# Patient Record
Sex: FEMALE | Race: WHITE | NOT HISPANIC OR LATINO | Employment: UNEMPLOYED | URBAN - NONMETROPOLITAN AREA
[De-identification: names, ages, dates, MRNs, and addresses within clinical notes are randomized per-mention and may not be internally consistent; named-entity substitution may affect disease eponyms.]

---

## 2023-09-07 PROBLEM — G60.9 IDIOPATHIC NEUROPATHY: Status: ACTIVE | Noted: 2023-09-07

## 2023-09-07 PROBLEM — E07.9 THYROID DISEASE: Status: ACTIVE | Noted: 2023-09-07

## 2023-09-07 PROBLEM — E89.0 POSTPROCEDURAL HYPOTHYROIDISM: Status: ACTIVE | Noted: 2023-09-07

## 2023-09-07 PROBLEM — M19.90 OSTEOARTHRITIS: Status: ACTIVE | Noted: 2023-09-07

## 2023-09-07 PROBLEM — R20.0 NUMBNESS: Status: ACTIVE | Noted: 2023-09-07

## 2023-09-07 PROBLEM — R76.0 ABNORMAL ANTINUCLEAR ANTIBODY TITER: Status: ACTIVE | Noted: 2023-09-07

## 2023-09-07 PROBLEM — E55.9 VITAMIN D DEFICIENCY: Status: ACTIVE | Noted: 2023-09-07

## 2023-09-07 PROBLEM — H35.3190 NONEXUDATIVE AGE-RELATED MACULAR DEGENERATION: Status: ACTIVE | Noted: 2023-09-07

## 2023-09-07 PROBLEM — M19.90 ARTHRITIS: Status: ACTIVE | Noted: 2023-09-07

## 2023-09-07 RX ORDER — ASPIRIN 325 MG
TABLET ORAL
COMMUNITY
End: 2024-02-09 | Stop reason: ENTERED-IN-ERROR

## 2023-09-07 RX ORDER — TRIAMCINOLONE ACETONIDE 55 UG/1
SPRAY, METERED NASAL
COMMUNITY
Start: 2018-08-20 | End: 2024-01-09 | Stop reason: ALTCHOICE

## 2023-09-07 RX ORDER — LEVOTHYROXINE SODIUM 112 UG/1
TABLET ORAL
COMMUNITY
Start: 2018-08-20 | End: 2024-01-09 | Stop reason: ALTCHOICE

## 2023-09-07 RX ORDER — DIAPER,BRIEF,INFANT-TODD,DISP
1 EACH MISCELLANEOUS DAILY
COMMUNITY
Start: 2018-08-20 | End: 2024-02-09 | Stop reason: ENTERED-IN-ERROR

## 2023-09-07 RX ORDER — ERYTHROMYCIN 5 MG/G
OINTMENT OPHTHALMIC
COMMUNITY
End: 2024-01-09 | Stop reason: ALTCHOICE

## 2023-09-07 RX ORDER — VIT A/VIT C/VIT E/ZINC/COPPER 2148-113
1 TABLET ORAL 2 TIMES DAILY
Status: ON HOLD | COMMUNITY
End: 2024-02-09 | Stop reason: SINTOL

## 2023-09-07 RX ORDER — TRIAMCINOLONE ACETONIDE 1 MG/G
CREAM TOPICAL
COMMUNITY
Start: 2018-08-20 | End: 2024-02-09 | Stop reason: ENTERED-IN-ERROR

## 2023-09-07 RX ORDER — TRAVOPROST OPHTHALMIC SOLUTION 0.04 MG/ML
1 SOLUTION OPHTHALMIC NIGHTLY
COMMUNITY
Start: 2018-08-20

## 2023-09-07 RX ORDER — SOY PROTEIN
1 POWDER (GRAM) ORAL DAILY
COMMUNITY
Start: 2018-08-20 | End: 2024-02-09 | Stop reason: ENTERED-IN-ERROR

## 2023-09-07 RX ORDER — ONDANSETRON 4 MG/1
4 TABLET, FILM COATED ORAL EVERY 8 HOURS PRN
COMMUNITY
End: 2024-04-05 | Stop reason: ALTCHOICE

## 2023-09-07 RX ORDER — LEVOTHYROXINE SODIUM 75 UG/1
TABLET ORAL
COMMUNITY
End: 2023-10-05

## 2023-09-07 RX ORDER — ACETAMINOPHEN 500 MG
1 TABLET ORAL DAILY
COMMUNITY

## 2023-09-07 RX ORDER — CLOBETASOL PROPIONATE 0.5 MG/G
EMULSION TOPICAL
COMMUNITY
Start: 2018-08-20 | End: 2024-02-09 | Stop reason: ENTERED-IN-ERROR

## 2023-10-05 DIAGNOSIS — E07.9 THYROID DISEASE: Primary | ICD-10-CM

## 2023-10-05 DIAGNOSIS — E07.9 THYROID DISEASE: ICD-10-CM

## 2023-10-05 RX ORDER — LEVOTHYROXINE SODIUM 75 UG/1
75 TABLET ORAL DAILY
Qty: 90 TABLET | Refills: 3 | Status: CANCELLED | OUTPATIENT
Start: 2023-10-05 | End: 2024-10-04

## 2023-10-05 RX ORDER — LEVOTHYROXINE SODIUM 75 UG/1
75 TABLET ORAL DAILY
Qty: 90 TABLET | Refills: 3 | Status: SHIPPED | OUTPATIENT
Start: 2023-10-05 | End: 2024-10-04

## 2024-01-09 ENCOUNTER — OFFICE VISIT (OUTPATIENT)
Dept: PRIMARY CARE | Facility: CLINIC | Age: 89
End: 2024-01-09
Payer: MEDICARE

## 2024-01-09 ENCOUNTER — ANCILLARY PROCEDURE (OUTPATIENT)
Dept: RADIOLOGY | Facility: CLINIC | Age: 89
End: 2024-01-09
Payer: MEDICARE

## 2024-01-09 VITALS
TEMPERATURE: 98.6 F | HEIGHT: 62 IN | SYSTOLIC BLOOD PRESSURE: 136 MMHG | WEIGHT: 167.4 LBS | DIASTOLIC BLOOD PRESSURE: 80 MMHG | OXYGEN SATURATION: 97 % | BODY MASS INDEX: 30.8 KG/M2 | HEART RATE: 86 BPM

## 2024-01-09 DIAGNOSIS — B02.9 HERPES ZOSTER WITHOUT COMPLICATION: Primary | ICD-10-CM

## 2024-01-09 DIAGNOSIS — M79.671 RIGHT FOOT PAIN: ICD-10-CM

## 2024-01-09 PROBLEM — R53.82 CHRONIC FATIGUE: Status: ACTIVE | Noted: 2024-01-09

## 2024-01-09 PROCEDURE — 1125F AMNT PAIN NOTED PAIN PRSNT: CPT | Performed by: FAMILY MEDICINE

## 2024-01-09 PROCEDURE — 99214 OFFICE O/P EST MOD 30 MIN: CPT | Performed by: FAMILY MEDICINE

## 2024-01-09 PROCEDURE — 73630 X-RAY EXAM OF FOOT: CPT | Mod: RIGHT SIDE | Performed by: RADIOLOGY

## 2024-01-09 PROCEDURE — 1159F MED LIST DOCD IN RCRD: CPT | Performed by: FAMILY MEDICINE

## 2024-01-09 PROCEDURE — 73630 X-RAY EXAM OF FOOT: CPT | Mod: RT

## 2024-01-09 PROCEDURE — 1036F TOBACCO NON-USER: CPT | Performed by: FAMILY MEDICINE

## 2024-01-09 RX ORDER — VALACYCLOVIR HYDROCHLORIDE 1 G/1
1000 TABLET, FILM COATED ORAL 3 TIMES DAILY
Qty: 21 TABLET | Refills: 0 | Status: SHIPPED | OUTPATIENT
Start: 2024-01-09 | End: 2024-01-16

## 2024-01-09 ASSESSMENT — ENCOUNTER SYMPTOMS
COUGH: 0
DEPRESSION: 0
FEVER: 0
SORE THROAT: 0
LOSS OF SENSATION IN FEET: 0
NUMBNESS: 1
SHORTNESS OF BREATH: 0
CHILLS: 0
FATIGUE: 0
OCCASIONAL FEELINGS OF UNSTEADINESS: 0
JOINT SWELLING: 1

## 2024-01-09 ASSESSMENT — PATIENT HEALTH QUESTIONNAIRE - PHQ9
1. LITTLE INTEREST OR PLEASURE IN DOING THINGS: NOT AT ALL
SUM OF ALL RESPONSES TO PHQ9 QUESTIONS 1 AND 2: 0
2. FEELING DOWN, DEPRESSED OR HOPELESS: NOT AT ALL

## 2024-01-09 ASSESSMENT — LIFESTYLE VARIABLES: HOW OFTEN DO YOU HAVE A DRINK CONTAINING ALCOHOL: NEVER

## 2024-01-09 ASSESSMENT — PAIN SCALES - GENERAL: PAINLEVEL: 6

## 2024-01-09 NOTE — PROGRESS NOTES
"Subjective   Patient ID: Nanette Flynn is a 88 y.o. female who presents for Neck Pain and Ear Injury.    HPI pain began 5-6 days ago, pain along neck and jaw into collarbone. Now with rash.      Review of Systems   Constitutional:  Negative for chills, fatigue and fever.   HENT:  Positive for ear pain. Negative for congestion, postnasal drip and sore throat.    Eyes:  Negative for visual disturbance.   Respiratory:  Negative for cough and shortness of breath.    Cardiovascular:  Negative for chest pain.   Musculoskeletal:  Positive for joint swelling (right foot).   Neurological:  Positive for numbness.       Objective   /80   Pulse 86   Temp 37 °C (98.6 °F)   Ht 1.575 m (5' 2\")   Wt 75.9 kg (167 lb 6.4 oz)   SpO2 97%   BMI 30.62 kg/m²     Physical Exam  Constitutional:       Appearance: Normal appearance.   Musculoskeletal:         General: Swelling, tenderness (lateral aspect of right foot) and deformity present.   Skin:     Findings: Erythema and rash present.      Comments: Erythematous rash along c2 distribution with 2 small blisters noted   Neurological:      Mental Status: She is alert.         Assessment/Plan   Problem List Items Addressed This Visit    None  Visit Diagnoses         Codes    Herpes zoster without complication    -  Primary B02.9    Relevant Medications    valACYclovir (Valtrex) 1 gram tablet    Right foot pain     M79.671    Relevant Orders    XR foot right 1-2 views          Call if no better or worse  "

## 2024-01-10 ENCOUNTER — TELEPHONE (OUTPATIENT)
Dept: PRIMARY CARE | Facility: CLINIC | Age: 89
End: 2024-01-10
Payer: MEDICARE

## 2024-01-10 PROBLEM — B02.9 HERPES ZOSTER WITHOUT COMPLICATION: Status: ACTIVE | Noted: 2024-01-10

## 2024-01-10 PROBLEM — M79.671 RIGHT FOOT PAIN: Status: ACTIVE | Noted: 2024-01-10

## 2024-01-10 PROBLEM — H40.1130 PRIMARY OPEN ANGLE GLAUCOMA (POAG) OF BOTH EYES: Status: ACTIVE | Noted: 2022-04-01

## 2024-01-10 PROBLEM — H35.3130 BILATERAL NONEXUDATIVE AGE-RELATED MACULAR DEGENERATION: Status: ACTIVE | Noted: 2022-10-03

## 2024-01-10 NOTE — TELEPHONE ENCOUNTER
Daughter has decided to get the rx for Gabapentin filled.  Please send to [facility] in [place].  Thank you

## 2024-01-11 DIAGNOSIS — B02.9 HERPES ZOSTER WITHOUT COMPLICATION: Primary | ICD-10-CM

## 2024-01-11 DIAGNOSIS — M79.671 RIGHT FOOT PAIN: Primary | ICD-10-CM

## 2024-01-11 RX ORDER — GABAPENTIN 100 MG/1
100 CAPSULE ORAL 2 TIMES DAILY
Qty: 60 CAPSULE | Refills: 5 | Status: SHIPPED | OUTPATIENT
Start: 2024-01-11 | End: 2024-01-25 | Stop reason: SINTOL

## 2024-01-25 ENCOUNTER — OFFICE VISIT (OUTPATIENT)
Dept: PRIMARY CARE | Facility: CLINIC | Age: 89
End: 2024-01-25
Payer: MEDICARE

## 2024-01-25 VITALS
OXYGEN SATURATION: 95 % | HEIGHT: 62 IN | DIASTOLIC BLOOD PRESSURE: 60 MMHG | HEART RATE: 82 BPM | TEMPERATURE: 97.8 F | BODY MASS INDEX: 31.1 KG/M2 | SYSTOLIC BLOOD PRESSURE: 138 MMHG | WEIGHT: 169 LBS

## 2024-01-25 DIAGNOSIS — Z00.00 ROUTINE GENERAL MEDICAL EXAMINATION AT HEALTH CARE FACILITY: Primary | ICD-10-CM

## 2024-01-25 DIAGNOSIS — R76.0 ABNORMAL ANTINUCLEAR ANTIBODY TITER: ICD-10-CM

## 2024-01-25 DIAGNOSIS — B02.29 POST HERPETIC NEURALGIA: ICD-10-CM

## 2024-01-25 DIAGNOSIS — E55.9 VITAMIN D DEFICIENCY: ICD-10-CM

## 2024-01-25 DIAGNOSIS — R53.82 CHRONIC FATIGUE: ICD-10-CM

## 2024-01-25 DIAGNOSIS — E07.9 THYROID DISEASE: ICD-10-CM

## 2024-01-25 LAB
ALBUMIN SERPL BCP-MCNC: 4.1 G/DL (ref 3.4–5)
ALP SERPL-CCNC: 55 U/L (ref 33–136)
ALT SERPL W P-5'-P-CCNC: 22 U/L (ref 7–45)
ANION GAP SERPL CALC-SCNC: 14 MMOL/L (ref 10–20)
AST SERPL W P-5'-P-CCNC: 25 U/L (ref 9–39)
BASOPHILS # BLD AUTO: 0.06 X10*3/UL (ref 0–0.1)
BASOPHILS NFR BLD AUTO: 0.7 %
BILIRUB SERPL-MCNC: 0.5 MG/DL (ref 0–1.2)
BUN SERPL-MCNC: 27 MG/DL (ref 6–23)
CALCIUM SERPL-MCNC: 9.6 MG/DL (ref 8.6–10.3)
CHLORIDE SERPL-SCNC: 102 MMOL/L (ref 98–107)
CO2 SERPL-SCNC: 27 MMOL/L (ref 21–32)
CREAT SERPL-MCNC: 0.94 MG/DL (ref 0.5–1.05)
EGFRCR SERPLBLD CKD-EPI 2021: 59 ML/MIN/1.73M*2
EOSINOPHIL # BLD AUTO: 0.3 X10*3/UL (ref 0–0.4)
EOSINOPHIL NFR BLD AUTO: 3.3 %
ERYTHROCYTE [DISTWIDTH] IN BLOOD BY AUTOMATED COUNT: 13.8 % (ref 11.5–14.5)
GLUCOSE SERPL-MCNC: 98 MG/DL (ref 74–99)
HCT VFR BLD AUTO: 39.7 % (ref 36–46)
HGB BLD-MCNC: 13 G/DL (ref 12–16)
IMM GRANULOCYTES # BLD AUTO: 0.02 X10*3/UL (ref 0–0.5)
IMM GRANULOCYTES NFR BLD AUTO: 0.2 % (ref 0–0.9)
LYMPHOCYTES # BLD AUTO: 3.54 X10*3/UL (ref 0.8–3)
LYMPHOCYTES NFR BLD AUTO: 39 %
MCH RBC QN AUTO: 31.3 PG (ref 26–34)
MCHC RBC AUTO-ENTMCNC: 32.7 G/DL (ref 32–36)
MCV RBC AUTO: 96 FL (ref 80–100)
MONOCYTES # BLD AUTO: 0.87 X10*3/UL (ref 0.05–0.8)
MONOCYTES NFR BLD AUTO: 9.6 %
NEUTROPHILS # BLD AUTO: 4.28 X10*3/UL (ref 1.6–5.5)
NEUTROPHILS NFR BLD AUTO: 47.2 %
NRBC BLD-RTO: 0 /100 WBCS (ref 0–0)
PLATELET # BLD AUTO: 226 X10*3/UL (ref 150–450)
POTASSIUM SERPL-SCNC: 4.3 MMOL/L (ref 3.5–5.3)
PROT SERPL-MCNC: 7.1 G/DL (ref 6.4–8.2)
RBC # BLD AUTO: 4.15 X10*6/UL (ref 4–5.2)
SODIUM SERPL-SCNC: 139 MMOL/L (ref 136–145)
T4 FREE SERPL-MCNC: 0.94 NG/DL (ref 0.61–1.12)
TSH SERPL-ACNC: 1.64 MIU/L (ref 0.44–3.98)
WBC # BLD AUTO: 9.1 X10*3/UL (ref 4.4–11.3)

## 2024-01-25 PROCEDURE — 1170F FXNL STATUS ASSESSED: CPT | Performed by: FAMILY MEDICINE

## 2024-01-25 PROCEDURE — 80053 COMPREHEN METABOLIC PANEL: CPT

## 2024-01-25 PROCEDURE — 1036F TOBACCO NON-USER: CPT | Performed by: FAMILY MEDICINE

## 2024-01-25 PROCEDURE — 84439 ASSAY OF FREE THYROXINE: CPT

## 2024-01-25 PROCEDURE — G0439 PPPS, SUBSEQ VISIT: HCPCS | Performed by: FAMILY MEDICINE

## 2024-01-25 PROCEDURE — 84443 ASSAY THYROID STIM HORMONE: CPT

## 2024-01-25 PROCEDURE — 1159F MED LIST DOCD IN RCRD: CPT | Performed by: FAMILY MEDICINE

## 2024-01-25 PROCEDURE — 1157F ADVNC CARE PLAN IN RCRD: CPT | Performed by: FAMILY MEDICINE

## 2024-01-25 PROCEDURE — 85025 COMPLETE CBC W/AUTO DIFF WBC: CPT

## 2024-01-25 PROCEDURE — 36415 COLL VENOUS BLD VENIPUNCTURE: CPT

## 2024-01-25 PROCEDURE — 1125F AMNT PAIN NOTED PAIN PRSNT: CPT | Performed by: FAMILY MEDICINE

## 2024-01-25 ASSESSMENT — PATIENT HEALTH QUESTIONNAIRE - PHQ9
1. LITTLE INTEREST OR PLEASURE IN DOING THINGS: NOT AT ALL
2. FEELING DOWN, DEPRESSED OR HOPELESS: NOT AT ALL
SUM OF ALL RESPONSES TO PHQ9 QUESTIONS 1 AND 2: 0

## 2024-01-25 ASSESSMENT — ENCOUNTER SYMPTOMS
CONSTIPATION: 0
DYSURIA: 0
FATIGUE: 0
SORE THROAT: 0
NAUSEA: 0
HEMATURIA: 0
HEADACHES: 0
CHILLS: 0
FREQUENCY: 0
WHEEZING: 0
DIARRHEA: 0
COUGH: 0
ABDOMINAL PAIN: 0
VOMITING: 0
ARTHRALGIAS: 0
FEVER: 0
DIZZINESS: 0
NERVOUS/ANXIOUS: 0
BRUISES/BLEEDS EASILY: 0
SHORTNESS OF BREATH: 0
PALPITATIONS: 0
DYSPHORIC MOOD: 0
JOINT SWELLING: 0

## 2024-01-25 ASSESSMENT — ACTIVITIES OF DAILY LIVING (ADL)
GROCERY_SHOPPING: INDEPENDENT
MANAGING_FINANCES: INDEPENDENT
TAKING_MEDICATION: INDEPENDENT
BATHING: INDEPENDENT
DOING_HOUSEWORK: INDEPENDENT
DRESSING: INDEPENDENT

## 2024-01-25 ASSESSMENT — PAIN SCALES - GENERAL: PAINLEVEL: 6

## 2024-01-25 ASSESSMENT — LIFESTYLE VARIABLES: HOW OFTEN DO YOU HAVE A DRINK CONTAINING ALCOHOL: NEVER

## 2024-01-25 NOTE — PROGRESS NOTES
"Subjective   Reason for Visit: Nanette Flynn is an 88 y.o. female here for a Medicare Wellness visit.     Past Medical, Surgical, and Family History reviewed and updated in chart.    Reviewed all medications by prescribing practitioner or clinical pharmacist (such as prescriptions, OTCs, herbal therapies and supplements) and documented in the medical record.    HPI HERE FOR EXAM AND LABS. MOST OF SHINGLES RASH GONE BUT STILL WITH PAIN, ALSO PAIN IN RIGHT EAR NOW TOO. NO NEW BLISTERS. SAW PODIATRY  AND HAD CALLUS ALONG WITH CORE REMOVAL AND PAIN IN FOOT GONE AND CAN WALK/PLACE PRESSURE.     Patient Care Team:  Salud Walker MD as PCP - General (Family Medicine)  Salud Walker MD as PCP - [employer] PCP  Asia Dejesus DO as Primary Care Provider     Review of Systems   Constitutional:  Negative for chills, fatigue and fever.   HENT:  Negative for congestion, ear pain, hearing loss, postnasal drip, sore throat and tinnitus.    Eyes:  Negative for visual disturbance.   Respiratory:  Negative for cough, shortness of breath and wheezing.    Cardiovascular:  Negative for chest pain and palpitations.   Gastrointestinal:  Negative for abdominal pain, constipation, diarrhea, nausea and vomiting.   Endocrine: Negative for polyuria.   Genitourinary:  Negative for dysuria, frequency, hematuria and urgency.   Musculoskeletal:  Negative for arthralgias, gait problem and joint swelling.   Skin:  Negative for rash.   Neurological:  Negative for dizziness, syncope and headaches.   Hematological:  Does not bruise/bleed easily.   Psychiatric/Behavioral:  Negative for dysphoric mood. The patient is not nervous/anxious.        Objective   Vitals:  /60   Pulse 82   Temp 36.6 °C (97.8 °F)   Ht 1.575 m (5' 2\")   Wt 76.7 kg (169 lb)   SpO2 95%   BMI 30.91 kg/m²       Physical Exam  Vitals reviewed.   Constitutional:       General: She is not in acute distress.     Appearance: " Normal appearance.   HENT:      Head: Normocephalic.      Right Ear: Tympanic membrane, ear canal and external ear normal.      Left Ear: Tympanic membrane, ear canal and external ear normal.      Nose: Nose normal.      Mouth/Throat:      Mouth: Mucous membranes are moist.      Pharynx: Oropharynx is clear.   Eyes:      Extraocular Movements: Extraocular movements intact.      Conjunctiva/sclera: Conjunctivae normal.      Pupils: Pupils are equal, round, and reactive to light.   Cardiovascular:      Rate and Rhythm: Normal rate and regular rhythm.      Pulses: Normal pulses.      Heart sounds: No murmur heard.  Pulmonary:      Effort: Pulmonary effort is normal.      Breath sounds: Normal breath sounds. No wheezing or rhonchi.   Abdominal:      General: Bowel sounds are normal. There is no distension.      Palpations: Abdomen is soft.      Tenderness: There is no abdominal tenderness. There is no rebound.   Musculoskeletal:      Right lower leg: No edema.      Left lower leg: No edema.   Skin:     General: Skin is warm.      Coloration: Skin is not jaundiced.   Neurological:      General: No focal deficit present.      Mental Status: She is alert and oriented to person, place, and time.      Cranial Nerves: No cranial nerve deficit.      Gait: Gait normal.   Psychiatric:         Mood and Affect: Mood normal.         Assessment/Plan   Problem List Items Addressed This Visit       Abnormal antinuclear antibody titer    Thyroid disease    Relevant Orders    Thyroid Stimulating Hormone    Thyroxine, Free    Vitamin D deficiency    Chronic fatigue    Relevant Orders    CBC and Auto Differential    Comprehensive Metabolic Panel     Other Visit Diagnoses       Routine general medical examination at health care facility    -  Primary    Relevant Orders    1 Year Follow Up In Primary Care - Wellness Exam    Post herpetic neuralgia              May expect decrease in pain as swelling continues to subside- use topical  capsaicin or HC in the meantime    Labs ordered     Low fat low chol low salt diet recommended, care gaps reviewed, wait on zoster vaccine for now

## 2024-01-25 NOTE — LETTER
January 25, 2024     Patient: Nanette Flynn   YOB: 1935   Date of Visit: 1/25/2024       To Whom It May Concern:    It is my medical opinion that Ms. Flynn requires a disability parking placard for the following reasons:  She cannot walk without assistance from another person or the use of an assistance device (cane, crutch, prosthetic device, wheelchair, etc.).  She cannot walk 200 feet without stopping to rest.  Duration of need: permanent    If you have any questions or concerns, please don't hesitate to call.         Sincerely,        Salud Walker MD        CC: Nanette EMMA Gee

## 2024-02-09 ENCOUNTER — HOSPITAL ENCOUNTER (INPATIENT)
Facility: HOSPITAL | Age: 89
LOS: 5 days | Discharge: NURSING, REHAB OR LONG-TERM CARE | DRG: 176 | End: 2024-02-14
Attending: STUDENT IN AN ORGANIZED HEALTH CARE EDUCATION/TRAINING PROGRAM | Admitting: INTERNAL MEDICINE
Payer: MEDICARE

## 2024-02-09 ENCOUNTER — APPOINTMENT (OUTPATIENT)
Dept: VASCULAR MEDICINE | Facility: HOSPITAL | Age: 89
DRG: 176 | End: 2024-02-09
Payer: MEDICARE

## 2024-02-09 ENCOUNTER — APPOINTMENT (OUTPATIENT)
Dept: RADIOLOGY | Facility: HOSPITAL | Age: 89
DRG: 176 | End: 2024-02-09
Payer: MEDICARE

## 2024-02-09 ENCOUNTER — APPOINTMENT (OUTPATIENT)
Dept: CARDIOLOGY | Facility: HOSPITAL | Age: 89
DRG: 176 | End: 2024-02-09
Payer: MEDICARE

## 2024-02-09 DIAGNOSIS — I82.623: ICD-10-CM

## 2024-02-09 DIAGNOSIS — I26.94 MULTIPLE SUBSEGMENTAL PULMONARY EMBOLI WITHOUT ACUTE COR PULMONALE (MULTI): Primary | ICD-10-CM

## 2024-02-09 DIAGNOSIS — I26.99 OTHER PULMONARY EMBOLISM WITHOUT ACUTE COR PULMONALE (MULTI): ICD-10-CM

## 2024-02-09 LAB
ALBUMIN SERPL BCP-MCNC: 4 G/DL (ref 3.4–5)
ALP SERPL-CCNC: 54 U/L (ref 33–136)
ALT SERPL W P-5'-P-CCNC: 13 U/L (ref 7–45)
ANION GAP SERPL CALC-SCNC: 14 MMOL/L (ref 10–20)
APPEARANCE UR: CLEAR
AST SERPL W P-5'-P-CCNC: 23 U/L (ref 9–39)
ATRIAL RATE: 78 BPM
BASOPHILS # BLD AUTO: 0.06 X10*3/UL (ref 0–0.1)
BASOPHILS NFR BLD AUTO: 0.3 %
BILIRUB SERPL-MCNC: 1.4 MG/DL (ref 0–1.2)
BILIRUB UR STRIP.AUTO-MCNC: NEGATIVE MG/DL
BUN SERPL-MCNC: 17 MG/DL (ref 6–23)
CALCIUM SERPL-MCNC: 9.3 MG/DL (ref 8.6–10.3)
CARDIAC TROPONIN I PNL SERPL HS: 10 NG/L (ref 0–13)
CHLORIDE SERPL-SCNC: 99 MMOL/L (ref 98–107)
CO2 SERPL-SCNC: 27 MMOL/L (ref 21–32)
COLOR UR: NORMAL
CREAT SERPL-MCNC: 0.72 MG/DL (ref 0.5–1.05)
EGFRCR SERPLBLD CKD-EPI 2021: 81 ML/MIN/1.73M*2
EOSINOPHIL # BLD AUTO: 0.17 X10*3/UL (ref 0–0.4)
EOSINOPHIL NFR BLD AUTO: 0.9 %
ERYTHROCYTE [DISTWIDTH] IN BLOOD BY AUTOMATED COUNT: 14 % (ref 11.5–14.5)
GLUCOSE SERPL-MCNC: 123 MG/DL (ref 74–99)
GLUCOSE UR STRIP.AUTO-MCNC: NEGATIVE MG/DL
HCT VFR BLD AUTO: 40.5 % (ref 36–46)
HGB BLD-MCNC: 13.5 G/DL (ref 12–16)
HOLD SPECIMEN: NORMAL
IMM GRANULOCYTES # BLD AUTO: 0.11 X10*3/UL (ref 0–0.5)
IMM GRANULOCYTES NFR BLD AUTO: 0.6 % (ref 0–0.9)
KETONES UR STRIP.AUTO-MCNC: NEGATIVE MG/DL
LACTATE SERPL-SCNC: 1.3 MMOL/L (ref 0.4–2)
LEUKOCYTE ESTERASE UR QL STRIP.AUTO: NEGATIVE
LIPASE SERPL-CCNC: 35 U/L (ref 9–82)
LYMPHOCYTES # BLD AUTO: 2.4 X10*3/UL (ref 0.8–3)
LYMPHOCYTES NFR BLD AUTO: 12.5 %
MAGNESIUM SERPL-MCNC: 1.91 MG/DL (ref 1.6–2.4)
MCH RBC QN AUTO: 32.2 PG (ref 26–34)
MCHC RBC AUTO-ENTMCNC: 33.3 G/DL (ref 32–36)
MCV RBC AUTO: 97 FL (ref 80–100)
MONOCYTES # BLD AUTO: 2.14 X10*3/UL (ref 0.05–0.8)
MONOCYTES NFR BLD AUTO: 11.2 %
NEUTROPHILS # BLD AUTO: 14.28 X10*3/UL (ref 1.6–5.5)
NEUTROPHILS NFR BLD AUTO: 74.5 %
NITRITE UR QL STRIP.AUTO: NEGATIVE
NRBC BLD-RTO: 0 /100 WBCS (ref 0–0)
P AXIS: 44 DEGREES
P OFFSET: 173 MS
P ONSET: 119 MS
PH UR STRIP.AUTO: 8 [PH]
PLATELET # BLD AUTO: 206 X10*3/UL (ref 150–450)
POTASSIUM SERPL-SCNC: 3.9 MMOL/L (ref 3.5–5.3)
PR INTERVAL: 190 MS
PROT SERPL-MCNC: 7.4 G/DL (ref 6.4–8.2)
PROT UR STRIP.AUTO-MCNC: NEGATIVE MG/DL
Q ONSET: 214 MS
QRS COUNT: 12 BEATS
QRS DURATION: 110 MS
QT INTERVAL: 388 MS
QTC CALCULATION(BAZETT): 442 MS
QTC FREDERICIA: 423 MS
R AXIS: 32 DEGREES
RBC # BLD AUTO: 4.19 X10*6/UL (ref 4–5.2)
RBC # UR STRIP.AUTO: NEGATIVE /UL
SODIUM SERPL-SCNC: 136 MMOL/L (ref 136–145)
SP GR UR STRIP.AUTO: 1.02
T AXIS: 79 DEGREES
T OFFSET: 408 MS
UFH PPP CHRO-ACNC: 1.1 IU/ML
UFH PPP CHRO-ACNC: 1.6 IU/ML
UROBILINOGEN UR STRIP.AUTO-MCNC: <2 MG/DL
VENTRICULAR RATE: 78 BPM
WBC # BLD AUTO: 19.2 X10*3/UL (ref 4.4–11.3)

## 2024-02-09 PROCEDURE — 71275 CT ANGIOGRAPHY CHEST: CPT

## 2024-02-09 PROCEDURE — 93005 ELECTROCARDIOGRAM TRACING: CPT

## 2024-02-09 PROCEDURE — 2500000001 HC RX 250 WO HCPCS SELF ADMINISTERED DRUGS (ALT 637 FOR MEDICARE OP): Performed by: INTERNAL MEDICINE

## 2024-02-09 PROCEDURE — 83735 ASSAY OF MAGNESIUM: CPT | Performed by: PHYSICIAN ASSISTANT

## 2024-02-09 PROCEDURE — 36415 COLL VENOUS BLD VENIPUNCTURE: CPT | Performed by: PHYSICIAN ASSISTANT

## 2024-02-09 PROCEDURE — 74177 CT ABD & PELVIS W/CONTRAST: CPT

## 2024-02-09 PROCEDURE — 96375 TX/PRO/DX INJ NEW DRUG ADDON: CPT

## 2024-02-09 PROCEDURE — 84484 ASSAY OF TROPONIN QUANT: CPT | Performed by: PHYSICIAN ASSISTANT

## 2024-02-09 PROCEDURE — 83605 ASSAY OF LACTIC ACID: CPT | Performed by: PHYSICIAN ASSISTANT

## 2024-02-09 PROCEDURE — 2500000004 HC RX 250 GENERAL PHARMACY W/ HCPCS (ALT 636 FOR OP/ED): Performed by: PHYSICIAN ASSISTANT

## 2024-02-09 PROCEDURE — 93970 EXTREMITY STUDY: CPT

## 2024-02-09 PROCEDURE — 93970 EXTREMITY STUDY: CPT | Performed by: INTERNAL MEDICINE

## 2024-02-09 PROCEDURE — 99223 1ST HOSP IP/OBS HIGH 75: CPT | Performed by: INTERNAL MEDICINE

## 2024-02-09 PROCEDURE — 85025 COMPLETE CBC W/AUTO DIFF WBC: CPT | Performed by: PHYSICIAN ASSISTANT

## 2024-02-09 PROCEDURE — 81003 URINALYSIS AUTO W/O SCOPE: CPT | Performed by: PHYSICIAN ASSISTANT

## 2024-02-09 PROCEDURE — 1100000001 HC PRIVATE ROOM DAILY

## 2024-02-09 PROCEDURE — 85520 HEPARIN ASSAY: CPT | Mod: 91 | Performed by: INTERNAL MEDICINE

## 2024-02-09 PROCEDURE — 99285 EMERGENCY DEPT VISIT HI MDM: CPT | Mod: 25 | Performed by: STUDENT IN AN ORGANIZED HEALTH CARE EDUCATION/TRAINING PROGRAM

## 2024-02-09 PROCEDURE — 36415 COLL VENOUS BLD VENIPUNCTURE: CPT | Performed by: INTERNAL MEDICINE

## 2024-02-09 PROCEDURE — 2550000001 HC RX 255 CONTRASTS: Performed by: PHYSICIAN ASSISTANT

## 2024-02-09 PROCEDURE — 96374 THER/PROPH/DIAG INJ IV PUSH: CPT

## 2024-02-09 PROCEDURE — 83690 ASSAY OF LIPASE: CPT | Performed by: PHYSICIAN ASSISTANT

## 2024-02-09 PROCEDURE — 2550000001 HC RX 255 CONTRASTS: Performed by: STUDENT IN AN ORGANIZED HEALTH CARE EDUCATION/TRAINING PROGRAM

## 2024-02-09 PROCEDURE — 80053 COMPREHEN METABOLIC PANEL: CPT | Performed by: PHYSICIAN ASSISTANT

## 2024-02-09 PROCEDURE — 96361 HYDRATE IV INFUSION ADD-ON: CPT

## 2024-02-09 RX ORDER — ONDANSETRON HYDROCHLORIDE 2 MG/ML
4 INJECTION, SOLUTION INTRAVENOUS ONCE
Status: COMPLETED | OUTPATIENT
Start: 2024-02-09 | End: 2024-02-09

## 2024-02-09 RX ORDER — LEVOTHYROXINE SODIUM 75 UG/1
75 TABLET ORAL DAILY
Status: DISCONTINUED | OUTPATIENT
Start: 2024-02-10 | End: 2024-02-15 | Stop reason: HOSPADM

## 2024-02-09 RX ORDER — ACETAMINOPHEN 325 MG/1
650 TABLET ORAL EVERY 6 HOURS PRN
Status: DISCONTINUED | OUTPATIENT
Start: 2024-02-09 | End: 2024-02-11

## 2024-02-09 RX ORDER — HEPARIN SODIUM 10000 [USP'U]/100ML
0-4500 INJECTION, SOLUTION INTRAVENOUS CONTINUOUS
Status: DISCONTINUED | OUTPATIENT
Start: 2024-02-09 | End: 2024-02-14

## 2024-02-09 RX ORDER — HEPARIN SODIUM 5000 [USP'U]/ML
80 INJECTION, SOLUTION INTRAVENOUS; SUBCUTANEOUS ONCE
Status: COMPLETED | OUTPATIENT
Start: 2024-02-09 | End: 2024-02-09

## 2024-02-09 RX ORDER — LANOLIN ALCOHOL/MO/W.PET/CERES
1000 CREAM (GRAM) TOPICAL DAILY
Status: DISCONTINUED | OUTPATIENT
Start: 2024-02-09 | End: 2024-02-15 | Stop reason: HOSPADM

## 2024-02-09 RX ORDER — LEVOTHYROXINE SODIUM 75 UG/1
75 TABLET ORAL
Status: DISCONTINUED | OUTPATIENT
Start: 2024-02-10 | End: 2024-02-09

## 2024-02-09 RX ORDER — KETOROLAC TROMETHAMINE 15 MG/ML
15 INJECTION, SOLUTION INTRAMUSCULAR; INTRAVENOUS ONCE
Status: COMPLETED | OUTPATIENT
Start: 2024-02-09 | End: 2024-02-09

## 2024-02-09 RX ORDER — LATANOPROST 50 UG/ML
1 SOLUTION/ DROPS OPHTHALMIC NIGHTLY
Status: DISCONTINUED | OUTPATIENT
Start: 2024-02-09 | End: 2024-02-15 | Stop reason: HOSPADM

## 2024-02-09 RX ORDER — HEPARIN SODIUM 5000 [USP'U]/ML
3000-6000 INJECTION, SOLUTION INTRAVENOUS; SUBCUTANEOUS EVERY 4 HOURS PRN
Status: DISCONTINUED | OUTPATIENT
Start: 2024-02-09 | End: 2024-02-14

## 2024-02-09 RX ORDER — ALUMINUM HYDROXIDE, MAGNESIUM HYDROXIDE, AND SIMETHICONE 1200; 120; 1200 MG/30ML; MG/30ML; MG/30ML
10 SUSPENSION ORAL 4 TIMES DAILY PRN
Status: DISCONTINUED | OUTPATIENT
Start: 2024-02-09 | End: 2024-02-15 | Stop reason: HOSPADM

## 2024-02-09 RX ORDER — ACETAMINOPHEN 500 MG
5 TABLET ORAL NIGHTLY PRN
Status: DISCONTINUED | OUTPATIENT
Start: 2024-02-09 | End: 2024-02-15 | Stop reason: HOSPADM

## 2024-02-09 RX ORDER — TRAVOPROST OPHTHALMIC SOLUTION 0.04 MG/ML
1 SOLUTION OPHTHALMIC NIGHTLY
Status: DISCONTINUED | OUTPATIENT
Start: 2024-02-09 | End: 2024-02-09

## 2024-02-09 RX ORDER — TRAMADOL HYDROCHLORIDE 50 MG/1
50 TABLET ORAL EVERY 6 HOURS PRN
Status: DISCONTINUED | OUTPATIENT
Start: 2024-02-09 | End: 2024-02-10

## 2024-02-09 RX ORDER — CHOLECALCIFEROL (VITAMIN D3) 25 MCG
5000 TABLET ORAL EVERY MORNING
Status: DISCONTINUED | OUTPATIENT
Start: 2024-02-10 | End: 2024-02-15 | Stop reason: HOSPADM

## 2024-02-09 RX ADMIN — HEPARIN SODIUM 6000 UNITS: 5000 INJECTION INTRAVENOUS; SUBCUTANEOUS at 12:30

## 2024-02-09 RX ADMIN — HEPARIN SODIUM 2000 UNITS/HR: 10000 INJECTION, SOLUTION INTRAVENOUS at 12:31

## 2024-02-09 RX ADMIN — ONDANSETRON 4 MG: 2 INJECTION INTRAMUSCULAR; INTRAVENOUS at 10:50

## 2024-02-09 RX ADMIN — Medication 1 PACKET: at 20:00

## 2024-02-09 RX ADMIN — KETOROLAC TROMETHAMINE 15 MG: 15 INJECTION, SOLUTION INTRAMUSCULAR; INTRAVENOUS at 10:50

## 2024-02-09 RX ADMIN — SODIUM CHLORIDE 500 ML: 9 INJECTION, SOLUTION INTRAVENOUS at 10:49

## 2024-02-09 RX ADMIN — LATANOPROST 1 DROP: 50 SOLUTION OPHTHALMIC at 22:57

## 2024-02-09 RX ADMIN — IOHEXOL 90 ML: 350 INJECTION, SOLUTION INTRAVENOUS at 11:35

## 2024-02-09 SDOH — SOCIAL STABILITY: SOCIAL INSECURITY: ARE YOU OR HAVE YOU BEEN THREATENED OR ABUSED PHYSICALLY, EMOTIONALLY, OR SEXUALLY BY ANYONE?: NO

## 2024-02-09 SDOH — SOCIAL STABILITY: SOCIAL INSECURITY: ARE THERE ANY APPARENT SIGNS OF INJURIES/BEHAVIORS THAT COULD BE RELATED TO ABUSE/NEGLECT?: NO

## 2024-02-09 SDOH — SOCIAL STABILITY: SOCIAL INSECURITY: HAS ANYONE EVER THREATENED TO HURT YOUR FAMILY OR YOUR PETS?: NO

## 2024-02-09 SDOH — SOCIAL STABILITY: SOCIAL INSECURITY: WERE YOU ABLE TO COMPLETE ALL THE BEHAVIORAL HEALTH SCREENINGS?: YES

## 2024-02-09 SDOH — SOCIAL STABILITY: SOCIAL INSECURITY: DO YOU FEEL ANYONE HAS EXPLOITED OR TAKEN ADVANTAGE OF YOU FINANCIALLY OR OF YOUR PERSONAL PROPERTY?: NO

## 2024-02-09 SDOH — SOCIAL STABILITY: SOCIAL INSECURITY: DO YOU FEEL UNSAFE GOING BACK TO THE PLACE WHERE YOU ARE LIVING?: NO

## 2024-02-09 SDOH — SOCIAL STABILITY: SOCIAL INSECURITY: HAVE YOU HAD THOUGHTS OF HARMING ANYONE ELSE?: NO

## 2024-02-09 SDOH — SOCIAL STABILITY: SOCIAL INSECURITY: ABUSE: ADULT

## 2024-02-09 SDOH — SOCIAL STABILITY: SOCIAL INSECURITY: DOES ANYONE TRY TO KEEP YOU FROM HAVING/CONTACTING OTHER FRIENDS OR DOING THINGS OUTSIDE YOUR HOME?: NO

## 2024-02-09 ASSESSMENT — COGNITIVE AND FUNCTIONAL STATUS - GENERAL
STANDING UP FROM CHAIR USING ARMS: A LITTLE
DRESSING REGULAR LOWER BODY CLOTHING: A LITTLE
WALKING IN HOSPITAL ROOM: A LITTLE
MOVING TO AND FROM BED TO CHAIR: A LITTLE
TOILETING: A LITTLE
PATIENT BASELINE BEDBOUND: NO
WALKING IN HOSPITAL ROOM: A LITTLE
CLIMB 3 TO 5 STEPS WITH RAILING: A LOT
MOBILITY SCORE: 19
DAILY ACTIVITIY SCORE: 22
MOVING TO AND FROM BED TO CHAIR: A LITTLE
CLIMB 3 TO 5 STEPS WITH RAILING: A LITTLE
DRESSING REGULAR LOWER BODY CLOTHING: A LITTLE
STANDING UP FROM CHAIR USING ARMS: A LITTLE
TOILETING: A LITTLE
DAILY ACTIVITIY SCORE: 22
MOBILITY SCORE: 20

## 2024-02-09 ASSESSMENT — LIFESTYLE VARIABLES
EVER HAD A DRINK FIRST THING IN THE MORNING TO STEADY YOUR NERVES TO GET RID OF A HANGOVER: NO
HOW OFTEN DO YOU HAVE 6 OR MORE DRINKS ON ONE OCCASION: NEVER
AUDIT-C TOTAL SCORE: 0
AUDIT-C TOTAL SCORE: 0
HOW OFTEN DO YOU HAVE A DRINK CONTAINING ALCOHOL: NEVER
HAVE YOU EVER FELT YOU SHOULD CUT DOWN ON YOUR DRINKING: NO
HAVE PEOPLE ANNOYED YOU BY CRITICIZING YOUR DRINKING: NO
SKIP TO QUESTIONS 9-10: 1
EVER FELT BAD OR GUILTY ABOUT YOUR DRINKING: NO
HOW MANY STANDARD DRINKS CONTAINING ALCOHOL DO YOU HAVE ON A TYPICAL DAY: PATIENT DOES NOT DRINK

## 2024-02-09 ASSESSMENT — ENCOUNTER SYMPTOMS
RESPIRATORY NEGATIVE: 1
NECK PAIN: 1
ACTIVITY CHANGE: 1
ALLERGIC/IMMUNOLOGIC NEGATIVE: 1
HEMATOLOGIC/LYMPHATIC NEGATIVE: 1
BACK PAIN: 1
NEUROLOGICAL NEGATIVE: 1
ARTHRALGIAS: 1
EYES NEGATIVE: 1
CARDIOVASCULAR NEGATIVE: 1
FLANK PAIN: 1
PSYCHIATRIC NEGATIVE: 1
JOINT SWELLING: 1
ENDOCRINE NEGATIVE: 1

## 2024-02-09 ASSESSMENT — PAIN SCALES - GENERAL
PAINLEVEL_OUTOF10: 0 - NO PAIN
PAINLEVEL_OUTOF10: 0 - NO PAIN
PAINLEVEL_OUTOF10: 7
PAINLEVEL_OUTOF10: 5 - MODERATE PAIN

## 2024-02-09 ASSESSMENT — ACTIVITIES OF DAILY LIVING (ADL)
FEEDING YOURSELF: INDEPENDENT
HEARING - LEFT EAR: FUNCTIONAL
DRESSING YOURSELF: INDEPENDENT
LACK_OF_TRANSPORTATION: NO
JUDGMENT_ADEQUATE_SAFELY_COMPLETE_DAILY_ACTIVITIES: YES
GROOMING: INDEPENDENT
HEARING - RIGHT EAR: FUNCTIONAL
LACK_OF_TRANSPORTATION: NO
PATIENT'S MEMORY ADEQUATE TO SAFELY COMPLETE DAILY ACTIVITIES?: YES
TOILETING: INDEPENDENT
WALKS IN HOME: NEEDS ASSISTANCE
ASSISTIVE_DEVICE: WALKER
BATHING: INDEPENDENT
ADEQUATE_TO_COMPLETE_ADL: YES

## 2024-02-09 ASSESSMENT — PATIENT HEALTH QUESTIONNAIRE - PHQ9
SUM OF ALL RESPONSES TO PHQ9 QUESTIONS 1 & 2: 0
1. LITTLE INTEREST OR PLEASURE IN DOING THINGS: NOT AT ALL
2. FEELING DOWN, DEPRESSED OR HOPELESS: NOT AT ALL

## 2024-02-09 ASSESSMENT — PAIN - FUNCTIONAL ASSESSMENT
PAIN_FUNCTIONAL_ASSESSMENT: 0-10
PAIN_FUNCTIONAL_ASSESSMENT: 0-10

## 2024-02-09 ASSESSMENT — COLUMBIA-SUICIDE SEVERITY RATING SCALE - C-SSRS
6. HAVE YOU EVER DONE ANYTHING, STARTED TO DO ANYTHING, OR PREPARED TO DO ANYTHING TO END YOUR LIFE?: NO
1. IN THE PAST MONTH, HAVE YOU WISHED YOU WERE DEAD OR WISHED YOU COULD GO TO SLEEP AND NOT WAKE UP?: NO
2. HAVE YOU ACTUALLY HAD ANY THOUGHTS OF KILLING YOURSELF?: NO

## 2024-02-09 ASSESSMENT — PAIN DESCRIPTION - DESCRIPTORS: DESCRIPTORS: PRESSURE

## 2024-02-09 NOTE — PROGRESS NOTES
02/09/24 1442   Discharge Planning   Living Arrangements Children;Family members   Support Systems Children;Family members   Assistance Needed A&OX3; independent with ADLs with walker- lives in in-law suite at daughters home; doesn't drive; room air at baseline and room air now; not on anticoags prior to hospitals [facility]???   Type of Residence Private residence   Number of Stairs to Enter Residence 0   Number of Stairs Within Residence 0   Do you have animals or pets at home? Yes   Type of Animals or Pets 1dog   Who is requesting discharge planning? Provider   Patient expects to be discharged to: TBD - likely home no needs with new Eliquis (cost???)   Does the patient need discharge transport arranged? Yes   RoundTrip coordination needed? Yes   Has discharge transport been arranged? No   Financial Resource Strain   How hard is it for you to pay for the very basics like food, housing, medical care, and heating? Not hard   Housing Stability   In the last 12 months, was there a time when you were not able to pay the mortgage or rent on time? N   In the last 12 months, how many places have you lived? 1   In the last 12 months, was there a time when you did not have a steady place to sleep or slept in a shelter (including now)? N   Transportation Needs   In the past 12 months, has lack of transportation kept you from medical appointments or from getting medications? no   In the past 12 months, has lack of transportation kept you from meetings, work, or from getting things needed for daily living? No        02/12/24 1131   Discharge Planning   Living Arrangements Children;Family members   Support Systems Children;Family members   Assistance Needed A&OX3; independent with ADLs with walker- lives in in-law suite at daughters home; doesn't drive; room air at baseline and room air now; not on anticoags prior to hospital - [facility]   Type of Residence Private residence   Number of Stairs to Enter Residence 0    Number of Stairs Within Residence 0   Do you have animals or pets at home? Yes   Type of Animals or Pets 1dog   Who is requesting discharge planning? Provider   Financial Resource Strain   How hard is it for you to pay for the very basics like food, housing, medical care, and heating? Not hard   Housing Stability   In the last 12 months, was there a time when you were not able to pay the mortgage or rent on time? N   In the last 12 months, how many places have you lived? 1   In the last 12 months, was there a time when you did not have a steady place to sleep or slept in a shelter (including now)? N   Transportation Needs   In the past 12 months, has lack of transportation kept you from medical appointments or from getting medications? no   In the past 12 months, has lack of transportation kept you from meetings, work, or from getting things needed for daily living? No      02/14/24 0630   Discharge Planning   Living Arrangements Children;Family members   Support Systems Children;Family members   Assistance Needed A&OX3; independent with ADLs with walker- lives in in-law suite at daughters home; doesn't drive; room air at baseline and room air now; not on anticoags prior to [facility]   Type of Residence Private residence   Number of Stairs to Enter Residence 0   Number of Stairs Within Residence 0   Do you have animals or pets at home? Yes   Type of Animals or Pets 1dog   Who is requesting discharge planning? Provider   Financial Resource Strain   How hard is it for you to pay for the very basics like food, housing, medical care, and heating? Not hard   Housing Stability   In the last 12 months, was there a time when you were not able to pay the mortgage or rent on time? N   In the last 12 months, how many places have you lived? 1   In the last 12 months, was there a time when you did not have a steady place to sleep or slept in a shelter (including now)? N   Transportation Needs   In the past 12  months, has lack of transportation kept you from medical appointments or from getting medications? no   In the past 12 months, has lack of transportation kept you from meetings, work, or from getting things needed for daily living? No       02/09/2024 1444pm  Spoke with patient bedside. Patient denies any home going needs at present. Patient stated she has been getting around well at home with walker. Patient will likely dc home with new Eliquis - doc will need to price check with pharmacy.     02/12/2024 1132am  Spoke with patient's family bedside (PT currently attempting to work with patient and she is experiencing difficulty moving) Patient's family now aware patient may need to transition to snf. PAQN SNF list given to family to preference. Family is mentioning [facility] as first of preference, but they will confirm and also come up with other choices. Will continue to follow.     02/13/2024 0820am  Referral was sent via [facility] to [facility] and they not have bed available. Spoke with patient's family bedside and made them aware. Their next preference is 2)[facility] 3)[facility]. Will send referrals to facilities for review.     02/13/2024 0844am  [facility] able to accept patient upon precert-family bedside made aware.     02/13/2024 1246pm  Made aware patient ready to discharge tomorrow, Wed 2/14/24. [facility] started precert and auth received from [employer] insurance (auth to admit TOMORROW 2/14 - and will then have 72 hrs to admit from tomorrow) Dr Harrison made aware of auth. Will setup transport for tomorrow morning transition to snf if dc documentation completed today.     02/13/2024 1541pm  Transport confirmed for Wed 2/13/24 11am stretcher via [facility], but patient CANNOT DISCHARGE TO SNF until Wednesday 2/13, and discharge paperwork not completed at time of this note.     02/13/2024 1605pm  Patient ready to discharge to [facility] tomorrow, Wednesday 2/14/24 with 11am stretcher with [facility]. Family and patient  aware of dc tomorrow - IMM obtained. 'After visit summary' and goldenrod sent to [facility] and they were made aware of transport time. Steph Angel at [facility] to complete 7000 in HENs. Ambulance form given to unit secretary. RN and RN Navigator aware of discharge for tomorrow.     02/14/2024 0631am  Patient dcing today to [facility]. Transport confirmed for 11am stretcher transport this morning via [facility]. All documentation for dc completed and family as well as patient aware of discharge. Patient cleared to transition to snf from Transitions standpoint.

## 2024-02-09 NOTE — PROGRESS NOTES
Pharmacy Medication History Review    Nanette Flynn is a 88 y.o. female admitted for Multiple subsegmental pulmonary emboli without acute cor pulmonale (CMS/[facility]). Pharmacy reviewed the patient's [id number] medications and allergies for accuracy.    The list below reflectives the updated PTA list. Please review each medication in order reconciliation for additional clarification and justification.  Prior to Admission medications    Medication Sig Start Date End Date Taking? Authorizing Provider   calcium carb,gluc/mag ox,gluc (CALCIUM MAGNESIUM ORAL) Take 1 tablet by mouth once daily at bedtime.    Historical Provider, MD   cholecalciferol (Vitamin D-3) 5,000 Units tablet Take 1 tablet (5,000 Units) by mouth once daily in the morning.    Historical Provider, MD   CYANOCOBALAMIN, VITAMIN B-12, ORAL Place 1 tablet under the tongue once daily in the morning.    Historical Provider, MD   levothyroxine (Synthroid, Levoxyl) 75 mcg tablet Take 1 tablet (75 mcg) by mouth once daily.  Patient taking differently: Take 1 tablet (75 mcg) by mouth once daily in the morning. Take before meals. 10/5/23 10/4/24  Salud Walker MD   ondansetron (Zofran) 4 mg tablet Take 1 tablet (4 mg) by mouth every 8 hours if needed.    Historical Provider, MD   travoprost (Travatan Z) 0.004 % drops ophthalmic solution Administer 1 drop into both eyes once daily at bedtime. 8/20/18   Historical Provider, MD   vitamins A,C,E-zinc-copper (PreserVision AREDS) 2,148 mcg-113 mg-45 mg-17.4mg tablet Take 1 capsule by mouth 2 times a day.    Historical Provider, MD   ascorbic acid/multivit-min (EMERGEN-C IMMUNE PLUS ORAL) TAKE AS DIRECTED. 8/20/18 2/9/24  Historical Provider, MD   aspirin (Joe Aspirin) 325 mg tablet   2/9/24  Historical Provider, MD   calcium-mag-vit B6-D3-minerals (Calcium Citrate Plus, Vit B6,) 881-86-8-125 gk-qv-fa-unit tablet Take 1 tablet by mouth once daily. 8/20/18 2/9/24  Historical Provider, MD    clobetasoL-emollient 0.05 % cream APPLY AND GENTLY MASSAGE INTO AFFECTED AREA(S) TWICE DAILY. 8/20/18 2/9/24  Historical Provider, MD   cyanocobalamin/folic acid (vitamin B12-folic acid) 500-400 mcg tablet Take 1 tablet by mouth once daily. 8/20/18 2/9/24  Historical Provider, MD   triamcinolone (Kenalog) 0.1 % cream  8/20/18 2/9/24  Historical Provider, MD        The list below reflectives the updated allergy list. Please review each documented allergy for additional clarification and justification.  Allergies  Reviewed by Marilou Valdez RN on 2/9/2024        Severity Reactions Comments    Gabapentin Medium Confusion FELL ASLEEP WITH ONE DOSE AND NO RECALL OF THAT DAY             Below are additional concerns with the patient's PTA list.      Lizy Raines CPhT

## 2024-02-09 NOTE — H&P
History Of Present Illness  Nanette Flynn is a 88 y.o. female presenting with right flank pain, radiating to her back, as well as across front of abdomen to epigastric area. This started last evening, made her very uncomfortable. She was unable to lie down, as that increased the pain. Coughing, taking a deep breath or moving made it worse. She denies any chest discomfort, sob, palpitations, lightheadedness. No new leg swelling or cramping. She has a bunyon on her right foot which is very painful and has caused her to have very limited mobility for the last 3 weeks or so. She has never had this before, and has never had PE before.     Past Medical History  Rheumatic fever, as a child.   Hypothyroidism  Glaucoma  Osteoarthritis  Shingles    Surgical History  Past Surgical History:   Procedure Laterality Date    APPENDECTOMY  08/20/2018    Appendectomy    CT ANGIO NECK  9/24/2013    CT NECK ANGIO W AND WO IV CONTRAST 9/24/2013 [facility]    CT HEAD ANGIO W AND WO IV CONTRAST  9/24/2013    CT HEAD ANGIO W AND WO IV CONTRAST 9/24/2013 [facility]    HYSTERECTOMY  08/20/2018    Hysterectomy    OTHER SURGICAL HISTORY  08/20/2018    Enterectomy   Colonoscopy     Social History  She reports that she has quit smoking. Her smoking use included cigarettes. She has never used smokeless tobacco. She reports that she does not currently use alcohol. She reports that she does not currently use drugs.    Family History  Family History   Problem Relation Name Age of Onset    Heart disease Mother      Hypertension Mother      Transient ischemic attack Mother      Heart disease Father      Cancer Maternal Grandmother      Colon cancer Maternal Grandmother          Allergies  Gabapentin    Review of Systems   Constitutional:  Positive for activity change.   HENT: Negative.     Eyes: Negative.    Respiratory: Negative.     Cardiovascular: Negative.    Endocrine: Negative.    Genitourinary:  Positive for flank pain.  "  Musculoskeletal:  Positive for arthralgias, back pain, joint swelling and neck pain.   Skin: Negative.    Allergic/Immunologic: Negative.    Neurological: Negative.    Hematological: Negative.    Psychiatric/Behavioral: Negative.          Physical Exam  Constitutional:       Appearance: Normal appearance.   HENT:      Head: Normocephalic and atraumatic.      Nose: Nose normal.      Mouth/Throat:      Mouth: Mucous membranes are moist.   Eyes:      Extraocular Movements: Extraocular movements intact.      Pupils: Pupils are equal, round, and reactive to light.      Comments: Glasses intact   Neck:      Comments: No rash, adenopathy, jvd or bruits  Cardiovascular:      Pulses: Normal pulses.      Comments: Regular, increased S2. 2/6 low harsh systolic murmur  Pulmonary:      Effort: Pulmonary effort is normal.      Breath sounds: Normal breath sounds.   Abdominal:      General: Bowel sounds are normal.      Palpations: Abdomen is soft.   Musculoskeletal:      Comments: Significant osteoarthritic changes of knees  No pitting edema  Both calves are a little tender to palpation. Homans is negative   Skin:     General: Skin is warm and dry.   Neurological:      General: No focal deficit present.      Mental Status: She is alert and oriented to person, place, and time. Mental status is at baseline.   Psychiatric:         Mood and Affect: Mood normal.         Behavior: Behavior normal.          Last Recorded Vitals  Blood pressure 114/73, pulse 80, temperature 36.4 °C (97.5 °F), temperature source Temporal, resp. rate 18, height 1.626 m (5' 4\"), weight 75 kg (165 lb 5.5 oz), SpO2 94 %.    Relevant Results    Assessment/Plan   Acute bilateral PE. She is not hypoxic or tachycardic. Continue heparin overnight. Will request bilateral LE venous doppler, and echo as well, although it does not seem that she has any right heart strain at this time. Will need to check on eliquis prices. It seems that her decreased mobility is the " likely risk factor for this occurrence-no recent trips, plane rides etc.   Hx RF  . Echo may be interesting  Hypothyroidism. Continue synthroid  DJD, severe. Sees ortho, getting injections etc. Also sees podiatry for the bunyion.   Glaucoma. Continue ophthalmic drops       I spent 32 minutes in the professional and overall care of this patient.      Chapis Rock MD

## 2024-02-09 NOTE — ED PROVIDER NOTES
HPI   Chief Complaint   Patient presents with    Flank Pain     Last night she started to experience a pressure pain in the right flank that is worse with deep breath.  No change with movement only deep breath and it radiates around to the front of the abd.        This is an 88-year-old female coming in for abdominal discomfort.  Patient reports that last night she started experiencing mid abdominal pain is present only when she takes deep breath.  She states it is located to the right periumbilical area and then radiates around to the right-sided back.  She states it is not present if she has normal breathing but only when she takes deep breath.  She denies any other areas of discomfort.  She has not had any vomiting but does complain of some nausea.  No diarrhea.  She denies any chest pain or shortness of breath.      History provided by:  Patient and relative                      [place] Coma Scale Score: 15                     Patient History   Past Medical History:   Diagnosis Date    Other specified postprocedural states     History of colonoscopy    Personal history of other diseases of the circulatory system     History of rheumatic fever    Personal history of other diseases of the circulatory system     History of rheumatic fever    Personal history of other medical treatment     History of mammogram     Past Surgical History:   Procedure Laterality Date    APPENDECTOMY  08/20/2018    Appendectomy    CT ANGIO NECK  9/24/2013    CT NECK ANGIO W AND WO IV CONTRAST 9/24/2013 [facility]    CT HEAD ANGIO W AND WO IV CONTRAST  9/24/2013    CT HEAD ANGIO W AND WO IV CONTRAST 9/24/2013 [facility]    HYSTERECTOMY  08/20/2018    Hysterectomy    OTHER SURGICAL HISTORY  08/20/2018    Enterectomy     Family History   Problem Relation Name Age of Onset    Heart disease Mother      Hypertension Mother      Transient ischemic attack Mother      Heart disease Father      Cancer Maternal Grandmother       Colon cancer Maternal Grandmother       Social History     Tobacco Use    Smoking status: Former     Types: Cigarettes    Smokeless tobacco: Never   Substance Use Topics    Alcohol use: Not Currently    Drug use: Not Currently       Physical Exam   ED Triage Vitals   Temp Heart Rate Respirations BP   -- 02/09/24 1000 02/09/24 1000 02/09/24 1000    76 18 144/72      Pulse Ox Temp src Heart Rate Source Patient Position   02/09/24 1000 -- 02/09/24 1012 --   96 %  Monitor       BP Location FiO2 (%)     -- --             Physical Exam  Vitals and nursing note reviewed.   Constitutional:       General: She is not in acute distress.     Appearance: Normal appearance. She is not toxic-appearing.   HENT:      Head: Normocephalic and atraumatic.      Nose: Nose normal.      Mouth/Throat:      Mouth: Mucous membranes are moist.      Pharynx: Oropharynx is clear.   Eyes:      Extraocular Movements: Extraocular movements intact.      Conjunctiva/sclera: Conjunctivae normal.      Pupils: Pupils are equal, round, and reactive to light.   Cardiovascular:      Rate and Rhythm: Normal rate and regular rhythm.      Pulses: Normal pulses.      Heart sounds: Normal heart sounds.   Pulmonary:      Effort: Pulmonary effort is normal. No respiratory distress.      Breath sounds: Normal breath sounds.   Abdominal:      General: Abdomen is flat. Bowel sounds are normal.      Palpations: Abdomen is soft.      Tenderness: There is no abdominal tenderness.   Musculoskeletal:         General: Normal range of motion.      Cervical back: Normal range of motion and neck supple.   Skin:     General: Skin is warm and dry.      Coloration: Skin is not jaundiced or pale.      Findings: No bruising.   Neurological:      General: No focal deficit present.      Mental Status: She is alert and oriented to person, place, and time. Mental status is at baseline.   Psychiatric:         Mood and Affect: Mood normal.         Behavior: Behavior normal.         ED  Course & MDM   ED Course as of 02/09/24 1248   Fri Feb 09, 2024   1004 EKG completed at 954 on my interpretation shows normal sinus rhythm.  Ventricular rate of 70 bpm with a QTc of 442 ms.  No T wave elevation or depression [RS]      ED Course User Index  [RS] Terry Villarreal PA-C         Diagnoses as of 02/09/24 1248   Multiple subsegmental pulmonary emboli without acute cor pulmonale (CMS/HCC)       Medical Decision Making  Summary:  Medical Decision Making:   Patient presented as described in HPI. Patient case including ROS, PE, and treatment and plan discussed with ED attending if attached as cosigner. Results from labs and or imaging included below if completed. Nanette Flynn  is a 88 y.o. coming in for Patient presents with:  Flank Pain: Last night she started to experience a pressure pain in the right flank that is worse with deep breath.  No change with movement only deep breath and it radiates around to the front of the abd.   .  Patient complains of abdominal discomfort however does not present on palpation.  She reports it is only present when she takes a deep breath.  Due to her symptoms CT PE study will be completed as well as CT abdomen pelvis with contrast.  Lab work was completed.  Lab work shows a white blood cell count of 19.  No other concerning lab abnormalities.  CT scan did show multiple bilateral pulmonary emboli.  She is made aware of the findings.  There is some constipation and other abnormalities however nothing else emergent.  Patient was started on heparin.  She will be hospitalized for the management of the pulmonary embolisms.      I also explained the plan and treatment course. Patient/guardian is in agreement with plan, treatment course, and states verbally that they will comply.    Labs Reviewed  CBC WITH AUTO DIFFERENTIAL - Abnormal     WBC                           19.2 (*)               nRBC                          0.0                    RBC                           4.19                    Hemoglobin                    13.5                   Hematocrit                    40.5                   MCV                           97                     MCH                           32.2                   MCHC                          33.3                   RDW                           14.0                   Platelets                     206                    Neutrophils %                 74.5                   Immature Granulocytes %, Automated   0.6                    Lymphocytes %                 12.5                   Monocytes %                   11.2                   Eosinophils %                 0.9                    Basophils %                   0.3                    Neutrophils Absolute          14.28 (*)               Immature Granulocytes Absolute, Au*   0.11                   Lymphocytes Absolute          2.40                   Monocytes Absolute            2.14 (*)               Eosinophils Absolute          0.17                   Basophils Absolute            0.06                COMPREHENSIVE METABOLIC PANEL - Abnormal     Glucose                       123 (*)                Sodium                        136                    Potassium                     3.9                    Chloride                      99                     Bicarbonate                   27                     Anion Gap                     14                     Urea Nitrogen                 17                     Creatinine                    0.72                   eGFR                          81                     Calcium                       9.3                    Albumin                       4.0                    Alkaline Phosphatase          54                     Total Protein                 7.4                    AST                           23                     Bilirubin, Total              1.4 (*)                ALT                           13                  MAGNESIUM - Normal      Magnesium                     1.91                LACTATE - Normal     Lactate                       1.3                      Narrative: Venipuncture immediately after or during the administration of Metamizole may lead to falsely low results. Testing should be performed immediately                prior to Metamizole dosing.  LIPASE - Normal     Lipase                        35                       Narrative: Venipuncture immediately after or during the administration of Metamizole may lead to falsely low results. Testing should be performed immediately prior to Metamizole dosing.  TROPONIN I, HIGH SENSITIVITY - Normal     Troponin I, High Sensitivity   10                       Narrative: Less than 99th percentile of normal range cutoff-                Female and children under 18 years old <14 ng/L; Male <21 ng/L: Negative                Repeat testing should be performed if clinically indicated.                                 Female and children under 18 years old 14-50 ng/L; Male 21-50 ng/L:                Consistent with possible cardiac damage and possible increased clinical                 risk. Serial measurements may help to assess extent of myocardial damage.                                 >50 ng/L: Consistent with cardiac damage, increased clinical risk and                myocardial infarction. Serial measurements may help assess extent of                 myocardial damage.                                  NOTE: Children less than 1 year old may have higher baseline troponin                 levels and results should be interpreted in conjunction with the overall                 clinical context.                                 NOTE: Troponin I testing is performed using a different                 testing methodology at [facility] than at other                 [facility]. Direct result comparisons should only                 be made within the same method.  URINALYSIS WITH REFLEX  CULTURE AND MICROSCOPIC       Narrative: The following orders were created for panel order Urinalysis with Reflex Culture and Microscopic.                Procedure                               Abnormality         Status                                   ---------                               -----------         ------                                   Urinalysis with Reflex C...[[id number]]                                                               Extra Urine Gray Tube[[id number]]                                                                                     Please view results for these tests on the individual orders.  URINALYSIS WITH REFLEX CULTURE AND MICROSCOPIC  EXTRA URINE GRAY TUBE   CT angio chest for pulmonary embolism   Final Result    CHEST:    1.  Bilateral pulmonary embolism. There are emboli in the proximal    segmental arteries to the right middle lobe right lower lobe in left    lower lobe. No emboli in the main pulmonary outflow tract or right or    left main pulmonary arteries. No CT evidence for right heart strain.    2. No acute parenchymal disease.    3. Scattered enlarged mediastinal lymph nodes          ABDOMEN AND PELVIS:    1.  No acute abdominal or pelvic pathology.    2. Status post right hemicolectomy.    3. Descending colonic and sigmoid diverticula with no diverticulitis.    4. Constipation.    5. Benign subcentimeter hepatic cysts.    6. Status post hysterectomy.          MACRO:    Anna Marie Nesbitt discussed the significance and urgency of this    critical finding by secure chat with  IWONA CARDENAS on 2/9/2024 at    11:53 am.  (**-RCF-**) Findings:  See findings.          Signed by: Anna Marie Nesbitt 2/9/2024 11:53 AM    Dictation workstation:   [id number]     CT abdomen pelvis w IV contrast   Final Result    CHEST:    1.  Bilateral pulmonary embolism. There are emboli in the proximal    segmental arteries to the right middle lobe right lower lobe in left    lower lobe. No emboli  in the main pulmonary outflow tract or right or    left main pulmonary arteries. No CT evidence for right heart strain.    2. No acute parenchymal disease.    3. Scattered enlarged mediastinal lymph nodes          ABDOMEN AND PELVIS:    1.  No acute abdominal or pelvic pathology.    2. Status post right hemicolectomy.    3. Descending colonic and sigmoid diverticula with no diverticulitis.    4. Constipation.    5. Benign subcentimeter hepatic cysts.    6. Status post hysterectomy.          MACRO:    Anna Marie Nesbitt discussed the significance and urgency of this    critical finding by secure chat with  TERRY CARDENAS on 2/9/2024 at    11:53 am.  (**-F-**) Findings:  See findings.          Signed by: Anna Marie Nesbitt 2/9/2024 11:53 AM    Dictation workstation:   [id number]         Tests/Medications/Escalations of Care considered but not given: As in [facility]    Patient care discussed with: N/A  Social Determinants affecting care: N/A    Final diagnosis and disposition as documented     ED Course as of 02/09/24 1250  ------------------------------------------------------------  Time: 02/09 1004  Comment: EKG completed at 954 on my interpretation shows normal sinus rhythm.  Ventricular rate of 70 bpm with a QTc of 442 ms.  No T wave elevation or depression  By: Terry Cardenas PA-C    ------------------------------------------------------------  Diagnoses as of 02/09/24 1250  Multiple subsegmental pulmonary emboli without acute cor pulmonale (CMS/HCC)        Hospitalize. I discussed the differential; results and admit plan with the patient and/or family/friend/caregiver if present.  I emphasized the importance of hospitalization need for re-evaluation/continued monitoring/interventions.. They agreed that if they feel their condition is worsening or if they have any other concern they should alert staff immediately for further assistance. I gave the patient an opportunity to ask all questions they had and answered all of them  accordingly. The patient and/or family/friend/caregiver expressed understanding verbally and that they would comply.    Disposition:  Hospitalize to obs floor under Dr. Rock per their orders. Discussed findings and treatment done here in ED with admitting physician. It would be a risk to discharge the patient in their condition due to possibility of deterioration in their condition and the need for urgent interventions.    This note has been transcribed using voice recognition and may contain grammatical errors, misplaced words, incorrect words, incorrect phrases or other errors.         Procedure  Procedures     Terry Villarreal PA-C  02/09/24 4046

## 2024-02-10 LAB
UFH PPP CHRO-ACNC: 0.6 IU/ML
UFH PPP CHRO-ACNC: 0.7 IU/ML
UFH PPP CHRO-ACNC: 0.8 IU/ML

## 2024-02-10 PROCEDURE — 36415 COLL VENOUS BLD VENIPUNCTURE: CPT | Performed by: INTERNAL MEDICINE

## 2024-02-10 PROCEDURE — 99233 SBSQ HOSP IP/OBS HIGH 50: CPT | Performed by: INTERNAL MEDICINE

## 2024-02-10 PROCEDURE — 2500000004 HC RX 250 GENERAL PHARMACY W/ HCPCS (ALT 636 FOR OP/ED): Performed by: INTERNAL MEDICINE

## 2024-02-10 PROCEDURE — 1100000001 HC PRIVATE ROOM DAILY

## 2024-02-10 PROCEDURE — 85520 HEPARIN ASSAY: CPT | Mod: MUE | Performed by: INTERNAL MEDICINE

## 2024-02-10 PROCEDURE — 2500000004 HC RX 250 GENERAL PHARMACY W/ HCPCS (ALT 636 FOR OP/ED): Performed by: NURSE PRACTITIONER

## 2024-02-10 PROCEDURE — 2500000001 HC RX 250 WO HCPCS SELF ADMINISTERED DRUGS (ALT 637 FOR MEDICARE OP): Performed by: INTERNAL MEDICINE

## 2024-02-10 RX ORDER — DOCUSATE SODIUM 100 MG/1
100 CAPSULE, LIQUID FILLED ORAL 2 TIMES DAILY
Status: DISCONTINUED | OUTPATIENT
Start: 2024-02-10 | End: 2024-02-15 | Stop reason: HOSPADM

## 2024-02-10 RX ORDER — PROCHLORPERAZINE 25 MG/1
25 SUPPOSITORY RECTAL ONCE
Status: COMPLETED | OUTPATIENT
Start: 2024-02-10 | End: 2024-02-10

## 2024-02-10 RX ORDER — MORPHINE SULFATE 4 MG/ML
4 INJECTION INTRAVENOUS EVERY 4 HOURS PRN
Status: DISCONTINUED | OUTPATIENT
Start: 2024-02-10 | End: 2024-02-15 | Stop reason: HOSPADM

## 2024-02-10 RX ORDER — PROCHLORPERAZINE EDISYLATE 5 MG/ML
10 INJECTION INTRAMUSCULAR; INTRAVENOUS ONCE
Status: COMPLETED | OUTPATIENT
Start: 2024-02-10 | End: 2024-02-10

## 2024-02-10 RX ORDER — ONDANSETRON HYDROCHLORIDE 2 MG/ML
4 INJECTION, SOLUTION INTRAVENOUS EVERY 6 HOURS PRN
Status: DISCONTINUED | OUTPATIENT
Start: 2024-02-10 | End: 2024-02-11

## 2024-02-10 RX ORDER — PROCHLORPERAZINE MALEATE 5 MG
10 TABLET ORAL ONCE
Status: COMPLETED | OUTPATIENT
Start: 2024-02-10 | End: 2024-02-10

## 2024-02-10 RX ORDER — MORPHINE SULFATE 2 MG/ML
2 INJECTION, SOLUTION INTRAMUSCULAR; INTRAVENOUS ONCE
Status: COMPLETED | OUTPATIENT
Start: 2024-02-10 | End: 2024-02-10

## 2024-02-10 RX ORDER — OXYCODONE HYDROCHLORIDE 5 MG/1
5 TABLET ORAL EVERY 4 HOURS PRN
Status: DISCONTINUED | OUTPATIENT
Start: 2024-02-10 | End: 2024-02-15 | Stop reason: HOSPADM

## 2024-02-10 RX ADMIN — Medication 1 PACKET: at 09:00

## 2024-02-10 RX ADMIN — Medication 5000 UNITS: at 09:20

## 2024-02-10 RX ADMIN — HEPARIN SODIUM 1350 UNITS/HR: 10000 INJECTION, SOLUTION INTRAVENOUS at 00:41

## 2024-02-10 RX ADMIN — LEVOTHYROXINE SODIUM 75 MCG: 75 TABLET ORAL at 05:00

## 2024-02-10 RX ADMIN — ONDANSETRON 4 MG: 2 INJECTION INTRAMUSCULAR; INTRAVENOUS at 16:49

## 2024-02-10 RX ADMIN — DOCUSATE SODIUM 100 MG: 100 CAPSULE, LIQUID FILLED ORAL at 12:23

## 2024-02-10 RX ADMIN — CYANOCOBALAMIN TAB 1000 MCG 1000 MCG: 1000 TAB at 09:20

## 2024-02-10 RX ADMIN — OXYCODONE HYDROCHLORIDE 5 MG: 5 TABLET ORAL at 12:23

## 2024-02-10 RX ADMIN — LATANOPROST 1 DROP: 50 SOLUTION OPHTHALMIC at 20:32

## 2024-02-10 RX ADMIN — TRAMADOL HYDROCHLORIDE 50 MG: 50 TABLET, COATED ORAL at 05:03

## 2024-02-10 RX ADMIN — HEPARIN SODIUM 1350 UNITS/HR: 10000 INJECTION, SOLUTION INTRAVENOUS at 05:05

## 2024-02-10 RX ADMIN — PROCHLORPERAZINE EDISYLATE 10 MG: 5 INJECTION INTRAMUSCULAR; INTRAVENOUS at 20:52

## 2024-02-10 RX ADMIN — OXYCODONE HYDROCHLORIDE 5 MG: 5 TABLET ORAL at 16:49

## 2024-02-10 RX ADMIN — MORPHINE SULFATE 2 MG: 2 INJECTION, SOLUTION INTRAMUSCULAR; INTRAVENOUS at 09:21

## 2024-02-10 ASSESSMENT — COGNITIVE AND FUNCTIONAL STATUS - GENERAL
WALKING IN HOSPITAL ROOM: A LITTLE
MOBILITY SCORE: 20
TOILETING: A LITTLE
DAILY ACTIVITIY SCORE: 22
DRESSING REGULAR LOWER BODY CLOTHING: A LITTLE
MOVING TO AND FROM BED TO CHAIR: A LITTLE
TOILETING: A LITTLE
MOVING TO AND FROM BED TO CHAIR: A LITTLE
CLIMB 3 TO 5 STEPS WITH RAILING: A LITTLE
MOBILITY SCORE: 20
DRESSING REGULAR LOWER BODY CLOTHING: A LITTLE
DAILY ACTIVITIY SCORE: 22
CLIMB 3 TO 5 STEPS WITH RAILING: A LITTLE
STANDING UP FROM CHAIR USING ARMS: A LITTLE
WALKING IN HOSPITAL ROOM: A LITTLE
STANDING UP FROM CHAIR USING ARMS: A LITTLE

## 2024-02-10 ASSESSMENT — PAIN SCALES - GENERAL
PAINLEVEL_OUTOF10: 8
PAINLEVEL_OUTOF10: 4
PAINLEVEL_OUTOF10: 6
PAINLEVEL_OUTOF10: 0 - NO PAIN
PAINLEVEL_OUTOF10: 3
PAINLEVEL_OUTOF10: 5 - MODERATE PAIN
PAINLEVEL_OUTOF10: 5 - MODERATE PAIN
PAINLEVEL_OUTOF10: 4

## 2024-02-10 ASSESSMENT — PAIN DESCRIPTION - LOCATION
LOCATION: SHOULDER

## 2024-02-10 ASSESSMENT — PAIN - FUNCTIONAL ASSESSMENT
PAIN_FUNCTIONAL_ASSESSMENT: 0-10

## 2024-02-10 ASSESSMENT — PAIN DESCRIPTION - ORIENTATION
ORIENTATION: RIGHT

## 2024-02-10 NOTE — NURSING NOTE
2130 - heparin assay critical 1.6. Omaira Anderson notified and orders to hold and redraw in 1 hour.   22:30 - assay 1.1. Justin gave orders to continue holding and redraw in 1 hour.   23:45 - assay 0.7. Justin gave orders to restart heparin drip at appropriate rate.

## 2024-02-10 NOTE — CARE PLAN
Problem: Pain  Goal: My pain/discomfort is manageable  Outcome: Progressing     Problem: Safety  Goal: Patient will be injury free during hospitalization  Outcome: Progressing  Goal: I will remain free of falls  Outcome: Progressing     Problem: Daily Care  Goal: Daily care needs are met  Outcome: Progressing   The patient's goals for the shift include      The clinical goals for the shift include patient will have decreased right shoulder pain by end of day    Patient started PRN oxycodone today and pain was managed to a point in which the patient could rest. Patient still has complaints of pain but states that the oxycodone has helped to decrease pain level to a tolerable level.

## 2024-02-10 NOTE — CARE PLAN
2/10/24 @ 1150: Molina to pharmacy [facility] in [place], cost for script is $0, patient and provider notified. Lindsey Khan Rn, [facility]

## 2024-02-10 NOTE — PROGRESS NOTES
Nanette Dawkins is a 88 y.o. female on day 1 of admission presenting with Multiple subsegmental pulmonary emboli without acute cor pulmonale (CMS/HCC).      Subjective   Having a lot of right shoulder pain, radiating across to her chest. Worse with movement, deep breaths       Objective     Last Recorded Vitals  /77 (BP Location: Left arm, Patient Position: Lying)   Pulse 63   Temp 36.5 °C (97.7 °F) (Temporal)   Resp 16   Wt 75 kg (165 lb 5.5 oz)   SpO2 94%   Intake/Output last 3 Shifts:    Intake/Output Summary (Last 24 hours) at 2/10/2024 1155  Last data filed at 2/10/2024 0911  Gross per 24 hour   Intake 1160 ml   Output --   Net 1160 ml       Admission Weight  Weight: 75 kg (165 lb 5.5 oz) (02/09/24 1012)    Daily Weight  02/09/24 : 75 kg (165 lb 5.5 oz)    Image Results  ECG 12 lead  Normal sinus rhythm  Minimal voltage criteria for LVH, may be normal variant ( [place] product )  Cannot rule out Anterior infarct , age undetermined  Abnormal ECG  When compared with ECG of 24-SEP-2013 16:01,  No significant change was found  See ED provider note for full interpretation and clinical correlation  Confirmed by Emmett Fajardo ([id number]) on 2/9/2024 6:32:40 PM  Vascular US lower extremity venous duplex bilateral  Preliminary Cardiology Report            [place] [address]   Tel [phone] and Fax [phone]               Preliminary Vascular Lab Report     [facility] US LOWER EXTREMITY VENOUS DUPLEX BILATERAL       Patient Name:      NANETTE DAWKINS Reading Physician:  [id number] Blanca Quiroz MD  Study Date:        2/9/2024           Ordering Physician: [id number] EM GALLAGHER  MRN/PID:           [id number]           Technologist:       Alejandra Mckee RVT  Accession#:        [id number]       Technologist 2:  Date of Birth/Age: 1935          Encounter#:         [id number]  Gender:            F  Admission Status:  Inpatient          Location Performed:  [facility]       Diagnosis/ICD: Acute embolism and thrombosis of deep veins of upper extremity,                 bilateral-I82.623  Procedure/CPT: 45911 Peripheral venous duplex scan for DVT complete       PRELIMINARY CONCLUSIONS:  Right Lower Venous: No evidence of acute deep vein thrombus visualized in the right lower extremity. Additional Findings; Lymph nodes in groin measuring 0.9 cm x 0.5 cm.  Left Lower Venous: No evidence of acute deep vein thrombus visualized in the left lower extremity.     Imaging & Doppler Findings:     Right                 Compressible Thrombus        Flow  Distal External Iliac     Yes        None   Spontaneous/Phasic  CFV                       Yes        None   Spontaneous/Phasic  PFV                       Yes        None  FV Proximal               Yes        None   Spontaneous/Phasic  FV Mid                    Yes        None  FV Distal                 Yes        None  Popliteal                 Yes        None   Spontaneous/Phasic  Peroneal                  Yes        None  PTV                       Yes        None       Left                  Compress Thrombus        Flow  Distal External Iliac   Yes      None   Spontaneous/Phasic  CFV                     Yes      None   Spontaneous/Phasic  PFV                     Yes      None  FV Proximal             Yes      None   Spontaneous/Phasic  FV Mid                  Yes      None  FV Distal               Yes      None  Popliteal               Yes      None   Spontaneous/Phasic  Peroneal                Yes      None  PTV                     Yes      None    VASCULAR PRELIMINARY REPORT  completed by Alejandra Mckee RVT on 2/9/2024 at 3:37:41 PM       ** Final **  CT angio chest for pulmonary embolism, CT abdomen pelvis w IV contrast  Narrative: Interpreted By:  Anna Marie Nesbitt,   STUDY:  CT ANGIO CHEST FOR PULMONARY EMBOLISM; CT ABDOMEN PELVIS W IV  CONTRAST;  2/9/2024 11:31 am      INDICATION:  Signs/Symptoms:Pain in her abdomen  but only when taking a deep  breath.  Located to the periumbilical region and radiates to the  right flank.  Patient is tachypneic; Signs/Symptoms:Pain in her  abdomen but only when taking a deep breath. Located to the  periumbilical region and radiates to the right flank. Patient is  tachypneic.      COMPARISON:  None.      ACCESSION NUMBER(S):  [id number]; [id number]      ORDERING CLINICIAN:  IWONA CARDENAS      TECHNIQUE:  CT of the chest, abdomen, and pelvis was performed.  Contiguous axial images were obtained at 3 mm slice thickness through  the chest, abdomen and pelvis. Coronal and sagittal reconstructions  at 3 mm slice thickness were performed.  90 mL Omnipaque 350      FINDINGS:  CHEST:      LUNG/PLEURA/LARGE AIRWAYS:  There is no infiltrate or pleural fluid.  There is no pulmonary  nodule. There are chronic interstitial changes of the lungs.  There are scattered areas of scarring.  There is a 5.5 cm thin-walled bulla in the left upper lobe.      VESSELS:  There are emboli to proximal pulmonary arteries to the right middle  and lower lobes There are emboli in proximal segmental arteries to  the left lower lobe There are no emboli in the right or left main  pulmonary arteries or main pulmonary outflow tract. There is no right  heart strain. There is atherosclerotic calcification of the thoracic  aorta      HEART:  The heart is unremarkable.      MEDIASTINUM AND ARMANDO:  There is an enlarged subcarinal lymph node measuring 2.8 x 1.4 cm  There is an enlarged right hilar lymph node measuring 1.6 x 1.4 cm      CHEST WALL AND LOWER NECK:  The chest wall is unremarkable. There is no abnormality of the lower  neck.      ABDOMEN:      LIVER:  There are benign subcentimeter hepatic cysts.      BILE DUCTS:  There is no intrahepatic, common hepatic or common bile ductal  dilatation.      GALLBLADDER:  The gallbladder is unremarkable.      PANCREAS:  There is no abnormality of the pancreas.      SPLEEN:  The spleen is  unremarkable. There is no splenic mass. There is no  splenomegaly      ADRENAL GLANDS:  The adrenal glands are unremarkable.      KIDNEYS AND URETERS:.  The kidneys function symmetrically.  There is no renal mass.  There is no intrarenal calculus or hydronephrosis.          PELVIS:      BLADDER:  The bladder is unremarkable.      REPRODUCTIVE ORGANS:  Status post hysterectomy.      BOWEL:  There is no bowel wall thickening, dilatation or obstruction.  There  are descending colonic and sigmoid diverticula with no  diverticulitis. Status post right hemicolectomy. There is a moderate  amount of stool throughout the colon.      VESSELS:  There is atherosclerotic calcification of the abdominal aorta, iliac  and femoral arteries.      PERITONEUM/RETROPERITONEUM/LYMPH NODES:  There is no retroperitoneal or pelvic adenopathy.  There is no  ascites.      ABDOMINAL WALL:  The abdominal wall is unremarkable.      BONES AND SOFT TISSUES:  There is no acute osseous finding.      There is no soft tissue abnormality.      Impression: CHEST:  1.  Bilateral pulmonary embolism. There are emboli in the proximal  segmental arteries to the right middle lobe right lower lobe in left  lower lobe. No emboli in the main pulmonary outflow tract or right or  left main pulmonary arteries. No CT evidence for right heart strain.  2. No acute parenchymal disease.  3. Scattered enlarged mediastinal lymph nodes      ABDOMEN AND PELVIS:  1.  No acute abdominal or pelvic pathology.  2. Status post right hemicolectomy.  3. Descending colonic and sigmoid diverticula with no diverticulitis.  4. Constipation.  5. Benign subcentimeter hepatic cysts.  6. Status post hysterectomy.      MACRO:  Anna Marie Nesbitt discussed the significance and urgency of this  critical finding by secure chat with  IWONA CARDENAS on 2/9/2024 at  11:53 am.  (**-F-**) Findings:  See findings.      Signed by: Anna Marie Nesbitt 2/9/2024 11:53 AM  Dictation workstation:    [id number]      Physical Exam  Constitutional:       Appearance: Normal appearance.   HENT:      Head: Normocephalic.      Nose: Nose normal.      Mouth/Throat:      Mouth: Mucous membranes are moist.   Eyes:      Extraocular Movements: Extraocular movements intact.      Pupils: Pupils are equal, round, and reactive to light.   Cardiovascular:      Comments: Regular, S4, increased S2, 1/6 systolic murmur  Pulmonary:      Comments: Splinting. No wheezing. Shallow respirations  Abdominal:      General: Bowel sounds are normal.      Palpations: Abdomen is soft.   Musculoskeletal:         General: Normal range of motion.   Skin:     General: Skin is warm.   Neurological:      General: No focal deficit present.      Mental Status: She is alert and oriented to person, place, and time.   Psychiatric:         Mood and Affect: Mood normal.         Behavior: Behavior normal.       Relevant Results       Assessment/Plan        Principal Problem:    Multiple subsegmental pulmonary emboli without acute cor pulmonale (CMS/HCC)    Multiple bilateral acute PE. She is on heparin, no evidence of right heart strain. Vascular study neg for DVT. Also nothing seen in abdomen. There is some adenopathy noted, hilar, and in groin. She is not hypoxic, but very uncomfortable. Will work on pain control today. Tele. Echo Monday . Eliquis will be free for her  Hypothyroid, stable on meds  Rheumatic fever  Glaucoma, continue ophthalmic drops              Chapis Rock MD

## 2024-02-10 NOTE — CARE PLAN
Problem: Pain  Goal: My pain/discomfort is manageable  Outcome: Progressing     Problem: Safety  Goal: Patient will be injury free during hospitalization  Outcome: Progressing  Goal: I will remain free of falls  Outcome: Progressing     Problem: Daily Care  Goal: Daily care needs are met  Outcome: Progressing     Problem: Psychosocial Needs  Goal: Demonstrates ability to cope with hospitalization/illness  Outcome: Progressing  Goal: Collaborate with me, my family, and caregiver to identify my specific goals  Outcome: Progressing     Problem: Discharge Barriers  Goal: My discharge needs are met  Outcome: Progressing       Pt had uneventful night. No changes. Heparin adjusted throughout the night. Medicated for right shoulder discomfort.

## 2024-02-11 LAB
BASOPHILS # BLD AUTO: 0.04 X10*3/UL (ref 0–0.1)
BASOPHILS NFR BLD AUTO: 0.3 %
EOSINOPHIL # BLD AUTO: 0.21 X10*3/UL (ref 0–0.4)
EOSINOPHIL NFR BLD AUTO: 1.5 %
ERYTHROCYTE [DISTWIDTH] IN BLOOD BY AUTOMATED COUNT: 13.6 % (ref 11.5–14.5)
ERYTHROCYTE [DISTWIDTH] IN BLOOD BY AUTOMATED COUNT: 13.8 % (ref 11.5–14.5)
HCT VFR BLD AUTO: 34.9 % (ref 36–46)
HCT VFR BLD AUTO: 37.7 % (ref 36–46)
HGB BLD-MCNC: 11.5 G/DL (ref 12–16)
HGB BLD-MCNC: 12.2 G/DL (ref 12–16)
HOLD SPECIMEN: NORMAL
HOLD SPECIMEN: NORMAL
IMM GRANULOCYTES # BLD AUTO: 0.07 X10*3/UL (ref 0–0.5)
IMM GRANULOCYTES NFR BLD AUTO: 0.5 % (ref 0–0.9)
LYMPHOCYTES # BLD AUTO: 3.1 X10*3/UL (ref 0.8–3)
LYMPHOCYTES NFR BLD AUTO: 22.7 %
MCH RBC QN AUTO: 31.3 PG (ref 26–34)
MCH RBC QN AUTO: 31.8 PG (ref 26–34)
MCHC RBC AUTO-ENTMCNC: 32.4 G/DL (ref 32–36)
MCHC RBC AUTO-ENTMCNC: 33 G/DL (ref 32–36)
MCV RBC AUTO: 96 FL (ref 80–100)
MCV RBC AUTO: 97 FL (ref 80–100)
MONOCYTES # BLD AUTO: 1.77 X10*3/UL (ref 0.05–0.8)
MONOCYTES NFR BLD AUTO: 13 %
NEUTROPHILS # BLD AUTO: 8.46 X10*3/UL (ref 1.6–5.5)
NEUTROPHILS NFR BLD AUTO: 62 %
NRBC BLD-RTO: 0 /100 WBCS (ref 0–0)
NRBC BLD-RTO: 0 /100 WBCS (ref 0–0)
PLATELET # BLD AUTO: 190 X10*3/UL (ref 150–450)
PLATELET # BLD AUTO: 200 X10*3/UL (ref 150–450)
RBC # BLD AUTO: 3.62 X10*6/UL (ref 4–5.2)
RBC # BLD AUTO: 3.9 X10*6/UL (ref 4–5.2)
UFH PPP CHRO-ACNC: 0.4 IU/ML
WBC # BLD AUTO: 13.7 X10*3/UL (ref 4.4–11.3)
WBC # BLD AUTO: 14.2 X10*3/UL (ref 4.4–11.3)

## 2024-02-11 PROCEDURE — 85520 HEPARIN ASSAY: CPT | Performed by: INTERNAL MEDICINE

## 2024-02-11 PROCEDURE — 99233 SBSQ HOSP IP/OBS HIGH 50: CPT | Performed by: INTERNAL MEDICINE

## 2024-02-11 PROCEDURE — 1100000001 HC PRIVATE ROOM DAILY

## 2024-02-11 PROCEDURE — 2500000001 HC RX 250 WO HCPCS SELF ADMINISTERED DRUGS (ALT 637 FOR MEDICARE OP): Performed by: INTERNAL MEDICINE

## 2024-02-11 PROCEDURE — 2500000004 HC RX 250 GENERAL PHARMACY W/ HCPCS (ALT 636 FOR OP/ED): Performed by: INTERNAL MEDICINE

## 2024-02-11 PROCEDURE — 85027 COMPLETE CBC AUTOMATED: CPT | Performed by: INTERNAL MEDICINE

## 2024-02-11 PROCEDURE — 36415 COLL VENOUS BLD VENIPUNCTURE: CPT | Performed by: INTERNAL MEDICINE

## 2024-02-11 PROCEDURE — 85025 COMPLETE CBC W/AUTO DIFF WBC: CPT | Performed by: INTERNAL MEDICINE

## 2024-02-11 RX ORDER — ACETAMINOPHEN 160 MG/5ML
650 SOLUTION ORAL
Status: DISCONTINUED | OUTPATIENT
Start: 2024-02-11 | End: 2024-02-15 | Stop reason: HOSPADM

## 2024-02-11 RX ORDER — ACETAMINOPHEN 325 MG/1
975 TABLET ORAL
Status: DISCONTINUED | OUTPATIENT
Start: 2024-02-11 | End: 2024-02-15 | Stop reason: HOSPADM

## 2024-02-11 RX ORDER — ONDANSETRON HYDROCHLORIDE 2 MG/ML
4 INJECTION, SOLUTION INTRAVENOUS EVERY 4 HOURS PRN
Status: DISCONTINUED | OUTPATIENT
Start: 2024-02-11 | End: 2024-02-15 | Stop reason: HOSPADM

## 2024-02-11 RX ORDER — ACETAMINOPHEN 650 MG/1
650 SUPPOSITORY RECTAL
Status: DISCONTINUED | OUTPATIENT
Start: 2024-02-11 | End: 2024-02-15 | Stop reason: HOSPADM

## 2024-02-11 RX ADMIN — ACETAMINOPHEN 975 MG: 325 TABLET ORAL at 10:32

## 2024-02-11 RX ADMIN — LATANOPROST 1 DROP: 50 SOLUTION OPHTHALMIC at 20:38

## 2024-02-11 RX ADMIN — ACETAMINOPHEN 975 MG: 325 TABLET ORAL at 20:37

## 2024-02-11 RX ADMIN — DOCUSATE SODIUM 100 MG: 100 CAPSULE, LIQUID FILLED ORAL at 20:37

## 2024-02-11 RX ADMIN — ACETAMINOPHEN 975 MG: 325 TABLET ORAL at 15:26

## 2024-02-11 RX ADMIN — DOCUSATE SODIUM 100 MG: 100 CAPSULE, LIQUID FILLED ORAL at 10:32

## 2024-02-11 RX ADMIN — HEPARIN SODIUM 1150 UNITS/HR: 10000 INJECTION, SOLUTION INTRAVENOUS at 04:21

## 2024-02-11 ASSESSMENT — PAIN - FUNCTIONAL ASSESSMENT: PAIN_FUNCTIONAL_ASSESSMENT: 0-10

## 2024-02-11 ASSESSMENT — COGNITIVE AND FUNCTIONAL STATUS - GENERAL
MOVING FROM LYING ON BACK TO SITTING ON SIDE OF FLAT BED WITH BEDRAILS: A LITTLE
STANDING UP FROM CHAIR USING ARMS: A LOT
TURNING FROM BACK TO SIDE WHILE IN FLAT BAD: A LITTLE
MOBILITY SCORE: 14
TOILETING: A LITTLE
MOVING TO AND FROM BED TO CHAIR: A LOT
WALKING IN HOSPITAL ROOM: A LOT
CLIMB 3 TO 5 STEPS WITH RAILING: A LOT
CLIMB 3 TO 5 STEPS WITH RAILING: A LOT
DAILY ACTIVITIY SCORE: 21
WALKING IN HOSPITAL ROOM: A LOT
MOVING TO AND FROM BED TO CHAIR: A LOT
DAILY ACTIVITIY SCORE: 20
TOILETING: A LITTLE
HELP NEEDED FOR BATHING: A LITTLE
MOVING FROM LYING ON BACK TO SITTING ON SIDE OF FLAT BED WITH BEDRAILS: A LITTLE
DRESSING REGULAR UPPER BODY CLOTHING: A LITTLE
STANDING UP FROM CHAIR USING ARMS: A LOT
MOBILITY SCORE: 14
HELP NEEDED FOR BATHING: A LITTLE
DRESSING REGULAR LOWER BODY CLOTHING: A LITTLE
DRESSING REGULAR LOWER BODY CLOTHING: A LITTLE
TURNING FROM BACK TO SIDE WHILE IN FLAT BAD: A LITTLE

## 2024-02-11 ASSESSMENT — PAIN DESCRIPTION - ORIENTATION: ORIENTATION: RIGHT

## 2024-02-11 ASSESSMENT — PAIN SCALES - GENERAL
PAINLEVEL_OUTOF10: 6
PAINLEVEL_OUTOF10: 3

## 2024-02-11 ASSESSMENT — PAIN DESCRIPTION - LOCATION: LOCATION: SHOULDER

## 2024-02-11 NOTE — CARE PLAN
The patient's goals for the shift include      Pt had nausea thru out the night that was resolved with compazine . Vitals are stable

## 2024-02-11 NOTE — CARE PLAN
Problem: Pain  Goal: My pain/discomfort is manageable  Outcome: Progressing     Problem: Safety  Goal: Patient will be injury free during hospitalization  Outcome: Progressing  Goal: I will remain free of falls  Outcome: Progressing     Problem: Daily Care  Goal: Daily care needs are met  Outcome: Progressing     Problem: Psychosocial Needs  Goal: Demonstrates ability to cope with hospitalization/illness  Outcome: Progressing  Goal: Collaborate with me, my family, and caregiver to identify my specific goals  Outcome: Progressing     Problem: Discharge Barriers  Goal: My discharge needs are met  Outcome: Progressing   The patient's goals for the shift include      The clinical goals for the shift include patient will get up to the chair by end of day.    Patient used the BSC and sat up in the chair today. Patients pain was controlled with scheduled tylenol. Patient had no complaints of nausea today. Family at [facility] visiting with patient all day.

## 2024-02-11 NOTE — PROGRESS NOTES
Nanette Dawkins is a 88 y.o. female on day 2 of admission presenting with Multiple subsegmental pulmonary emboli without acute cor pulmonale (CMS/HCC).      Subjective   Miserable. Did sleep, but now again in a lot of pain, also stiffness. Vomited last evening.        Objective     Last Recorded Vitals  /60   Pulse 69   Temp 36.7 °C (98.1 °F) (Temporal)   Resp 18   Wt 75 kg (165 lb 5.5 oz)   SpO2 91%   Intake/Output last 3 Shifts:  No intake or output data in the 24 hours ending 02/11/24 0913    Admission Weight  Weight: 75 kg (165 lb 5.5 oz) (02/09/24 1012)    Daily Weight  02/09/24 : 75 kg (165 lb 5.5 oz)    Image Results  ECG 12 lead  Normal sinus rhythm  Minimal voltage criteria for LVH, may be normal variant ( [place] product )  Cannot rule out Anterior infarct , age undetermined  Abnormal ECG  When compared with ECG of 24-SEP-2013 16:01,  No significant change was found  See ED provider note for full interpretation and clinical correlation  Confirmed by Emmett Fajardo ([id number]) on 2/9/2024 6:32:40 PM  Vascular US lower extremity venous duplex bilateral  Preliminary Cardiology Report            [place] [address]   Tel [phone] and Fax [phone]               Preliminary Vascular Lab Report     [facility] US LOWER EXTREMITY VENOUS DUPLEX BILATERAL       Patient Name:      ANNETTE DAWKINS Reading Physician:  [id number] Blanca Quiroz MD  Study Date:        2/9/2024           Ordering Physician: [id number] EM GALLAGHER  MRN/PID:           [id number]           Technologist:       Alejandra Mckee RVT  Accession#:        [id number]       Technologist 2:  Date of Birth/Age: 1935          Encounter#:         [id number]  Gender:            F  Admission Status:  Inpatient          Location Performed: [facility]       Diagnosis/ICD: Acute embolism and thrombosis of deep veins of upper extremity,                 bilateral-I82.623  Procedure/CPT: 55799  Peripheral venous duplex scan for DVT complete       PRELIMINARY CONCLUSIONS:  Right Lower Venous: No evidence of acute deep vein thrombus visualized in the right lower extremity. Additional Findings; Lymph nodes in groin measuring 0.9 cm x 0.5 cm.  Left Lower Venous: No evidence of acute deep vein thrombus visualized in the left lower extremity.     Imaging & Doppler Findings:     Right                 Compressible Thrombus        Flow  Distal External Iliac     Yes        None   Spontaneous/Phasic  CFV                       Yes        None   Spontaneous/Phasic  PFV                       Yes        None  FV Proximal               Yes        None   Spontaneous/Phasic  FV Mid                    Yes        None  FV Distal                 Yes        None  Popliteal                 Yes        None   Spontaneous/Phasic  Peroneal                  Yes        None  PTV                       Yes        None       Left                  Compress Thrombus        Flow  Distal External Iliac   Yes      None   Spontaneous/Phasic  CFV                     Yes      None   Spontaneous/Phasic  PFV                     Yes      None  FV Proximal             Yes      None   Spontaneous/Phasic  FV Mid                  Yes      None  FV Distal               Yes      None  Popliteal               Yes      None   Spontaneous/Phasic  Peroneal                Yes      None  PTV                     Yes      None    VASCULAR PRELIMINARY REPORT  completed by Alejandra Mckee RVT on 2/9/2024 at 3:37:41 PM       ** Final **  CT angio chest for pulmonary embolism, CT abdomen pelvis w IV contrast  Narrative: Interpreted By:  Anna Marie Nesbitt,   STUDY:  CT ANGIO CHEST FOR PULMONARY EMBOLISM; CT ABDOMEN PELVIS W IV  CONTRAST;  2/9/2024 11:31 am      INDICATION:  Signs/Symptoms:Pain in her abdomen but only when taking a deep  breath.  Located to the periumbilical region and radiates to the  right flank.  Patient is tachypneic; Signs/Symptoms:Pain in  her  abdomen but only when taking a deep breath. Located to the  periumbilical region and radiates to the right flank. Patient is  tachypneic.      COMPARISON:  None.      ACCESSION NUMBER(S):  [id number]; [id number]      ORDERING CLINICIAN:  IWONA CARDENAS      TECHNIQUE:  CT of the chest, abdomen, and pelvis was performed.  Contiguous axial images were obtained at 3 mm slice thickness through  the chest, abdomen and pelvis. Coronal and sagittal reconstructions  at 3 mm slice thickness were performed.  90 mL Omnipaque 350      FINDINGS:  CHEST:      LUNG/PLEURA/LARGE AIRWAYS:  There is no infiltrate or pleural fluid.  There is no pulmonary  nodule. There are chronic interstitial changes of the lungs.  There are scattered areas of scarring.  There is a 5.5 cm thin-walled bulla in the left upper lobe.      VESSELS:  There are emboli to proximal pulmonary arteries to the right middle  and lower lobes There are emboli in proximal segmental arteries to  the left lower lobe There are no emboli in the right or left main  pulmonary arteries or main pulmonary outflow tract. There is no right  heart strain. There is atherosclerotic calcification of the thoracic  aorta      HEART:  The heart is unremarkable.      MEDIASTINUM AND ARMANDO:  There is an enlarged subcarinal lymph node measuring 2.8 x 1.4 cm  There is an enlarged right hilar lymph node measuring 1.6 x 1.4 cm      CHEST WALL AND LOWER NECK:  The chest wall is unremarkable. There is no abnormality of the lower  neck.      ABDOMEN:      LIVER:  There are benign subcentimeter hepatic cysts.      BILE DUCTS:  There is no intrahepatic, common hepatic or common bile ductal  dilatation.      GALLBLADDER:  The gallbladder is unremarkable.      PANCREAS:  There is no abnormality of the pancreas.      SPLEEN:  The spleen is unremarkable. There is no splenic mass. There is no  splenomegaly      ADRENAL GLANDS:  The adrenal glands are unremarkable.      KIDNEYS AND  URETERS:.  The kidneys function symmetrically.  There is no renal mass.  There is no intrarenal calculus or hydronephrosis.          PELVIS:      BLADDER:  The bladder is unremarkable.      REPRODUCTIVE ORGANS:  Status post hysterectomy.      BOWEL:  There is no bowel wall thickening, dilatation or obstruction.  There  are descending colonic and sigmoid diverticula with no  diverticulitis. Status post right hemicolectomy. There is a moderate  amount of stool throughout the colon.      VESSELS:  There is atherosclerotic calcification of the abdominal aorta, iliac  and femoral arteries.      PERITONEUM/RETROPERITONEUM/LYMPH NODES:  There is no retroperitoneal or pelvic adenopathy.  There is no  ascites.      ABDOMINAL WALL:  The abdominal wall is unremarkable.      BONES AND SOFT TISSUES:  There is no acute osseous finding.      There is no soft tissue abnormality.      Impression: CHEST:  1.  Bilateral pulmonary embolism. There are emboli in the proximal  segmental arteries to the right middle lobe right lower lobe in left  lower lobe. No emboli in the main pulmonary outflow tract or right or  left main pulmonary arteries. No CT evidence for right heart strain.  2. No acute parenchymal disease.  3. Scattered enlarged mediastinal lymph nodes      ABDOMEN AND PELVIS:  1.  No acute abdominal or pelvic pathology.  2. Status post right hemicolectomy.  3. Descending colonic and sigmoid diverticula with no diverticulitis.  4. Constipation.  5. Benign subcentimeter hepatic cysts.  6. Status post hysterectomy.      MACRO:  Anna Marie Nesbitt discussed the significance and urgency of this  critical finding by secure chat with  IWONA CARDENAS on 2/9/2024 at  11:53 am.  (**-RCF-**) Findings:  See findings.      Signed by: Anna Marie Nesbitt 2/9/2024 11:53 AM  Dictation workstation:   [id number]    No current facility-administered medications on file prior to encounter.     Current Outpatient Medications on File Prior to Encounter    Medication Sig Dispense Refill    calcium carb,gluc/mag ox,gluc (CALCIUM MAGNESIUM ORAL) Take 1 tablet by mouth once daily at bedtime.      cholecalciferol (Vitamin D-3) 5,000 Units tablet Take 1 tablet (5,000 Units) by mouth once daily in the morning.      CYANOCOBALAMIN, VITAMIN B-12, ORAL Place 1 tablet under the tongue once daily in the morning.      levothyroxine (Synthroid, Levoxyl) 75 mcg tablet Take 1 tablet (75 mcg) by mouth once daily. (Patient taking differently: Take 1 tablet (75 mcg) by mouth once daily in the morning. Take before meals.) 90 tablet 3    ondansetron (Zofran) 4 mg tablet Take 1 tablet (4 mg) by mouth every 8 hours if needed.      travoprost (Travatan Z) 0.004 % drops ophthalmic solution Administer 1 drop into both eyes once daily at bedtime.      [DISCONTINUED] ascorbic acid/multivit-min (EMERGEN-C IMMUNE PLUS ORAL) TAKE AS DIRECTED.      [DISCONTINUED] aspirin (Joe Aspirin) 325 mg tablet       [DISCONTINUED] calcium-mag-vit B6-D3-minerals (Calcium Citrate Plus, Vit B6,) 550-74-9-125 ft-vh-jx-unit tablet Take 1 tablet by mouth once daily.      [DISCONTINUED] clobetasoL-emollient 0.05 % cream APPLY AND GENTLY MASSAGE INTO AFFECTED AREA(S) TWICE DAILY.      [DISCONTINUED] cyanocobalamin/folic acid (vitamin B12-folic acid) 500-400 mcg tablet Take 1 tablet by mouth once daily.      [DISCONTINUED] triamcinolone (Kenalog) 0.1 % cream       [DISCONTINUED] vitamins A,C,E-zinc-copper (PreserVision AREDS) 2,148 mcg-113 mg-45 mg-17.4mg tablet Take 1 capsule by mouth 2 times a day.        Results for orders placed or performed during the hospital encounter of 02/09/24 (from the past 24 hour(s))   Heparin Assay, U[facility]   Result Value Ref Range    Heparin Unfractionated 0.8 See Comment Below for Therapeutic Ranges IU/mL   Heparin Assay, [facility]   Result Value Ref Range    Heparin Unfractionated 0.7 See Comment Below for Therapeutic Ranges IU/mL   Heparin Assay, U[facility]   Result Value Ref Range    Heparin  Unfractionated 0.6 See Comment Below for Therapeutic Ranges IU/mL   CBC   Result Value Ref Range    WBC 14.2 (H) 4.4 - 11.3 x10*3/uL    nRBC 0.0 0.0 - 0.0 /100 WBCs    RBC 3.62 (L) 4.00 - 5.20 x10*6/uL    Hemoglobin 11.5 (L) 12.0 - 16.0 g/dL    Hematocrit 34.9 (L) 36.0 - 46.0 %    MCV 96 80 - 100 fL    MCH 31.8 26.0 - 34.0 pg    MCHC 33.0 32.0 - 36.0 g/dL    RDW 13.8 11.5 - 14.5 %    Platelets 190 150 - 450 x10*3/uL   Lavender Top   Result Value Ref Range    Extra Tube Hold for add-ons.    PST Top   Result Value Ref Range    Extra Tube Hold for add-ons.    Heparin Assay, [facility]FH   Result Value Ref Range    Heparin Unfractionated 0.4 See Comment Below for Therapeutic Ranges IU/mL        Physical Exam  Constitutional:       General: She is in acute distress.   HENT:      Head: Normocephalic and atraumatic.      Nose: Nose normal.      Mouth/Throat:      Mouth: Mucous membranes are dry.   Eyes:      Extraocular Movements: Extraocular movements intact.      Pupils: Pupils are equal, round, and reactive to light.   Cardiovascular:      Pulses: Normal pulses.      Comments: Currently , regular, s4. Quite prominent S2  Pulmonary:      Comments: Shallow respirations, decreased breath sounds in bases  Abdominal:      General: Bowel sounds are normal.      Palpations: Abdomen is soft.   Musculoskeletal:      Comments: Significant osteoarthritic changes of knees, right greater than left. No pitting edema. Poor rom this morning.    Skin:     General: Skin is warm and dry.   Neurological:      General: No focal deficit present.      Mental Status: She is alert.      Motor: Weakness present.   Psychiatric:         Mood and Affect: Mood normal.         Behavior: Behavior normal.       Relevant Results     Assessment/Plan        Principal Problem:    Multiple subsegmental pulmonary emboli without acute cor pulmonale (CMS/HCC)    Acute bilateral pulmonary emboli. No dvt in lower extremities, or thrombus noted on scan of abd/pelvis. No  personal hx of malignancy. Does have family hx of Factor V Leiden. Continue heparin today, but when transitioned to eliquis, will be free of cost. Encouraged her to attempt to use pain medication again, also to get up in chair today with assistance of course. .Will have PT see  Bradycardia. Reviewed tele--several episodes of low rate, last evening. Unable to tell for sure if its sinus or she is dropping a beat. Will have echo tomorrow. Also will ask cards to see. Not on b or calcium channel blocker  Glaucoma. Continue ophthalmic drops  Hypothyroid-continue supplement.   Diverticulosis. Bowel hygiene.               Chapis Rock MD

## 2024-02-12 LAB
ANION GAP SERPL CALC-SCNC: 15 MMOL/L (ref 10–20)
BUN SERPL-MCNC: 11 MG/DL (ref 6–23)
CALCIUM SERPL-MCNC: 8.7 MG/DL (ref 8.6–10.3)
CHLORIDE SERPL-SCNC: 101 MMOL/L (ref 98–107)
CO2 SERPL-SCNC: 27 MMOL/L (ref 21–32)
CREAT SERPL-MCNC: 0.67 MG/DL (ref 0.5–1.05)
EGFRCR SERPLBLD CKD-EPI 2021: 85 ML/MIN/1.73M*2
GLUCOSE SERPL-MCNC: 113 MG/DL (ref 74–99)
POTASSIUM SERPL-SCNC: 3.5 MMOL/L (ref 3.5–5.3)
SODIUM SERPL-SCNC: 139 MMOL/L (ref 136–145)
T4 FREE SERPL-MCNC: 0.92 NG/DL (ref 0.61–1.12)
TSH SERPL-ACNC: 4.54 MIU/L (ref 0.44–3.98)
UFH PPP CHRO-ACNC: 0.2 IU/ML
UFH PPP CHRO-ACNC: 0.4 IU/ML
UFH PPP CHRO-ACNC: 0.4 IU/ML

## 2024-02-12 PROCEDURE — 97530 THERAPEUTIC ACTIVITIES: CPT | Mod: GP | Performed by: PHYSICAL THERAPIST

## 2024-02-12 PROCEDURE — 99232 SBSQ HOSP IP/OBS MODERATE 35: CPT | Performed by: INTERNAL MEDICINE

## 2024-02-12 PROCEDURE — 99222 1ST HOSP IP/OBS MODERATE 55: CPT | Performed by: STUDENT IN AN ORGANIZED HEALTH CARE EDUCATION/TRAINING PROGRAM

## 2024-02-12 PROCEDURE — 36415 COLL VENOUS BLD VENIPUNCTURE: CPT | Performed by: INTERNAL MEDICINE

## 2024-02-12 PROCEDURE — 84443 ASSAY THYROID STIM HORMONE: CPT | Performed by: INTERNAL MEDICINE

## 2024-02-12 PROCEDURE — 84439 ASSAY OF FREE THYROXINE: CPT | Performed by: INTERNAL MEDICINE

## 2024-02-12 PROCEDURE — 1200000002 HC GENERAL ROOM WITH TELEMETRY DAILY

## 2024-02-12 PROCEDURE — 85520 HEPARIN ASSAY: CPT | Performed by: INTERNAL MEDICINE

## 2024-02-12 PROCEDURE — 81241 F5 GENE: CPT | Performed by: INTERNAL MEDICINE

## 2024-02-12 PROCEDURE — 97162 PT EVAL MOD COMPLEX 30 MIN: CPT | Mod: GP | Performed by: PHYSICAL THERAPIST

## 2024-02-12 PROCEDURE — 2500000005 HC RX 250 GENERAL PHARMACY W/O HCPCS: Performed by: INTERNAL MEDICINE

## 2024-02-12 PROCEDURE — 2500000004 HC RX 250 GENERAL PHARMACY W/ HCPCS (ALT 636 FOR OP/ED): Performed by: INTERNAL MEDICINE

## 2024-02-12 PROCEDURE — 82374 ASSAY BLOOD CARBON DIOXIDE: CPT | Performed by: INTERNAL MEDICINE

## 2024-02-12 PROCEDURE — 2500000001 HC RX 250 WO HCPCS SELF ADMINISTERED DRUGS (ALT 637 FOR MEDICARE OP): Performed by: INTERNAL MEDICINE

## 2024-02-12 RX ORDER — LIDOCAINE 560 MG/1
2 PATCH PERCUTANEOUS; TOPICAL; TRANSDERMAL DAILY
Status: DISCONTINUED | OUTPATIENT
Start: 2024-02-12 | End: 2024-02-15 | Stop reason: HOSPADM

## 2024-02-12 RX ADMIN — LEVOTHYROXINE SODIUM 75 MCG: 75 TABLET ORAL at 05:34

## 2024-02-12 RX ADMIN — ACETAMINOPHEN 975 MG: 325 TABLET ORAL at 08:54

## 2024-02-12 RX ADMIN — LIDOCAINE 2 PATCH: 4 PATCH TOPICAL at 11:51

## 2024-02-12 RX ADMIN — HEPARIN SODIUM 1150 UNITS/HR: 10000 INJECTION, SOLUTION INTRAVENOUS at 01:44

## 2024-02-12 RX ADMIN — Medication 1000 UNITS: at 08:54

## 2024-02-12 RX ADMIN — Medication 1 PACKET: at 09:00

## 2024-02-12 RX ADMIN — DOCUSATE SODIUM 100 MG: 100 CAPSULE, LIQUID FILLED ORAL at 20:54

## 2024-02-12 RX ADMIN — DOCUSATE SODIUM 100 MG: 100 CAPSULE, LIQUID FILLED ORAL at 08:54

## 2024-02-12 RX ADMIN — HEPARIN SODIUM 1350 UNITS/HR: 10000 INJECTION, SOLUTION INTRAVENOUS at 08:41

## 2024-02-12 RX ADMIN — ACETAMINOPHEN 975 MG: 325 TABLET ORAL at 15:59

## 2024-02-12 RX ADMIN — LATANOPROST 1 DROP: 50 SOLUTION OPHTHALMIC at 20:54

## 2024-02-12 RX ADMIN — HEPARIN SODIUM 1350 UNITS/HR: 10000 INJECTION, SOLUTION INTRAVENOUS at 21:17

## 2024-02-12 RX ADMIN — ACETAMINOPHEN 975 MG: 325 TABLET ORAL at 20:54

## 2024-02-12 ASSESSMENT — PAIN SCALES - GENERAL
PAINLEVEL_OUTOF10: 3
PAINLEVEL_OUTOF10: 0 - NO PAIN
PAINLEVEL_OUTOF10: 3
PAINLEVEL_OUTOF10: 3

## 2024-02-12 ASSESSMENT — COGNITIVE AND FUNCTIONAL STATUS - GENERAL
MOBILITY SCORE: 14
STANDING UP FROM CHAIR USING ARMS: A LOT
STANDING UP FROM CHAIR USING ARMS: A LOT
WALKING IN HOSPITAL ROOM: A LITTLE
WALKING IN HOSPITAL ROOM: A LOT
TOILETING: A LITTLE
MOBILITY SCORE: 14
TURNING FROM BACK TO SIDE WHILE IN FLAT BAD: A LOT
TURNING FROM BACK TO SIDE WHILE IN FLAT BAD: A LOT
TOILETING: A LITTLE
HELP NEEDED FOR BATHING: A LITTLE
DRESSING REGULAR LOWER BODY CLOTHING: A LITTLE
TURNING FROM BACK TO SIDE WHILE IN FLAT BAD: A LITTLE
MOVING TO AND FROM BED TO CHAIR: A LOT
DRESSING REGULAR UPPER BODY CLOTHING: A LITTLE
DRESSING REGULAR LOWER BODY CLOTHING: A LITTLE
DAILY ACTIVITIY SCORE: 20
MOVING FROM LYING ON BACK TO SITTING ON SIDE OF FLAT BED WITH BEDRAILS: A LITTLE
MOVING FROM LYING ON BACK TO SITTING ON SIDE OF FLAT BED WITH BEDRAILS: A LITTLE
DAILY ACTIVITIY SCORE: 20
HELP NEEDED FOR BATHING: A LITTLE
CLIMB 3 TO 5 STEPS WITH RAILING: A LOT
STANDING UP FROM CHAIR USING ARMS: A LOT
CLIMB 3 TO 5 STEPS WITH RAILING: A LOT
MOBILITY SCORE: 14
MOVING FROM LYING ON BACK TO SITTING ON SIDE OF FLAT BED WITH BEDRAILS: A LITTLE
CLIMB 3 TO 5 STEPS WITH RAILING: A LOT
WALKING IN HOSPITAL ROOM: A LITTLE
MOVING TO AND FROM BED TO CHAIR: A LOT
MOVING TO AND FROM BED TO CHAIR: A LOT
DRESSING REGULAR UPPER BODY CLOTHING: A LITTLE

## 2024-02-12 ASSESSMENT — PAIN - FUNCTIONAL ASSESSMENT
PAIN_FUNCTIONAL_ASSESSMENT: 0-10

## 2024-02-12 ASSESSMENT — ACTIVITIES OF DAILY LIVING (ADL): LACK_OF_TRANSPORTATION: NO

## 2024-02-12 NOTE — PROGRESS NOTES
Physical Therapy    Physical Therapy Evaluation    Patient Name: Nanette Flynn  MRN: [id number]  Today's Date: 2/12/2024   Time Calculation  Start Time: 1042  Stop Time: 1131  Time Calculation (min): 49 min    Assessment/Plan   PT Assessment  PT Assessment Results: Decreased strength, Decreased range of motion, Decreased endurance, Decreased mobility, Impaired balance, Pain  Rehab Prognosis: Good  Evaluation/Treatment Tolerance: Patient limited by pain (difficult to use right UE and B knees due to pain)  End of Session Communication: Bedside nurse  Assessment Comment: Patient is an 88 y.o. female admitted to due flank pain and found to have B PEs. She was mod I with mobility prior to admission. On evaluation today, she was limited by pain in both knee and in her right shoulder which is new since admission. She required significiantly more assistance with all functional mobility skills. Given this decline in function and decreased safety, Recommending moderate intensity rehab at discharge to assist with return to PLOF. She may benefit from OT consult due to new onset UE pain.  End of Session Patient Position: Up in chair, Alarm on  IP OR SWING BED PT PLAN  Inpatient or Swing Bed: Inpatient  PT Plan  Treatment/Interventions: Bed mobility, Transfer training, Gait training, Strengthening, Range of motion, Therapeutic exercise, Therapeutic activity  PT Plan: Skilled PT  PT Frequency: 3 times per week  PT Discharge Recommendations: Moderate intensity level of continued care  Equipment Recommended upon Discharge: Wheeled walker  PT Recommended Transfer Status: Assist x2  PT - OK to Discharge: Yes      Subjective   General Visit Information:  General  Reason for Referral: Patient is an 88 y.o. female admitted due to right flank pain, found to have acute bilateral PEs.  She has been on heparin since admit date. She was referred to PT due to impaired mobility.  Referred By: Dr. Rock  Past Medical History Relevant to  Rehab: Rheumatic fever, as a child.   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  Prior to Session Communication: Bedside nurse  Patient Position Received: Bed, 3 rail up, Alarm off, not on at start of session  General Comment: Patient was on room air, had an IV and Purewick in place.  Home Living:  Home Living  Type of Home: Other (Comment) (In-law suite in son's home)  Lives With: Alone (But home is attached to her son's home)  Home Adaptive Equipment: Walker rolling or standard (Patient has 2 rollators)  Home Layout: One level  Home Access: Ramped entrance  Bathroom Shower/Tub: Walk-in shower  Bathroom Toilet: Standard  Bathroom Equipment: Grab bars in shower, Tub transfer bench  Home Living Comments: Has a lift chair  Prior Level of Function:  Prior Function Per Pt/Caregiver Report  Level of [place]:  (Patient performs sponge bathing independently, needs assist to wash her back and hair from dtr a few times per week. She is able to dress herself. receives meals from [employer] 5 days per week. Ambulates with rollator with mod I)  Precautions:  Precautions  Medical Precautions: Fall precautions         Objective   Pain:  Pain Assessment  Pain Assessment: 0-10  Pain Score:  (Not rated currently.  Patient reports having chronic B(L>R) knee pain.  Also, acute pain in left UE after raising it above her head for the CT Scan.  Difficult and painful to move.)  Pain Location:  (Shoulder and knees)  Cognition:  Cognition  Overall Cognitive Status: Within Functional Limits    General Assessments:  General Observation  General Observation: left knee appears slightly swollen, compression hose on     Activity Tolerance  Endurance: Decreased tolerance for upright activites         Strength  Strength Comments: B lower extremity weakness based on observation of  functional deficits.  Static Sitting Balance  Static Sitting-Balance Support: Left upper extremity supported  Static Sitting-Level of Assistance: Close supervision, Contact guard    Static Standing Balance  Static Standing-Balance Support: Bilateral upper extremity supported  Static Standing-Level of Assistance: Minimum assistance  Functional Assessments:  Bed Mobility  Bed Mobility: Yes  Bed Mobility 1  Bed Mobility 1: Supine to sitting  Level of Assistance 1: Maximum assistance  Bed Mobility Comments 1: patient unable to use right UE due to pain. used bedrail for support    Transfers  Transfer: Yes  Transfer 1  Transfer From 1: Sit to  Transfer to 1: Stand  Technique 1: Sit to stand  Transfer Device 1: Walker  Transfer Level of Assistance 1: Moderate assistance, Minimal verbal cues  Trials/Comments 1: The bed was elevated and the patient had to utilize stabilization from the bed for knee support as she stood. Gait belt was donned.  Transfers 2  Transfer From 2: Bed to  Transfer to 2: Chair with arms  Technique 2: Sit to stand  Transfer Device 2: Walker (rollator)  Transfer Level of Assistance 2: Minimum assistance (min A x 2)    Ambulation/Gait Training  Ambulation/Gait Training Performed: Yes  Ambulation/Gait Training 1  Surface 1: Level tile  Device 1: Rolling walker  Assistance 1: Minimum assistance (min A x 2)  Quality of Gait 1: Decreased step length  Comments/Distance (ft) 1: 3 feet from bed to chair with Rollator  Extremity/Trunk Assessments:  RLE   RLE : Within Functional Limits  LLE   LLE :  (Has approximately 90 degrees of knee flexion observed in sitting. end range is painful)  Outcome Measures:  [facility] Basic Mobility  Turning from your back to your side while in a flat bed without using bedrails: A little  Moving from lying on your back to sitting on the side of a flat bed without using bedrails: A lot  Moving to and from bed to chair (including a wheelchair): A lot  Standing up from a chair using your  arms (e.g. wheelchair or bedside chair): A lot  To walk in hospital room: A little  Climbing 3-5 steps with railing: A lot  Basic Mobility - Total Score: 14    Encounter Problems       Encounter Problems (Active)       PT Problem       Patient will perform bed mobility with S with use of bed rail.        Start:  02/12/24    Expected End:  02/26/24            Patient will perform sit to stand with Rollator with elevated bed with mod I.        Start:  02/12/24    Expected End:  02/26/24            Patient will ambulate 50 feet or more with rollator with S.        Start:  02/12/24    Expected End:  02/26/24            Patient will perform 2 x 10-15 reps of gentle LE ROM and strengthening exercises to enhance mobility and strength necessary for functional mobility.        Start:  02/12/24    Expected End:  02/26/24                   Education Documentation  Body Mechanics, taught by Ping Chino, PT at 2/12/2024  1:41 PM.  Learner: Family, Patient  Readiness: Acceptance  Method: Explanation  Response: Verbalizes Understanding, Demonstrated Understanding    Mobility Training, taught by Ping Chino PT at 2/12/2024  1:41 PM.  Learner: Family, Patient  Readiness: Acceptance  Method: Explanation  Response: Verbalizes Understanding, Demonstrated Understanding    Education Comments  No comments found.

## 2024-02-12 NOTE — CARE PLAN
The patient's goals for the shift include  pain control and rest    The clinical goals for the shift include patioent will have one meal in the chair by end ofday

## 2024-02-12 NOTE — PROGRESS NOTES
Nanette Dawkins is a 88 y.o. female on day 3 of admission presenting with Multiple subsegmental pulmonary emboli without acute cor pulmonale (CMS/HCC).      Subjective   Both knees are very sore, and still has discomfort with movement of right shoulder       Objective     Last Recorded Vitals  /81 (BP Location: Left arm, Patient Position: Lying)   Pulse 74   Temp 36.6 °C (97.9 °F) (Temporal)   Resp 18   Wt 75 kg (165 lb 5.5 oz)   SpO2 92%   Intake/Output last 3 Shifts:    Intake/Output Summary (Last 24 hours) at 2/12/2024 0916  Last data filed at 2/12/2024 0841  Gross per 24 hour   Intake 1011.33 ml   Output 700 ml   Net 311.33 ml       Admission Weight  Weight: 75 kg (165 lb 5.5 oz) (02/09/24 1012)    Daily Weight  02/09/24 : 75 kg (165 lb 5.5 oz)    Image Results  Vascular US lower extremity venous duplex bilateral            [place] [id number]   Tel [phone] and Fax [phone]       Vascular Lab Report  [facility] US LOWER EXTREMITY VENOUS DUPLEX BILATERAL       Patient Name:      NANETTE DAWKINS  Reading Physician: [id number] Blanca Quiroz MD  Study Date:        2/9/2024            Ordering           [id number] EM GALLAGHER                                         Physician:  MRN/PID:           [id number]            Technologist:      Alejandra Mckee RVT  Accession#:        [id number]        Technologist 2:  Date of Birth/Age: 1935 / 88      Encounter#:        [id number]                     years  Gender:            F  Admission Status:  Inpatient           Location           [facility]                                         Performed:       Diagnosis/ICD: Acute embolism and thrombosis of deep veins of upper extremity,                 bilateral-I82.623  CPT Codes:     95052 Peripheral venous duplex scan for DVT complete       CONCLUSIONS:  Right Lower Venous: No evidence of acute deep  vein thrombus visualized in the right lower extremity. Additional Findings; Lymph nodes in groin measuring 0.9 cm x 0.5 cm.  Left Lower Venous: No evidence of acute deep vein thrombus visualized in the left lower extremity.     Imaging & Doppler Findings:     Right                 Compressible Thrombus        Flow  Distal External Iliac     Yes        None   Spontaneous/Phasic  CFV                       Yes        None   Spontaneous/Phasic  PFV                       Yes        None  FV Proximal               Yes        None   Spontaneous/Phasic  FV Mid                    Yes        None  FV Distal                 Yes        None  Popliteal                 Yes        None   Spontaneous/Phasic  Peroneal                  Yes        None  PTV                       Yes        None       Left                  Compress Thrombus        Flow  Distal External Iliac   Yes      None   Spontaneous/Phasic  CFV                     Yes      None   Spontaneous/Phasic  PFV                     Yes      None  FV Proximal             Yes      None   Spontaneous/Phasic  FV Mid                  Yes      None  FV Distal               Yes      None  Popliteal               Yes      None   Spontaneous/Phasic  Peroneal                Yes      None  PTV                     Yes      None       [id number] Blanca Quiroz MD  Electronically signed by [id number] Blanca Quiroz MD on 2/11/2024 at 1:10:21 PM       ** Final **    No current facility-administered medications on file prior to encounter.     Current Outpatient Medications on File Prior to Encounter   Medication Sig Dispense Refill    calcium carb,gluc/mag ox,gluc (CALCIUM MAGNESIUM ORAL) Take 1 tablet by mouth once daily at bedtime.      cholecalciferol (Vitamin D-3) 5,000 Units tablet Take 1 tablet (5,000 Units) by mouth once daily in the morning.      CYANOCOBALAMIN, VITAMIN B-12, ORAL Place 1 tablet under the tongue once daily in the morning.      levothyroxine (Synthroid, Levoxyl) 75 mcg tablet  Take 1 tablet (75 mcg) by mouth once daily. (Patient taking differently: Take 1 tablet (75 mcg) by mouth once daily in the morning. Take before meals.) 90 tablet 3    ondansetron (Zofran) 4 mg tablet Take 1 tablet (4 mg) by mouth every 8 hours if needed.      travoprost (Travatan Z) 0.004 % drops ophthalmic solution Administer 1 drop into both eyes once daily at bedtime.      [DISCONTINUED] ascorbic acid/multivit-min (EMERGEN-C IMMUNE PLUS ORAL) TAKE AS DIRECTED.      [DISCONTINUED] aspirin (Joe Aspirin) 325 mg tablet       [DISCONTINUED] calcium-mag-vit B6-D3-minerals (Calcium Citrate Plus, Vit B6,) 733-57-3-125 ir-by-wk-unit tablet Take 1 tablet by mouth once daily.      [DISCONTINUED] clobetasoL-emollient 0.05 % cream APPLY AND GENTLY MASSAGE INTO AFFECTED AREA(S) TWICE DAILY.      [DISCONTINUED] cyanocobalamin/folic acid (vitamin B12-folic acid) 500-400 mcg tablet Take 1 tablet by mouth once daily.      [DISCONTINUED] triamcinolone (Kenalog) 0.1 % cream       [DISCONTINUED] vitamins A,C,E-zinc-copper (PreserVision AREDS) 2,148 mcg-113 mg-45 mg-17.4mg tablet Take 1 capsule by mouth 2 times a day.        Results for orders placed or performed during the hospital encounter of 02/09/24 (from the past 24 hour(s))   CBC and Auto Differential   Result Value Ref Range    WBC 13.7 (H) 4.4 - 11.3 x10*3/uL    nRBC 0.0 0.0 - 0.0 /100 WBCs    RBC 3.90 (L) 4.00 - 5.20 x10*6/uL    Hemoglobin 12.2 12.0 - 16.0 g/dL    Hematocrit 37.7 36.0 - 46.0 %    MCV 97 80 - 100 fL    MCH 31.3 26.0 - 34.0 pg    MCHC 32.4 32.0 - 36.0 g/dL    RDW 13.6 11.5 - 14.5 %    Platelets 200 150 - 450 x10*3/uL    Neutrophils % 62.0 40.0 - 80.0 %    Immature Granulocytes %, Automated 0.5 0.0 - 0.9 %    Lymphocytes % 22.7 13.0 - 44.0 %    Monocytes % 13.0 2.0 - 10.0 %    Eosinophils % 1.5 0.0 - 6.0 %    Basophils % 0.3 0.0 - 2.0 %    Neutrophils Absolute 8.46 (H) 1.60 - 5.50 x10*3/uL    Immature Granulocytes Absolute, Automated 0.07 0.00 - 0.50 x10*3/uL     Lymphocytes Absolute 3.10 (H) 0.80 - 3.00 x10*3/uL    Monocytes Absolute 1.77 (H) 0.05 - 0.80 x10*3/uL    Eosinophils Absolute 0.21 0.00 - 0.40 x10*3/uL    Basophils Absolute 0.04 0.00 - 0.10 x10*3/uL   TSH with reflex to Free T4 if abnormal   Result Value Ref Range    Thyroid Stimulating Hormone 4.54 (H) 0.44 - 3.98 mIU/L   Basic Metabolic Panel   Result Value Ref Range    Glucose 113 (H) 74 - 99 mg/dL    Sodium 139 136 - 145 mmol/L    Potassium 3.5 3.5 - 5.3 mmol/L    Chloride 101 98 - 107 mmol/L    Bicarbonate 27 21 - 32 mmol/L    Anion Gap 15 10 - 20 mmol/L    Urea Nitrogen 11 6 - 23 mg/dL    Creatinine 0.67 0.50 - 1.05 mg/dL    eGFR 85 >60 mL/min/1.73m*2    Calcium 8.7 8.6 - 10.3 mg/dL   Heparin Assay   Result Value Ref Range    Heparin Unfractionated 0.2 See Comment Below for Therapeutic Ranges IU/mL   Thyroxine, Free   Result Value Ref Range    Thyroxine, Free 0.92 0.61 - 1.12 ng/dL      Physical Exam  Constitutional:       Appearance: Normal appearance.   HENT:      Head: Normocephalic and atraumatic.      Nose: Nose normal.      Mouth/Throat:      Mouth: Mucous membranes are moist.   Eyes:      Extraocular Movements: Extraocular movements intact.      Pupils: Pupils are equal, round, and reactive to light.      Comments: Glasses in place   Cardiovascular:      Pulses: Normal pulses.      Comments: Regular, increased S2  Pulmonary:      Comments: Fairly good inspiratory effort, slight splinting.   No rhonchi or wheezing etc.   Abdominal:      General: Bowel sounds are normal.      Palpations: Abdomen is soft.   Musculoskeletal:      Comments: Very tender along right supraclavicular area, superficial  Both patellar areas very tender, bony osteophytes present.   No pitting edeme  Limited rom of RUE, decreased in knees as well.   Skin:     General: Skin is warm and dry.   Neurological:      General: No focal deficit present.      Mental Status: She is alert and oriented to person, place, and time.    Psychiatric:         Mood and Affect: Mood normal.         Behavior: Behavior normal.       Relevant Results       Assessment/Plan      Principal Problem:    Multiple subsegmental pulmonary emboli without acute cor pulmonale (CMS/HCC)    Ms Flynn was admitted with acute bilateral acute PE, no dvt on lower extremity dopplers. Remains on heparin currently, awaiting factor v leiden results.  Acute bilateral PE. She has not been overtly hypoxic, on heparin. Does have family member with Factor V Leiden assay , as it may effect which anticoagulant she should be on long term. Echo today to eval for right heart strain. Only other risk factor that is obvious is that she has been more sedentary recently, few nodes noted on scans, monitor  Bradycardia. Two days ago, had marked bradycardia on tele, but has not been recurrent. Maintain tele for now  Hypertension. Pressure has been up at times, however, it is likely related to her discomfort/pain she is experiencing  Hx shingles/PHN, right side of neck/shoulder/head  Osteoarthritis, having quite a flare currently of discomfort. Having issues with pain medication. Using tylenol, had emesis from morphine, then wouldn't take any thing else. Ordering lidoderm patch for her knees. PT/OT eval ordered. SS consult placed as well, may need snf from here.               Chapis Rock MD

## 2024-02-12 NOTE — CONSULTS
"Inpatient consult to Cardiology  Consult performed by: Zaira Ramirez PA-C  Consult ordered by: Chapis Rock MD        History Of Present Illness:    Nanette Flynn is a 88 y.o. female presenting with multiple medical complaints. Patient notes that she is unable to walk due to bilateral knee pain, states that she has terrible arthritis and is unable to function due to her knees, foot pain and back pain. She reports that she had pleuritic flank pain and presented to the hospital where she was diagnosed with b/l pulmonary emboli.   Currently Denies any chest pain, chest pressure, palpitations, dizziness, cough, shortness of breath, orthopnea, edema. Patient is oxygenating well on RA.      Last Recorded Vitals:  Vitals:    02/11/24 0650 02/11/24 1424 02/11/24 2035 02/12/24 0500   BP: 158/60 119/66 154/76 162/81   BP Location:  Left arm Left arm Left arm   Patient Position:  Sitting Lying Lying   Pulse:  69 67 74   Resp:  17 18 18   Temp:  36.5 °C (97.7 °F) 36.6 °C (97.9 °F) 36.6 °C (97.9 °F)   TempSrc:  Temporal Temporal Temporal   SpO2:  96% 96% 92%   Weight:       Height:           Last Labs:  CBC - 2/11/2024:  9:54 AM  13.7 12.2 200    37.7      CMP - 2/12/2024:  5:56 AM  8.7 7.4 23 --- 1.4   _ 4.0 13 54      PTT - No results in last year.  _   _ _     Troponin I, High Sensitivity   Date/Time Value Ref Range Status   02/09/2024 10:13 AM 10 0 - 13 ng/L Final     LDL Calculated   Date/Time Value Ref Range Status   07/25/2023 04:00 PM 92 65 - 130 MG/DL Final      Last I/O:  I/O last 3 completed shifts:  In: 843.4 (11.2 mL/kg) [P.O.:720; I.V.:123.4 (1.6 mL/kg)]  Out: 200 (2.7 mL/kg) [Urine:200 (0.1 mL/kg/hr)]  Weight: 75 kg     Past Cardiology Tests (Last 3 Years):  EKG:  ECG 12 lead 02/09/2024  NSR 78 bpm.     Echo:  Transthoracic Echocardiogram (02/09/2024):   Pending.     Ejection Fractions:  No results found for: \"EF\"  Cath:  No results found for this or any previous visit from the past 1095 " days.    Stress Test:  No results found for this or any previous visit from the past 1095 days.    Imaging:  US LE for DVT (02/09/2024):   CONCLUSIONS:  Right Lower Venous: No evidence of acute deep vein thrombus visualized in the right lower extremity. Additional Findings; Lymph nodes in groin measuring 0.9 cm x 0.5 cm.  Left Lower Venous: No evidence of acute deep vein thrombus visualized in the left lower extremity.    CT Abd/pelvis w/ IV contrast (02/09/2024):   ABDOMEN AND PELVIS:  1.  No acute abdominal or pelvic pathology.  2. Status post right hemicolectomy.  3. Descending colonic and sigmoid diverticula with no diverticulitis.  4. Constipation.  5. Benign subcentimeter hepatic cysts.  6. Status post hysterectomy.    CT Chest for PE (02/09/2024):   CHEST:  1.  Bilateral pulmonary embolism. There are emboli in the proximal segmental arteries to the right middle lobe right lower lobe in left lower lobe. No emboli in the main pulmonary outflow tract or right or left main pulmonary arteries. No CT evidence for right heart strain.  2. No acute parenchymal disease.  3. Scattered enlarged mediastinal lymph nodes        Past Medical History:  She has a past medical history of Other specified postprocedural states, Personal history of other diseases of the circulatory system, Personal history of other diseases of the circulatory system, and Personal history of other medical treatment.    Past Surgical History:  She has a past surgical history that includes Hysterectomy (08/20/2018); Appendectomy (08/20/2018); Other surgical history (08/20/2018); CT angio head w and wo IV contrast (9/24/2013); and CT angio neck (9/24/2013).      Social History:  She reports that she has quit smoking. Her smoking use included cigarettes. She has never used smokeless tobacco. She reports that she does not currently use alcohol. She reports that she does not currently use drugs.    Family History:  Family History   Problem Relation Name  Age of Onset    Heart disease Mother      Hypertension Mother      Transient ischemic attack Mother      Heart disease Father      Cancer Maternal Grandmother      Colon cancer Maternal Grandmother          Allergies:  Gabapentin    Inpatient Medications:  Scheduled medications   Medication Dose Route Frequency    acetaminophen  975 mg oral TID    Or    acetaminophen  650 mg nasogastric tube TID    Or    acetaminophen  650 mg rectal TID    cholecalciferol  5,000 Units oral q AM    cyanocobalamin  1,000 mcg oral Daily    docusate sodium  100 mg oral BID    latanoprost  1 drop Both Eyes Nightly    levothyroxine  75 mcg oral Daily    perflutren lipid microspheres  0.5-10 mL of dilution intravenous Once in imaging    perflutren protein A microsphere  0.5 mL intravenous Once in imaging    psyllium  1 packet oral Daily    sulfur hexafluoride microsphr  2 mL intravenous Once in imaging     PRN medications   Medication    alum-mag hydroxide-simeth    heparin    melatonin    morphine    ondansetron    oxyCODONE    oxygen     Continuous Medications   Medication Dose Last Rate    heparin  0-4,500 Units/hr 1,350 Units/hr (02/12/24 0841)     Outpatient Medications:  Current Outpatient Medications   Medication Instructions    apixaban (Eliquis) 5 mg (74 tabs) tablet Take 2 tablets (10 mg) by mouth 2 times a day for 7 days, then take 1 tablet (5 mg) by mouth 2 times a day.    calcium carb,gluc/mag ox,gluc (CALCIUM MAGNESIUM ORAL) 1 tablet, oral, Nightly    cholecalciferol (Vitamin D-3) 5,000 Units tablet Take 1 tablet (5,000 Units) by mouth once daily in the morning.    CYANOCOBALAMIN, VITAMIN B-12, ORAL 1 tablet, sublingual, Every morning    levothyroxine (SYNTHROID, LEVOXYL) 75 mcg, oral, Daily    ondansetron (Zofran) 4 mg tablet Take 1 tablet (4 mg) by mouth every 8 hours if needed.    travoprost (Travatan Z) 0.004 % drops ophthalmic solution Administer 1 drop into both eyes once daily at bedtime.       Physical Exam:  Physical  Exam  Constitutional:       Appearance: Normal appearance.   HENT:      Head: Normocephalic.      Nose: Nose normal.      Mouth/Throat:      Mouth: Mucous membranes are dry.   Eyes:      Conjunctiva/sclera: Conjunctivae normal.   Neck:      Comments: No JVD  Cardiovascular:      Rate and Rhythm: Normal rate and regular rhythm.      Heart sounds: No murmur heard.     Comments: Tele NSR 76 bpm, occasional PVCs  Pulmonary:      Effort: Pulmonary effort is normal.      Breath sounds: Normal breath sounds.   Abdominal:      Palpations: Abdomen is soft.   Skin:     General: Skin is warm and dry.   Neurological:      General: No focal deficit present.      Mental Status: She is alert and oriented to person, place, and time. Mental status is at baseline.   Psychiatric:         Mood and Affect: Mood normal.         Behavior: Behavior normal.            Assessment/Plan   Nanette Flynn is a 87 yo female with a PMH of RF, Hypothyroidism, DJD, Glaucoma who was admitted for flank pain noted to have b/l pulmonary emboli. Cardiology consulted d/t concern for bradycardia. EKG and telemetry reviewed, NSR 67-74 bpm with occasional PVCs, no evidence of bradycardia or heart block.     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        Zaira Ramirez PA-C

## 2024-02-12 NOTE — NURSING NOTE
1900 Bedside shift report received. Patients daughter at bedside. Assumed care of patient.    2037 Nurse present in room to administer scheduled medications. Medications taken per order and without difficulty.

## 2024-02-12 NOTE — CARE PLAN
The patient's goals for the shift include      The clinical goals for the shift include pt will have at least one meal up in the chair today.

## 2024-02-13 ENCOUNTER — APPOINTMENT (OUTPATIENT)
Dept: CARDIOLOGY | Facility: HOSPITAL | Age: 89
DRG: 176 | End: 2024-02-13
Payer: MEDICARE

## 2024-02-13 ENCOUNTER — TELEPHONE (OUTPATIENT)
Dept: CARDIOLOGY | Facility: CLINIC | Age: 89
End: 2024-02-13

## 2024-02-13 LAB
AORTIC VALVE MEAN GRADIENT: 7.6 MMHG
AORTIC VALVE PEAK VELOCITY: 1.79 M/S
AV PEAK GRADIENT: 12.9 MMHG
AVA (PEAK VEL): 1.71 CM2
AVA (VTI): 1.85 CM2
EJECTION FRACTION APICAL 4 CHAMBER: 67.3
EJECTION FRACTION: 67 %
LEFT ATRIUM VOLUME AREA LENGTH INDEX BSA: 15.8 ML/M2
LEFT VENTRICLE INTERNAL DIMENSION DIASTOLE: 4.31 CM (ref 3.5–6)
LEFT VENTRICULAR OUTFLOW TRACT DIAMETER: 2 CM
MITRAL VALVE E/A RATIO: 0.81
MITRAL VALVE E/E' RATIO: 13.74
RIGHT VENTRICLE FREE WALL PEAK S': 17 CM/S
RIGHT VENTRICLE PEAK SYSTOLIC PRESSURE: 32.5 MMHG
TRICUSPID ANNULAR PLANE SYSTOLIC EXCURSION: 2.4 CM
UFH PPP CHRO-ACNC: 0.5 IU/ML

## 2024-02-13 PROCEDURE — 97530 THERAPEUTIC ACTIVITIES: CPT | Mod: GO

## 2024-02-13 PROCEDURE — 1200000002 HC GENERAL ROOM WITH TELEMETRY DAILY

## 2024-02-13 PROCEDURE — 2500000001 HC RX 250 WO HCPCS SELF ADMINISTERED DRUGS (ALT 637 FOR MEDICARE OP): Performed by: INTERNAL MEDICINE

## 2024-02-13 PROCEDURE — 36415 COLL VENOUS BLD VENIPUNCTURE: CPT | Performed by: INTERNAL MEDICINE

## 2024-02-13 PROCEDURE — 93306 TTE W/DOPPLER COMPLETE: CPT | Performed by: STUDENT IN AN ORGANIZED HEALTH CARE EDUCATION/TRAINING PROGRAM

## 2024-02-13 PROCEDURE — 93306 TTE W/DOPPLER COMPLETE: CPT

## 2024-02-13 PROCEDURE — 99232 SBSQ HOSP IP/OBS MODERATE 35: CPT | Performed by: INTERNAL MEDICINE

## 2024-02-13 PROCEDURE — 97165 OT EVAL LOW COMPLEX 30 MIN: CPT | Mod: GO

## 2024-02-13 PROCEDURE — 85520 HEPARIN ASSAY: CPT | Performed by: INTERNAL MEDICINE

## 2024-02-13 RX ADMIN — APIXABAN 10 MG: 5 TABLET, FILM COATED ORAL at 17:58

## 2024-02-13 RX ADMIN — LATANOPROST 1 DROP: 50 SOLUTION OPHTHALMIC at 20:31

## 2024-02-13 RX ADMIN — ACETAMINOPHEN 975 MG: 325 TABLET ORAL at 14:28

## 2024-02-13 RX ADMIN — Medication 1 PACKET: at 09:00

## 2024-02-13 RX ADMIN — LEVOTHYROXINE SODIUM 75 MCG: 75 TABLET ORAL at 05:54

## 2024-02-13 RX ADMIN — DOCUSATE SODIUM 100 MG: 100 CAPSULE, LIQUID FILLED ORAL at 08:10

## 2024-02-13 RX ADMIN — DOCUSATE SODIUM 100 MG: 100 CAPSULE, LIQUID FILLED ORAL at 20:27

## 2024-02-13 RX ADMIN — ACETAMINOPHEN 975 MG: 325 TABLET ORAL at 08:10

## 2024-02-13 RX ADMIN — ACETAMINOPHEN 975 MG: 325 TABLET ORAL at 20:27

## 2024-02-13 RX ADMIN — Medication 5000 UNITS: at 08:10

## 2024-02-13 RX ADMIN — CYANOCOBALAMIN TAB 1000 MCG 1000 MCG: 1000 TAB at 08:11

## 2024-02-13 ASSESSMENT — COGNITIVE AND FUNCTIONAL STATUS - GENERAL
HELP NEEDED FOR BATHING: A LOT
TOILETING: A LOT
MOBILITY SCORE: 14
DRESSING REGULAR UPPER BODY CLOTHING: A LITTLE
TURNING FROM BACK TO SIDE WHILE IN FLAT BAD: A LOT
DRESSING REGULAR LOWER BODY CLOTHING: A LOT
CLIMB 3 TO 5 STEPS WITH RAILING: A LOT
DAILY ACTIVITIY SCORE: 15
HELP NEEDED FOR BATHING: A LOT
TOILETING: A LOT
PERSONAL GROOMING: A LITTLE
STANDING UP FROM CHAIR USING ARMS: A LOT
EATING MEALS: A LITTLE
DRESSING REGULAR LOWER BODY CLOTHING: A LOT
EATING MEALS: A LITTLE
PERSONAL GROOMING: A LITTLE
MOVING TO AND FROM BED TO CHAIR: A LOT
DAILY ACTIVITIY SCORE: 15
MOVING FROM LYING ON BACK TO SITTING ON SIDE OF FLAT BED WITH BEDRAILS: A LITTLE
DRESSING REGULAR UPPER BODY CLOTHING: A LITTLE
WALKING IN HOSPITAL ROOM: A LITTLE

## 2024-02-13 ASSESSMENT — PAIN SCALES - GENERAL
PAINLEVEL_OUTOF10: 2
PAINLEVEL_OUTOF10: 9
PAINLEVEL_OUTOF10: 1

## 2024-02-13 ASSESSMENT — ACTIVITIES OF DAILY LIVING (ADL): BATHING_ASSISTANCE: MODERATE

## 2024-02-13 ASSESSMENT — PAIN - FUNCTIONAL ASSESSMENT
PAIN_FUNCTIONAL_ASSESSMENT: 0-10
PAIN_FUNCTIONAL_ASSESSMENT: 0-10

## 2024-02-13 NOTE — CARE PLAN
The patient's goals for the shift include decreased pain.     The clinical goals for the shift include pt will have pain control during this shift    Over the shift, the patient did not make progress toward the following goals. Barriers to progression include decreased movement dt pain. Recommendations to address these barriers include encouraging patient to ambulate while awake and get up to the chair to decrease pain and stiffness.

## 2024-02-13 NOTE — TELEPHONE ENCOUNTER
----- Message from Zaira Ramirez PA-C sent at 2/12/2024  2:27 PM EST -----  Regarding: Follow Up  Can you please make a follow up in 3 mos with Dr. Quiroz for this patient for b/l pulmonary embolism, concern for factor V.

## 2024-02-13 NOTE — PROGRESS NOTES
Occupational Therapy    Evaluation    Patient Name: Nanette Flynn  MRN: [id number]  Today's Date: 2/13/2024  Time Calculation  Start Time: 1254  Stop Time: 1326  Time Calculation (min): 32 min    Assessment  IP OT Assessment  OT Assessment: Pt presents with decreased endurance, decreased ADL, decreased functional mobility. Continued skilled OT recommended to maximize pt safety and independence in order to return to PLOF.  Prognosis: Good  Barriers to Discharge: None  Evaluation/Treatment Tolerance: Patient limited by fatigue  Medical Staff Made Aware: Yes  End of Session Communication: Bedside nurse  End of Session Patient Position: Up in chair, Alarm off, caregiver present (pt's family present. Nursing notified)  Plan:  Treatment Interventions: ADL retraining, Functional transfer training, UE strengthening/ROM, Endurance training, Compensatory technique education  OT Frequency: 3 times per week  OT Discharge Recommendations: Moderate intensity level of continued care  Equipment Recommended upon Discharge: Wheeled walker  OT Recommended Transfer Status: Assist of 1  OT - OK to Discharge: Yes (per OT POC)    Subjective   Current Problem:  1. Multiple subsegmental pulmonary emboli without acute cor pulmonale (CMS/HCC)  Vascular US lower extremity venous duplex bilateral    Transthoracic Echo (TTE) Complete    Vascular US lower extremity venous duplex bilateral    Transthoracic Echo (TTE) Complete    apixaban (Eliquis) 5 mg (74 tabs) tablet      2. Acute bilateral deep vein thrombosis (DVT) of upper extremities (CMS/HCC)  Vascular US lower extremity venous duplex bilateral    Vascular US lower extremity venous duplex bilateral      3. Other pulmonary embolism without acute cor pulmonale (CMS/HCC)  Transthoracic Echo (TTE) Complete        General:  General  Reason for Referral: 89 yo female referred to OT for bilat PE, impaired ADL, impaired mobility  Referred By: Chapis Rock MD  Past Medical History Relevant to  Rehab: Rheumatic fever, as a child.   Hypothyroidism  Glaucoma  Osteoarthritis  Shingles  Family/Caregiver Present: Yes  Caregiver Feedback: SonBIBIANA able to confirm PLOF and discuss POC  Prior to Session Communication: Bedside nurse  Patient Position Received: Bed, 3 rail up, Alarm off, not on at start of session  General Comment: Pt pleasant, agreeable to therapy/ IV, purewick in place  Precautions:  Medical Precautions: Fall precautions     Pain:  Pain Assessment  Pain Assessment: 0-10  Pain Score: 9  Pain Type: Chronic pain  Pain Location: Shoulder  Pain Orientation: Right    Objective   Cognition:  Overall Cognitive Status: Within Functional Limits     Home Living:  Type of Home: Other (Comment) (In-law suite in son's home)  Lives With: Alone  Home Adaptive Equipment:  (rollator)  Home Layout: One level  Home Access: Ramped entrance  Bathroom Shower/Tub: Walk-in shower  Bathroom Toilet: Standard  Bathroom Equipment: Grab bars in shower, Tub transfer bench  Home Living Comments: uses lift chair   Prior Function:  Prior Function Comments: Patient performs sponge bathing independently, needs assist to wash her back and hair from dtr a few times per week. She is able to dress herself. receives meals from [employer] 5 days per week. Ambulates with rollator with mod I     ADL:  Eating Assistance: Minimal  Grooming Assistance: Minimal  Bathing Assistance: Moderate  UE Dressing Assistance: Minimal  LE Dressing Assistance: Maximal  Toileting Assistance with Device: Maximal  Functional Assistance: Moderate  ADL Comments: Son assisted with donning shoes, other ADL performance anticipated d/t current clinical presentation  Activity Tolerance:  Endurance: Decreased tolerance for upright activites  Bed Mobility/Transfers: Bed Mobility  Bed Mobility: Yes  Bed Mobility 1  Bed Mobility 1: Supine to sitting  Level of Assistance 1: Maximum assistance  Bed Mobility Comments 1: patient unable to use right UE due to pain. used  bedrail for support; assist to scoot and maneuver BLE/trunk, increased time    Transfers  Transfer: Yes  Transfer 1  Transfer From 1: Sit to  Transfer to 1: Stand  Technique 1: Sit to stand  Transfer Device 1: Walker  Transfer Level of Assistance 1: Moderate assistance  Trials/Comments 1: elevated bed, assist at LUE and at bottom to elevate with L knee blocked  Transfers 2  Transfer From 2: Bed to  Transfer to 2: Chair with arms  Technique 2: Stand pivot  Transfer Device 2:  (rollator)  Transfer Level of Assistance 2: Minimum assistance  Trials/Comments 2: Cues for step sequencing, hand placement, safe controlled descent. Increased time    Strength:  Strength Comments: LUE WFL, RUE not tested d/t limited ROM    Hand Function:  Hand Function  Gross Grasp: Functional  Extremities: RUE   RUE : Exceptions to WFL  RUE AROM (degrees)  RUE AROM Comment: Shoulder flexion grossly 75 degrees, elbow to distal WFL and LUE   LUE: Within Functional Limits    Outcome Measures: [facility] Daily Activity  Putting on and taking off regular lower body clothing: A lot  Bathing (including washing, rinsing, drying): A lot  Putting on and taking off regular upper body clothing: A little  Toileting, which includes using toilet, bedpan or urinal: A lot  Taking care of personal grooming such as brushing teeth: A little  Eating Meals: A little  Daily Activity - Total Score: 15      Education Documentation  Body Mechanics, taught by Gen Love OT at 2/13/2024  1:53 PM.  Learner: Family, Patient  Readiness: Acceptance  Method: Explanation  Response: Verbalizes Understanding    Precautions, taught by Gen Love OT at 2/13/2024  1:53 PM.  Learner: Family, Patient  Readiness: Acceptance  Method: Explanation  Response: Verbalizes Understanding    ADL Training, taught by Gen Love OT at 2/13/2024  1:53 PM.  Learner: Family, Patient  Readiness: Acceptance  Method: Explanation  Response: Verbalizes Understanding    Education  Comments  No comments found.      Goals:   Encounter Problems       Encounter Problems (Active)       OT Goals       Pt will demo ADL routine and meaningful daily activities using modifications as needed  (Progressing)       Start:  02/13/24    Expected End:  02/27/24            Pt will demo increased functional mobility to tolerate tasks necessary to complete ADL routine.  (Progressing)       Start:  02/13/24    Expected End:  02/27/24            Pt will increase endurance to tolerate 15min of OOB activity with no more than 1 rest break in order to increase ability to engage in ADL completion.  (Progressing)       Start:  02/13/24    Expected End:  02/27/24            Pt will tolerate 10min stand during functional task completion with no more than 1 rest break in order to increase endurance for functional task completion.  (Progressing)       Start:  02/13/24    Expected End:  02/27/24

## 2024-02-13 NOTE — PROGRESS NOTES
Nanette Dawkins is a 88 y.o. female on day 4 of admission presenting with Multiple subsegmental pulmonary emboli without acute cor pulmonale (CMS/HCC).      Subjective   Still having shoulder discomfort, otherwise feeling slightly improved today. No acute events overnight.        Objective     Last Recorded Vitals  /84   Pulse 73   Temp 36.2 °C (97.2 °F) (Temporal)   Resp 18   Wt 75 kg (165 lb 5.5 oz)   SpO2 95%   Intake/Output last 3 Shifts:    Intake/Output Summary (Last 24 hours) at 2/13/2024 1537  Last data filed at 2/13/2024 1528  Gross per 24 hour   Intake 240 ml   Output 1400 ml   Net -1160 ml         Admission Weight  Weight: 75 kg (165 lb 5.5 oz) (02/09/24 1012)    Daily Weight  02/09/24 : 75 kg (165 lb 5.5 oz)    Image Results  Transthoracic Echo (TTE) [facility], [address] [place] [address]                Tel [phone] and Fax [phone]    TRANSTHORACIC ECHOCARDIOGRAM REPORT       Patient Name:      NANETTE DAWKINS   Reading Physician:    [id number] Irving Ibanez MD  Study Date:        2/13/2024            Ordering Provider:    [id number] EM GALLAGHER  MRN/PID:           [id number]             Fellow:  Accession#:        [id number]         Nurse:  Date of Birth/Age: 1935 / 88 years Sonographer:          Marilou Chakraborty RDCS  Gender:            F                    Additional Staff:  Height:            162.56 cm            Admit Date:           2/9/2024  Weight:            74.84 kg             Admission Status:     Inpatient -                                                                Routine  BSA / BMI:         1.80 m2 / 28.32      Encounter#:           [id number]                     kg/m2                                          Department Location:   [facility]                                                                Invasive  Blood Pressure: 162 /81 mmHg    Study Type:    TRANSTHORACIC ECHO (TTE) COMPLETE  Diagnosis/ICD: Other pulmonary embolism without acute cor pulmonale-I26.99  Indication:    PE  CPT Code:      Echo Complete w Full Doppler-66021    Patient History:  Pertinent History: Abdominal pain.    Study Detail: The following Echo studies were performed: 2D, M-Mode, Doppler and                color flow.       PHYSICIAN INTERPRETATION:  Left Ventricle: The left ventricular systolic function is normal, with an estimated ejection fraction of 65-70%. The left ventricular cavity size is normal. Spectral Doppler shows an impaired relaxation pattern of left ventricular diastolic filling.  Left Atrium: The left atrium is normal in size.  Right Ventricle: The right ventricle is normal in size. There is normal right ventricular global systolic function. RV free wall is not well visualized.  Right Atrium: The right atrium is normal in size.  Aortic Valve: The aortic valve is trileaflet. There is mild aortic valve cusp calcification. There is trace to mild aortic valve regurgitation. The peak instantaneous gradient of the aortic valve is 12.9 mmHg. The mean gradient of the aortic valve is 7.6 mmHg.  Mitral Valve: The mitral valve is mildly thickened. There is mild mitral annular calcification. There is trace to mild mitral valve regurgitation.  Tricuspid Valve: The tricuspid valve is structurally normal. There is trace to mild tricuspid regurgitation. The Doppler estimated RVSP is slightly elevated at 32.5 mmHg.  Pulmonic Valve: The pulmonic valve is structurally normal. There is trace to mild pulmonic valve regurgitation.  Pericardium: There is no pericardial effusion noted.  Aorta: The aortic root is normal.  Systemic Veins: The inferior vena cava appears to be of normal size. There is IVC inspiratory collapse greater than 50%.  In comparison to the  previous echocardiogram(s): There are no prior studies on this patient for comparison purposes.       CONCLUSIONS:   1. Left ventricular systolic function is normal with a 65-70% estimated ejection fraction.   2. Spectral Doppler shows an impaired relaxation pattern of left ventricular diastolic filling.   3. Slightly elevated RVSP.    QUANTITATIVE DATA SUMMARY:  2D MEASUREMENTS:                           Normal Ranges:  IVSd:          1.09 cm   (0.6-1.1cm)  LVPWd:         0.80 cm   (0.6-1.1cm)  LVIDd:         4.31 cm   (3.9-5.9cm)  LVIDs:         2.87 cm  LV Mass Index: 73.6 g/m2  LV % FS        33.4 %    LA VOLUME:                               Normal Ranges:  LA Vol A4C:       25.5 ml    (22+/-6mL/m2)  LA Vol A2C:       29.2 ml  LA Vol BP:        28.4 ml  LA Vol Index A4C: 14.1 ml/m2  LA Vol Index A2C: 16.2 ml/m2  LA Vol Index BP:  15.8 ml/m2  LA Volume Index:  15.8 ml/m2  LA Vol A4C:       23.9 ml  LA Vol A2C:       27.8 ml    RA VOLUME BY A/L METHOD:                        Normal Ranges:  RA Area A4C: 14.7 cm2    M-MODE MEASUREMENTS:                   Normal Ranges:  Ao Root: 3.30 cm (2.0-3.7cm)  LAs:     2.76 cm (2.7-4.0cm)    LV SYSTOLIC FUNCTION BY 2D PLANIMETRY (MOD):                      Normal Ranges:  EF-A4C View: 67.3 % (>=55%)  EF-A2C View: 67.8 %  EF-Biplane:  67.2 %    LV DIASTOLIC FUNCTION:                              Normal Ranges:  MV Peak E:      1.10 m/s    (0.7-1.2 m/s)  MV Peak A:      1.36 m/s    (0.42-0.7 m/s)  E/A Ratio:      0.81        (1.0-2.2)  MV e'           0.08 m/s    (>8.0)  MV lateral e'   0.08 m/s  MV medial e'    0.06 m/s  MV A Dur:       129.18 msec  E/e' Ratio:     13.74       (<8.0)  PulmV Sys Brooks:  47.67 cm/s  PulmV Haddad Brooks: 27.08 cm/s  PulmV S/D Brooks:  1.76    MITRAL VALVE:                  Normal Ranges:  MV DT: 313 msec (150-240msec)    AORTIC VALVE:                                     Normal Ranges:  AoV Vmax:                1.79 m/s  (<=1.7m/s)  AoV Peak PG:              12.9 mmHg (<20mmHg)  AoV Mean P.6 mmHg  (1.7-11.5mmHg)  LVOT Max Brooks:            0.98 m/s  (<=1.1m/s)  AoV VTI:                 37.39 cm  (18-25cm)  LVOT VTI:                22.05 cm  LVOT Diameter:           2.00 cm   (1.8-2.4cm)  AoV Area, VTI:           1.85 cm2  (2.5-5.5cm2)  AoV Area,Vmax:           1.71 cm2  (2.5-4.5cm2)  AoV Dimensionless Index: 0.59    AORTIC INSUFFICIENCY:  AI Vmax:       4.99 m/s  AI Half-time:  546 msec  AI Decel Time: 1884 msec  AI Decel Rate: 264.63 cm/s2       RIGHT VENTRICLE:  RV Basal 3.60 cm  RV Mid   2.40 cm  RV Major 7.6 cm  TAPSE:   24.0 mm  RV s'    0.17 m/s    TRICUSPID VALVE/RVSP:                              Normal Ranges:  Peak TR Velocity: 2.72 m/s  RV Syst Pressure: 32.5 mmHg (< 30mmHg)  IVC Diam:         1.60 cm    Pulmonary Veins:  PulmV Haddad Brooks: 27.08 cm/s  PulmV S/D Brooks:  1.76  PulmV Sys Brooks:  47.67 cm/s       [id number] Irving Ibanez MD  Electronically signed on 2024 at 9:19:47 AM       ** Final **    No current facility-administered medications on file prior to encounter.     Current Outpatient Medications on File Prior to Encounter   Medication Sig Dispense Refill    calcium carb,gluc/mag ox,gluc (CALCIUM MAGNESIUM ORAL) Take 1 tablet by mouth once daily at bedtime.      cholecalciferol (Vitamin D-3) 5,000 Units tablet Take 1 tablet (5,000 Units) by mouth once daily in the morning.      CYANOCOBALAMIN, VITAMIN B-12, ORAL Place 1 tablet under the tongue once daily in the morning.      levothyroxine (Synthroid, Levoxyl) 75 mcg tablet Take 1 tablet (75 mcg) by mouth once daily. (Patient taking differently: Take 1 tablet (75 mcg) by mouth once daily in the morning. Take before meals.) 90 tablet 3    ondansetron (Zofran) 4 mg tablet Take 1 tablet (4 mg) by mouth every 8 hours if needed.      travoprost (Travatan Z) 0.004 % drops ophthalmic solution Administer 1 drop into both eyes once daily at bedtime.      [DISCONTINUED] ascorbic acid/multivit-min  (EMERGEN-C IMMUNE PLUS ORAL) TAKE AS DIRECTED.      [DISCONTINUED] aspirin (Joe Aspirin) 325 mg tablet       [DISCONTINUED] calcium-mag-vit B6-D3-minerals (Calcium Citrate Plus, Vit B6,) 358-42-6-125 dx-dt-xu-unit tablet Take 1 tablet by mouth once daily.      [DISCONTINUED] clobetasoL-emollient 0.05 % cream APPLY AND GENTLY MASSAGE INTO AFFECTED AREA(S) TWICE DAILY.      [DISCONTINUED] cyanocobalamin/folic acid (vitamin B12-folic acid) 500-400 mcg tablet Take 1 tablet by mouth once daily.      [DISCONTINUED] triamcinolone (Kenalog) 0.1 % cream       [DISCONTINUED] vitamins A,C,E-zinc-copper (PreserVision AREDS) 2,148 mcg-113 mg-45 mg-17.4mg tablet Take 1 capsule by mouth 2 times a day.        Results for orders placed or performed during the hospital encounter of 02/09/24 (from the past 24 hour(s))   Heparin Assay, [facility]   Result Value Ref Range    Heparin Unfractionated 0.4 See Comment Below for Therapeutic Ranges IU/mL   Heparin Assay, [facility]   Result Value Ref Range    Heparin Unfractionated 0.5 See Comment Below for Therapeutic Ranges IU/mL   Transthoracic Echo (TTE) Complete   Result Value Ref Range    AV pk jeannie 1.79 m/s    AV mn grad 7.6 mmHg    LVOT diam 2.00 cm    LV biplane EF 67 %    MV avg E/e' ratio 13.74     MV E/A ratio 0.81     LA vol index A/L 15.8 ml/m2    Tricuspid annular plane systolic excursion 2.4 cm    RV free wall pk S' 17.00 cm/s    LVIDd 4.31 cm    RVSP 32.5 mmHg    Aortic Valve Area by Continuity of VTI 1.85 cm2    Aortic Valve Area by Continuity of Peak Velocity 1.71 cm2    AV pk grad 12.9 mmHg    LV A4C EF 67.3       Physical Exam  Constitutional:       Appearance: Normal appearance.   HENT:      Head: Normocephalic and atraumatic.      Nose: Nose normal.      Mouth/Throat:      Mouth: Mucous membranes are moist.   Eyes:      Extraocular Movements: Extraocular movements intact.      Comments: Glasses in place   Cardiovascular:      Pulses: Normal pulses.      Comments: Regular, increased  S2  Pulmonary:      Comments: Fairly good inspiratory effort, slight splinting.   No rhonchi or wheezing etc.   Abdominal:      General: Bowel sounds are normal.      Palpations: Abdomen is soft.   Skin:     General: Skin is warm and dry.   Neurological:      General: No focal deficit present.      Mental Status: She is alert and oriented to person, place, and time.   Psychiatric:         Mood and Affect: Mood normal.         Behavior: Behavior normal.       Relevant Results       Assessment/Plan      Principal Problem:    Multiple subsegmental pulmonary emboli without acute cor pulmonale (CMS/HCC)    Ms Flynn was admitted with acute bilateral acute PE, no dvt on lower extremity dopplers. Remains on heparin currently, awaiting factor v leiden results.  Acute bilateral PE. She has not been overtly hypoxic, on heparin. Does have family member with Factor V Leiden assay , as it may effect which anticoagulant she should be on long term. Echo with only mildly elevated RVSP, no dilation of RV. Will transition pt off heparin to eliquis today.   Bradycardia. Two days ago, had marked bradycardia on tele, but has not been recurrent. Maintain tele for now  Hypertension. Pressure has been up at times, however, it is likely related to her discomfort/pain she is experiencing  Hx shingles/PHN, right side of neck/shoulder/head  Osteoarthritis, having quite a flare currently of discomfort. Having issues with pain medication. Using tylenol, had emesis from morphine, then wouldn't take any thing else. Ordering lidoderm patch for her knees. PT/OT eval ordered. SS consult placed as well, may need snf from here.           Yung Harrison, DO

## 2024-02-14 VITALS
TEMPERATURE: 97.7 F | HEIGHT: 64 IN | BODY MASS INDEX: 28.23 KG/M2 | WEIGHT: 165.34 LBS | RESPIRATION RATE: 18 BRPM | DIASTOLIC BLOOD PRESSURE: 72 MMHG | SYSTOLIC BLOOD PRESSURE: 147 MMHG | OXYGEN SATURATION: 95 % | HEART RATE: 63 BPM

## 2024-02-14 LAB
ANION GAP SERPL CALC-SCNC: 13 MMOL/L (ref 10–20)
APPEARANCE UR: CLEAR
BILIRUB UR STRIP.AUTO-MCNC: NEGATIVE MG/DL
BUN SERPL-MCNC: 12 MG/DL (ref 6–23)
CALCIUM SERPL-MCNC: 8.5 MG/DL (ref 8.6–10.3)
CHLORIDE SERPL-SCNC: 102 MMOL/L (ref 98–107)
CO2 SERPL-SCNC: 27 MMOL/L (ref 21–32)
COLOR UR: ABNORMAL
CREAT SERPL-MCNC: 0.64 MG/DL (ref 0.5–1.05)
EGFRCR SERPLBLD CKD-EPI 2021: 86 ML/MIN/1.73M*2
ERYTHROCYTE [DISTWIDTH] IN BLOOD BY AUTOMATED COUNT: 13.6 % (ref 11.5–14.5)
GLUCOSE SERPL-MCNC: 95 MG/DL (ref 74–99)
GLUCOSE UR STRIP.AUTO-MCNC: NEGATIVE MG/DL
HCT VFR BLD AUTO: 35.8 % (ref 36–46)
HGB BLD-MCNC: 11.9 G/DL (ref 12–16)
KETONES UR STRIP.AUTO-MCNC: NEGATIVE MG/DL
LEUKOCYTE ESTERASE UR QL STRIP.AUTO: NEGATIVE
MCH RBC QN AUTO: 31.8 PG (ref 26–34)
MCHC RBC AUTO-ENTMCNC: 33.2 G/DL (ref 32–36)
MCV RBC AUTO: 96 FL (ref 80–100)
NITRITE UR QL STRIP.AUTO: NEGATIVE
NRBC BLD-RTO: 0 /100 WBCS (ref 0–0)
PH UR STRIP.AUTO: 7 [PH]
PLATELET # BLD AUTO: 238 X10*3/UL (ref 150–450)
POTASSIUM SERPL-SCNC: 3.1 MMOL/L (ref 3.5–5.3)
PROT UR STRIP.AUTO-MCNC: NEGATIVE MG/DL
RBC # BLD AUTO: 3.74 X10*6/UL (ref 4–5.2)
RBC # UR STRIP.AUTO: ABNORMAL /UL
RBC #/AREA URNS AUTO: NORMAL /HPF
SODIUM SERPL-SCNC: 139 MMOL/L (ref 136–145)
SP GR UR STRIP.AUTO: 1
UROBILINOGEN UR STRIP.AUTO-MCNC: <2 MG/DL
WBC # BLD AUTO: 8 X10*3/UL (ref 4.4–11.3)
WBC #/AREA URNS AUTO: NORMAL /HPF

## 2024-02-14 PROCEDURE — 2500000002 HC RX 250 W HCPCS SELF ADMINISTERED DRUGS (ALT 637 FOR MEDICARE OP, ALT 636 FOR OP/ED): Performed by: INTERNAL MEDICINE

## 2024-02-14 PROCEDURE — 81001 URINALYSIS AUTO W/SCOPE: CPT | Performed by: INTERNAL MEDICINE

## 2024-02-14 PROCEDURE — 36415 COLL VENOUS BLD VENIPUNCTURE: CPT | Performed by: INTERNAL MEDICINE

## 2024-02-14 PROCEDURE — 82374 ASSAY BLOOD CARBON DIOXIDE: CPT | Performed by: INTERNAL MEDICINE

## 2024-02-14 PROCEDURE — 2500000001 HC RX 250 WO HCPCS SELF ADMINISTERED DRUGS (ALT 637 FOR MEDICARE OP): Performed by: INTERNAL MEDICINE

## 2024-02-14 PROCEDURE — 99239 HOSP IP/OBS DSCHRG MGMT >30: CPT | Performed by: INTERNAL MEDICINE

## 2024-02-14 PROCEDURE — 85027 COMPLETE CBC AUTOMATED: CPT | Performed by: INTERNAL MEDICINE

## 2024-02-14 RX ORDER — POTASSIUM CHLORIDE 20 MEQ/1
20 TABLET, EXTENDED RELEASE ORAL ONCE
Status: COMPLETED | OUTPATIENT
Start: 2024-02-14 | End: 2024-02-14

## 2024-02-14 RX ADMIN — APIXABAN 10 MG: 5 TABLET, FILM COATED ORAL at 05:20

## 2024-02-14 RX ADMIN — APIXABAN 10 MG: 5 TABLET, FILM COATED ORAL at 17:45

## 2024-02-14 RX ADMIN — Medication 5000 UNITS: at 09:33

## 2024-02-14 RX ADMIN — ACETAMINOPHEN 975 MG: 325 TABLET ORAL at 09:32

## 2024-02-14 RX ADMIN — POTASSIUM CHLORIDE 20 MEQ: 1500 TABLET, EXTENDED RELEASE ORAL at 14:56

## 2024-02-14 RX ADMIN — POTASSIUM CHLORIDE 20 MEQ: 1500 TABLET, EXTENDED RELEASE ORAL at 14:57

## 2024-02-14 RX ADMIN — ACETAMINOPHEN 975 MG: 325 TABLET ORAL at 16:15

## 2024-02-14 RX ADMIN — DOCUSATE SODIUM 100 MG: 100 CAPSULE, LIQUID FILLED ORAL at 20:06

## 2024-02-14 RX ADMIN — DOCUSATE SODIUM 100 MG: 100 CAPSULE, LIQUID FILLED ORAL at 09:32

## 2024-02-14 RX ADMIN — ACETAMINOPHEN 975 MG: 325 TABLET ORAL at 20:06

## 2024-02-14 RX ADMIN — LEVOTHYROXINE SODIUM 75 MCG: 75 TABLET ORAL at 05:19

## 2024-02-14 ASSESSMENT — COGNITIVE AND FUNCTIONAL STATUS - GENERAL
MOVING FROM LYING ON BACK TO SITTING ON SIDE OF FLAT BED WITH BEDRAILS: A LOT
TOILETING: A LOT
MOVING TO AND FROM BED TO CHAIR: A LOT
STANDING UP FROM CHAIR USING ARMS: A LOT
DAILY ACTIVITIY SCORE: 15
CLIMB 3 TO 5 STEPS WITH RAILING: A LOT
WALKING IN HOSPITAL ROOM: A LOT
PERSONAL GROOMING: A LITTLE
TOILETING: A LOT
MOBILITY SCORE: 12
MOVING FROM LYING ON BACK TO SITTING ON SIDE OF FLAT BED WITH BEDRAILS: A LOT
DRESSING REGULAR UPPER BODY CLOTHING: A LITTLE
HELP NEEDED FOR BATHING: A LOT
EATING MEALS: A LITTLE
TURNING FROM BACK TO SIDE WHILE IN FLAT BAD: A LOT
MOBILITY SCORE: 12
MOVING TO AND FROM BED TO CHAIR: A LOT
DAILY ACTIVITIY SCORE: 15
STANDING UP FROM CHAIR USING ARMS: A LOT
EATING MEALS: A LITTLE
CLIMB 3 TO 5 STEPS WITH RAILING: A LOT
DRESSING REGULAR UPPER BODY CLOTHING: A LITTLE
HELP NEEDED FOR BATHING: A LOT
TURNING FROM BACK TO SIDE WHILE IN FLAT BAD: A LOT
DRESSING REGULAR LOWER BODY CLOTHING: A LOT
WALKING IN HOSPITAL ROOM: A LOT
DRESSING REGULAR LOWER BODY CLOTHING: A LOT
PERSONAL GROOMING: A LITTLE

## 2024-02-14 ASSESSMENT — PAIN - FUNCTIONAL ASSESSMENT: PAIN_FUNCTIONAL_ASSESSMENT: 0-10

## 2024-02-14 ASSESSMENT — ACTIVITIES OF DAILY LIVING (ADL): LACK_OF_TRANSPORTATION: NO

## 2024-02-14 ASSESSMENT — PAIN SCALES - GENERAL
PAINLEVEL_OUTOF10: 0 - NO PAIN
PAINLEVEL_OUTOF10: 0 - NO PAIN

## 2024-02-14 NOTE — DISCHARGE SUMMARY
Discharge Diagnosis  Multiple subsegmental pulmonary emboli without acute cor pulmonale (CMS/HCC)    Issues Requiring Follow-Up  Follow up with pcp  Follow up on factor V Leiden labs    Discharge Meds     Your medication list        START taking these medications        Instructions Last Dose Given Next Dose Due   apixaban 5 mg (74 tabs) tablet  Commonly known as: Eliquis      Take 2 tablets (10 mg) by mouth 2 times a day for 7 days, then take 1 tablet (5 mg) by mouth 2 times a day.              CHANGE how you take these medications        Instructions Last Dose Given Next Dose Due   levothyroxine 75 mcg tablet  Commonly known as: Synthroid, Levoxyl  What changed: when to take this      Take 1 tablet (75 mcg) by mouth once daily.              CONTINUE taking these medications        Instructions Last Dose Given Next Dose Due   CALCIUM MAGNESIUM ORAL           cholecalciferol 5,000 Units tablet  Commonly known as: Vitamin D-3           CYANOCOBALAMIN (VITAMIN B-12) ORAL           ondansetron 4 mg tablet  Commonly known as: Zofran           Travatan Z 0.004 % drops ophthalmic solution  Generic drug: travoprost                     Where to Get Your Medications        These medications were sent to [facility] [address] [place] [id number] [facility]  [id number] [facility] [id number]      Phone: [phone]   apixaban 5 mg (74 tabs) tablet         Test Results Pending At Discharge  Pending Labs       Order Current Status    Factor V Leiden In process            Hospital Course   89 yo F with a significant hx of RA, and family hx of Factor V Leiden disease, who was admitted on 2/9 with pleuritic chest and abdominal pain 2/2 b/l PEs. In the ED CTA chest was done which showed emboli in proximal segmental arteries or R middle lobe, R lower lobe and L lower lobe, without signs of R heart strain. She was started on a heparin drip. She was on room air throughout hospitalization. Her symptoms  improved and she was switched to eliquis on 2/13. She was discharged to home on 2/14 to continue eliquis and follow up with her PCP. She had labs for Factor V Leiden sent but had not returned by the time of discharge.    Pertinent Physical Exam At Time of Discharge  Physical Exam    Outpatient Follow-Up  Future Appointments   Date Time Provider Department Center   7/25/2024  1:00 PM Salud Walker MD [id number] Harley Harrison, DO

## 2024-02-14 NOTE — PROGRESS NOTES
Nanette Dawkins is a 88 y.o. female on day 5 of admission presenting with Multiple subsegmental pulmonary emboli without acute cor pulmonale (CMS/HCC).      Subjective   No acute events overnight. Pt denies any new or acute concerns this morning.         Objective     Last Recorded Vitals  /72 (BP Location: Right arm, Patient Position: Sitting)   Pulse 63   Temp 36.1 °C (97 °F) (Temporal)   Resp 18   Wt 75 kg (165 lb 5.5 oz)   SpO2 95%   Intake/Output last 3 Shifts:    Intake/Output Summary (Last 24 hours) at 2/14/2024 1633  Last data filed at 2/14/2024 1608  Gross per 24 hour   Intake 600 ml   Output 1960 ml   Net -1360 ml         Admission Weight  Weight: 75 kg (165 lb 5.5 oz) (02/09/24 1012)    Daily Weight  02/09/24 : 75 kg (165 lb 5.5 oz)    Image Results  Transthoracic Echo (TTE) [facility], [address] [place] [address]                Tel [phone] and Fax [phone]    TRANSTHORACIC ECHOCARDIOGRAM REPORT       Patient Name:      NANETTE DAWKINS   Reading Physician:    [id number] Irving Ibanez MD  Study Date:        2/13/2024            Ordering Provider:    [id number] EM GALLAGHER  MRN/PID:           [id number]             Fellow:  Accession#:        [id number]         Nurse:  Date of Birth/Age: 1935 / 88 years Sonographer:          Marilou Chakraborty                                                                [facility]  Gender:            F                    Additional Staff:  Height:            162.56 cm            Admit Date:           2/9/2024  Weight:            74.84 kg             Admission Status:     Inpatient -                                                                Routine  BSA / BMI:         1.80 m2 / 28.32      Encounter#:           [id number]                     kg/m2                                           Department Location:  [facility] Non                                                                Invasive  Blood Pressure: 162 /81 mmHg    Study Type:    TRANSTHORACIC ECHO (TTE) COMPLETE  Diagnosis/ICD: Other pulmonary embolism without acute cor pulmonale-I26.99  Indication:    PE  CPT Code:      Echo Complete w Full Doppler-12350    Patient History:  Pertinent History: Abdominal pain.    Study Detail: The following Echo studies were performed: 2D, M-Mode, Doppler and                color flow.       PHYSICIAN INTERPRETATION:  Left Ventricle: The left ventricular systolic function is normal, with an estimated ejection fraction of 65-70%. The left ventricular cavity size is normal. Spectral Doppler shows an impaired relaxation pattern of left ventricular diastolic filling.  Left Atrium: The left atrium is normal in size.  Right Ventricle: The right ventricle is normal in size. There is normal right ventricular global systolic function. RV free wall is not well visualized.  Right Atrium: The right atrium is normal in size.  Aortic Valve: The aortic valve is trileaflet. There is mild aortic valve cusp calcification. There is trace to mild aortic valve regurgitation. The peak instantaneous gradient of the aortic valve is 12.9 mmHg. The mean gradient of the aortic valve is 7.6 mmHg.  Mitral Valve: The mitral valve is mildly thickened. There is mild mitral annular calcification. There is trace to mild mitral valve regurgitation.  Tricuspid Valve: The tricuspid valve is structurally normal. There is trace to mild tricuspid regurgitation. The Doppler estimated RVSP is slightly elevated at 32.5 mmHg.  Pulmonic Valve: The pulmonic valve is structurally normal. There is trace to mild pulmonic valve regurgitation.  Pericardium: There is no pericardial effusion noted.  Aorta: The aortic root is normal.  Systemic Veins: The inferior vena cava appears to be of normal size. There is IVC inspiratory collapse greater than  50%.  In comparison to the previous echocardiogram(s): There are no prior studies on this patient for comparison purposes.       CONCLUSIONS:   1. Left ventricular systolic function is normal with a 65-70% estimated ejection fraction.   2. Spectral Doppler shows an impaired relaxation pattern of left ventricular diastolic filling.   3. Slightly elevated RVSP.    QUANTITATIVE DATA SUMMARY:  2D MEASUREMENTS:                           Normal Ranges:  IVSd:          1.09 cm   (0.6-1.1cm)  LVPWd:         0.80 cm   (0.6-1.1cm)  LVIDd:         4.31 cm   (3.9-5.9cm)  LVIDs:         2.87 cm  LV Mass Index: 73.6 g/m2  LV % FS        33.4 %    LA VOLUME:                               Normal Ranges:  LA Vol A4C:       25.5 ml    (22+/-6mL/m2)  LA Vol A2C:       29.2 ml  LA Vol BP:        28.4 ml  LA Vol Index A4C: 14.1 ml/m2  LA Vol Index A2C: 16.2 ml/m2  LA Vol Index BP:  15.8 ml/m2  LA Volume Index:  15.8 ml/m2  LA Vol A4C:       23.9 ml  LA Vol A2C:       27.8 ml    RA VOLUME BY A/L METHOD:                        Normal Ranges:  RA Area A4C: 14.7 cm2    M-MODE MEASUREMENTS:                   Normal Ranges:  Ao Root: 3.30 cm (2.0-3.7cm)  LAs:     2.76 cm (2.7-4.0cm)    LV SYSTOLIC FUNCTION BY 2D PLANIMETRY (MOD):                      Normal Ranges:  EF-A4C View: 67.3 % (>=55%)  EF-A2C View: 67.8 %  EF-Biplane:  67.2 %    LV DIASTOLIC FUNCTION:                              Normal Ranges:  MV Peak E:      1.10 m/s    (0.7-1.2 m/s)  MV Peak A:      1.36 m/s    (0.42-0.7 m/s)  E/A Ratio:      0.81        (1.0-2.2)  MV e'           0.08 m/s    (>8.0)  MV lateral e'   0.08 m/s  MV medial e'    0.06 m/s  MV A Dur:       129.18 msec  E/e' Ratio:     13.74       (<8.0)  PulmV Sys Brooks:  47.67 cm/s  PulmV Haddad Brooks: 27.08 cm/s  PulmV S/D Brooks:  1.76    MITRAL VALVE:                  Normal Ranges:  MV DT: 313 msec (150-240msec)    AORTIC VALVE:                                     Normal Ranges:  AoV Vmax:                1.79 m/s   (<=1.7m/s)  AoV Peak P.9 mmHg (<20mmHg)  AoV Mean P.6 mmHg  (1.7-11.5mmHg)  LVOT Max Brooks:            0.98 m/s  (<=1.1m/s)  AoV VTI:                 37.39 cm  (18-25cm)  LVOT VTI:                22.05 cm  LVOT Diameter:           2.00 cm   (1.8-2.4cm)  AoV Area, VTI:           1.85 cm2  (2.5-5.5cm2)  AoV Area,Vmax:           1.71 cm2  (2.5-4.5cm2)  AoV Dimensionless Index: 0.59    AORTIC INSUFFICIENCY:  AI Vmax:       4.99 m/s  AI Half-time:  546 msec  AI Decel Time: 1884 msec  AI Decel Rate: 264.63 cm/s2       RIGHT VENTRICLE:  RV Basal 3.60 cm  RV Mid   2.40 cm  RV Major 7.6 cm  TAPSE:   24.0 mm  RV s'    0.17 m/s    TRICUSPID VALVE/RVSP:                              Normal Ranges:  Peak TR Velocity: 2.72 m/s  RV Syst Pressure: 32.5 mmHg (< 30mmHg)  IVC Diam:         1.60 cm    Pulmonary Veins:  PulmV Haddad Brooks: 27.08 cm/s  PulmV S/D Brooks:  1.76  PulmV Sys Brooks:  47.67 cm/s       [id number] Irving Ibanez MD  Electronically signed on 2024 at 9:19:47 AM       ** Final **    No current facility-administered medications on file prior to encounter.     Current Outpatient Medications on File Prior to Encounter   Medication Sig Dispense Refill    calcium carb,gluc/mag ox,gluc (CALCIUM MAGNESIUM ORAL) Take 1 tablet by mouth once daily at bedtime.      cholecalciferol (Vitamin D-3) 5,000 Units tablet Take 1 tablet (5,000 Units) by mouth once daily in the morning.      CYANOCOBALAMIN, VITAMIN B-12, ORAL Place 1 tablet under the tongue once daily in the morning.      levothyroxine (Synthroid, Levoxyl) 75 mcg tablet Take 1 tablet (75 mcg) by mouth once daily. (Patient taking differently: Take 1 tablet (75 mcg) by mouth once daily in the morning. Take before meals.) 90 tablet 3    ondansetron (Zofran) 4 mg tablet Take 1 tablet (4 mg) by mouth every 8 hours if needed.      travoprost (Travatan Z) 0.004 % drops ophthalmic solution Administer 1 drop into both eyes once daily at bedtime.       [DISCONTINUED] ascorbic acid/multivit-min (EMERGEN-C IMMUNE PLUS ORAL) TAKE AS DIRECTED.      [DISCONTINUED] aspirin (Joe Aspirin) 325 mg tablet       [DISCONTINUED] calcium-mag-vit B6-D3-minerals (Calcium Citrate Plus, Vit B6,) 866-77-5-125 ug-ri-rw-unit tablet Take 1 tablet by mouth once daily.      [DISCONTINUED] clobetasoL-emollient 0.05 % cream APPLY AND GENTLY MASSAGE INTO AFFECTED AREA(S) TWICE DAILY.      [DISCONTINUED] cyanocobalamin/folic acid (vitamin B12-folic acid) 500-400 mcg tablet Take 1 tablet by mouth once daily.      [DISCONTINUED] triamcinolone (Kenalog) 0.1 % cream       [DISCONTINUED] vitamins A,C,E-zinc-copper (PreserVision AREDS) 2,148 mcg-113 mg-45 mg-17.4mg tablet Take 1 capsule by mouth 2 times a day.        Results for orders placed or performed during the hospital encounter of 02/09/24 (from the past 24 hour(s))   CBC   Result Value Ref Range    WBC 8.0 4.4 - 11.3 x10*3/uL    nRBC 0.0 0.0 - 0.0 /100 WBCs    RBC 3.74 (L) 4.00 - 5.20 x10*6/uL    Hemoglobin 11.9 (L) 12.0 - 16.0 g/dL    Hematocrit 35.8 (L) 36.0 - 46.0 %    MCV 96 80 - 100 fL    MCH 31.8 26.0 - 34.0 pg    MCHC 33.2 32.0 - 36.0 g/dL    RDW 13.6 11.5 - 14.5 %    Platelets 238 150 - 450 x10*3/uL   Basic Metabolic Panel   Result Value Ref Range    Glucose 95 74 - 99 mg/dL    Sodium 139 136 - 145 mmol/L    Potassium 3.1 (L) 3.5 - 5.3 mmol/L    Chloride 102 98 - 107 mmol/L    Bicarbonate 27 21 - 32 mmol/L    Anion Gap 13 10 - 20 mmol/L    Urea Nitrogen 12 6 - 23 mg/dL    Creatinine 0.64 0.50 - 1.05 mg/dL    eGFR 86 >60 mL/min/1.73m*2    Calcium 8.5 (L) 8.6 - 10.3 mg/dL      Physical Exam  Constitutional:       Appearance: Normal appearance.   HENT:      Head: Normocephalic and atraumatic.      Nose: Nose normal.      Mouth/Throat:      Mouth: Mucous membranes are moist.   Eyes:      Extraocular Movements: Extraocular movements intact.      Comments: Glasses in place   Cardiovascular:      Pulses: Normal pulses.      Comments:  Regular, increased S2  Pulmonary:      Comments: Fairly good inspiratory effort, slight splinting.   No rhonchi or wheezing etc.   Abdominal:      General: Bowel sounds are normal.      Palpations: Abdomen is soft.   Skin:     General: Skin is warm and dry.   Neurological:      General: No focal deficit present.      Mental Status: She is alert and oriented to person, place, and time.   Psychiatric:         Mood and Affect: Mood normal.         Behavior: Behavior normal.       Relevant Results       Assessment/Plan      Principal Problem:    Multiple subsegmental pulmonary emboli without acute cor pulmonale (CMS/HCC)    Ms Flynn was admitted with acute bilateral acute PE, no dvt on lower extremity dopplers. Remains on heparin currently, awaiting factor v leiden results.  Acute bilateral PE. She has not been overtly hypoxic, on heparin. Does have family member with Factor V Leiden assay , as it may effect which anticoagulant she should be on long term. Echo with only mildly elevated RVSP, no dilation of RV. Will transition pt off heparin to eliquis today.   Bradycardia. Two days ago, had marked bradycardia on tele, but has not been recurrent. Maintain tele for now  Hypertension. Pressure has been up at times, however, it is likely related to her discomfort/pain she is experiencing  Hx shingles/PHN, right side of neck/shoulder/head  Osteoarthritis, having quite a flare currently of discomfort. Having issues with pain medication. Using tylenol, had emesis from morphine, then wouldn't take any thing else. Ordering lidoderm patch for her knees. PT/OT eval ordered. SS consult placed as well, may need snf from here.           Yung Harrison, DO

## 2024-02-14 NOTE — NURSING NOTE
0730: assumed pt care, from shift change report, pt is no longer on a heparin drip     0938: Dr. Marroquin aware potassium 3.1    1046: gave report to [place]    1200: pt's ride to [place] was put off to 3:30 pm, let family know    1502: pt refused to let us look at her skin, says she's ok    1559: pt is still here, daughter is worried that her mom has a UTI b/c she feels like she can't empty her bladder all the way, got order to get a urine

## 2024-02-14 NOTE — CARE PLAN
The patient's goals for the shift include  Rest    The clinical goals for the shift include Patient will remain comfortable this shift

## 2024-02-15 ENCOUNTER — NURSING HOME VISIT (OUTPATIENT)
Dept: POST ACUTE CARE | Facility: EXTERNAL LOCATION | Age: 89
End: 2024-02-15
Payer: MEDICARE

## 2024-02-15 DIAGNOSIS — R11.0 NAUSEA: ICD-10-CM

## 2024-02-15 DIAGNOSIS — E89.0 POSTPROCEDURAL HYPOTHYROIDISM: ICD-10-CM

## 2024-02-15 DIAGNOSIS — I10 ESSENTIAL HYPERTENSION: ICD-10-CM

## 2024-02-15 DIAGNOSIS — M62.81 MUSCLE WEAKNESS (GENERALIZED): Primary | ICD-10-CM

## 2024-02-15 DIAGNOSIS — E55.9 VITAMIN D DEFICIENCY: ICD-10-CM

## 2024-02-15 DIAGNOSIS — Z83.2 FAMILY HISTORY OF FACTOR V DEFICIENCY: ICD-10-CM

## 2024-02-15 DIAGNOSIS — K21.9 GASTROESOPHAGEAL REFLUX DISEASE, UNSPECIFIED WHETHER ESOPHAGITIS PRESENT: ICD-10-CM

## 2024-02-15 DIAGNOSIS — I26.94 MULTIPLE SUBSEGMENTAL PULMONARY EMBOLI WITHOUT ACUTE COR PULMONALE (MULTI): ICD-10-CM

## 2024-02-15 DIAGNOSIS — L30.9 DERMATITIS: ICD-10-CM

## 2024-02-15 LAB — HOLD SPECIMEN: NORMAL

## 2024-02-15 PROCEDURE — 99310 SBSQ NF CARE HIGH MDM 45: CPT | Performed by: NURSE PRACTITIONER

## 2024-02-15 NOTE — CARE PLAN
The patient's goals for the shift include  go to rehab    The clinical goals for the shift include Patient will remain comfortabe this shift

## 2024-02-19 ENCOUNTER — NURSING HOME VISIT (OUTPATIENT)
Dept: POST ACUTE CARE | Facility: EXTERNAL LOCATION | Age: 89
End: 2024-02-19
Payer: MEDICARE

## 2024-02-19 VITALS
OXYGEN SATURATION: 96 % | HEART RATE: 84 BPM | TEMPERATURE: 97.4 F | SYSTOLIC BLOOD PRESSURE: 122 MMHG | RESPIRATION RATE: 18 BRPM | DIASTOLIC BLOOD PRESSURE: 62 MMHG

## 2024-02-19 DIAGNOSIS — E89.0 POSTPROCEDURAL HYPOTHYROIDISM: ICD-10-CM

## 2024-02-19 DIAGNOSIS — I26.94 MULTIPLE SUBSEGMENTAL PULMONARY EMBOLI WITHOUT ACUTE COR PULMONALE (MULTI): Primary | ICD-10-CM

## 2024-02-19 LAB
ELECTRONICALLY SIGNED BY: NORMAL
F5 P.R506Q BLD/T QL: NORMAL
F5 P.R506Q BLD/T QL: NORMAL

## 2024-02-19 PROCEDURE — 99305 1ST NF CARE MODERATE MDM 35: CPT | Performed by: FAMILY MEDICINE

## 2024-02-19 ASSESSMENT — ENCOUNTER SYMPTOMS
UNEXPECTED WEIGHT CHANGE: 0
APPETITE CHANGE: 0
NAUSEA: 0

## 2024-02-19 NOTE — PROGRESS NOTES
Subjective   Patient ID: Nanette Flynn is a 88 y.o. female who is acute skilled care being seen and evaluated for multiple medical problems.    HPI   88-year-old female status post admission for bilateral pulmonary emboli  Treated successfully with Eliquis  At [facility] for rehabilitation purposes  No complaint of chest pain shortness of breath or edema  No fever chills cough headache    Review of Systems   Constitutional:  Negative for appetite change and unexpected weight change.   Eyes:  Negative for visual disturbance.   Gastrointestinal:  Negative for nausea.       Objective   There were no vitals taken for this visit.    Physical Exam  Constitutional:       General: She is not in acute distress.     Appearance: Normal appearance.   HENT:      Head: Normocephalic and atraumatic.   Eyes:      Conjunctiva/sclera: Conjunctivae normal.   Cardiovascular:      Rate and Rhythm: Normal rate and regular rhythm.   Pulmonary:      Effort: Pulmonary effort is normal.      Breath sounds: Normal breath sounds.   Abdominal:      Palpations: Abdomen is soft.   Musculoskeletal:      Cervical back: Neck supple.   Lymphadenopathy:      Cervical: No cervical adenopathy.   Neurological:      Mental Status: She is alert.         Assessment/Plan   Diagnoses and all orders for this visit:  Multiple subsegmental pulmonary emboli without acute cor pulmonale (CMS/HCC)  Comments:  Continue Eliquis  Patient may need hypercoagulable workup since no obvious risk factors  Postprocedural hypothyroidism  Comments:  Treated and controlled    Factor V Leiden is pending and patient says she does have 2 family members with this   Goals    None     Recheck 1 week sooner if any issues arise

## 2024-02-19 NOTE — LETTER
Patient: Nanette Flynn  : 1935    Encounter Date: 2024    Subjective  Patient ID: Nanette Flynn is a 88 y.o. female who is acute skilled care being seen and evaluated for multiple medical problems.    HPI   88-year-old female status post admission for bilateral pulmonary emboli  Treated successfully with Eliquis  At [facility] for rehabilitation purposes  No complaint of chest pain shortness of breath or edema  No fever chills cough headache    Review of Systems   Constitutional:  Negative for appetite change and unexpected weight change.   Eyes:  Negative for visual disturbance.   Gastrointestinal:  Negative for nausea.       Objective  There were no vitals taken for this visit.    Physical Exam  Constitutional:       General: She is not in acute distress.     Appearance: Normal appearance.   HENT:      Head: Normocephalic and atraumatic.   Eyes:      Conjunctiva/sclera: Conjunctivae normal.   Cardiovascular:      Rate and Rhythm: Normal rate and regular rhythm.   Pulmonary:      Effort: Pulmonary effort is normal.      Breath sounds: Normal breath sounds.   Abdominal:      Palpations: Abdomen is soft.   Musculoskeletal:      Cervical back: Neck supple.   Lymphadenopathy:      Cervical: No cervical adenopathy.   Neurological:      Mental Status: She is alert.         Assessment/Plan  Diagnoses and all orders for this visit:  Multiple subsegmental pulmonary emboli without acute cor pulmonale (CMS/HCC)  Comments:  Continue Eliquis  Patient may need hypercoagulable workup since no obvious risk factors  Postprocedural hypothyroidism  Comments:  Treated and controlled    Factor V Leiden is pending and patient says she does have 2 family members with this   Goals    None     Recheck 1 week sooner if any issues arise      Electronically Signed By: Semaj Winslow MD   24  9:59 AM

## 2024-02-22 ENCOUNTER — APPOINTMENT (OUTPATIENT)
Dept: RADIOLOGY | Facility: HOSPITAL | Age: 89
End: 2024-02-22
Payer: MEDICARE

## 2024-02-22 ENCOUNTER — HOSPITAL ENCOUNTER (OUTPATIENT)
Facility: HOSPITAL | Age: 89
Setting detail: OBSERVATION
Discharge: NURSING, REHAB OR LONG-TERM CARE | End: 2024-02-26
Attending: EMERGENCY MEDICINE | Admitting: INTERNAL MEDICINE
Payer: MEDICARE

## 2024-02-22 ENCOUNTER — APPOINTMENT (OUTPATIENT)
Dept: CARDIOLOGY | Facility: HOSPITAL | Age: 89
End: 2024-02-22
Payer: MEDICARE

## 2024-02-22 DIAGNOSIS — R55 SYNCOPE AND COLLAPSE: ICD-10-CM

## 2024-02-22 DIAGNOSIS — R00.1 BRADYCARDIA: ICD-10-CM

## 2024-02-22 DIAGNOSIS — I26.94 MULTIPLE SUBSEGMENTAL PULMONARY EMBOLI WITHOUT ACUTE COR PULMONALE (MULTI): ICD-10-CM

## 2024-02-22 DIAGNOSIS — R41.82 ALTERED MENTAL STATUS, UNSPECIFIED ALTERED MENTAL STATUS TYPE: Primary | ICD-10-CM

## 2024-02-22 PROBLEM — G45.9 TIA (TRANSIENT ISCHEMIC ATTACK): Status: ACTIVE | Noted: 2024-02-22

## 2024-02-22 LAB
ALBUMIN SERPL BCP-MCNC: 3.6 G/DL (ref 3.4–5)
ALP SERPL-CCNC: 64 U/L (ref 33–136)
ALT SERPL W P-5'-P-CCNC: 38 U/L (ref 7–45)
ANION GAP BLDV CALCULATED.4IONS-SCNC: 8 MMOL/L (ref 10–25)
ANION GAP SERPL CALC-SCNC: 13 MMOL/L (ref 10–20)
AST SERPL W P-5'-P-CCNC: 36 U/L (ref 9–39)
BASE EXCESS BLDV CALC-SCNC: 7.6 MMOL/L (ref -2–3)
BASOPHILS # BLD AUTO: 0.08 X10*3/UL (ref 0–0.1)
BASOPHILS NFR BLD AUTO: 0.7 %
BILIRUB DIRECT SERPL-MCNC: 0 MG/DL (ref 0–0.3)
BILIRUB SERPL-MCNC: 0.4 MG/DL (ref 0–1.2)
BODY TEMPERATURE: ABNORMAL
BUN SERPL-MCNC: 17 MG/DL (ref 6–23)
CA-I BLDV-SCNC: 1.21 MMOL/L (ref 1.1–1.33)
CALCIUM SERPL-MCNC: 9.4 MG/DL (ref 8.6–10.3)
CARDIAC TROPONIN I PNL SERPL HS: 10 NG/L (ref 0–13)
CARDIAC TROPONIN I PNL SERPL HS: 9 NG/L (ref 0–13)
CHLORIDE BLDV-SCNC: 100 MMOL/L (ref 98–107)
CHLORIDE SERPL-SCNC: 100 MMOL/L (ref 98–107)
CO2 SERPL-SCNC: 29 MMOL/L (ref 21–32)
CREAT SERPL-MCNC: 0.9 MG/DL (ref 0.5–1.05)
EGFRCR SERPLBLD CKD-EPI 2021: 62 ML/MIN/1.73M*2
EOSINOPHIL # BLD AUTO: 0.32 X10*3/UL (ref 0–0.4)
EOSINOPHIL NFR BLD AUTO: 3 %
ERYTHROCYTE [DISTWIDTH] IN BLOOD BY AUTOMATED COUNT: 14.1 % (ref 11.5–14.5)
GLUCOSE BLDV-MCNC: 102 MG/DL (ref 74–99)
GLUCOSE SERPL-MCNC: 95 MG/DL (ref 74–99)
HCO3 BLDV-SCNC: 32.7 MMOL/L (ref 22–26)
HCT VFR BLD AUTO: 37.7 % (ref 36–46)
HCT VFR BLD EST: 37 % (ref 36–46)
HGB BLD-MCNC: 12.1 G/DL (ref 12–16)
HGB BLDV-MCNC: 12.4 G/DL (ref 12–16)
IMM GRANULOCYTES # BLD AUTO: 0.29 X10*3/UL (ref 0–0.5)
IMM GRANULOCYTES NFR BLD AUTO: 2.7 % (ref 0–0.9)
INHALED O2 CONCENTRATION: 21 %
INR PPP: 1.8 (ref 0.9–1.1)
LACTATE BLDV-SCNC: 1.7 MMOL/L (ref 0.4–2)
LACTATE SERPL-SCNC: 1.6 MMOL/L (ref 0.4–2)
LYMPHOCYTES # BLD AUTO: 4.09 X10*3/UL (ref 0.8–3)
LYMPHOCYTES NFR BLD AUTO: 37.8 %
MCH RBC QN AUTO: 31.3 PG (ref 26–34)
MCHC RBC AUTO-ENTMCNC: 32.1 G/DL (ref 32–36)
MCV RBC AUTO: 97 FL (ref 80–100)
MONOCYTES # BLD AUTO: 0.93 X10*3/UL (ref 0.05–0.8)
MONOCYTES NFR BLD AUTO: 8.6 %
NEUTROPHILS # BLD AUTO: 5.11 X10*3/UL (ref 1.6–5.5)
NEUTROPHILS NFR BLD AUTO: 47.2 %
NRBC BLD-RTO: 0 /100 WBCS (ref 0–0)
OXYHGB MFR BLDV: 44.8 % (ref 45–75)
PCO2 BLDV: 47 MM HG (ref 41–51)
PH BLDV: 7.45 PH (ref 7.33–7.43)
PLATELET # BLD AUTO: 340 X10*3/UL (ref 150–450)
PO2 BLDV: ABNORMAL MM[HG]
POTASSIUM BLDV-SCNC: 4.4 MMOL/L (ref 3.5–5.3)
POTASSIUM SERPL-SCNC: 4.1 MMOL/L (ref 3.5–5.3)
PROT SERPL-MCNC: 7.2 G/DL (ref 6.4–8.2)
PROTHROMBIN TIME: 20 SECONDS (ref 9.8–12.8)
RBC # BLD AUTO: 3.87 X10*6/UL (ref 4–5.2)
SAO2 % BLDV: 46 % (ref 45–75)
SODIUM BLDV-SCNC: 136 MMOL/L (ref 136–145)
SODIUM SERPL-SCNC: 138 MMOL/L (ref 136–145)
WBC # BLD AUTO: 10.8 X10*3/UL (ref 4.4–11.3)

## 2024-02-22 PROCEDURE — 71045 X-RAY EXAM CHEST 1 VIEW: CPT | Performed by: RADIOLOGY

## 2024-02-22 PROCEDURE — 85025 COMPLETE CBC W/AUTO DIFF WBC: CPT | Performed by: EMERGENCY MEDICINE

## 2024-02-22 PROCEDURE — 94667 MNPJ CHEST WALL 1ST: CPT

## 2024-02-22 PROCEDURE — 2500000001 HC RX 250 WO HCPCS SELF ADMINISTERED DRUGS (ALT 637 FOR MEDICARE OP): Performed by: EMERGENCY MEDICINE

## 2024-02-22 PROCEDURE — 71045 X-RAY EXAM CHEST 1 VIEW: CPT

## 2024-02-22 PROCEDURE — 99285 EMERGENCY DEPT VISIT HI MDM: CPT | Mod: 25

## 2024-02-22 PROCEDURE — 93005 ELECTROCARDIOGRAM TRACING: CPT

## 2024-02-22 PROCEDURE — 85610 PROTHROMBIN TIME: CPT | Performed by: EMERGENCY MEDICINE

## 2024-02-22 PROCEDURE — 99223 1ST HOSP IP/OBS HIGH 75: CPT

## 2024-02-22 PROCEDURE — 36415 COLL VENOUS BLD VENIPUNCTURE: CPT | Performed by: EMERGENCY MEDICINE

## 2024-02-22 PROCEDURE — 84132 ASSAY OF SERUM POTASSIUM: CPT | Performed by: EMERGENCY MEDICINE

## 2024-02-22 PROCEDURE — 2500000004 HC RX 250 GENERAL PHARMACY W/ HCPCS (ALT 636 FOR OP/ED)

## 2024-02-22 PROCEDURE — 83605 ASSAY OF LACTIC ACID: CPT | Performed by: EMERGENCY MEDICINE

## 2024-02-22 PROCEDURE — G0378 HOSPITAL OBSERVATION PER HR: HCPCS

## 2024-02-22 PROCEDURE — 70450 CT HEAD/BRAIN W/O DYE: CPT | Performed by: RADIOLOGY

## 2024-02-22 PROCEDURE — 82248 BILIRUBIN DIRECT: CPT | Performed by: EMERGENCY MEDICINE

## 2024-02-22 PROCEDURE — 84484 ASSAY OF TROPONIN QUANT: CPT | Performed by: EMERGENCY MEDICINE

## 2024-02-22 PROCEDURE — 2500000001 HC RX 250 WO HCPCS SELF ADMINISTERED DRUGS (ALT 637 FOR MEDICARE OP)

## 2024-02-22 PROCEDURE — 70450 CT HEAD/BRAIN W/O DYE: CPT

## 2024-02-22 RX ORDER — ACETAMINOPHEN 325 MG/1
650 TABLET ORAL ONCE
Status: COMPLETED | OUTPATIENT
Start: 2024-02-22 | End: 2024-02-22

## 2024-02-22 RX ORDER — CHOLECALCIFEROL (VITAMIN D3) 25 MCG
5000 TABLET ORAL DAILY
Status: DISCONTINUED | OUTPATIENT
Start: 2024-02-22 | End: 2024-02-26 | Stop reason: HOSPADM

## 2024-02-22 RX ORDER — ONDANSETRON 4 MG/1
4 TABLET, FILM COATED ORAL EVERY 8 HOURS PRN
Status: DISCONTINUED | OUTPATIENT
Start: 2024-02-22 | End: 2024-02-26 | Stop reason: HOSPADM

## 2024-02-22 RX ORDER — ACETAMINOPHEN 325 MG/1
325 TABLET ORAL EVERY 4 HOURS PRN
Status: DISCONTINUED | OUTPATIENT
Start: 2024-02-22 | End: 2024-02-25

## 2024-02-22 RX ORDER — DOCUSATE SODIUM 100 MG/1
100 CAPSULE, LIQUID FILLED ORAL 2 TIMES DAILY PRN
Status: DISCONTINUED | OUTPATIENT
Start: 2024-02-22 | End: 2024-02-26 | Stop reason: HOSPADM

## 2024-02-22 RX ORDER — AMOXICILLIN 250 MG
1 CAPSULE ORAL DAILY
COMMUNITY
End: 2024-03-28 | Stop reason: ALTCHOICE

## 2024-02-22 RX ORDER — UBIDECARENONE 75 MG
250 CAPSULE ORAL DAILY
Status: DISCONTINUED | OUTPATIENT
Start: 2024-02-22 | End: 2024-02-26 | Stop reason: HOSPADM

## 2024-02-22 RX ORDER — DOCUSATE SODIUM 100 MG/1
100 CAPSULE, LIQUID FILLED ORAL 2 TIMES DAILY PRN
COMMUNITY
End: 2024-03-28 | Stop reason: ALTCHOICE

## 2024-02-22 RX ORDER — ACETAMINOPHEN 325 MG/1
325 TABLET ORAL EVERY 4 HOURS PRN
COMMUNITY
End: 2024-03-28 | Stop reason: ALTCHOICE

## 2024-02-22 RX ORDER — LISINOPRIL 5 MG/1
5 TABLET ORAL DAILY
Status: DISCONTINUED | OUTPATIENT
Start: 2024-02-22 | End: 2024-02-26 | Stop reason: HOSPADM

## 2024-02-22 RX ORDER — TRAVOPROST OPHTHALMIC SOLUTION 0.04 MG/ML
1 SOLUTION OPHTHALMIC NIGHTLY
Status: DISCONTINUED | OUTPATIENT
Start: 2024-02-22 | End: 2024-02-22

## 2024-02-22 RX ORDER — LISINOPRIL 5 MG/1
5 TABLET ORAL ONCE
Status: COMPLETED | OUTPATIENT
Start: 2024-02-22 | End: 2024-02-22

## 2024-02-22 RX ORDER — LEVOTHYROXINE SODIUM 75 UG/1
75 TABLET ORAL DAILY
Status: DISCONTINUED | OUTPATIENT
Start: 2024-02-23 | End: 2024-02-25

## 2024-02-22 RX ORDER — LISINOPRIL 5 MG/1
5 TABLET ORAL DAILY
COMMUNITY
End: 2024-03-28 | Stop reason: SDUPTHER

## 2024-02-22 RX ORDER — LANOLIN ALCOHOL/MO/W.PET/CERES
400 CREAM (GRAM) TOPICAL DAILY
Status: DISCONTINUED | OUTPATIENT
Start: 2024-02-22 | End: 2024-02-26 | Stop reason: HOSPADM

## 2024-02-22 RX ORDER — TRIAMCINOLONE ACETONIDE 1 MG/G
1 CREAM TOPICAL 2 TIMES DAILY PRN
Status: DISCONTINUED | OUTPATIENT
Start: 2024-02-22 | End: 2024-02-26 | Stop reason: HOSPADM

## 2024-02-22 RX ORDER — AMOXICILLIN 250 MG
1 CAPSULE ORAL DAILY
Status: DISCONTINUED | OUTPATIENT
Start: 2024-02-22 | End: 2024-02-26 | Stop reason: HOSPADM

## 2024-02-22 RX ORDER — LATANOPROST 50 UG/ML
1 SOLUTION/ DROPS OPHTHALMIC NIGHTLY
Status: DISCONTINUED | OUTPATIENT
Start: 2024-02-22 | End: 2024-02-26 | Stop reason: HOSPADM

## 2024-02-22 RX ORDER — CALCIUM CARBONATE 500(1250)
1250 TABLET ORAL DAILY
Status: DISCONTINUED | OUTPATIENT
Start: 2024-02-22 | End: 2024-02-26 | Stop reason: HOSPADM

## 2024-02-22 RX ORDER — TRIAMCINOLONE ACETONIDE 1 MG/G
1 CREAM TOPICAL 2 TIMES DAILY PRN
COMMUNITY
End: 2024-04-05 | Stop reason: ALTCHOICE

## 2024-02-22 RX ADMIN — APIXABAN 5 MG: 5 TABLET, FILM COATED ORAL at 20:40

## 2024-02-22 RX ADMIN — ACETAMINOPHEN 650 MG: 325 TABLET ORAL at 14:09

## 2024-02-22 RX ADMIN — LISINOPRIL 5 MG: 5 TABLET ORAL at 14:09

## 2024-02-22 RX ADMIN — CALCIUM 1250 MG: 500 TABLET ORAL at 20:40

## 2024-02-22 RX ADMIN — LATANOPROST 1 DROP: 50 SOLUTION OPHTHALMIC at 20:40

## 2024-02-22 RX ADMIN — Medication 400 MG: at 20:40

## 2024-02-22 SDOH — SOCIAL STABILITY: SOCIAL INSECURITY: WERE YOU ABLE TO COMPLETE ALL THE BEHAVIORAL HEALTH SCREENINGS?: NO

## 2024-02-22 SDOH — SOCIAL STABILITY: SOCIAL INSECURITY: DO YOU FEEL ANYONE HAS EXPLOITED OR TAKEN ADVANTAGE OF YOU FINANCIALLY OR OF YOUR PERSONAL PROPERTY?: NO

## 2024-02-22 SDOH — SOCIAL STABILITY: SOCIAL INSECURITY: HAS ANYONE EVER THREATENED TO HURT YOUR FAMILY OR YOUR PETS?: NO

## 2024-02-22 SDOH — SOCIAL STABILITY: SOCIAL INSECURITY: DO YOU FEEL UNSAFE GOING BACK TO THE PLACE WHERE YOU ARE LIVING?: NO

## 2024-02-22 SDOH — SOCIAL STABILITY: SOCIAL INSECURITY: ABUSE: ADULT

## 2024-02-22 SDOH — SOCIAL STABILITY: SOCIAL INSECURITY: HAVE YOU HAD THOUGHTS OF HARMING ANYONE ELSE?: NO

## 2024-02-22 SDOH — SOCIAL STABILITY: SOCIAL INSECURITY: DOES ANYONE TRY TO KEEP YOU FROM HAVING/CONTACTING OTHER FRIENDS OR DOING THINGS OUTSIDE YOUR HOME?: NO

## 2024-02-22 SDOH — SOCIAL STABILITY: SOCIAL INSECURITY: ARE YOU OR HAVE YOU BEEN THREATENED OR ABUSED PHYSICALLY, EMOTIONALLY, OR SEXUALLY BY ANYONE?: NO

## 2024-02-22 SDOH — SOCIAL STABILITY: SOCIAL INSECURITY: ARE THERE ANY APPARENT SIGNS OF INJURIES/BEHAVIORS THAT COULD BE RELATED TO ABUSE/NEGLECT?: NO

## 2024-02-22 ASSESSMENT — PAIN SCALES - GENERAL
PAINLEVEL_OUTOF10: 5 - MODERATE PAIN
PAINLEVEL_OUTOF10: 0 - NO PAIN
PAINLEVEL_OUTOF10: 0 - NO PAIN

## 2024-02-22 ASSESSMENT — COLUMBIA-SUICIDE SEVERITY RATING SCALE - C-SSRS
1. IN THE PAST MONTH, HAVE YOU WISHED YOU WERE DEAD OR WISHED YOU COULD GO TO SLEEP AND NOT WAKE UP?: NO
6. HAVE YOU EVER DONE ANYTHING, STARTED TO DO ANYTHING, OR PREPARED TO DO ANYTHING TO END YOUR LIFE?: NO
2. HAVE YOU ACTUALLY HAD ANY THOUGHTS OF KILLING YOURSELF?: NO

## 2024-02-22 ASSESSMENT — LIFESTYLE VARIABLES
EVER FELT BAD OR GUILTY ABOUT YOUR DRINKING: NO
EVER HAD A DRINK FIRST THING IN THE MORNING TO STEADY YOUR NERVES TO GET RID OF A HANGOVER: NO
SKIP TO QUESTIONS 9-10: 1
HOW OFTEN DO YOU HAVE 6 OR MORE DRINKS ON ONE OCCASION: NEVER
AUDIT-C TOTAL SCORE: 0
HOW OFTEN DO YOU HAVE A DRINK CONTAINING ALCOHOL: NEVER
HOW MANY STANDARD DRINKS CONTAINING ALCOHOL DO YOU HAVE ON A TYPICAL DAY: PATIENT DOES NOT DRINK
AUDIT-C TOTAL SCORE: 0
HAVE PEOPLE ANNOYED YOU BY CRITICIZING YOUR DRINKING: NO
HAVE YOU EVER FELT YOU SHOULD CUT DOWN ON YOUR DRINKING: NO

## 2024-02-22 ASSESSMENT — COGNITIVE AND FUNCTIONAL STATUS - GENERAL
MOVING FROM LYING ON BACK TO SITTING ON SIDE OF FLAT BED WITH BEDRAILS: A LITTLE
MOBILITY SCORE: 18
DRESSING REGULAR LOWER BODY CLOTHING: A LITTLE
TOILETING: A LITTLE
DAILY ACTIVITIY SCORE: 20
WALKING IN HOSPITAL ROOM: A LITTLE
CLIMB 3 TO 5 STEPS WITH RAILING: A LITTLE
MOVING FROM LYING ON BACK TO SITTING ON SIDE OF FLAT BED WITH BEDRAILS: A LITTLE
HELP NEEDED FOR BATHING: A LITTLE
HELP NEEDED FOR BATHING: A LITTLE
DRESSING REGULAR UPPER BODY CLOTHING: A LITTLE
DRESSING REGULAR LOWER BODY CLOTHING: A LITTLE
DAILY ACTIVITIY SCORE: 20
CLIMB 3 TO 5 STEPS WITH RAILING: A LITTLE
CLIMB 3 TO 5 STEPS WITH RAILING: A LITTLE
HELP NEEDED FOR BATHING: A LITTLE
MOVING FROM LYING ON BACK TO SITTING ON SIDE OF FLAT BED WITH BEDRAILS: A LITTLE
DRESSING REGULAR UPPER BODY CLOTHING: A LITTLE
TURNING FROM BACK TO SIDE WHILE IN FLAT BAD: A LITTLE
STANDING UP FROM CHAIR USING ARMS: A LITTLE
DAILY ACTIVITIY SCORE: 20
MOBILITY SCORE: 18
TURNING FROM BACK TO SIDE WHILE IN FLAT BAD: A LITTLE
TOILETING: A LITTLE
STANDING UP FROM CHAIR USING ARMS: A LITTLE
STANDING UP FROM CHAIR USING ARMS: A LITTLE
PATIENT BASELINE BEDBOUND: NO
WALKING IN HOSPITAL ROOM: A LITTLE
MOVING TO AND FROM BED TO CHAIR: A LITTLE
TURNING FROM BACK TO SIDE WHILE IN FLAT BAD: A LITTLE
DRESSING REGULAR UPPER BODY CLOTHING: A LITTLE
TOILETING: A LITTLE
WALKING IN HOSPITAL ROOM: A LITTLE
MOVING TO AND FROM BED TO CHAIR: A LITTLE
DRESSING REGULAR LOWER BODY CLOTHING: A LITTLE
MOBILITY SCORE: 18
MOVING TO AND FROM BED TO CHAIR: A LITTLE

## 2024-02-22 ASSESSMENT — ACTIVITIES OF DAILY LIVING (ADL)
PATIENT'S MEMORY ADEQUATE TO SAFELY COMPLETE DAILY ACTIVITIES?: YES
FEEDING YOURSELF: INDEPENDENT
GROOMING: INDEPENDENT
TOILETING: INDEPENDENT
ADEQUATE_TO_COMPLETE_ADL: YES
DRESSING YOURSELF: INDEPENDENT
WALKS IN HOME: NEEDS ASSISTANCE
ASSISTIVE_DEVICE: WALKER
HEARING - LEFT EAR: FUNCTIONAL
LACK_OF_TRANSPORTATION: NO
HEARING - RIGHT EAR: FUNCTIONAL
JUDGMENT_ADEQUATE_SAFELY_COMPLETE_DAILY_ACTIVITIES: YES
BATHING: INDEPENDENT

## 2024-02-22 ASSESSMENT — PAIN - FUNCTIONAL ASSESSMENT
PAIN_FUNCTIONAL_ASSESSMENT: 0-10
PAIN_FUNCTIONAL_ASSESSMENT: 0-10

## 2024-02-22 NOTE — ED PROVIDER NOTES
Nanette Flynn  88 y.o.    HPI  Presents to the ED with parent for an episode of unresponsiveness at [place].  Patient states she remembers eating breakfast and was having a good morning.  Thought that she went to take a nap.  When she woke up people were telling her she was going to the hospital.  EMS report is that the patient did have CPR.   Denies any recent illness.  She tells me that she had a recent hospitalization where they found a blood clot and that is why she is on a blood thinner.  Denies any pain or issues at this time.  No chest pain or shortness of breath or abdominal pain.  No head pain.  No new weakness numbness or tingling.  States that she has double vision at baseline.  She does not have her corrective glasses with her.  Says that she will continue to have nausea until she has her glasses.    Patient History   Past Medical History:   Diagnosis Date    Other specified postprocedural states     History of colonoscopy    Personal history of other diseases of the circulatory system     History of rheumatic fever    Personal history of other diseases of the circulatory system     History of rheumatic fever    Personal history of other medical treatment     History of mammogram     Past Surgical History:   Procedure Laterality Date    APPENDECTOMY  08/20/2018    Appendectomy    CT ANGIO NECK  9/24/2013    CT NECK ANGIO W AND WO IV CONTRAST 9/24/2013 [facility]    CT HEAD ANGIO W AND WO IV CONTRAST  9/24/2013    CT HEAD ANGIO W AND WO IV CONTRAST 9/24/2013 [facility]    HYSTERECTOMY  08/20/2018    Hysterectomy    OTHER SURGICAL HISTORY  08/20/2018    Enterectomy     Family History   Problem Relation Name Age of Onset    Heart disease Mother      Hypertension Mother      Transient ischemic attack Mother      Heart disease Father      Cancer Maternal Grandmother      Colon cancer Maternal Grandmother       Social History     Tobacco Use    Smoking status: Former     Types:  "Cigarettes    Smokeless tobacco: Never   Substance Use Topics    Alcohol use: Not Currently    Drug use: Not Currently       Physical Exam   Vitals:    02/22/24 1023   BP: 168/78   BP Location: Left arm   Patient Position: Lying   Pulse: 67   Resp: 18   Temp: 36.2 °C (97.2 °F)   TempSrc: Skin   SpO2: 96%   Weight: 75.8 kg (167 lb)   Height: 1.626 m (5' 4\")        Constitutional: NAD, non-toxic  Eyes: PERRL, Conjunctiva normal, No discharge  HEENT: Atraumatic. External ears appear normal, Nose is without drainage, MMM, no intraoral lesions  Neck: Normal ROM, No lymphadenopathy, No stridor  Cardiac: Regular rhythm and rate  Pulmonary/Chest: No respiratory distress, Normal breath sounds, No chest tenderness  Abdomen: BS present, Soft, Non-tender without rebound/guarding  Back: No tenderness, No contusions  Extremities: Normal ROM, No edema.  Compression stockings in place.  No tenderness, intact distal pulses  Skin: Warm and dry, No rashes, No erythema  Neurologic: Alert and oriented x 3, Speech clear/fluent. Normal motor function, Normal sensation, No focal neurologic deficits.  Strong and equal hand grasps and foot push pulls.      EKG interpreted by me: 1023.  Sinus rhythm at a rate of 68.  No ST segment elevation concerning for acute MI.  There is a first-degree AV block with a NJ interval of 210.  QTc is 448.    ED Course & MDM     This is an 88-year-old female with a history of PE on apixaban who comes to the emergency department after a period of unresponsiveness for which the nursing facility perform CPR.  Patient herself has no complaints.  She does not recall this episode at all.  On exam she is alert and pleasant oriented times 3 but does not member the situation.  EKG basic blood work, chest x-ray and head CT were obtained.  Patient was observed in the emergency department.  Workup was generally reassuring.  The patient did have a brief episode in the emergency department that she told the nurse about where " things kind of got quieted and she felt a little bit off and that resolved spontaneously.  We did not observe any EKG changes at that time.  Patient again states that she is feeling completely fine.  See nursing note for details.  I went in and discussed test results with the patient and multiple female family members at the bedside.  Patient is agreeable to overnight hospitalization for further neurologic monitoring, evaluation and care.  No new concerns or issues at this time.      Impression   1. Altered mental status, unspecified altered mental status type         Disposition  Hospitalize       MD Don Pedro MD  02/22/24 2728

## 2024-02-22 NOTE — ED TRIAGE NOTES
Patient here for unconscious 08:15 had breakfast, 09:30 pt found with head back in wheelchair Nursing home states she had no pulse and did one round of CPR and then found a pulse. PT denies CP/SOB, states last thig she remembers is eating breakfast. Endorses nausea but states its because she gets car sick. Takes warfarin for PE's

## 2024-02-22 NOTE — H&P
History Of Present Illness  Nanette Flynn is a 88 y.o. female with PMHx of hypothyroidism, RA, macular degeneration, glaucoma and recent history of subsegmental PEs (2/9/24) on eliquis.    Per nursing home staff, patient was eating breakfast this morning when nursing staff noticed her to lose consciousness with agonal breaths, and unresponsive to stimulation and unable to be aroused.  No muscle jerking or twitching noted.  Patient became bradycardic at 20 bpm, and then pulse was lost, CPR started, patient received ROSC within 2 minutes of CPR.  In another 2 minutes, patient became alert and only slightly confused about what was currently happening, she did not remember what caused her to lose consciousness.  She did not fall, or hit her head, but is on Eliquis.  No incontinence of bladder or bowel.  EMS brought her to the ED.    In ED, CT head was unremarkable for acute pathology, patient denied CT neck and MRI imaging.  Chest x-ray with possible mild interstitial congestion and right basilar airspace disease.  Patient given Tylenol for chest pain from prior compressions, and given her home dose of lisinopril.  EKG with no STEMI,No significant change from prior EKGs.  Troponin and delta troponin negative.  Patient admitted for observation for syncope versus cardiac arrest versus seizure.    Upon initial interview, patient only complains of reproducible chest pain in the area where she received compressions.  Patient and family endorsed patient had a similar LOC episode last year, and another similar episode 10 years ago, but has never been evaluated by a cardiologist. Denies shortness of breath, abdominal pain, urinary symptoms, diarrhea, constipation, numbness, tingling, muscle weakness.    12 point Review of Systems negative unless stated above     Past Medical History  She has a past medical history of Other specified postprocedural states, Personal history of other diseases of the circulatory system,  Personal history of other diseases of the circulatory system, and Personal history of other medical treatment.    Surgical History  She has a past surgical history that includes Hysterectomy (08/20/2018); Appendectomy (08/20/2018); Other surgical history (08/20/2018); CT angio head w and wo IV contrast (9/24/2013); and CT angio neck (9/24/2013).     Social History  She reports that she has quit smoking. Her smoking use included cigarettes. She has never used smokeless tobacco. She reports that she does not currently use alcohol. She reports that she does not currently use drugs.    Family History  Family History   Problem Relation Name Age of Onset    Heart disease Mother      Hypertension Mother      Transient ischemic attack Mother      Heart disease Father      Cancer Maternal Grandmother      Colon cancer Maternal Grandmother          Allergies  Gabapentin    Review of Systems  12 point review of systems negative unless stated above     Physical Exam  Constitutional: patient was seen and examined at the bedside and appeared calm  Eyes: Large pupils due to patient's history of macular degeneration.  Symmetric bilaterally.  No nystagmus.  Head: atraumatic, normocephalic  Heart: RRR, no M/R/G. Reproducible chest pain upon light palpation  Lungs: CTA, b/l, no W/R/R  Abdomen: soft, ND, NT, + BS in all 4 quadrants   Extremities: no edema, ecchymoses, erythema.  Full range of motion and 5/5 strength in all extremities symmetric bilaterally, sensation intact symmetric bilaterally in all extremities.  Neuro: AAOx3 to person/place/time.  No cranial nerve deficits.  Psych: appropriate mood and behavior      Last Recorded Vitals  /84   Pulse 74   Temp 36.2 °C (97.2 °F) (Skin)   Resp 15   Wt 75.8 kg (167 lb)   SpO2 96%     Relevant Results    Scheduled medications  psyllium, 1 packet, oral, Daily      Continuous medications     PRN medications  Results for orders placed or performed during the hospital encounter  of 02/22/24 (from the past 24 hour(s))   CBC and Auto Differential   Result Value Ref Range    WBC 10.8 4.4 - 11.3 x10*3/uL    nRBC 0.0 0.0 - 0.0 /100 WBCs    RBC 3.87 (L) 4.00 - 5.20 x10*6/uL    Hemoglobin 12.1 12.0 - 16.0 g/dL    Hematocrit 37.7 36.0 - 46.0 %    MCV 97 80 - 100 fL    MCH 31.3 26.0 - 34.0 pg    MCHC 32.1 32.0 - 36.0 g/dL    RDW 14.1 11.5 - 14.5 %    Platelets 340 150 - 450 x10*3/uL    Neutrophils % 47.2 40.0 - 80.0 %    Immature Granulocytes %, Automated 2.7 (H) 0.0 - 0.9 %    Lymphocytes % 37.8 13.0 - 44.0 %    Monocytes % 8.6 2.0 - 10.0 %    Eosinophils % 3.0 0.0 - 6.0 %    Basophils % 0.7 0.0 - 2.0 %    Neutrophils Absolute 5.11 1.60 - 5.50 x10*3/uL    Immature Granulocytes Absolute, Automated 0.29 0.00 - 0.50 x10*3/uL    Lymphocytes Absolute 4.09 (H) 0.80 - 3.00 x10*3/uL    Monocytes Absolute 0.93 (H) 0.05 - 0.80 x10*3/uL    Eosinophils Absolute 0.32 0.00 - 0.40 x10*3/uL    Basophils Absolute 0.08 0.00 - 0.10 x10*3/uL   Basic metabolic panel   Result Value Ref Range    Glucose 95 74 - 99 mg/dL    Sodium 138 136 - 145 mmol/L    Potassium 4.1 3.5 - 5.3 mmol/L    Chloride 100 98 - 107 mmol/L    Bicarbonate 29 21 - 32 mmol/L    Anion Gap 13 10 - 20 mmol/L    Urea Nitrogen 17 6 - 23 mg/dL    Creatinine 0.90 0.50 - 1.05 mg/dL    eGFR 62 >60 mL/min/1.73m*2    Calcium 9.4 8.6 - 10.3 mg/dL   Hepatic function panel   Result Value Ref Range    Albumin 3.6 3.4 - 5.0 g/dL    Bilirubin, Total 0.4 0.0 - 1.2 mg/dL    Bilirubin, Direct 0.0 0.0 - 0.3 mg/dL    Alkaline Phosphatase 64 33 - 136 U/L    ALT 38 7 - 45 U/L    AST 36 9 - 39 U/L    Total Protein 7.2 6.4 - 8.2 g/dL   Lactate   Result Value Ref Range    Lactate 1.6 0.4 - 2.0 mmol/L   Blood Gas Venous Full Panel   Result Value Ref Range    POCT pH, Venous 7.45 (H) 7.33 - 7.43 pH    POCT pCO2, Venous 47 41 - 51 mm Hg    POCT pO2, Venous      POCT SO2, Venous 46 45 - 75 %    POCT Oxy Hemoglobin, Venous 44.8 (L) 45.0 - 75.0 %    POCT Hematocrit Calculated,  Venous 37.0 36.0 - 46.0 %    POCT Sodium, Venous 136 136 - 145 mmol/L    POCT Potassium, Venous 4.4 3.5 - 5.3 mmol/L    POCT Chloride, Venous 100 98 - 107 mmol/L    POCT Ionized Calicum, Venous 1.21 1.10 - 1.33 mmol/L    POCT Glucose, Venous 102 (H) 74 - 99 mg/dL    POCT Lactate, Venous 1.7 0.4 - 2.0 mmol/L    POCT Base Excess, Venous 7.6 (H) -2.0 - 3.0 mmol/L    POCT HCO3 Calculated, Venous 32.7 (H) 22.0 - 26.0 mmol/L    POCT Hemoglobin, Venous 12.4 12.0 - 16.0 g/dL    POCT Anion Gap, Venous 8.0 (L) 10.0 - 25.0 mmol/L    Patient Temperature      FiO2 21 %   Troponin I, High Sensitivity, Initial   Result Value Ref Range    Troponin I, High Sensitivity 9 0 - 13 ng/L   Protime-INR   Result Value Ref Range    Protime 20.0 (H) 9.8 - 12.8 seconds    INR 1.8 (H) 0.9 - 1.1   ECG 12 lead   Result Value Ref Range    Ventricular Rate 68 BPM    Atrial Rate 68 BPM    ID Interval 210 ms    QRS Duration 104 ms    QT Interval 422 ms    QTC Calculation(Bazett) 448 ms    P Axis 44 degrees    R Axis 22 degrees    T Axis 94 degrees    QRS Count 11 beats    Q Onset 216 ms    P Onset 111 ms    P Offset 160 ms    T Offset 427 ms    QTC Fredericia 440 ms   Troponin, High Sensitivity, 1 Hour   Result Value Ref Range    Troponin I, High Sensitivity 10 0 - 13 ng/L     CT head wo IV contrast    Result Date: 2/22/2024  Interpreted By:  Boy Ho, STUDY: CT HEAD WO IV CONTRAST;  2/22/2024 11:25 am   INDICATION: Signs/Symptoms:AMS/unresponsive; anti-coagulated.   COMPARISON: 09/24/2013   ACCESSION NUMBER(S): [id number]   ORDERING CLINICIAN: FREDDIE DALE   TECHNIQUE: Sequential trans axial images were obtained  .   FINDINGS: INTRACRANIAL:   There is mild-to-moderate prominence of the cortical sulci indicating atrophy.   There is mild-to-moderate ventriculomegaly, again consistent with atrophy.   Moderate decreased attenuation of the periventricular and long tracks of the white matter most consistent with gliosis from arterial disease. There  is no evidence of definite subacute infarction, intracranial hemorrhage or mass.     EXTRACRANIAL: Visualized paranasal sinuses and mastoids are clear. The calvarium is intact.       Mild-to-moderate age related degenerative change progressed from the previous exam, but otherwise without acute findings.   Signed by: Boy Ho 2/22/2024 11:30 AM Dictation workstation:   [id number]    ECG 12 lead    Result Date: 2/22/2024  Sinus rhythm with 1st degree AV block Cannot rule out Anterior infarct (cited on or before 09-FEB-2024) Abnormal ECG When compared with ECG of 09-FEB-2024 09:54, No significant change was found    XR chest 1 view    Result Date: 2/22/2024  Interpreted By:  Boy Ho, STUDY: XR CHEST 1 VIEW;  2/22/2024 11:09 am   INDICATION: Signs/Symptoms:had CPR.   COMPARISON: 03/29/2016   ACCESSION NUMBER(S): [id number]   ORDERING CLINICIAN: FREDDIE DALE   FINDINGS: CARDIOMEDIASTINAL SILHOUETTE AND VASCULATURE:   Cardiac size:  Within normal limits.   Aortic shadow:  Within normal limits.   Mediastinal contours: Within normal limits.   Pulmonary vasculature:  The central vasculature is unremarkable   LUNGS: Mild interstitial prominence increased from the prior examination may be due to fibrosis, although mild interstitial congestion is possible. Follow-up as clinically warranted. Longitudinal linear opacity in the right lower lung medially and opacity right lateral costophrenic angle may be due to fibrosis or atelectasis. However, the possibility of a focal infiltrate or nodule at the right costophrenic angle is not excluded, follow-up would be recommended. The lungs otherwise are clear without consolidative infiltrate.   ABDOMEN AND OTHER FINDINGS: No remarkable upper abdominal findings.   BONES: No acute osseous changes.       1.  Possible mild interstitial congestion, and right basilar airspace disease as described.   Signed by: Boy Ho 2/22/2024 11:15 AM Dictation workstation:   [id number]       Assessment/Plan   Principal Problem:    Syncope and collapse  Active Problems:    TIA (transient ischemic attack)    Nanette Flynn is a 88 y.o. female with PMHx of hypothyroidism, RA, macular degeneration, glaucoma, and recent history of subsegmental PEs (2/9/24) on eliquis.    ACUTE ISSUES:  #Syncope vs arrhythmia vs cardiac arrest  #seizure less likely  -On telemetry, fall precautions  - Cardio consulted  - Orthostatic blood pressure measurements  - Echocardiogram completed within past 1-2 weeks during recent hospitalization  - Offered carotid US as well as MRI Brain to further evaluate but declined at this time due to potential cost incurred if done in hospital. Consider as outpatient  - could potentially be related to Pes found on recent hospitalization. Continue anticoagulation    #Chest pain 2/2 costochondritis from CPR  - Tylenol 325mg p.o. every 4 hours as needed / She declines lidocaine patch or other stronger pain meds  - Incentive spirometry and Acapella    CHRONIC ISSUES:  #HTN: Continue home lisinopril 5 Mg p.o. daily  #Hypothyroidism: Continue home levothyroxine 75 mcg p.o., daily  #History of recent PEs: Continue home Eliquis 5 Mg p.o. twice daily  #Vitamin D deficiency: Continue home vitamin D3 5000 units p.o. daily  #Vitamin B12 deficiency: Continue home vitamin B12 50 mcg p.o. daily  #Glaucoma: Continue home travoprost 1 drop in both eyes, once nightly    F: PRN  E: PRN  N: regular diet  Bowel: pericolace, colace  DVT: eliquis    Dispo: admitted for syncope vs cardiac arrest vs arrythmia       Amber Lala DO

## 2024-02-22 NOTE — PROGRESS NOTES
Pharmacy Medication History Review    Nanette Flynn is a 88 y.o. female admitted for Syncope and collapse. Pharmacy reviewed the patient's [id number] medications and allergies for accuracy.    The list below reflectives the updated PTA list. Please review each medication in order reconciliation for additional clarification and justification.  Prior to Admission medications    Medication Sig Start Date End Date Taking? Authorizing Provider   acetaminophen (Tylenol) 325 mg tablet Take 1 tablet (325 mg) by mouth every 4 hours if needed for mild pain (1 - 3).    Historical Provider, MD   apixaban (Eliquis) 5 mg (74 tabs) tablet Take 2 tablets (10 mg) by mouth 2 times a day for 7 days, then take 1 tablet (5 mg) by mouth 2 times a day. 2/10/24   Chapis Rock MD   Ca carb-Ca gluc-Mg ox-Mg gluco 500 mg calcium -250 mg tablet Take 1 tablet by mouth once daily.    Historical Provider, MD   cholecalciferol (Vitamin D-3) 5,000 Units tablet Take 1 tablet (5,000 Units) by mouth once daily.    Historical Provider, MD   CYANOCOBALAMIN, VITAMIN B-12, ORAL Take 1 tablet by mouth once daily.    Historical Provider, MD   docusate sodium (Colace) 100 mg capsule Take 1 capsule (100 mg) by mouth 2 times a day as needed for constipation.    Historical Provider, MD   levothyroxine (Synthroid, Levoxyl) 75 mcg tablet Take 1 tablet (75 mcg) by mouth once daily.  Patient taking differently: Take 1 tablet (75 mcg) by mouth once daily in the morning. Take before meals. 10/5/23 10/4/24  Salud Walker MD   lisinopril 5 mg tablet Take 1 tablet (5 mg) by mouth once daily.    Historical Provider, MD   ondansetron (Zofran) 4 mg tablet Take 1 tablet (4 mg) by mouth every 8 hours if needed.    Historical Provider, MD   sennosides-docusate sodium (Latasha-Colace) 8.6-50 mg tablet Take 1 tablet by mouth once daily.    Historical Provider, MD   travoprost (Travatan Z) 0.004 % drops ophthalmic solution Administer 1 drop into both  eyes once daily at bedtime. 8/20/18   Historical Provider, MD   triamcinolone (Kenalog) 0.1 % cream Apply 1 Application topically 2 times a day as needed. To bilateral lower legs    Historical Provider, MD        The list below reflectives the updated allergy list. Please review each documented allergy for additional clarification and justification.  Allergies  Reviewed by Raji Ratliff RN on 2/22/2024        Severity Reactions Comments    Gabapentin Medium Confusion FELL ASLEEP WITH ONE DOSE AND NO RECALL OF THAT DAY             Below are additional concerns with the patient's PTA list.      Lizy Raines CPhT

## 2024-02-23 PROBLEM — R55 SYNCOPE AND COLLAPSE: Status: RESOLVED | Noted: 2024-02-22 | Resolved: 2024-02-23

## 2024-02-23 PROBLEM — G45.9 TIA (TRANSIENT ISCHEMIC ATTACK): Status: RESOLVED | Noted: 2024-02-22 | Resolved: 2024-02-23

## 2024-02-23 LAB
ALBUMIN SERPL BCP-MCNC: 3.4 G/DL (ref 3.4–5)
ALP SERPL-CCNC: 58 U/L (ref 33–136)
ALT SERPL W P-5'-P-CCNC: 31 U/L (ref 7–45)
ANION GAP SERPL CALC-SCNC: 13 MMOL/L (ref 10–20)
AST SERPL W P-5'-P-CCNC: 26 U/L (ref 9–39)
BILIRUB SERPL-MCNC: 0.5 MG/DL (ref 0–1.2)
BNP SERPL-MCNC: 87 PG/ML (ref 0–99)
BUN SERPL-MCNC: 15 MG/DL (ref 6–23)
CALCIUM SERPL-MCNC: 9 MG/DL (ref 8.6–10.3)
CHLORIDE SERPL-SCNC: 103 MMOL/L (ref 98–107)
CO2 SERPL-SCNC: 25 MMOL/L (ref 21–32)
CREAT SERPL-MCNC: 0.73 MG/DL (ref 0.5–1.05)
EGFRCR SERPLBLD CKD-EPI 2021: 80 ML/MIN/1.73M*2
ERYTHROCYTE [DISTWIDTH] IN BLOOD BY AUTOMATED COUNT: 14.2 % (ref 11.5–14.5)
GLUCOSE SERPL-MCNC: 90 MG/DL (ref 74–99)
HCT VFR BLD AUTO: 37.3 % (ref 36–46)
HGB BLD-MCNC: 11.8 G/DL (ref 12–16)
INR PPP: 1.3 (ref 0.9–1.1)
MAGNESIUM SERPL-MCNC: 2.14 MG/DL (ref 1.6–2.4)
MCH RBC QN AUTO: 31.7 PG (ref 26–34)
MCHC RBC AUTO-ENTMCNC: 31.6 G/DL (ref 32–36)
MCV RBC AUTO: 100 FL (ref 80–100)
NRBC BLD-RTO: 0 /100 WBCS (ref 0–0)
PHOSPHATE SERPL-MCNC: 3.4 MG/DL (ref 2.5–4.9)
PLATELET # BLD AUTO: 316 X10*3/UL (ref 150–450)
POTASSIUM SERPL-SCNC: 4 MMOL/L (ref 3.5–5.3)
PROT SERPL-MCNC: 6.6 G/DL (ref 6.4–8.2)
PROTHROMBIN TIME: 14.6 SECONDS (ref 9.8–12.8)
RBC # BLD AUTO: 3.72 X10*6/UL (ref 4–5.2)
SODIUM SERPL-SCNC: 137 MMOL/L (ref 136–145)
TSH SERPL-ACNC: 2.62 MIU/L (ref 0.44–3.98)
WBC # BLD AUTO: 9.7 X10*3/UL (ref 4.4–11.3)

## 2024-02-23 PROCEDURE — 2500000001 HC RX 250 WO HCPCS SELF ADMINISTERED DRUGS (ALT 637 FOR MEDICARE OP)

## 2024-02-23 PROCEDURE — 85610 PROTHROMBIN TIME: CPT

## 2024-02-23 PROCEDURE — 36415 COLL VENOUS BLD VENIPUNCTURE: CPT

## 2024-02-23 PROCEDURE — 80053 COMPREHEN METABOLIC PANEL: CPT

## 2024-02-23 PROCEDURE — 97165 OT EVAL LOW COMPLEX 30 MIN: CPT | Mod: GO

## 2024-02-23 PROCEDURE — 84100 ASSAY OF PHOSPHORUS: CPT

## 2024-02-23 PROCEDURE — 2500000004 HC RX 250 GENERAL PHARMACY W/ HCPCS (ALT 636 FOR OP/ED)

## 2024-02-23 PROCEDURE — 94668 MNPJ CHEST WALL SBSQ: CPT

## 2024-02-23 PROCEDURE — G0378 HOSPITAL OBSERVATION PER HR: HCPCS

## 2024-02-23 PROCEDURE — 99232 SBSQ HOSP IP/OBS MODERATE 35: CPT

## 2024-02-23 PROCEDURE — 99221 1ST HOSP IP/OBS SF/LOW 40: CPT | Performed by: INTERNAL MEDICINE

## 2024-02-23 PROCEDURE — 85027 COMPLETE CBC AUTOMATED: CPT

## 2024-02-23 PROCEDURE — 94760 N-INVAS EAR/PLS OXIMETRY 1: CPT

## 2024-02-23 PROCEDURE — 83735 ASSAY OF MAGNESIUM: CPT

## 2024-02-23 PROCEDURE — 83880 ASSAY OF NATRIURETIC PEPTIDE: CPT

## 2024-02-23 PROCEDURE — 97535 SELF CARE MNGMENT TRAINING: CPT | Mod: GO

## 2024-02-23 PROCEDURE — 84443 ASSAY THYROID STIM HORMONE: CPT

## 2024-02-23 RX ADMIN — LEVOTHYROXINE SODIUM 75 MCG: 75 TABLET ORAL at 05:30

## 2024-02-23 RX ADMIN — Medication 5000 UNITS: at 09:34

## 2024-02-23 RX ADMIN — CYANOCOBALAMIN TAB 500 MCG 250 MCG: 500 TAB at 09:29

## 2024-02-23 RX ADMIN — CALCIUM 1250 MG: 500 TABLET ORAL at 09:34

## 2024-02-23 RX ADMIN — APIXABAN 5 MG: 5 TABLET, FILM COATED ORAL at 20:25

## 2024-02-23 RX ADMIN — LATANOPROST 1 DROP: 50 SOLUTION OPHTHALMIC at 20:27

## 2024-02-23 RX ADMIN — ACETAMINOPHEN 325 MG: 325 TABLET ORAL at 10:45

## 2024-02-23 RX ADMIN — SENNOSIDES AND DOCUSATE SODIUM 1 TABLET: 8.6; 5 TABLET ORAL at 09:35

## 2024-02-23 RX ADMIN — LISINOPRIL 5 MG: 5 TABLET ORAL at 09:34

## 2024-02-23 RX ADMIN — Medication 400 MG: at 09:34

## 2024-02-23 RX ADMIN — APIXABAN 5 MG: 5 TABLET, FILM COATED ORAL at 09:34

## 2024-02-23 ASSESSMENT — COGNITIVE AND FUNCTIONAL STATUS - GENERAL
DRESSING REGULAR UPPER BODY CLOTHING: A LITTLE
WALKING IN HOSPITAL ROOM: A LITTLE
MOBILITY SCORE: 18
DRESSING REGULAR LOWER BODY CLOTHING: A LOT
EATING MEALS: A LITTLE
TOILETING: A LOT
TURNING FROM BACK TO SIDE WHILE IN FLAT BAD: A LITTLE
HELP NEEDED FOR BATHING: A LOT
TOILETING: A LOT
CLIMB 3 TO 5 STEPS WITH RAILING: A LITTLE
PERSONAL GROOMING: A LITTLE
DRESSING REGULAR LOWER BODY CLOTHING: A LOT
TOILETING: A LOT
DRESSING REGULAR UPPER BODY CLOTHING: A LITTLE
DAILY ACTIVITIY SCORE: 16
DRESSING REGULAR UPPER BODY CLOTHING: A LITTLE
TURNING FROM BACK TO SIDE WHILE IN FLAT BAD: A LITTLE
DRESSING REGULAR LOWER BODY CLOTHING: A LOT
HELP NEEDED FOR BATHING: A LOT
STANDING UP FROM CHAIR USING ARMS: A LITTLE
PERSONAL GROOMING: A LITTLE
MOVING FROM LYING ON BACK TO SITTING ON SIDE OF FLAT BED WITH BEDRAILS: A LITTLE
HELP NEEDED FOR BATHING: A LOT
MOVING TO AND FROM BED TO CHAIR: A LITTLE
DAILY ACTIVITIY SCORE: 16
PERSONAL GROOMING: A LITTLE
MOVING FROM LYING ON BACK TO SITTING ON SIDE OF FLAT BED WITH BEDRAILS: A LITTLE
DAILY ACTIVITIY SCORE: 15
MOVING TO AND FROM BED TO CHAIR: A LITTLE
MOBILITY SCORE: 18
STANDING UP FROM CHAIR USING ARMS: A LITTLE
CLIMB 3 TO 5 STEPS WITH RAILING: A LITTLE
WALKING IN HOSPITAL ROOM: A LITTLE

## 2024-02-23 ASSESSMENT — PAIN - FUNCTIONAL ASSESSMENT
PAIN_FUNCTIONAL_ASSESSMENT: 0-10
PAIN_FUNCTIONAL_ASSESSMENT: 0-10

## 2024-02-23 ASSESSMENT — PAIN DESCRIPTION - ORIENTATION: ORIENTATION: RIGHT;LEFT

## 2024-02-23 ASSESSMENT — PAIN SCALES - GENERAL
PAINLEVEL_OUTOF10: 3
PAINLEVEL_OUTOF10: 0 - NO PAIN

## 2024-02-23 ASSESSMENT — PAIN DESCRIPTION - LOCATION: LOCATION: KNEE

## 2024-02-23 ASSESSMENT — ACTIVITIES OF DAILY LIVING (ADL)
BATHING_ASSISTANCE: MINIMAL
HOME_MANAGEMENT_TIME_ENTRY: 15

## 2024-02-23 NOTE — PROGRESS NOTES
Occupational Therapy    Evaluation/Treatment    Patient Name: Nanette Flynn  MRN: [id number]  : 1935  Today's Date: 24  Time Calculation  Start Time: 105  Stop Time: 1125  Time Calculation (min): 30 min       Assessment:  OT Assessment: Pt presents with decreased endurance, decreased ADL, decreased functional mobility. Continued skilled OT recommended to maximize pt safety and independence in order to return to PLOF  Prognosis: Good  Barriers to Discharge: None  Evaluation/Treatment Tolerance: Patient limited by fatigue  Medical Staff Made Aware: Yes  End of Session Communication: Bedside nurse  End of Session Patient Position: Bed, 3 rail up, Alarm on  OT Assessment Results: Decreased ADL status, Decreased upper extremity strength, Decreased endurance, Decreased functional mobility  Prognosis: Good  Barriers to Discharge: None  Evaluation/Treatment Tolerance: Patient limited by fatigue  Medical Staff Made Aware: Yes  Strengths: Support of Caregivers, Ability to acquire knowledge  Plan:  Treatment Interventions: ADL retraining, Functional transfer training, UE strengthening/ROM, Endurance training, Compensatory technique education  OT Frequency: 3 times per week  OT Discharge Recommendations: Moderate intensity level of continued care  OT Recommended Transfer Status: Assist of 1  OT - OK to Discharge: Yes (per OT POC)  Treatment Interventions: ADL retraining, Functional transfer training, UE strengthening/ROM, Endurance training, Compensatory technique education    Subjective   Current Problem:  1. Altered mental status, unspecified altered mental status type        2. Syncope and collapse  Holter Or Event Cardiac Monitor    Holter Or Event Cardiac Monitor        General:   OT Received On: 24  General  Reason for Referral: 87 yo female referred to OT for syncope at SNF, impaired mobility, impaired ADL  Referred By: Amber Lala DO  Past Medical History Relevant to Rehab: hypothyroidism,  RA, macular degeneration, glaucoma and recent history of subsegmental PEs (2/9/24) on eliquis.  Family/Caregiver Present: Yes  Caregiver Feedback: son, dtr able to confirm PLOF and discuss POC  Prior to Session Communication: Bedside nurse  Patient Position Received: Bed, 3 rail up, Alarm on  General Comment: Pt pleasant, cooperative, eager for participation. [place], tele in place  Precautions:  Medical Precautions: Fall precautions    Pain:  Pain Assessment  Pain Assessment: 0-10  Pain Score: 3  Pain Type: Chronic pain  Pain Location: Knee  Pain Orientation: Right, Left    Objective   Cognition:  Overall Cognitive Status: Within Functional Limits  Orientation Level: Oriented X4     Home Living:  Type of Home: House (in-law suite on sons's property)  Lives With: Alone  Home Adaptive Equipment: Other (Comment), Reacher (rollator)  Home Layout: One level  Home Access: Ramped entrance  Bathroom Shower/Tub: Walk-in shower  Bathroom Toilet: Standard  Bathroom Equipment: Grab bars in shower, Tub transfer bench    Prior Function:  Level of [place]: Independent with ADLs and functional transfers, Independent with homemaking with ambulation  Prior Function Comments: Patient performs sponge bathing independently. She is able to dress herself. receives meals from [employer] 5 days per week. Ambulates with rollator     ADL:  Eating Assistance: Independent  Grooming Assistance: Stand by  Bathing Assistance: Minimal  Toileting Assistance with Device: Moderate  Functional Assistance: Minimal  ADL Comments: ADL performance anticipated d/t current clinical presentation  Activities of Daily Living: UE Bathing  UE Bathing Level of Assistance: Setup  UE Bathing Where Assessed: Edge of bed  UE Bathing Comments: doffed/donned personal pullover shirt, no limitations with increased time after setup    LE Dressing  LE Dressing: Yes  LE Dressing Adaptive Equipment: Reacher  Shoe Level of Assistance: Modified independent  LE  Dressing Where Assessed: Edge of bed  LE Dressing Comments: able to don velcro shoes using AE, no limitations  Activity Tolerance:  Endurance: Tolerates less than 10 min exercise, no significant change in vital signs    Bed Mobility/Transfers: Bed Mobility  Bed Mobility: Yes  Bed Mobility 1  Bed Mobility 1: Supine to sitting, Sitting to supine  Level of Assistance 1: Close supervision  Bed Mobility Comments 1: use of bed rail    Transfers  Transfer: Yes  Transfer 1  Transfer From 1: Sit to  Transfer to 1: Stand  Technique 1: Sit to stand, Stand to sit  Transfer Device 1:  (rollator)  Transfer Level of Assistance 1: Moderate assistance  Trials/Comments 1: Pt required assistance for standing from low bed; pt progressed to CGA with bed  raised    Ambulation/Gait Training:  Ambulation/Gait Training  Ambulation/Gait Training Performed: Yes  Ambulation/Gait Training 1  Surface 1: Level tile  Device 1: Rollator  Assistance 1: Contact guard  Comments/Distance (ft) 1: ambulated household distances in pt room siumlating home environment. Pt demo'd good balance, fair pace, cues for staying within rollator    Strength:  Strength Comments: RUE 5/5, LUE 4/5    Hand Function:  Hand Function  Gross Grasp: Functional    Outcome Measures: [facility] Daily Activity  Putting on and taking off regular lower body clothing: A lot  Bathing (including washing, rinsing, drying): A lot  Putting on and taking off regular upper body clothing: A little  Toileting, which includes using toilet, bedpan or urinal: A lot  Taking care of personal grooming such as brushing teeth: A little  Eating Meals: None  Daily Activity - Total Score: 16    Education Documentation  Body Mechanics, taught by Gen Love OT at 2/23/2024 11:47 AM.  Learner: Patient  Readiness: Acceptance  Method: Explanation  Response: Verbalizes Understanding    Precautions, taught by Gen Love OT at 2/23/2024 11:47 AM.  Learner: Patient  Readiness: Acceptance  Method:  Explanation  Response: Verbalizes Understanding    ADL Training, taught by Gen Love OT at 2/23/2024 11:47 AM.  Learner: Patient  Readiness: Acceptance  Method: Explanation  Response: Verbalizes Understanding    Education Comments  No comments found.      Goals:  Encounter Problems       Encounter Problems (Active)       OT Goals       Pt will complete [id number] transfers using LRD in preparation for ADLs with supervision  (Progressing)       Start:  02/23/24    Expected End:  03/08/24            Pt will increase endurance to tolerate 15min of OOB activity with no more than 1 rest break in order to increase ability to engage in ADL completion.  (Progressing)       Start:  02/23/24    Expected End:  03/08/24            Pt will tolerate 10min stand during functional task completion with no more than 1 rest break in order to increase endurance for functional task completion.  (Progressing)       Start:  02/23/24    Expected End:  03/08/24            Pt will demo LE ADL completion with modified independence, using AE if needed.  (Progressing)       Start:  02/23/24    Expected End:  03/08/24

## 2024-02-23 NOTE — PROGRESS NOTES
Nanette Flynn is a 88 y.o. female on day 0 of admission presenting with Syncope and collapse.      Subjective   No acute events overnight.  Patient seen and evaluated this morning.  Patient endorses continued chest wall pain in the region where she received CPR, pain well-managed with Tylenol.  Denies shortness of breath, abdominal pain, urinary symptoms, constipation, diarrhea, no new complaints or symptoms.  requested PT and OT evaluation because she reports she was soon to be discharged from SNF, would like to see if she is able to go home with family.  To be seen by cardiology today.       Objective     Last Recorded Vitals  /74   Pulse 74   Temp 35.9 °C (96.6 °F) (Temporal)   Resp 16   Wt 75.8 kg (167 lb)   SpO2 94%   Intake/Output last 3 Shifts:    Intake/Output Summary (Last 24 hours) at 2/23/2024 1521  Last data filed at 2/23/2024 0928  Gross per 24 hour   Intake 360 ml   Output 1550 ml   Net -1190 ml       Admission Weight  Weight: 75.8 kg (167 lb) (02/22/24 1023)    Daily Weight  02/22/24 : 75.8 kg (167 lb)    Image Results  CT head wo IV contrast  Narrative: Interpreted By:  Boy Ho,   STUDY:  CT HEAD WO IV CONTRAST;  2/22/2024 11:25 am      INDICATION:  Signs/Symptoms:AMS/unresponsive; anti-coagulated.      COMPARISON:  09/24/2013      ACCESSION NUMBER(S):  [id number]      ORDERING CLINICIAN:  FREDDIE DALE      TECHNIQUE:  Sequential trans axial images were obtained  .      FINDINGS:  INTRACRANIAL:      There is mild-to-moderate prominence of the cortical sulci indicating  atrophy.      There is mild-to-moderate ventriculomegaly, again consistent with  atrophy.      Moderate decreased attenuation of the periventricular and long tracks  of the white matter most consistent with gliosis from arterial  disease. There is no evidence of definite subacute infarction,  intracranial hemorrhage or mass.          EXTRACRANIAL:  Visualized paranasal sinuses and mastoids are clear.  The  calvarium is intact.      Impression: Mild-to-moderate age related degenerative change progressed from the  previous exam, but otherwise without acute findings.      Signed by: Boy Ho 2/22/2024 11:30 AM  Dictation workstation:   [id number]  ECG 12 lead  Sinus rhythm with 1st degree AV block  Cannot rule out Anterior infarct (cited on or before 09-FEB-2024)  Abnormal ECG  When compared with ECG of 09-FEB-2024 09:54,  No significant change was found  XR chest 1 view  Narrative: Interpreted By:  Boy Ho,   STUDY:  XR CHEST 1 VIEW;  2/22/2024 11:09 am      INDICATION:  Signs/Symptoms:had CPR.      COMPARISON:  03/29/2016      ACCESSION NUMBER(S):  [id number]      ORDERING CLINICIAN:  FREDDIE DALE      FINDINGS:  CARDIOMEDIASTINAL SILHOUETTE AND VASCULATURE:      Cardiac size:  Within normal limits.      Aortic shadow:  Within normal limits.      Mediastinal contours: Within normal limits.      Pulmonary vasculature:  The central vasculature is unremarkable      LUNGS:  Mild interstitial prominence increased from the prior examination may  be due to fibrosis, although mild interstitial congestion is  possible. Follow-up as clinically warranted. Longitudinal linear  opacity in the right lower lung medially and opacity right lateral  costophrenic angle may be due to fibrosis or atelectasis. However,  the possibility of a focal infiltrate or nodule at the right  costophrenic angle is not excluded, follow-up would be recommended.  The lungs otherwise are clear without consolidative infiltrate.      ABDOMEN AND OTHER FINDINGS:  No remarkable upper abdominal findings.      BONES:  No acute osseous changes.      Impression: 1.  Possible mild interstitial congestion, and right basilar airspace  disease as described.      Signed by: Boy Ho 2/22/2024 11:15 AM  Dictation workstation:   [id number]      Physical Exam  Constitutional: patient was seen and examined at the bedside and appeared calm  Eyes: Large  pupils due to patient's history of macular degeneration.  Symmetric bilaterally.  No nystagmus.  Head: atraumatic, normocephalic  Heart: RRR, no M/R/G. Reproducible chest pain upon light palpation  Lungs: CTA, b/l, no W/R/R  Abdomen: soft, ND, NT, + BS in all 4 quadrants   Extremities: no edema, ecchymoses, erythema.  Full range of motion and 5/5 strength in all extremities symmetric bilaterally, sensation intact symmetric bilaterally in all extremities.  Neuro: AAOx3 to person/place/time.  No cranial nerve deficits.  Psych: appropriate mood and behavior, calm    Relevant Results               Assessment/Plan      Principal Problem:    Syncope and collapse  Active Problems:    TIA (transient ischemic attack)    Nanette Flynn is a 88 y.o. female with PMHx of hypothyroidism, RA, macular degeneration, glaucoma, and recent history of subsegmental PEs (2/9/24) on eliquis.     ACUTE ISSUES:  # Syncope vs Cardiopulmonary arrest  -On telemetry, fall precautions  - Cardio recs: outpatient zio for arrhythmia assessment, scheduled zio for 2/29/24 at 10AM  - cardiac arrest less likely given normal labs  - check Orthostatic blood pressure measurements  - Echocardiogram completed within past 1-2 weeks during recent hospitalization  - Offered carotid US as well as MRI Brain to further evaluate but declined at this time due to potential cost incurred if done in hospital. Consider as outpatient  - could potentially be related to PEs found on recent hospitalization. Continue anticoagulation  - OT rec: moderate intensity level of continued care  - PT consulted     #Chest pain 2/2 costochondritis from CPR  - Tylenol 325mg p.o. every 4 hours as needed / She declines lidocaine patch or other stronger pain meds  - Incentive spirometry and Acapella    #recent PE  - continue home eliquis 5mg PO BID     CHRONIC ISSUES:  #HTN: Continue home lisinopril 5 Mg p.o. daily  #Hypothyroidism: Continue home levothyroxine 75 mcg p.o.,  daily  #History of recent PEs: Continue home Eliquis 5 Mg p.o. twice daily  #Vitamin D deficiency: Continue home vitamin D3 5000 units p.o. daily  #Vitamin B12 deficiency: Continue home vitamin B12 50 mcg p.o. daily  #Glaucoma: Continue home travoprost 1 drop in both eyes, once nightly     F: PRN  E: PRN  N: regular diet  Bowel: pericolace, colace  DVT: eliquis     Dispo: syncope vs arrythmia, less likely seizure or cardiac arrest, seen by cardiology with plan for outpatient follow-up for Zio patch, PT/OT consulted.  Estimated length of stay 1 to 2 days.              Amber Lala DO

## 2024-02-23 NOTE — CONSULTS
Consults  History Of Present Illness:    Nanette Flynn is a 88 y.o. female with a past medical history of hypothyroidism, RA, macular degeneration, glaucoma, and recent history of subsegmental PEs (2/9/24) on eliquis who was admitted for syncope vs arrhythmia vs cardiac arrest.    Patient states she has been doing very well at her SNF and was close to being discharged. She states yesterday she woke up feeling good, got herself dressed, and went to breakfast. She states later in the morning she was just sitting in her roll aider and the next thing she knew she was being pushed into an ambulance and had no recollection as to what happened. She said when she awoke she was a little confused as to what was going on. Information obtained from EMS was patient was found in her roll aider unresponsive and agonal breathing, they checked her vitals and her HR was 20 and they were unable to palpate a pulse so CPR was started with ROSC within two minutes, by the time EMS arrived the patient was awake and stable. She does complain of some reproducible chest pain since CPR. Patient denies currently or ever having cardiac chest pain, shortness of breath, heart palpations, dizziness or orthopnea.        Last Recorded Vitals:  Vitals:    02/22/24 2009 02/23/24 0445 02/23/24 0758 02/23/24 0938   BP: 125/72 153/77  157/74   BP Location: Left arm Left arm     Patient Position: Lying Lying     Pulse: 89 64  74   Resp: 16      Temp: 36.3 °C (97.3 °F) 35.9 °C (96.6 °F)     TempSrc: Temporal Temporal     SpO2: 94% 94% 95% 94%   Weight:       Height:           Last Labs:  CBC - 2/23/2024:  7:33 AM  9.7 11.8 316    37.3      CMP - 2/23/2024:  7:33 AM  9.0 6.6 26 --- 0.5   3.4 3.4 31 58      PTT - No results in last year.  1.3   14.6 _     Troponin I, High Sensitivity   Date/Time Value Ref Range Status   02/22/2024 12:08 PM 10 0 - 13 ng/L Final   02/22/2024 10:27 AM 9 0 - 13 ng/L Final   02/09/2024 10:13 AM 10 0 - 13 ng/L Final     BNP    Date/Time Value Ref Range Status   02/23/2024 07:33 AM 87 0 - 99 pg/mL Final     LDL Calculated   Date/Time Value Ref Range Status   07/25/2023 04:00 PM 92 65 - 130 MG/DL Final      Last I/O:  I/O last 3 completed shifts:  In: - (0 mL/kg)   Out: 650 (8.6 mL/kg) [Urine:650 (0.2 mL/kg/hr)]  Weight: 75.7 kg     Past Cardiology Tests (Last 3 Years):  EKG:  ECG 12 lead 02/22/2024 (Preliminary)      ECG 12 lead 02/09/2024    Echo:  Transthoracic Echo (TTE) Complete 02/13/2024    Ejection Fractions:  EF   Date/Time Value Ref Range Status   02/13/2024 09:05 AM 67 %      Cath:  No results found for this or any previous visit from the past 1095 days.    Stress Test:  No results found for this or any previous visit from the past 1095 days.    Cardiac Imaging:  No results found for this or any previous visit from the past 1095 days.      Past Medical History:  She has a past medical history of Other specified postprocedural states, Personal history of other diseases of the circulatory system, Personal history of other diseases of the circulatory system, and Personal history of other medical treatment.    Past Surgical History:  She has a past surgical history that includes Hysterectomy (08/20/2018); Appendectomy (08/20/2018); Other surgical history (08/20/2018); CT angio head w and wo IV contrast (9/24/2013); and CT angio neck (9/24/2013).      Social History:  She reports that she has quit smoking. Her smoking use included cigarettes. She has never used smokeless tobacco. She reports that she does not currently use alcohol. She reports that she does not currently use drugs.    Family History:  Family History   Problem Relation Name Age of Onset    Heart disease Mother      Hypertension Mother      Transient ischemic attack Mother      Heart disease Father      Cancer Maternal Grandmother      Colon cancer Maternal Grandmother          Allergies:  Gabapentin    Inpatient Medications:  Scheduled medications   Medication Dose  Route Frequency    apixaban  5 mg oral BID    calcium carbonate  1,250 mg oral Daily    cholecalciferol  5,000 Units oral Daily    cyanocobalamin  250 mcg oral Daily    latanoprost  1 drop Both Eyes Nightly    levothyroxine  75 mcg oral Daily    lisinopril  5 mg oral Daily    magnesium oxide  400 mg oral Daily    psyllium  1 packet oral Daily    sennosides-docusate sodium  1 tablet oral Daily     PRN medications   Medication    acetaminophen    docusate sodium    ondansetron    triamcinolone     Continuous Medications   Medication Dose Last Rate     Outpatient Medications:  Current Outpatient Medications   Medication Instructions    acetaminophen (TYLENOL) 325 mg, oral, Every 4 hours PRN    apixaban (Eliquis) 5 mg (74 tabs) tablet Take 2 tablets (10 mg) by mouth 2 times a day for 7 days, then take 1 tablet (5 mg) by mouth 2 times a day.    Ca carb-Ca gluc-Mg ox-Mg gluco 500 mg calcium -250 mg tablet 1 tablet, oral, Daily    cholecalciferol (Vitamin D-3) 5,000 Units tablet Take 1 tablet (5,000 Units) by mouth once daily.    CYANOCOBALAMIN, VITAMIN B-12, ORAL 1 tablet, oral, Daily    docusate sodium (COLACE) 100 mg, oral, 2 times daily PRN    levothyroxine (SYNTHROID, LEVOXYL) 75 mcg, oral, Daily    lisinopril 5 mg, oral, Daily    ondansetron (Zofran) 4 mg tablet Take 1 tablet (4 mg) by mouth every 8 hours if needed.    sennosides-docusate sodium (Latasha-Colace) 8.6-50 mg tablet 1 tablet, oral, Daily    travoprost (Travatan Z) 0.004 % drops ophthalmic solution Administer 1 drop into both eyes once daily at bedtime.    triamcinolone (Kenalog) 0.1 % cream 1 Application, Topical, 2 times daily PRN, To bilateral lower legs       Physical Exam  Vitals reviewed.   HENT:      Head: Normocephalic.      Nose: Nose normal.   Eyes:      Pupils: Pupils are equal, round, and reactive to light.   Cardiovascular:      Rate and Rhythm: Normal rate and regular rhythm.   Pulmonary:      Effort: Pulmonary effort is normal.      Breath  sounds: Normal breath sounds.   Abdominal:      General: Abdomen is flat.      Palpations: Abdomen is soft.   Musculoskeletal:         General: Normal range of motion.      Cervical back: Normal range of motion.   Skin:     General: Skin is warm and dry.   Neurological:      General: No focal deficit present.      Mental Status: He is alert and oriented to person, place, and time.   Psychiatric:         Mood and Affect: Mood normal.         Behavior: Behavior normal.     Echocardiogram 2/13/24  CONCLUSIONS:   1. Left ventricular systolic function is normal with a 65-70% estimated ejection fraction.   2. Spectral Doppler shows an impaired relaxation pattern of left ventricular diastolic filling.   3. Slightly elevated RVSP.     Assessment/Plan   Nanette Flynn is a 88 y.o. female with PMHx of hypothyroidism, RA, macular degeneration, glaucoma, and recent history of subsegmental PEs (2/9/24) on eliquis.     Assessment:   -Euvolemic on exam, no S/S of ADHF  -Must recent echo showing normal EF without right heart strain   -Negative Trops 9, 10, negative BNP, 87  -Tele showing NSR w/8 sinus pauses <3, longest 2.20 secs, bradycardia overnight to 45     #Syncope vs arrhythmia vs cardiac arrest (less likely given normal labs)  #Hx of recent PEs on Eliquis     Plan:  -Outpatient Zio for further arrhythmia assessment, scheduled zio for 2/29/24 at 10:00am  -She has an appt scheduled for PE follow up with Dr. Quiroz 5/15  -Consider outpatient sleep study for KI assessment     Cardiology will sign off, thank you.       SUSANNAH Pringle-CNP

## 2024-02-23 NOTE — PROGRESS NOTES
"*Provider Impression*    Patient is an 88 year old female who is seen today for management of multiple medical problems       #Weakness - PT/OT  #PE / FHx factor V Leiden - eliquis 10mg BID until 2/22 then 5mg BID  #HTN - lisinopril 5mg daily  #Hypothyroidism - levothyroxine 75mcg daily  #GERD / Nausea - zofran 4mg q6h PRN  #Dermatitis - triamcinolone 0.1% PRN  #Glaucoma - mgmt per ophthalmology - travatan,   #Vitamin deficiency - vitamin D 5000units daily, calcium/Mag 500/250mg 2 tabs daily, vitamin B12 1000mcg daily  #ACP - Full Code  Follow up as needed      *Chief Complaint*  PE    *History of Present Illness*    Patient is an 87 y/o female w/ PMH as below who was admitted on 2/9 with pleuritic chest and abdominal pain. In the ED CTA chest was done which showed emboli in proximal segmental arteries or R middle lobe, R lower lobe and L lower lobe, without signs of R heart strain. She was started on a heparin drip. She remained on room air throughout hospitalization. Her symptoms improved and she was switched to eliquis on 2/13. She was determiend to be stable and was then d/c to [facility] [place].     She is seen sitting up in her room visiting with her family and reports getting therapy, making progress, knee is hurting, working on that with PT, basically feeling good - \"nothing hurts unless I try to do something.\" She reports she has been having some nausea, no HA, vision change, no dizziness, no emesis, this was going on in the hospital, APAP helps w/ pain, no CP, SOB, cough, bowels moving normally, no LUTS, or any other new c/o presently.     [place]iges - gabapentin  PMH - PE, hypothyroidism, vitamin deficiency, POAG, shingles, OA, rheumatic fever  PSH - appendectomy, hysterectomy, enterectomy  FH - Mother had heart disease, HTN, TIA; MGM had colon cancer  SocHx - Former smoker, No EtOH    *Review of Systems*  All other systems reviewed are negative except as noted in the HPI     *Vital Signs*   Date: 2/15/24 - T: " 97.8  P: 66  R: 18  BP: 173/89  SpO2: 96% on RA    *Results / Data*  CBC - Date: 2/14/24  WBC: 8.0  HGB: 11.9  HCT: 35.8  PLT: 238  ;   BMP - Date: 2/14/24  Na: 139  K: 3.1  Cl: 102  CO2: 27  BUN: 12  Cr: 0.64  Glu: 95  Ca: 8.5  ;   LFT - Date: 2/9/24  AST: 23  ALT: 13  ALP: 54  Tbili: 1.4  ;   Coags - Date:   INR:   PT:    *Physical Exam*  Gen: (+) NAD, (+) well-appearing  HEENT: (+) normocephalic, (+) MMM  Neck: (+) supple  Lungs: (+) CTAB, (-) wheezes, (-) rales, (-) rhonchi  Heart: (+) RRR, (+) S1 S2, (-) murmurs  Pulses: (+) palpable  Abd: (+) soft, (+) NT, (+) ND, (+) BS+  Ext: (-) edema, (-) deformity  MSK: (-) joint swelling  Skin: (+) warm, (+) dry, (-) rash  Neuro: (+) follows commands, (-) tremor, (+) alert

## 2024-02-23 NOTE — DISCHARGE INSTRUCTIONS
Your syncope and collapse were likely due to bradycardia or other arrythmia, which is now stable. You are now stable enough to be discharged home.    The following recommendations are made for you at time of hospital discharge:  - Please take your home medications as instructed prior to hospitalization.   - The following changes to your home medications have been made during your hospital stay: Take Eliquis 5 mg by mouth, twice daily.   - Please follow-up with your primary care provider within 5-7 days from time of discharge. Please call your PCP's office to schedule an appointment. Likely will need to bring photo ID and insurance card to your appointment.   - Please follow-up with your Cardiologist within 5-7 days from time of discharge. Please call your cardiology office to schedule an appointment. Likely will need to bring photo ID and insurance card to your appointment.   -   services has been requested to make an appointment for you however if you do not hear back from them within 2-3 days, please call [phone] to find out about appointments with  services.  - If you experience any worsening symptoms or any new acute concerns arise, please contact your primary care provider to discuss and possibly arrange an appointment. If you cannot get in touch with provider or severe symptoms are present, please return to nearest emergency room/urgent care for evaluation and treatment.

## 2024-02-23 NOTE — HOSPITAL COURSE
Nanette Flynn is a 88 y.o. female with PMHx of hypothyroidism, RA, macular degeneration, glaucoma and recent history of subsegmental PEs (2/9/24) on eliquis, presenting to the hospital for syncopal episode versus cardiac arrest and nursing home performed CPR, ROSC.  ED COURSE:   In ED, CT head was unremarkable for acute pathology, patient denied CT neck and MRI imaging.  Chest x-ray with possible mild interstitial congestion and right basilar airspace disease.  Patient given Tylenol for chest pain from chest compressions, and given her home dose of lisinopril.  EKG with no STEMI,No significant change from prior EKGs.  Troponin and delta troponin negative.  Patient admitted for observation for syncope versus cardiac arrest versus seizure.   FLOOR COURSE:  Patient did not have return of symptoms or syncopal episodes.  Reproducible chest wall tenderness in the area where she received CPR, pain well-managed with Tylenol.  Cardiology consulted, more likely syncopal episode secondary to possible arrhythmia, less suspicious for cardiac arrest, recommends outpatient follow-up for Zio patch.  PT/OT recommend SNF. Patient was discharged to SNF on 2/26 with outpatient follow-up for Zio patch as well as referral for sleep medicine.

## 2024-02-23 NOTE — PROGRESS NOTES
02/23/24 1102   Discharge Planning   Living Arrangements Other (Comment)  (from [facility] skilled)   Support Systems Family members;Children   Assistance Needed patient is alert and oriented x3, completing skilled stay at [facility]   Type of Residence Private residence   Who is requesting discharge planning? Provider   Home or Post Acute Services Post acute facilities (Rehab/SNF/etc)   Type of Post Acute Facility Services Skilled nursing   Patient expects to be discharged to: return [facility] skilled   Does the patient need discharge transport arranged? Yes   RoundTrip coordination needed? Yes   Has discharge transport been arranged? No

## 2024-02-24 LAB
ALBUMIN SERPL BCP-MCNC: 3.6 G/DL (ref 3.4–5)
ALP SERPL-CCNC: 62 U/L (ref 33–136)
ALT SERPL W P-5'-P-CCNC: 29 U/L (ref 7–45)
ANION GAP SERPL CALC-SCNC: 13 MMOL/L (ref 10–20)
AST SERPL W P-5'-P-CCNC: 23 U/L (ref 9–39)
ATRIAL RATE: 68 BPM
BILIRUB SERPL-MCNC: 0.6 MG/DL (ref 0–1.2)
BUN SERPL-MCNC: 17 MG/DL (ref 6–23)
CALCIUM SERPL-MCNC: 9.1 MG/DL (ref 8.6–10.3)
CHLORIDE SERPL-SCNC: 102 MMOL/L (ref 98–107)
CO2 SERPL-SCNC: 26 MMOL/L (ref 21–32)
CREAT SERPL-MCNC: 0.79 MG/DL (ref 0.5–1.05)
EGFRCR SERPLBLD CKD-EPI 2021: 73 ML/MIN/1.73M*2
ERYTHROCYTE [DISTWIDTH] IN BLOOD BY AUTOMATED COUNT: 14.1 % (ref 11.5–14.5)
GLUCOSE BLD MANUAL STRIP-MCNC: 87 MG/DL (ref 74–99)
GLUCOSE SERPL-MCNC: 95 MG/DL (ref 74–99)
HCT VFR BLD AUTO: 40.4 % (ref 36–46)
HGB BLD-MCNC: 12.9 G/DL (ref 12–16)
INR PPP: 1.4 (ref 0.9–1.1)
MAGNESIUM SERPL-MCNC: 2.01 MG/DL (ref 1.6–2.4)
MCH RBC QN AUTO: 31.6 PG (ref 26–34)
MCHC RBC AUTO-ENTMCNC: 31.9 G/DL (ref 32–36)
MCV RBC AUTO: 99 FL (ref 80–100)
NRBC BLD-RTO: 0 /100 WBCS (ref 0–0)
P AXIS: 44 DEGREES
P OFFSET: 160 MS
P ONSET: 111 MS
PLATELET # BLD AUTO: 338 X10*3/UL (ref 150–450)
POTASSIUM SERPL-SCNC: 4.2 MMOL/L (ref 3.5–5.3)
PR INTERVAL: 210 MS
PROT SERPL-MCNC: 7.1 G/DL (ref 6.4–8.2)
PROTHROMBIN TIME: 15.8 SECONDS (ref 9.8–12.8)
Q ONSET: 216 MS
QRS COUNT: 11 BEATS
QRS DURATION: 104 MS
QT INTERVAL: 422 MS
QTC CALCULATION(BAZETT): 448 MS
QTC FREDERICIA: 440 MS
R AXIS: 22 DEGREES
RBC # BLD AUTO: 4.08 X10*6/UL (ref 4–5.2)
SODIUM SERPL-SCNC: 137 MMOL/L (ref 136–145)
T AXIS: 94 DEGREES
T OFFSET: 427 MS
VENTRICULAR RATE: 68 BPM
WBC # BLD AUTO: 10 X10*3/UL (ref 4.4–11.3)

## 2024-02-24 PROCEDURE — 2500000005 HC RX 250 GENERAL PHARMACY W/O HCPCS

## 2024-02-24 PROCEDURE — 82947 ASSAY GLUCOSE BLOOD QUANT: CPT | Mod: 59

## 2024-02-24 PROCEDURE — 80053 COMPREHEN METABOLIC PANEL: CPT

## 2024-02-24 PROCEDURE — G0378 HOSPITAL OBSERVATION PER HR: HCPCS

## 2024-02-24 PROCEDURE — 83735 ASSAY OF MAGNESIUM: CPT

## 2024-02-24 PROCEDURE — 85610 PROTHROMBIN TIME: CPT

## 2024-02-24 PROCEDURE — 99231 SBSQ HOSP IP/OBS SF/LOW 25: CPT | Performed by: INTERNAL MEDICINE

## 2024-02-24 PROCEDURE — 85027 COMPLETE CBC AUTOMATED: CPT

## 2024-02-24 PROCEDURE — 97162 PT EVAL MOD COMPLEX 30 MIN: CPT | Mod: GP | Performed by: PHYSICAL THERAPIST

## 2024-02-24 PROCEDURE — 36415 COLL VENOUS BLD VENIPUNCTURE: CPT

## 2024-02-24 PROCEDURE — 2500000001 HC RX 250 WO HCPCS SELF ADMINISTERED DRUGS (ALT 637 FOR MEDICARE OP)

## 2024-02-24 PROCEDURE — 2500000004 HC RX 250 GENERAL PHARMACY W/ HCPCS (ALT 636 FOR OP/ED)

## 2024-02-24 PROCEDURE — 97116 GAIT TRAINING THERAPY: CPT | Mod: GP | Performed by: PHYSICAL THERAPIST

## 2024-02-24 RX ADMIN — LISINOPRIL 5 MG: 5 TABLET ORAL at 08:39

## 2024-02-24 RX ADMIN — APIXABAN 5 MG: 5 TABLET, FILM COATED ORAL at 20:52

## 2024-02-24 RX ADMIN — Medication 400 MG: at 08:39

## 2024-02-24 RX ADMIN — CALCIUM 1250 MG: 500 TABLET ORAL at 08:39

## 2024-02-24 RX ADMIN — Medication 5000 UNITS: at 08:39

## 2024-02-24 RX ADMIN — CYANOCOBALAMIN TAB 500 MCG 250 MCG: 500 TAB at 08:39

## 2024-02-24 RX ADMIN — ACETAMINOPHEN 325 MG: 325 TABLET ORAL at 20:52

## 2024-02-24 RX ADMIN — APIXABAN 5 MG: 5 TABLET, FILM COATED ORAL at 08:39

## 2024-02-24 RX ADMIN — LATANOPROST 1 DROP: 50 SOLUTION OPHTHALMIC at 20:53

## 2024-02-24 RX ADMIN — LEVOTHYROXINE SODIUM 75 MCG: 75 TABLET ORAL at 06:24

## 2024-02-24 RX ADMIN — SENNOSIDES AND DOCUSATE SODIUM 1 TABLET: 8.6; 5 TABLET ORAL at 08:39

## 2024-02-24 RX ADMIN — ONDANSETRON HYDROCHLORIDE 4 MG: 4 TABLET, FILM COATED ORAL at 22:18

## 2024-02-24 ASSESSMENT — COGNITIVE AND FUNCTIONAL STATUS - GENERAL
CLIMB 3 TO 5 STEPS WITH RAILING: A LITTLE
TURNING FROM BACK TO SIDE WHILE IN FLAT BAD: A LITTLE
CLIMB 3 TO 5 STEPS WITH RAILING: A LITTLE
STANDING UP FROM CHAIR USING ARMS: A LITTLE
STANDING UP FROM CHAIR USING ARMS: A LOT
WALKING IN HOSPITAL ROOM: A LITTLE
MOVING TO AND FROM BED TO CHAIR: A LITTLE
WALKING IN HOSPITAL ROOM: A LITTLE
MOBILITY SCORE: 15
TOILETING: A LOT
TURNING FROM BACK TO SIDE WHILE IN FLAT BAD: A LITTLE
DRESSING REGULAR UPPER BODY CLOTHING: A LITTLE
STANDING UP FROM CHAIR USING ARMS: A LITTLE
WALKING IN HOSPITAL ROOM: A LITTLE
TOILETING: A LOT
MOBILITY SCORE: 18
HELP NEEDED FOR BATHING: A LOT
DAILY ACTIVITIY SCORE: 16
CLIMB 3 TO 5 STEPS WITH RAILING: TOTAL
MOVING FROM LYING ON BACK TO SITTING ON SIDE OF FLAT BED WITH BEDRAILS: A LITTLE
MOVING FROM LYING ON BACK TO SITTING ON SIDE OF FLAT BED WITH BEDRAILS: A LITTLE
MOBILITY SCORE: 18
HELP NEEDED FOR BATHING: A LOT
MOVING FROM LYING ON BACK TO SITTING ON SIDE OF FLAT BED WITH BEDRAILS: A LITTLE
PERSONAL GROOMING: A LITTLE
TURNING FROM BACK TO SIDE WHILE IN FLAT BAD: A LITTLE
DRESSING REGULAR LOWER BODY CLOTHING: A LOT
DRESSING REGULAR LOWER BODY CLOTHING: A LOT
PERSONAL GROOMING: A LITTLE
MOVING TO AND FROM BED TO CHAIR: A LITTLE
DAILY ACTIVITIY SCORE: 16
MOVING TO AND FROM BED TO CHAIR: A LITTLE
DRESSING REGULAR UPPER BODY CLOTHING: A LITTLE

## 2024-02-24 ASSESSMENT — PAIN DESCRIPTION - ORIENTATION: ORIENTATION: RIGHT;LEFT

## 2024-02-24 ASSESSMENT — PAIN DESCRIPTION - LOCATION: LOCATION: KNEE

## 2024-02-24 ASSESSMENT — PAIN - FUNCTIONAL ASSESSMENT
PAIN_FUNCTIONAL_ASSESSMENT: 0-10

## 2024-02-24 ASSESSMENT — PAIN SCALES - GENERAL
PAINLEVEL_OUTOF10: 3
PAINLEVEL_OUTOF10: 0 - NO PAIN
PAINLEVEL_OUTOF10: 2
PAINLEVEL_OUTOF10: 4

## 2024-02-24 NOTE — PROGRESS NOTES
"Nanette Flynn is a 88 y.o. female on day 0 of admission presenting with Syncope and collapse.    Subjective   Patient seen and examined at bedside today. She is doing well. No complaints. Just waiting on placement back to her SNF.     Objective     Physical Exam  Constitutional: Pleasant, Awake/Alert/Oriented to person place and time. No distress  Cardiovascular: Regular rate and rhythm, S1, S2. No extra heart sounds or murmurs  Respiratory: Clear to auscultation bilaterally. No wheezing, rales or rhonchi. Good chest wall expansion  Abdomen: Soft, Nontender. Bowel sounds appreciated  Extremities: Warm and dry. No acute rashes and lesions  Psychiatric: Appropriate mood and affect      Last Recorded Vitals  Blood pressure 138/63, pulse 62, temperature 36.1 °C (97 °F), temperature source Temporal, resp. rate 16, height 1.626 m (5' 4\"), weight 75.8 kg (167 lb), SpO2 94 %.  Intake/Output last 3 Shifts:  I/O last 3 completed shifts:  In: 360 (4.8 mL/kg) [P.O.:360]  Out: 3150 (41.6 mL/kg) [Urine:3150 (1.2 mL/kg/hr)]  Weight: 75.7 kg     Relevant Results              Scheduled medications  apixaban, 5 mg, oral, BID  calcium carbonate, 1,250 mg, oral, Daily  cholecalciferol, 5,000 Units, oral, Daily  cyanocobalamin, 250 mcg, oral, Daily  latanoprost, 1 drop, Both Eyes, Nightly  levothyroxine, 75 mcg, oral, Daily  lisinopril, 5 mg, oral, Daily  magnesium oxide, 400 mg, oral, Daily  psyllium, 1 packet, oral, Daily  sennosides-docusate sodium, 1 tablet, oral, Daily      Continuous medications     PRN medications  PRN medications: acetaminophen, docusate sodium, ondansetron, triamcinolone    Results for orders placed or performed during the hospital encounter of 02/22/24 (from the past 24 hour(s))   Comprehensive metabolic panel   Result Value Ref Range    Glucose 95 74 - 99 mg/dL    Sodium 137 136 - 145 mmol/L    Potassium 4.2 3.5 - 5.3 mmol/L    Chloride 102 98 - 107 mmol/L    Bicarbonate 26 21 - 32 mmol/L    Anion Gap 13 " 10 - 20 mmol/L    Urea Nitrogen 17 6 - 23 mg/dL    Creatinine 0.79 0.50 - 1.05 mg/dL    eGFR 73 >60 mL/min/1.73m*2    Calcium 9.1 8.6 - 10.3 mg/dL    Albumin 3.6 3.4 - 5.0 g/dL    Alkaline Phosphatase 62 33 - 136 U/L    Total Protein 7.1 6.4 - 8.2 g/dL    AST 23 9 - 39 U/L    Bilirubin, Total 0.6 0.0 - 1.2 mg/dL    ALT 29 7 - 45 U/L   Magnesium   Result Value Ref Range    Magnesium 2.01 1.60 - 2.40 mg/dL   Protime-INR   Result Value Ref Range    Protime 15.8 (H) 9.8 - 12.8 seconds    INR 1.4 (H) 0.9 - 1.1   CBC   Result Value Ref Range    WBC 10.0 4.4 - 11.3 x10*3/uL    nRBC 0.0 0.0 - 0.0 /100 WBCs    RBC 4.08 4.00 - 5.20 x10*6/uL    Hemoglobin 12.9 12.0 - 16.0 g/dL    Hematocrit 40.4 36.0 - 46.0 %    MCV 99 80 - 100 fL    MCH 31.6 26.0 - 34.0 pg    MCHC 31.9 (L) 32.0 - 36.0 g/dL    RDW 14.1 11.5 - 14.5 %    Platelets 338 150 - 450 x10*3/uL       Assessment/Plan   Active Problems:  There are no active Hospital Problems.    Acute Medical Issues:  # Chest pain 2/2 CPR / # Costochondritis  - Pain control with Tylenol PRN. She declined Lidocaine patch and stronger medications  - Incentive spirometry     # Physical deconditioning  - Pending placement back to her SNF    Resolved/Addressed Issues During Hospitalization:  # Syncope vs Cardiopulmonary arrest  - Cardiology recs: Sharon as outpatient 2/29/24 at 10AM.   - Followup outpatient  - Continue monitoring patient with telemetry while here. No recurrence of issues here.     Chronic Medical Issues:  # Hypertension: Lisinopril 5mg PO daily  # Hypothyroidism: Synthroid 75mcg PO daily  # hx of PE: Eliquis 5mg PO BID  # Vitamin D deficiency: Vitamin D 5000IU daily  # Vitamin B12 deficiency: Vitamin B12 250mcg Po daily  # Glaucoma: Xalatan eye drops QHS    Disposition:  Pending placement back to her SNF. Medically cleared         I spent 55 minutes in the professional and overall care of this patient.      Yuri Martinez DO

## 2024-02-24 NOTE — PROGRESS NOTES
Physical Therapy    Physical Therapy Evaluation    Patient Name: Nanette Flynn  MRN: [id number]  Today's Date: 2/24/2024   Time Calculation  Start Time: 1403  Stop Time: 1432  Time Calculation (min): 29 min    Assessment/Plan   PT Assessment  PT Assessment Results: Decreased strength, Decreased endurance, Decreased mobility, Decreased safety awareness  Rehab Prognosis: Good  Evaluation/Treatment Tolerance: Patient limited by fatigue  Medical Staff Made Aware: Yes  Strengths: Support of Caregivers, Insight into problems  End of Session Communication: Bedside nurse  End of Session Patient Position: Bed, 3 rail up, Alarm on  IP OR SWING BED PT PLAN  Inpatient or Swing Bed: Inpatient  PT Plan  Treatment/Interventions: Bed mobility, Transfer training, Gait training, Balance training, Endurance training  PT Plan: Skilled PT  PT Frequency: 3 times per week  PT Discharge Recommendations: Moderate intensity level of continued care  PT - OK to Discharge: Yes      Subjective   General Visit Information:  General  Reason for Referral: 89 yo female referred to PT for syncope at SNF, impaired mobility, impaired ADL  Referred By: Amber Lala DO  Past Medical History Relevant to Rehab: hypothyroidism, RA, macular degeneration, glaucoma and recent history of subsegmental PEs (2/9/24) on eliquis.  Family/Caregiver Present: Yes  Caregiver Feedback: dtrs present, and able to confirm PLOF and discuss POC including d/c recommendations.  Prior to Session Communication: Bedside nurse  Patient Position Received: Bed, 3 rail up, Alarm on  General Comment: Pt pleasant, cooperative, eager for participation. Pt up in bathroom upon arrival.  Home Living:  Home Living  Type of Home: House (in-law suite on sons's property)  Lives With: Alone  Home Adaptive Equipment: Walker rolling or standard (rollator)  Home Layout: One level  Home Access: Ramped entrance  Bathroom Shower/Tub: Walk-in shower  Bathroom Toilet: Standard  Bathroom Equipment:  Grab bars in shower, Tub transfer bench  Home Living Comments: Pt is recently from [place] receiving PT/OT  Prior Level of Function:  Prior Function Per Pt/Caregiver Report  Level of [place]: Independent with ADLs and functional transfers, Independent with homemaking with ambulation  Ambulatory Assistance: Independent  Prior Function Comments: Patient performs sponge bathing independently. She is able to dress herself. receives meals from [employer] 5 days per week. Ambulates with rollator  Precautions:  Precautions  Medical Precautions: Fall precautions  Vital Signs:  Vital Signs  Heart Rate:  (HR  throughout evaluation)    Objective   Pain:     Cognition:  Cognition  Orientation Level: Oriented X4    General Assessments:       Activity Tolerance  Endurance: Tolerates less than 10 min exercise, no significant change in vital signs        Functional Assessments:  ADL  Toileting Assistance with Device: Moderate  ADL Comments: PT assisted patient with stand-sit from toilet, Sade with use of grab bar. Assisted patient with pericare after BM on commode.    Bed Mobility  Bed Mobility: Yes  Bed Mobility 1  Bed Mobility 1: Sitting to supine  Level of Assistance 1: Contact guard  Bed Mobility Comments 1: CGA for sit-supine for LE placement and optimal positioning in bed    Transfers  Transfer: Yes  Transfer 1  Transfer From 1: Sit to  Transfer to 1: Stand  Technique 1: Sit to stand, Stand to sit  Transfer Level of Assistance 1: Moderate assistance    Ambulation/Gait Training  Ambulation/Gait Training Performed: Yes  Ambulation/Gait Training 1  Device 1:  (rollator)  Assistance 1: Contact guard  Quality of Gait 1: Narrow base of support, Decreased step length, Antalgic (cues to keep walker safe distance from patient)  Comments/Distance (ft) 1: Continued CGA with cues for keeping walker safe distance, pt wants to keep it too far from herself impairing safety and forward leaning. Pt ambulated 35ft then  stopped requiring standing rest break ~1 minute for recovery before continuing with ambulation x40 ft back to room.  Extremity/Trunk Assessments:  RLE   RLE :  (grossly >3+/5)  LLE   LLE :  (Grossly >3+/5)  Outcome Measures:  [facility] Basic Mobility  Turning from your back to your side while in a flat bed without using bedrails: A little  Moving from lying on your back to sitting on the side of a flat bed without using bedrails: A little  Moving to and from bed to chair (including a wheelchair): A little  Standing up from a chair using your arms (e.g. wheelchair or bedside chair): A lot  To walk in hospital room: A little  Climbing 3-5 steps with railing: Total  Basic Mobility - Total Score: 15    Encounter Problems       Encounter Problems (Active)       Mobility       STG - Patient will ambulate at modified independent level using rollator safe distance from patient without rest breaks for 150+ft to decrease risk for falls.  (Progressing)       Start:  02/24/24    Expected End:  03/09/24               Transfers       STG - Patient will perform bed mobility supine<->sit without bed rails at modified independent level.  (Progressing)       Start:  02/24/24    Expected End:  03/09/24            STG - Patient will transfer sit to and from stand at modified independent level with use of rollator.  (Progressing)       Start:  02/24/24    Expected End:  03/09/24                   Education Documentation  Mobility Training, taught by Bambi Belcher PT at 2/24/2024  3:07 PM.  Learner: Patient  Readiness: Acceptance  Method: Explanation  Response: Verbalizes Understanding    Education Comments  No comments found.

## 2024-02-24 NOTE — CARE PLAN
The patient's goals for the shift include  pt will get OOB.    The clinical goals for the shift include Patient will utilize the call light when needing assistance to maintain safety    Over the shift, the patient did make progress toward the following goals. Barriers to progression include pain, weakness. Recommendations to address these barriers include encourage pt to keep getting OOB.

## 2024-02-24 NOTE — NURSING NOTE
0730: assumed pt care    1220: pt states she feels funny, no CP, neuro assessment is negative, VSS, let resident Dr. Thomson know

## 2024-02-24 NOTE — PROGRESS NOTES
02/24/24 1400   Discharge Planning   Patient expects to be discharged to: Discharge  asked to initiate PRE-CERT. Patient is medically ready for DC per Dr. Martinez. [facility] notified of initiation of pre-cert. Patient will DC back to [facility] as soon as pre-cert returns.   Does the patient need discharge transport arranged? Yes   RoundTrip coordination needed? Yes   Has discharge transport been arranged? No

## 2024-02-25 LAB
ALBUMIN SERPL BCP-MCNC: 3.5 G/DL (ref 3.4–5)
ALP SERPL-CCNC: 60 U/L (ref 33–136)
ALT SERPL W P-5'-P-CCNC: 26 U/L (ref 7–45)
ANION GAP SERPL CALC-SCNC: 12 MMOL/L (ref 10–20)
AST SERPL W P-5'-P-CCNC: 20 U/L (ref 9–39)
BILIRUB SERPL-MCNC: 0.5 MG/DL (ref 0–1.2)
BUN SERPL-MCNC: 18 MG/DL (ref 6–23)
CALCIUM SERPL-MCNC: 9.2 MG/DL (ref 8.6–10.3)
CHLORIDE SERPL-SCNC: 103 MMOL/L (ref 98–107)
CO2 SERPL-SCNC: 28 MMOL/L (ref 21–32)
CREAT SERPL-MCNC: 0.86 MG/DL (ref 0.5–1.05)
EGFRCR SERPLBLD CKD-EPI 2021: 65 ML/MIN/1.73M*2
ERYTHROCYTE [DISTWIDTH] IN BLOOD BY AUTOMATED COUNT: 14.2 % (ref 11.5–14.5)
GLUCOSE SERPL-MCNC: 95 MG/DL (ref 74–99)
HCT VFR BLD AUTO: 40.3 % (ref 36–46)
HGB BLD-MCNC: 12.8 G/DL (ref 12–16)
INR PPP: 1.4 (ref 0.9–1.1)
MAGNESIUM SERPL-MCNC: 2.09 MG/DL (ref 1.6–2.4)
MCH RBC QN AUTO: 31.5 PG (ref 26–34)
MCHC RBC AUTO-ENTMCNC: 31.8 G/DL (ref 32–36)
MCV RBC AUTO: 99 FL (ref 80–100)
NRBC BLD-RTO: 0 /100 WBCS (ref 0–0)
PHOSPHATE SERPL-MCNC: 3.7 MG/DL (ref 2.5–4.9)
PLATELET # BLD AUTO: 339 X10*3/UL (ref 150–450)
POTASSIUM SERPL-SCNC: 4.2 MMOL/L (ref 3.5–5.3)
PROT SERPL-MCNC: 6.8 G/DL (ref 6.4–8.2)
PROTHROMBIN TIME: 15.9 SECONDS (ref 9.8–12.8)
RBC # BLD AUTO: 4.06 X10*6/UL (ref 4–5.2)
SODIUM SERPL-SCNC: 139 MMOL/L (ref 136–145)
WBC # BLD AUTO: 10.9 X10*3/UL (ref 4.4–11.3)

## 2024-02-25 PROCEDURE — 85027 COMPLETE CBC AUTOMATED: CPT

## 2024-02-25 PROCEDURE — 84100 ASSAY OF PHOSPHORUS: CPT

## 2024-02-25 PROCEDURE — 94668 MNPJ CHEST WALL SBSQ: CPT

## 2024-02-25 PROCEDURE — 2500000005 HC RX 250 GENERAL PHARMACY W/O HCPCS

## 2024-02-25 PROCEDURE — 2500000004 HC RX 250 GENERAL PHARMACY W/ HCPCS (ALT 636 FOR OP/ED)

## 2024-02-25 PROCEDURE — 2500000001 HC RX 250 WO HCPCS SELF ADMINISTERED DRUGS (ALT 637 FOR MEDICARE OP)

## 2024-02-25 PROCEDURE — 85610 PROTHROMBIN TIME: CPT

## 2024-02-25 PROCEDURE — 99231 SBSQ HOSP IP/OBS SF/LOW 25: CPT | Performed by: INTERNAL MEDICINE

## 2024-02-25 PROCEDURE — 97535 SELF CARE MNGMENT TRAINING: CPT | Mod: CO,GO | Performed by: OCCUPATIONAL THERAPY ASSISTANT

## 2024-02-25 PROCEDURE — 36415 COLL VENOUS BLD VENIPUNCTURE: CPT

## 2024-02-25 PROCEDURE — 80053 COMPREHEN METABOLIC PANEL: CPT

## 2024-02-25 PROCEDURE — G0378 HOSPITAL OBSERVATION PER HR: HCPCS

## 2024-02-25 PROCEDURE — 83735 ASSAY OF MAGNESIUM: CPT

## 2024-02-25 RX ORDER — ACETAMINOPHEN 325 MG/1
650 TABLET ORAL EVERY 4 HOURS PRN
Status: DISCONTINUED | OUTPATIENT
Start: 2024-02-25 | End: 2024-02-26 | Stop reason: HOSPADM

## 2024-02-25 RX ORDER — LEVOTHYROXINE SODIUM 75 UG/1
75 TABLET ORAL
Status: DISCONTINUED | OUTPATIENT
Start: 2024-02-26 | End: 2024-02-26 | Stop reason: HOSPADM

## 2024-02-25 RX ADMIN — ONDANSETRON HYDROCHLORIDE 4 MG: 4 TABLET, FILM COATED ORAL at 17:39

## 2024-02-25 RX ADMIN — APIXABAN 5 MG: 5 TABLET, FILM COATED ORAL at 20:54

## 2024-02-25 RX ADMIN — SENNOSIDES AND DOCUSATE SODIUM 1 TABLET: 8.6; 5 TABLET ORAL at 08:43

## 2024-02-25 RX ADMIN — CYANOCOBALAMIN TAB 500 MCG 250 MCG: 500 TAB at 08:43

## 2024-02-25 RX ADMIN — Medication 400 MG: at 08:43

## 2024-02-25 RX ADMIN — Medication 5000 UNITS: at 08:43

## 2024-02-25 RX ADMIN — APIXABAN 5 MG: 5 TABLET, FILM COATED ORAL at 08:42

## 2024-02-25 RX ADMIN — DOCUSATE SODIUM 100 MG: 100 CAPSULE, LIQUID FILLED ORAL at 21:03

## 2024-02-25 RX ADMIN — LISINOPRIL 5 MG: 5 TABLET ORAL at 08:42

## 2024-02-25 RX ADMIN — LATANOPROST 1 DROP: 50 SOLUTION OPHTHALMIC at 22:28

## 2024-02-25 RX ADMIN — ACETAMINOPHEN 650 MG: 325 TABLET ORAL at 09:19

## 2024-02-25 RX ADMIN — CALCIUM 1250 MG: 500 TABLET ORAL at 08:43

## 2024-02-25 ASSESSMENT — COGNITIVE AND FUNCTIONAL STATUS - GENERAL
DRESSING REGULAR LOWER BODY CLOTHING: A LOT
HELP NEEDED FOR BATHING: A LOT
STANDING UP FROM CHAIR USING ARMS: A LITTLE
CLIMB 3 TO 5 STEPS WITH RAILING: A LOT
DAILY ACTIVITIY SCORE: 17
MOBILITY SCORE: 17
MOBILITY SCORE: 17
TURNING FROM BACK TO SIDE WHILE IN FLAT BAD: A LITTLE
DAILY ACTIVITIY SCORE: 16
PERSONAL GROOMING: A LITTLE
WALKING IN HOSPITAL ROOM: A LITTLE
DRESSING REGULAR UPPER BODY CLOTHING: A LITTLE
CLIMB 3 TO 5 STEPS WITH RAILING: A LOT
STANDING UP FROM CHAIR USING ARMS: A LITTLE
WALKING IN HOSPITAL ROOM: A LITTLE
DRESSING REGULAR LOWER BODY CLOTHING: A LITTLE
TURNING FROM BACK TO SIDE WHILE IN FLAT BAD: A LITTLE
DRESSING REGULAR LOWER BODY CLOTHING: A LITTLE
MOVING FROM LYING ON BACK TO SITTING ON SIDE OF FLAT BED WITH BEDRAILS: A LITTLE
TOILETING: A LOT
MOVING FROM LYING ON BACK TO SITTING ON SIDE OF FLAT BED WITH BEDRAILS: A LITTLE
HELP NEEDED FOR BATHING: A LOT
DAILY ACTIVITIY SCORE: 17
TOILETING: A LOT
DRESSING REGULAR UPPER BODY CLOTHING: A LITTLE
PERSONAL GROOMING: A LITTLE
TOILETING: A LOT
HELP NEEDED FOR BATHING: A LOT
MOVING TO AND FROM BED TO CHAIR: A LITTLE
MOVING TO AND FROM BED TO CHAIR: A LITTLE
PERSONAL GROOMING: A LITTLE
DRESSING REGULAR UPPER BODY CLOTHING: A LITTLE

## 2024-02-25 ASSESSMENT — PAIN - FUNCTIONAL ASSESSMENT: PAIN_FUNCTIONAL_ASSESSMENT: 0-10

## 2024-02-25 ASSESSMENT — PAIN SCALES - GENERAL
PAINLEVEL_OUTOF10: 0 - NO PAIN

## 2024-02-25 NOTE — PROGRESS NOTES
Occupational Therapy    OT Treatment    Patient Name: Nanette Flynn  MRN: [id number]  Today's Date: 2/25/2024  Time Calculation  Start Time: 1446  Stop Time: 1502  Time Calculation (min): 16 min         Assessment:        Plan:  Treatment Interventions: ADL retraining, Functional transfer training, UE strengthening/ROM, Endurance training, Compensatory technique education  OT Frequency: 3 times per week  OT Discharge Recommendations: Moderate intensity level of continued care  OT Recommended Transfer Status: Assist of 1  OT - OK to Discharge: Yes (per OT POC)  Treatment Interventions: ADL retraining, Functional transfer training, UE strengthening/ROM, Endurance training, Compensatory technique education    Subjective      General:  General  General Comment: Chart review completed prior to session with nurse clearing pt for OT Services. Pt known to this REDD from current SNF placement. Pt up on SC agreeable to calling for assistance when done. REDD WAITED ~8 MINS before exiting. Nurse aware of pt location. Daughter alos present in room  Precautions:  Precautions Comment: Per OTR eval : falls  Vital Signs:     Pain:  Pain Assessment  Pain Score: 0 - No pain    Objective    Cognition:  Cognition  Impulsive: Moderately  Coordination:     Activities of Daily Living:  Pt up completing LE Dressing from eob level with F unsupported Bal while donning skirt with Mod A and shoes with reacher with CGA . Pt completing clothing management in prep for toileting with cga       Functional Standing Tolerance:     Bed Mobility/Transfers:      Transfer 1  Transfer From 1: Sit to, Stand to, Commode-standard to  Transfer Level of Assistance 1: Contact guard       Sitting Balance:  Dynamic Sitting Balance  Dynamic Sitting-Balance Support: No upper extremity supported  Dynamic Sitting-Balance: Lateral lean, Forward lean, Reaching for objects  Standing Balance:  Dynamic Standing Balance  Dynamic Standing-Balance Support: Bilateral upper  extremity supported  Dynamic Standing-Balance: Turning  Outcome Measures:[facility] Daily Activity  Putting on and taking off regular lower body clothing: A little  Bathing (including washing, rinsing, drying): A lot  Putting on and taking off regular upper body clothing: A little  Toileting, which includes using toilet, bedpan or urinal: A lot  Taking care of personal grooming such as brushing teeth: A little  Eating Meals: None  Daily Activity - Total Score: 17                 Goals:  Encounter Problems       Encounter Problems (Active)         OT Goals       Pt will complete [id number] transfers using LRD in preparation for ADLs with supervision  (Progressing)       Start:  02/23/24    Expected End:  03/08/24            Pt will increase endurance to tolerate 15min of OOB activity with no more than 1 rest break in order to increase ability to engage in ADL completion.  (Progressing)       Start:  02/23/24    Expected End:  03/08/24            Pt will tolerate 10min stand during functional task completion with no more than 1 rest break in order to increase endurance for functional task completion.  (Progressing)       Start:  02/23/24    Expected End:  03/08/24            Pt will demo LE ADL completion with modified independence, using AE if needed.  (Progressing)       Start:  02/23/24    Expected End:  03/08/24

## 2024-02-25 NOTE — PROGRESS NOTES
"Nanette Flynn is a 88 y.o. female on day 0 of admission presenting with Syncope and collapse.    Subjective   Patient seen and examined at bedside this morning.  She is doing well. Only complaint was nausea once that resolved with Zofran. Otherwise waiting to leave hospital.     Objective     Physical Exam  Constitutional: Pleasant, Awake/Alert/Oriented to person place and time. No distress  Cardiovascular: Regular rate and rhythm, S1, S2. No extra heart sounds or murmurs  Respiratory: Clear to auscultation bilaterally. No wheezing, rales or rhonchi. Good chest wall expansion  Abdomen: Soft, Nontender. Bowel sounds appreciated  Extremities: Warm and dry. No acute rashes and lesions  Psychiatric: Appropriate mood and affect  [No change in exam]    Last Recorded Vitals  Blood pressure 137/69, pulse 72, temperature 36.8 °C (98.2 °F), temperature source Temporal, resp. rate 18, height 1.626 m (5' 4\"), weight 75.8 kg (167 lb), SpO2 97 %.  Intake/Output last 3 Shifts:  I/O last 3 completed shifts:  In: 780 (10.3 mL/kg) [P.O.:780]  Out: 1900 (25.1 mL/kg) [Urine:1900 (0.7 mL/kg/hr)]  Weight: 75.7 kg     Relevant Results                             Assessment/Plan   Active Problems:  There are no active Hospital Problems.    Acute Medical Issues:  # Chest pain 2/2 CPR / # Costochondritis  - Pain control with Tylenol PRN. She declined Lidocaine patch and stronger medications  - Incentive spirometry      # Physical deconditioning  - Pending placement back to her SNF     Resolved/Addressed Issues During Hospitalization:  # Syncope vs Cardiopulmonary arrest  - Cardiology recs: Sharon as outpatient 2/29/24 at 10AM.   - Followup outpatient  - Continue monitoring patient with telemetry while here. No recurrence of issues here.      Chronic Medical Issues:  # Hypertension: Lisinopril 5mg PO daily  # Hypothyroidism: Synthroid 75mcg PO daily  # hx of PE: Eliquis 5mg PO BID  # Vitamin D deficiency: Vitamin D 5000IU daily  # " Vitamin B12 deficiency: Vitamin B12 250mcg Po daily  # Glaucoma: Xalatan eye drops QHS     Disposition:  Pending placement back to her SNF. Medically cleared       I spent 45 minutes in the professional and overall care of this patient.      Yuri Martinez DO

## 2024-02-25 NOTE — NURSING NOTE
0730: assumed pt care    1042: updated pt's med rec to reflect that pt takes synthroid only mon thru Sat, not Sunday. Let resident know

## 2024-02-25 NOTE — CARE PLAN
The clinical goals for the shift include Patient will utilize the call light when needing assistance to maintain safety

## 2024-02-26 VITALS
SYSTOLIC BLOOD PRESSURE: 162 MMHG | DIASTOLIC BLOOD PRESSURE: 76 MMHG | RESPIRATION RATE: 18 BRPM | HEIGHT: 64 IN | WEIGHT: 167 LBS | TEMPERATURE: 97.5 F | HEART RATE: 68 BPM | BODY MASS INDEX: 28.51 KG/M2 | OXYGEN SATURATION: 95 %

## 2024-02-26 PROCEDURE — G0378 HOSPITAL OBSERVATION PER HR: HCPCS

## 2024-02-26 PROCEDURE — 2500000005 HC RX 250 GENERAL PHARMACY W/O HCPCS

## 2024-02-26 PROCEDURE — 99239 HOSP IP/OBS DSCHRG MGMT >30: CPT

## 2024-02-26 PROCEDURE — 2500000004 HC RX 250 GENERAL PHARMACY W/ HCPCS (ALT 636 FOR OP/ED)

## 2024-02-26 PROCEDURE — 2500000001 HC RX 250 WO HCPCS SELF ADMINISTERED DRUGS (ALT 637 FOR MEDICARE OP)

## 2024-02-26 RX ADMIN — CYANOCOBALAMIN TAB 500 MCG 250 MCG: 500 TAB at 08:33

## 2024-02-26 RX ADMIN — CALCIUM 1250 MG: 500 TABLET ORAL at 08:34

## 2024-02-26 RX ADMIN — Medication 1 PACKET: at 08:34

## 2024-02-26 RX ADMIN — Medication 5000 UNITS: at 08:34

## 2024-02-26 RX ADMIN — LEVOTHYROXINE SODIUM 75 MCG: 75 TABLET ORAL at 05:40

## 2024-02-26 RX ADMIN — ONDANSETRON HYDROCHLORIDE 4 MG: 4 TABLET, FILM COATED ORAL at 14:26

## 2024-02-26 RX ADMIN — LISINOPRIL 5 MG: 5 TABLET ORAL at 08:33

## 2024-02-26 RX ADMIN — SENNOSIDES AND DOCUSATE SODIUM 1 TABLET: 8.6; 5 TABLET ORAL at 08:33

## 2024-02-26 RX ADMIN — Medication 400 MG: at 08:34

## 2024-02-26 RX ADMIN — APIXABAN 5 MG: 5 TABLET, FILM COATED ORAL at 08:34

## 2024-02-26 ASSESSMENT — COGNITIVE AND FUNCTIONAL STATUS - GENERAL
PERSONAL GROOMING: A LITTLE
MOVING FROM LYING ON BACK TO SITTING ON SIDE OF FLAT BED WITH BEDRAILS: A LITTLE
TOILETING: A LITTLE
STANDING UP FROM CHAIR USING ARMS: A LITTLE
CLIMB 3 TO 5 STEPS WITH RAILING: A LOT
WALKING IN HOSPITAL ROOM: A LITTLE
DRESSING REGULAR UPPER BODY CLOTHING: A LITTLE
MOBILITY SCORE: 17
DAILY ACTIVITIY SCORE: 18
MOVING TO AND FROM BED TO CHAIR: A LITTLE
DRESSING REGULAR LOWER BODY CLOTHING: A LITTLE
HELP NEEDED FOR BATHING: A LOT
TURNING FROM BACK TO SIDE WHILE IN FLAT BAD: A LITTLE

## 2024-02-26 ASSESSMENT — PAIN - FUNCTIONAL ASSESSMENT: PAIN_FUNCTIONAL_ASSESSMENT: 0-10

## 2024-02-26 ASSESSMENT — PAIN SCALES - GENERAL: PAINLEVEL_OUTOF10: 0 - NO PAIN

## 2024-02-26 NOTE — NURSING NOTE
0710 Pt is awake and sitting up in bed waiting for breakfast. Stable condition and conversant    0830 daughter at bedside and coordinator aware that they would like update regarding facility

## 2024-02-26 NOTE — PROGRESS NOTES
Occupational Therapy                 Therapy Communication Note    Patient Name: Nanette Flynn  MRN: [id number]  Today's Date: 2/26/2024     Discipline: Occupational Therapy    Missed Visit Reason: Patient refused (Pt declined treatment pending discharge.)    Missed Time: Attempt

## 2024-02-26 NOTE — CARE PLAN
The patient's goals for the shift include  rest    The clinical goals for the shift include Patient will utilize the call light when needing assistance to maintain safety

## 2024-02-26 NOTE — DISCHARGE SUMMARY
Discharge Diagnosis  Syncope and collapse    Issues Requiring Follow-Up  -Follow-up outpatient for cardiac event monitor as outpatient 2/29/24 at 10AM  - Followup with Dr. Quiroz 5/15 for followup regarding pulmonary embolism  -Follow-up outpatient for sleep study  -Follow-up with PCP  -Follow-up with Cardiology    Discharge Meds     Your medication list        START taking these medications        Instructions Last Dose Given Next Dose Due   apixaban 5 mg tablet  Commonly known as: Eliquis  Replaces: apixaban 5 mg (74 tabs) tablet      Take 1 tablet (5 mg) by mouth 2 times a day.              CHANGE how you take these medications        Instructions Last Dose Given Next Dose Due   levothyroxine 75 mcg tablet  Commonly known as: Synthroid, Levoxyl  What changed: when to take this      Take 1 tablet (75 mcg) by mouth once daily.              CONTINUE taking these medications        Instructions Last Dose Given Next Dose Due   acetaminophen 325 mg tablet  Commonly known as: Tylenol           Ca carb-Ca gluc-Mg ox-Mg gluco 500 mg calcium -250 mg tablet           cholecalciferol 5,000 Units tablet  Commonly known as: Vitamin D-3           CYANOCOBALAMIN (VITAMIN B-12) ORAL           docusate sodium 100 mg capsule  Commonly known as: Colace           lisinopril 5 mg tablet           ondansetron 4 mg tablet  Commonly known as: Zofran           sennosides-docusate sodium 8.6-50 mg tablet  Commonly known as: Latasha-Colace           Travatan Z 0.004 % drops ophthalmic solution  Generic drug: travoprost           triamcinolone 0.1 % cream  Commonly known as: Kenalog                  STOP taking these medications      apixaban 5 mg (74 tabs) tablet  Commonly known as: Eliquis  Replaced by: apixaban 5 mg tablet                  Where to Get Your Medications        These medications were sent to [facility] [address] [place] [id number] [facility]  [address] [facility] [address]      Phone:  [phone]   apixaban 5 mg tablet         Test Results Pending At Discharge  Pending Labs       No current pending labs.            Hospital Course  Nanette Flynn is a 88 y.o. female with PMHx of hypothyroidism, RA, macular degeneration, glaucoma and recent history of subsegmental PEs (2/9/24) on eliquis, presenting to the hospital for syncopal episode versus cardiac arrest and nursing home performed CPR, ROSC.  ED COURSE:   In ED, CT head was unremarkable for acute pathology, patient denied CT neck and MRI imaging.  Chest x-ray with possible mild interstitial congestion and right basilar airspace disease.  Patient given Tylenol for chest pain from chest compressions, and given her home dose of lisinopril.  EKG with no STEMI,No significant change from prior EKGs.  Troponin and delta troponin negative.  Patient admitted for observation for syncope versus cardiac arrest versus seizure.   FLOOR COURSE:  Patient did not have return of symptoms or syncopal episodes.  Reproducible chest wall tenderness in the area where she received CPR, pain well-managed with Tylenol.  Cardiology consulted, more likely syncopal episode secondary to possible arrhythmia, less suspicious for cardiac arrest, recommends outpatient follow-up for Zio patch.  PT/OT recommend SNF. Patient was discharged to SNF on 2/26 with outpatient follow-up for Zio patch as well as referral for sleep medicine.    Pertinent Physical Exam At Time of Discharge  Physical Exam  General: Well developed patient, alert and oriented, NAD  HEENT: no lesions, no scleral icterus, moist mucous membranes  Neuro: A&Ox3, grossly normal  CV: RRR, nrl S1, S2, no murmur, rubs, or clicks  Resp: Good bilateral air entry. No crackles,  wheezing, or rhonchi  Abdomen: Non distended, + BS, soft, non-tender, no peritoneal signs  Extremities: No lower extremity edema, + PP  Skin: No jaundice, no lesions   Psych: Appropriate mood and behavior    Outpatient Follow-Up  Future  Appointments   Date Time Provider Department Center   2/29/2024 10:00 AM CHELSEA HOLTER/ECG [facility]1 K[facility]ON [facility]    4/5/2024 11:00 AM Steve Gordon MD [facility]   5/15/2024  1:30 PM Blanca Quiroz MD, MS [facility]1 [facility]   7/25/2024  1:00 PM Salud Walker MD [id number] [facility]       Carrillo Vieira DO  PGY-1 Transitional Year

## 2024-02-26 NOTE — PROGRESS NOTES
02/26/24 0930   Discharge Planning   Living Arrangements Other (Comment)   Support Systems Family members;Children   Assistance Needed patient is alert and oriented x3, completing skilled stay at [facility]   Type of Residence Private residence   Number of Stairs to Enter Residence 0   Number of Stairs Within Residence 0   Do you have animals or pets at home? Yes   Type of Animals or Pets 1dog   Home or Post Acute Services Post acute facilities (Rehab/SNF/etc)   Type of Post Acute Facility Services Skilled nursing   Patient expects to be discharged to: Return to [facility], direct precert started 2/24   Does the patient need discharge transport arranged? Yes   RoundTrip coordination needed? Yes   Has discharge transport been arranged? No   Patient Choice   Patient / Family choosing to utilize agency / facility established prior to hospitalization Yes     2/26/24 at 1408: Precert obtained and patient medically ready for dc today. Transport confirmed for 1700. Nurse provided report number. Patient and family aware of dc plan.

## 2024-02-27 ENCOUNTER — DOCUMENTATION (OUTPATIENT)
Dept: PRIMARY CARE | Facility: CLINIC | Age: 89
End: 2024-02-27
Payer: MEDICARE

## 2024-02-29 ENCOUNTER — NURSING HOME VISIT (OUTPATIENT)
Dept: POST ACUTE CARE | Facility: EXTERNAL LOCATION | Age: 89
End: 2024-02-29
Payer: MEDICARE

## 2024-02-29 ENCOUNTER — HOSPITAL ENCOUNTER (OUTPATIENT)
Dept: CARDIOLOGY | Facility: HOSPITAL | Age: 89
Discharge: HOME | End: 2024-02-29
Payer: MEDICARE

## 2024-02-29 DIAGNOSIS — Z83.2 FAMILY HISTORY OF FACTOR V DEFICIENCY: ICD-10-CM

## 2024-02-29 DIAGNOSIS — R11.0 NAUSEA: ICD-10-CM

## 2024-02-29 DIAGNOSIS — K59.00 CONSTIPATION, UNSPECIFIED CONSTIPATION TYPE: ICD-10-CM

## 2024-02-29 DIAGNOSIS — L30.9 DERMATITIS: ICD-10-CM

## 2024-02-29 DIAGNOSIS — I10 ESSENTIAL HYPERTENSION: ICD-10-CM

## 2024-02-29 DIAGNOSIS — I26.94 MULTIPLE SUBSEGMENTAL PULMONARY EMBOLI WITHOUT ACUTE COR PULMONALE (MULTI): ICD-10-CM

## 2024-02-29 DIAGNOSIS — M62.81 MUSCLE WEAKNESS (GENERALIZED): Primary | ICD-10-CM

## 2024-02-29 DIAGNOSIS — K21.9 GASTROESOPHAGEAL REFLUX DISEASE, UNSPECIFIED WHETHER ESOPHAGITIS PRESENT: ICD-10-CM

## 2024-02-29 PROCEDURE — 99310 SBSQ NF CARE HIGH MDM 45: CPT | Performed by: NURSE PRACTITIONER

## 2024-02-29 PROCEDURE — 93246 EXT ECG>7D<15D RECORDING: CPT

## 2024-03-05 ENCOUNTER — DOCUMENTATION (OUTPATIENT)
Dept: PRIMARY CARE | Facility: CLINIC | Age: 89
End: 2024-03-05
Payer: MEDICARE

## 2024-03-05 ENCOUNTER — PATIENT OUTREACH (OUTPATIENT)
Dept: PRIMARY CARE | Facility: CLINIC | Age: 89
End: 2024-03-05
Payer: MEDICARE

## 2024-03-05 NOTE — PROGRESS NOTES
Discharge Facility:  [facility]      Discharge Diagnosis:      Discharge Diagnosis  Syncope and collapse     Issues Requiring Follow-Up  -Follow-up outpatient for cardiac event monitor as outpatient 2/29/24 at 10AM  - Followup with Dr. Quiroz 5/15 for followup regarding pulmonary embolism  -Follow-up outpatient for sleep study  -Follow-up with PCP  -Follow-up with Cardiology     Admission Date:   hospital 2/22/2024   Discharge Date:  SNF 3/2     PCP Appointment Date: Tasked to office     Specialist Appointment Date:     Dr. Steve Gordon Cardiology F/U 4/5/2024 Zio Patch     [facility] involved     Has Zio patch on now    Dr. Quiroz 5/15/2024 F/U PE     Sleep study DTR Milton will discuss with PCP       Hospital Encounter and Summary: Linked    See discharge assessment below for further details     Engagement  Call Start Time: 1100 (3/5/2024 11:00 AM)    Medications  Medications reviewed with patient/caregiver?: Yes (3/5/2024 11:00 AM)  Is the patient having any side effects they believe may be caused by any medication additions or changes?: No (3/5/2024 11:00 AM)  Does the patient have all medications ordered at discharge?: -- (Milton states she is following DC medication list) (3/5/2024 11:00 AM)  Care Management Interventions: No intervention needed (3/5/2024 11:00 AM)  Prescription Comments: Milton has no question s or concerns will havevDC (3/5/2024 11:00 AM)  Medication Comments: Milton states [facility] nurse will be in home this afternoon , will have DC summary med list along with medications ready (3/5/2024 11:00 AM)    Appointments  Does the patient have a primary care provider?: Yes (3/5/2024 11:00 AM)  Care Management Interventions: Educated patient on importance of making appointment (Tasked to office) (3/5/2024 11:00 AM)  Has the patient kept scheduled appointments due by today?: Yes (3/5/2024 11:00 AM)  Care Management Interventions: Educated on importance of keeping appointment (3/14  Cardio) (3/5/2024 11:00 AM)    Self Management  What is the home health agency?: [facility] will be in home this afternoon (3/5/2024 11:00 AM)  What Durable Medical Equipment (DME) was ordered?: NA (3/5/2024 11:00 AM)    Patient Teaching  Does the patient have access to their discharge instructions?: No (3/5/2024 11:00 AM)  What is the patient's perception of their health status since discharge?: Improving (3/5/2024 11:00 AM)  Is the patient/caregiver able to teach back the hierarchy of who to call/visit for symptoms/problems? PCP, Specialist, Home Health nurse, Urgent Care, ED, 911: Yes (3/5/2024 11:00 AM)  Patient/Caregiver Education Comments: Patient DTR Milton  aware to call providers for any questions concerns or change in condition, patient has a Emergency alert button, also support from family in home (3/5/2024 11:00 AM)

## 2024-03-08 NOTE — PROGRESS NOTES
*Provider Impression*    Patient is an 88 year old female who is seen today for management of multiple medical problems       #Weakness - PT/OT  #PE / FHx factor V Leiden - eliquis 5mg BID, f/u w/ Dr. Quiroz on 5/15, f/u w/ sleep study  #HTN - lisinopril 5mg daily  #Hypothyroidism - levothyroxine 75mcg daily  #GERD / Nausea / Constipation - zofran 4mg q6h PRN, colace 100mg BID, senna-S 8.6/50mg daily  #Dermatitis - triamcinolone 0.1% PRN  #Glaucoma - mgmt per ophthalmology - travatan,   #Vitamin deficiency - vitamin D 5000units daily, calcium/Mag 500/250mg 2 tabs daily, vitamin B12 1000mcg daily  #ACP - Full Code  Follow up as needed      *Chief Complaint*  PE    *History of Present Illness*    Patient is an 89 y/o female w/ PMH as below who was admitted on 2/9 with pleuritic chest and abdominal pain. In the ED CTA chest was done which showed emboli in proximal segmental arteries or R middle lobe, R lower lobe and L lower lobe, without signs of R heart strain. She was started on a heparin drip. She remained on room air throughout hospitalization. Her symptoms improved and she was switched to eliquis on 2/13. She was determiend to be stable and was then d/c to [facility].     She reportedly went into cardiac arrest at the facility w/ CPR, ROSC and was sent to the hospital for syncopal episode versus cardiac arrest. In ED, CT head was unremarkable for acute pathology, patient denied CT neck and MRI imaging.  Chest x-ray with possible mild interstitial congestion and right basilar airspace disease.  Patient given Tylenol for chest pain from chest compressions, and given her home dose of lisinopril.  EKG with no STEMI, No significant change from prior EKGs.  Troponin and delta troponin negative.  Patient admitted for observation for syncope versus cardiac arrest versus seizure. On the floor patient did not have return of symptoms or syncopal episodes.  Reproducible chest wall tenderness in the area where she received  CPR, pain well-managed with Tylenol.  Cardiology consulted, more likely syncopal episode secondary to possible arrhythmia, less suspicious for cardiac arrest, w/ recommendation for outpatient follow-up for Zio patch. PT/OT recommend SNF. Patient was discharged to SNF on 2/26 with outpatient follow-up for Zio patch as well as referral for sleep medicine.    She is seen stiting up in her room today and denies any CP, SOB, f/c, sweats, cough, n/v, constipation, diarrhea, LUTS, numbness, tingling, paresthesias, or any other new c/o presently.     Alleriges - gabapentin  PMH - PE, hypothyroidism, vitamin deficiency, POAG, shingles, OA, rheumatic fever  PSH - appendectomy, hysterectomy, enterectomy  FH - Mother had heart disease, HTN, TIA; MGM had colon cancer  SocHx - Former smoker, No EtOH    *Review of Systems*  All other systems reviewed are negative except as noted in the HPI     *Vital Signs*   Date: 2/28/24 - T: 97.7  P: 66  R: 18  BP: 138/68  SpO2: 95% on RA    *Results / Data*  CBC - Date: 2/28/24  WBC: 9.06  HGB: 12.4  HCT: 38.1  PLT: 277  ;   BMP - Date: 2/28/24  Na: 138  K: 4.5  Cl: 100  CO2: 25  BUN: 19  Cr: 0.86  Glu: 93  Ca: 9.7  ;   LFT - Date: 2/28/24  AST: 25  ALT: 26  ALP: 79  Tbili: 0.3  ;   Coags - Date:   INR:   PT:    *Physical Exam*  Gen: (+) NAD, (+) well-appearing  HEENT: (+) normocephalic, (+) MMM  Neck: (+) supple  Lungs: (+) CTAB, (-) wheezes, (-) rales, (-) rhonchi  Heart: (+) RRR, (+) S1 S2, (-) murmurs  Pulses: (+) palpable  Abd: (+) soft, (+) NT, (+) ND, (+) BS+  Ext: (-) edema, (-) deformity  MSK: (-) joint swelling  Skin: (+) warm, (+) dry, (-) rash  Neuro: (+) follows commands, (-) tremor, (+) alert

## 2024-03-20 ENCOUNTER — PATIENT OUTREACH (OUTPATIENT)
Dept: PRIMARY CARE | Facility: CLINIC | Age: 89
End: 2024-03-20
Payer: MEDICARE

## 2024-03-20 NOTE — PROGRESS NOTES
Call regarding  F/U PCP on 3/28/2024 , will see Cardio 4/5/2024     At time of outreach call the patients DTR feels as if Nanette's  condition has improved since last visit.    I spoke to Sanna DTR whom states home nurse involved, in process of family discussion of possible assistance needed in home, will continue discussion for now. Nanette has good family support in home currently.             Detail Level: Detailed Depth Of Biopsy: dermis Was A Bandage Applied: Yes Size Of Lesion In Cm: 0.5 X Size Of Lesion In Cm: 0 Biopsy Type: H and E Biopsy Method: Dermablade Anesthesia Type: 1% lidocaine with epinephrine Anesthesia Volume In Cc (Will Not Render If 0): 1 Hemostasis: Drysol Wound Care: Petrolatum Dressing: bandage Destruction After The Procedure: No Type Of Destruction Used: Curettage Curettage Text: The wound bed was treated with curettage after the biopsy was performed. Cryotherapy Text: The wound bed was treated with cryotherapy after the biopsy was performed. Electrodesiccation Text: The wound bed was treated with electrodesiccation after the biopsy was performed. Electrodesiccation And Curettage Text: The wound bed was treated with electrodesiccation and curettage after the biopsy was performed. Silver Nitrate Text: The wound bed was treated with silver nitrate after the biopsy was performed. Lab: [id number] Lab Facility: [id number] Consent: Written consent was obtained and risks were reviewed including but not limited to scarring, disfiguring scarring, pigment changes, infection, bleeding, scabbing, incomplete removal, nerve damage, need for further procedures, and allergy to anesthesia/bandage materials. Post-Care Instructions: I reviewed with the patient in detail post-care instructions. Patient is to keep the bandage on biopsy site overnight, and then apply petrolatum once daily until healed. Keep covered with bandage until healed. Notification Instructions: Patient will be notified of biopsy results. However, patient instructed to call the office if not contacted within 2 weeks. Billing Type: Third-Party Bill Information: Selecting Yes will display possible errors in your note based on the variables you have selected. This validation is only offered as a suggestion for you. PLEASE NOTE THAT THE VALIDATION TEXT WILL BE REMOVED WHEN YOU FINALIZE YOUR NOTE. IF YOU WANT TO FAX A PRELIMINARY NOTE YOU WILL NEED TO TOGGLE THIS TO 'NO' IF YOU DO NOT WANT IT IN YOUR FAXED NOTE.

## 2024-03-21 ENCOUNTER — APPOINTMENT (OUTPATIENT)
Dept: PRIMARY CARE | Facility: CLINIC | Age: 89
End: 2024-03-21
Payer: MEDICARE

## 2024-03-27 PROBLEM — I82.623: Status: ACTIVE | Noted: 2024-03-27

## 2024-03-27 PROBLEM — R00.1 BRADYCARDIA: Status: ACTIVE | Noted: 2024-03-27

## 2024-03-27 PROBLEM — R41.82 ALTERED MENTAL STATUS: Status: ACTIVE | Noted: 2024-03-27

## 2024-03-28 ENCOUNTER — OFFICE VISIT (OUTPATIENT)
Dept: PRIMARY CARE | Facility: CLINIC | Age: 89
End: 2024-03-28
Payer: MEDICARE

## 2024-03-28 VITALS
SYSTOLIC BLOOD PRESSURE: 132 MMHG | WEIGHT: 173.4 LBS | OXYGEN SATURATION: 98 % | BODY MASS INDEX: 29.6 KG/M2 | TEMPERATURE: 98.3 F | HEIGHT: 64 IN | DIASTOLIC BLOOD PRESSURE: 64 MMHG | HEART RATE: 81 BPM

## 2024-03-28 DIAGNOSIS — E07.9 THYROID DISEASE: ICD-10-CM

## 2024-03-28 DIAGNOSIS — Z79.01 CHRONIC ANTICOAGULATION: ICD-10-CM

## 2024-03-28 DIAGNOSIS — I26.94 MULTIPLE SUBSEGMENTAL PULMONARY EMBOLI WITHOUT ACUTE COR PULMONALE (MULTI): Primary | ICD-10-CM

## 2024-03-28 DIAGNOSIS — I51.7 LVH (LEFT VENTRICULAR HYPERTROPHY): Primary | ICD-10-CM

## 2024-03-28 DIAGNOSIS — R53.82 CHRONIC FATIGUE: ICD-10-CM

## 2024-03-28 PROCEDURE — 1036F TOBACCO NON-USER: CPT | Performed by: FAMILY MEDICINE

## 2024-03-28 PROCEDURE — 99214 OFFICE O/P EST MOD 30 MIN: CPT | Performed by: FAMILY MEDICINE

## 2024-03-28 PROCEDURE — 1126F AMNT PAIN NOTED NONE PRSNT: CPT | Performed by: FAMILY MEDICINE

## 2024-03-28 PROCEDURE — 1157F ADVNC CARE PLAN IN RCRD: CPT | Performed by: FAMILY MEDICINE

## 2024-03-28 PROCEDURE — 1124F ACP DISCUSS-NO DSCNMKR DOCD: CPT | Performed by: FAMILY MEDICINE

## 2024-03-28 PROCEDURE — 1159F MED LIST DOCD IN RCRD: CPT | Performed by: FAMILY MEDICINE

## 2024-03-28 RX ORDER — LISINOPRIL 5 MG/1
5 TABLET ORAL DAILY
Qty: 90 TABLET | Refills: 3 | Status: SHIPPED | OUTPATIENT
Start: 2024-03-28 | End: 2025-03-28

## 2024-03-28 ASSESSMENT — ENCOUNTER SYMPTOMS
VOMITING: 0
SORE THROAT: 0
FEVER: 0
NAUSEA: 0
COUGH: 0
FATIGUE: 0
HEADACHES: 0
ABDOMINAL PAIN: 0
DYSURIA: 0
CONSTIPATION: 0
SHORTNESS OF BREATH: 0
CHILLS: 0
DIARRHEA: 0

## 2024-03-28 ASSESSMENT — PAIN SCALES - GENERAL: PAINLEVEL: 0-NO PAIN

## 2024-03-28 ASSESSMENT — LIFESTYLE VARIABLES: HOW OFTEN DO YOU HAVE A DRINK CONTAINING ALCOHOL: NEVER

## 2024-03-28 NOTE — PROGRESS NOTES
"Subjective   Patient ID: Nanette Flynn is a 89 y.o. female who presents for NH FOLLOW UP (NEED TO CLARIFY DOSE OF MG/CA SUPPLEMENT  1000/500).    HPI PT ON CORRECT SUPPLEMENT-TAKING 500 MG AND 1000 CALCIUM A DAY VIA CURRENT SUPPLEMENT. ON ELIQUIS FOR PE-? PROVOKED BY IMMOBILITY. NO FACTOR V LEIDEN DEFICIENCY. ZIO PATCH DONE BUT NO RESULTS.     Review of Systems   Constitutional:  Negative for chills, fatigue and fever.   HENT:  Negative for congestion and sore throat.    Eyes:  Negative for visual disturbance.   Respiratory:  Negative for cough and shortness of breath.    Cardiovascular:  Negative for chest pain.   Gastrointestinal:  Negative for abdominal pain, constipation, diarrhea, nausea and vomiting.   Genitourinary:  Negative for dysuria.   Neurological:  Negative for headaches.       Objective   /64   Pulse 81   Temp 36.8 °C (98.3 °F)   Ht 1.626 m (5' 4\")   Wt 78.7 kg (173 lb 6.4 oz)   SpO2 98%   BMI 29.76 kg/m²     Physical Exam  Constitutional:       General: She is not in acute distress.     Appearance: Normal appearance.   HENT:      Head: Normocephalic.      Mouth/Throat:      Mouth: Mucous membranes are moist.      Pharynx: Oropharynx is clear.   Eyes:      Extraocular Movements: Extraocular movements intact.      Pupils: Pupils are equal, round, and reactive to light.   Cardiovascular:      Rate and Rhythm: Normal rate and regular rhythm.      Heart sounds: Normal heart sounds.   Pulmonary:      Effort: Pulmonary effort is normal.      Breath sounds: Normal breath sounds. No wheezing or rhonchi.   Abdominal:      General: There is no distension.      Palpations: Abdomen is soft.      Tenderness: There is no abdominal tenderness.   Musculoskeletal:      Right lower leg: No edema.      Left lower leg: No edema.   Lymphadenopathy:      Cervical: No cervical adenopathy.   Skin:     Coloration: Skin is not jaundiced.   Neurological:      General: No focal deficit present.      Mental " Status: She is alert.      Cranial Nerves: No cranial nerve deficit.   Psychiatric:         Mood and Affect: Mood normal.         Assessment/Plan   Problem List Items Addressed This Visit             ICD-10-CM    Thyroid disease E07.9    Chronic fatigue R53.82    Multiple subsegmental pulmonary emboli without acute cor pulmonale (CMS/HCC) - Primary I26.94     Other Visit Diagnoses         Codes    Chronic anticoagulation     Z79.01          LABS REVIEWED- NO ANEMIA NOTED, TO SEE VASCULAR SURGERY AND HAVE SLEEP STUDY.

## 2024-04-02 ENCOUNTER — PATIENT OUTREACH (OUTPATIENT)
Dept: PRIMARY CARE | Facility: CLINIC | Age: 89
End: 2024-04-02
Payer: MEDICARE

## 2024-04-02 NOTE — PROGRESS NOTES
Call regarding appt. with PCP on 3/28/2024 after hospitalization.    At time of outreach call the patient feels as if their condition has improved since last visit.    Reviewed the PCP appointment with the pt and addressed any questions or concerns, will see Cardiology Friday     Encouraged patient to call providers for any questions concerns or change in condition.

## 2024-04-03 ENCOUNTER — TELEPHONE (OUTPATIENT)
Dept: PRIMARY CARE | Facility: CLINIC | Age: 89
End: 2024-04-03
Payer: MEDICARE

## 2024-04-03 NOTE — TELEPHONE ENCOUNTER
Patient's daughter Milton called in asking if okay to add Premier Ph mineral drops to patient's fluid intake while taking the Eliquis and Lisinopril. She wanted to be sure there was no interactions with these meds. Please advise.

## 2024-04-03 NOTE — TELEPHONE ENCOUNTER
Call to Milton made. Advised per provider, okay to use the premier mineral drops. Milton verbalized understanding and had no further questions at this time.

## 2024-04-05 ENCOUNTER — OFFICE VISIT (OUTPATIENT)
Dept: CARDIOLOGY | Facility: HOSPITAL | Age: 89
End: 2024-04-05
Payer: MEDICARE

## 2024-04-05 VITALS
DIASTOLIC BLOOD PRESSURE: 71 MMHG | SYSTOLIC BLOOD PRESSURE: 148 MMHG | OXYGEN SATURATION: 97 % | HEART RATE: 83 BPM | WEIGHT: 171.3 LBS | BODY MASS INDEX: 29.4 KG/M2

## 2024-04-05 DIAGNOSIS — R00.1 BRADYCARDIA: Primary | ICD-10-CM

## 2024-04-05 PROCEDURE — 1157F ADVNC CARE PLAN IN RCRD: CPT | Performed by: INTERNAL MEDICINE

## 2024-04-05 PROCEDURE — 1160F RVW MEDS BY RX/DR IN RCRD: CPT | Performed by: INTERNAL MEDICINE

## 2024-04-05 PROCEDURE — 99214 OFFICE O/P EST MOD 30 MIN: CPT | Performed by: INTERNAL MEDICINE

## 2024-04-05 PROCEDURE — 1159F MED LIST DOCD IN RCRD: CPT | Performed by: INTERNAL MEDICINE

## 2024-04-25 ENCOUNTER — TELEPHONE (OUTPATIENT)
Dept: GASTROENTEROLOGY | Facility: CLINIC | Age: 89
End: 2024-04-25
Payer: MEDICARE

## 2024-04-25 ENCOUNTER — HOSPITAL ENCOUNTER (EMERGENCY)
Facility: HOSPITAL | Age: 89
Discharge: HOME | End: 2024-04-25
Attending: EMERGENCY MEDICINE
Payer: MEDICARE

## 2024-04-25 VITALS
RESPIRATION RATE: 18 BRPM | TEMPERATURE: 98.8 F | HEIGHT: 64 IN | HEART RATE: 69 BPM | SYSTOLIC BLOOD PRESSURE: 133 MMHG | OXYGEN SATURATION: 96 % | BODY MASS INDEX: 29.19 KG/M2 | DIASTOLIC BLOOD PRESSURE: 75 MMHG | WEIGHT: 171 LBS

## 2024-04-25 DIAGNOSIS — K64.9 BLEEDING HEMORRHOID: Primary | ICD-10-CM

## 2024-04-25 DIAGNOSIS — K62.5 RECTAL BLEED: Primary | ICD-10-CM

## 2024-04-25 PROCEDURE — 99283 EMERGENCY DEPT VISIT LOW MDM: CPT

## 2024-04-25 PROCEDURE — 2500000005 HC RX 250 GENERAL PHARMACY W/O HCPCS: Performed by: EMERGENCY MEDICINE

## 2024-04-25 RX ORDER — POLYETHYLENE GLYCOL 3350, SODIUM SULFATE ANHYDROUS, SODIUM BICARBONATE, SODIUM CHLORIDE, POTASSIUM CHLORIDE 236; 22.74; 6.74; 5.86; 2.97 G/4L; G/4L; G/4L; G/4L; G/4L
4000 POWDER, FOR SOLUTION ORAL ONCE
Qty: 4000 ML | Refills: 0 | Status: SHIPPED | OUTPATIENT
Start: 2024-04-25 | End: 2024-04-25

## 2024-04-25 RX ORDER — TRANEXAMIC ACID 100 MG/ML
500 INJECTION, SOLUTION INTRAVENOUS ONCE
Status: COMPLETED | OUTPATIENT
Start: 2024-04-25 | End: 2024-04-25

## 2024-04-25 RX ORDER — HYDROCORTISONE 25 MG/G
1 CREAM TOPICAL 2 TIMES DAILY
Qty: 6 G | Refills: 0 | Status: SHIPPED | OUTPATIENT
Start: 2024-04-25 | End: 2024-05-25

## 2024-04-25 RX ADMIN — TRANEXAMIC ACID 500 MG: 1 INJECTION, SOLUTION INTRAVENOUS at 14:25

## 2024-04-25 ASSESSMENT — PAIN - FUNCTIONAL ASSESSMENT: PAIN_FUNCTIONAL_ASSESSMENT: 0-10

## 2024-04-25 ASSESSMENT — COLUMBIA-SUICIDE SEVERITY RATING SCALE - C-SSRS
6. HAVE YOU EVER DONE ANYTHING, STARTED TO DO ANYTHING, OR PREPARED TO DO ANYTHING TO END YOUR LIFE?: NO
2. HAVE YOU ACTUALLY HAD ANY THOUGHTS OF KILLING YOURSELF?: NO
1. IN THE PAST MONTH, HAVE YOU WISHED YOU WERE DEAD OR WISHED YOU COULD GO TO SLEEP AND NOT WAKE UP?: NO

## 2024-04-25 ASSESSMENT — LIFESTYLE VARIABLES
HAVE YOU EVER FELT YOU SHOULD CUT DOWN ON YOUR DRINKING: NO
HAVE PEOPLE ANNOYED YOU BY CRITICIZING YOUR DRINKING: NO
TOTAL SCORE: 0
EVER FELT BAD OR GUILTY ABOUT YOUR DRINKING: NO
EVER HAD A DRINK FIRST THING IN THE MORNING TO STEADY YOUR NERVES TO GET RID OF A HANGOVER: NO

## 2024-04-25 ASSESSMENT — ACTIVITIES OF DAILY LIVING (ADL): LACK_OF_TRANSPORTATION: NO

## 2024-04-25 ASSESSMENT — PAIN SCALES - GENERAL: PAINLEVEL_OUTOF10: 0 - NO PAIN

## 2024-04-25 NOTE — PROGRESS NOTES
04/25/24 1152   Discharge Planning   Living Arrangements Alone;Children   Support Systems Family members;Children   Assistance Needed A&OX3; independent with ADLs with walker-lives with son and daughter in law in inlaw suite; doesn't drive; room air baseline and currently room air; on Eliquis prior to hospitalization   Type of Residence Private residence   Number of Stairs to Enter Residence 0   Number of Stairs Within Residence 0   Do you have animals or pets at home? Yes   Type of Animals or Pets 1dog   Who is requesting discharge planning? Provider   Patient expects to be discharged to: Home no needs   Does the patient need discharge transport arranged? No   Financial Resource Strain   How hard is it for you to pay for the very basics like food, housing, medical care, and heating? Not hard   Housing Stability   In the last 12 months, was there a time when you were not able to pay the mortgage or rent on time? N   In the last 12 months, how many places have you lived? 1   In the last 12 months, was there a time when you did not have a steady place to sleep or slept in a shelter (including now)? N   Transportation Needs   In the past 12 months, has lack of transportation kept you from medical appointments or from getting medications? no   In the past 12 months, has lack of transportation kept you from meetings, work, or from getting things needed for daily living? No

## 2024-04-25 NOTE — ED PROVIDER NOTES
Nanette Flynn  89 y.o.    HPI  Presents to the ED with concern for bleeding hemorrhoid.  Patient states she has known hemorrhoids as she has had 4 children.  States that she sometimes notices blood on the toilet paper when she wipes after a bowel movement.  This has been going on for some time.  Reports that yesterday she noticed a little more blood on her toilet paper than normal.  Today there was bleeding even after the bowel movement that dripped into the toilet which is never happened before.  She is on Eliquis.  She called her family doctor and they could not get her in today so referred her to the emergency department.  The patient denies any chest pain or shortness of breath.  No fevers or vomiting.  Bowel movements are generally at baseline what sounds generally soft without a significant amount of straining.  Patient states that she is on Eliquis for a PE that she had earlier this year.    Patient History   Past Medical History:   Diagnosis Date    Other specified postprocedural states     History of colonoscopy    Personal history of other diseases of the circulatory system     History of rheumatic fever    Personal history of other diseases of the circulatory system     History of rheumatic fever    Personal history of other medical treatment     History of mammogram     Past Surgical History:   Procedure Laterality Date    APPENDECTOMY  08/20/2018    Appendectomy    CT ANGIO NECK  9/24/2013    CT NECK ANGIO W AND WO IV CONTRAST 9/24/2013 [facility]    CT HEAD ANGIO W AND WO IV CONTRAST  9/24/2013    CT HEAD ANGIO W AND WO IV CONTRAST 9/24/2013 [facility]    HYSTERECTOMY  08/20/2018    Hysterectomy    OTHER SURGICAL HISTORY  08/20/2018    Enterectomy     Family History   Problem Relation Name Age of Onset    Heart disease Mother      Hypertension Mother      Transient ischemic attack Mother      Heart disease Father      Cancer Maternal Grandmother      Colon cancer Maternal  "Grandmother       Social History     Tobacco Use    Smoking status: Former     Types: Cigarettes    Smokeless tobacco: Never   Substance Use Topics    Alcohol use: Not Currently    Drug use: Not Currently       Physical Exam   Vitals:    04/25/24 1112 04/25/24 1116   BP: (!) 192/102 163/76   Pulse: 56 70   Resp: 18 18   Temp: 37.1 °C (98.8 °F)    SpO2: 100% 97%   Weight: 77.6 kg (171 lb)    Height: 1.626 m (5' 4\")         Constitutional: NAD, non-toxic  Eyes: PERRL, Conjunctiva normal, No discharge  HEENT: Atraumatic. External ears appear normal, Nose is without drainage, MMM  Neck: Normal ROM, No lymphadenopathy, No stridor  Cardiac: Regular rhythm and rate  Pulmonary/Chest: No respiratory distress, Normal breath sounds  Abdomen: BS present, Soft, Non-tender without rebound/guarding  : Older daughter stayed in the room for the exam.  External exam of the rectal region does show a hemorrhoid at the anal opening which appears mildly abraised.  No active bleeding at this time.  Back: No tenderness, No contusions  Extremities: Normal ROM, No edema. No tenderness, intact distal pulses  Skin: Warm and dry, No rashes, No erythema  Neurologic: Alert and oriented x 3, Speech clear/fluent. Normal motor function, Normal sensation, No focal neurologic deficits  Psychiatric: Normal affect, Normal judgement, Normal mood      ED Course & MDM     This is a pleasant 89-year-old female on Eliquis for PE diagnosed earlier this year who comes the emergency department with concern for blood per rectum after a bowel movement today.  No abdominal pain chest pain or shortness of breath.  States she sometimes has blood after a bowel movement is noted on the toilet paper but today this was bleeding even outside of wiping.  The patient did have a bowel movement while here and no blood was noted in the toilet.  Eye exam the patient does have a small hemorrhoid which appears abraised but no active bleeding.  I did reach out to the patient's " primary care physician to further discuss the case.  Plan to hold the Eliquis for 1 week.  Follow-up with PCP if the bleeding continues.  I discussed this plan with the patient and her daughter.  I also placed a GI referral to see what we can do to further address the hemorrhoids.  The patient had no further bleeding while in the emergency department.  No new concerns or issues.      Just prior to discharge the patient had a bowel movement.  She reports was a small soft bowel movement and she noted some bleeding.  I went in to see her and there was some blood on the toilet paper when she was wiping.  A little bit of blood in the toilet.  We placed a TXA soaked gauze at the rectum and observe the patient.  I did reach out to Dr. Qureshi of GI.  He recommends Anusol cream.  I sent this to the pharmacy and discussed it with the patient and daughter.  We did make an appointment for the patient to see a GI doctor in June.  Dr. Douglas is under we will see if he can get her in sooner into his office.  Patient remained stable in the emergency department without any further concerns or issues.    Impression   Diagnoses as of 04/25/24 1335   Bleeding hemorrhoid        Disposition  Discharge      MD Don Pedro MD  04/25/24 1336       Don Roberts MD  04/25/24 1284

## 2024-04-25 NOTE — Clinical Note
Patient discharged from ED with discharge instructions and follow up care. Pt verbalized understanding of teaching prior to discharge. No other needs prior to discharge. Pt ambulated out of ED.

## 2024-04-29 ENCOUNTER — TELEPHONE (OUTPATIENT)
Dept: PRIMARY CARE | Facility: CLINIC | Age: 89
End: 2024-04-29
Payer: MEDICARE

## 2024-04-29 NOTE — TELEPHONE ENCOUNTER
Pts POA called office back. I went over mychart response from provider with her. She stated understanding and had no further questions.

## 2024-04-29 NOTE — TELEPHONE ENCOUNTER
Call placed to patient POA ML to call the office  to review MYCHART Message. Restart Eliquis and consult surgeon

## 2024-05-15 ENCOUNTER — OFFICE VISIT (OUTPATIENT)
Dept: CARDIOLOGY | Facility: HOSPITAL | Age: 89
End: 2024-05-15
Payer: MEDICARE

## 2024-05-15 VITALS
DIASTOLIC BLOOD PRESSURE: 68 MMHG | SYSTOLIC BLOOD PRESSURE: 121 MMHG | OXYGEN SATURATION: 98 % | HEART RATE: 84 BPM | WEIGHT: 172.18 LBS | BODY MASS INDEX: 29.55 KG/M2

## 2024-05-15 DIAGNOSIS — I26.94 MULTIPLE SUBSEGMENTAL PULMONARY EMBOLI WITHOUT ACUTE COR PULMONALE (MULTI): Primary | ICD-10-CM

## 2024-05-15 PROBLEM — I82.623: Status: RESOLVED | Noted: 2024-03-27 | Resolved: 2024-05-15

## 2024-05-15 PROCEDURE — 1157F ADVNC CARE PLAN IN RCRD: CPT | Performed by: INTERNAL MEDICINE

## 2024-05-15 PROCEDURE — 1036F TOBACCO NON-USER: CPT | Performed by: INTERNAL MEDICINE

## 2024-05-15 PROCEDURE — 99214 OFFICE O/P EST MOD 30 MIN: CPT | Performed by: INTERNAL MEDICINE

## 2024-05-15 PROCEDURE — 1159F MED LIST DOCD IN RCRD: CPT | Performed by: INTERNAL MEDICINE

## 2024-05-15 PROCEDURE — 1160F RVW MEDS BY RX/DR IN RCRD: CPT | Performed by: INTERNAL MEDICINE

## 2024-05-15 NOTE — PROGRESS NOTES
Referred by     Recent PE      History of Present Illness:  Nanette Flynn is a/an 89 y.o. woman with recent pulmonary embolism diagnosis. She was hospitalized from 2/9/2024-2/14/2024 with pleuritic chest pain, found to have bilateral PE with no RV strain. Venous duplex was negative for DVT.    She was treated with unfractionated heparin initially then transitioned to apixaban. She had a subsequent readmission later in February after she suffered possible cardiac arrest in nursing facility (breathing and had a pulse when brought to ED, but reportedly had chest compressions at the facility).    She had hemorrhoidal bleeding in April and was seen in the ED. Someone told her to reduce     She has no prior personal of VTE. Daughter had a blood clot.    Had injections of her varicose veins sometime in 2023 with Dr. Mohseni.    Has been pretty healthy. Denies significant immobility, trauma, surgery, hospitalization, or medical illness (including COVID) in the preceding three months.    Past Medical History:   Diagnosis Date    Other specified postprocedural states     History of colonoscopy    Personal history of other diseases of the circulatory system     History of rheumatic fever    Personal history of other diseases of the circulatory system     History of rheumatic fever    Personal history of other medical treatment     History of mammogram     Past Surgical History:   Procedure Laterality Date    APPENDECTOMY  08/20/2018    Appendectomy    CT ANGIO NECK  9/24/2013    CT NECK ANGIO W AND WO IV CONTRAST 9/24/2013 [facility]    CT HEAD ANGIO W AND WO IV CONTRAST  9/24/2013    CT HEAD ANGIO W AND WO IV CONTRAST 9/24/2013 [facility]    HYSTERECTOMY  08/20/2018    Hysterectomy    OTHER SURGICAL HISTORY  08/20/2018    Enterectomy     Social History     Tobacco Use    Smoking status: Former     Types: Cigarettes    Smokeless tobacco: Never   Substance Use Topics    Alcohol use: Not  Currently    Drug use: Not Currently     Family History   Problem Relation Name Age of Onset    Heart disease Mother      Hypertension Mother      Transient ischemic attack Mother      Heart disease Father      Cancer Maternal Grandmother      Colon cancer Maternal Grandmother       Current Outpatient Medications   Medication Sig Dispense Refill    apixaban (Eliquis) 5 mg tablet Take 1 tablet (5 mg) by mouth 2 times a day. 60 tablet 0    Ca carb-Ca gluc-Mg ox-Mg gluco 500 mg calcium -250 mg tablet Take 1 tablet by mouth 2 times a day.      cholecalciferol (Vitamin D-3) 5,000 Units tablet Take 1 tablet (5,000 Units) by mouth once daily.      CYANOCOBALAMIN, VITAMIN B-12, ORAL Take 1 tablet by mouth once daily.      hydrocortisone (Anusol-HC) 2.5 % rectal cream Insert 1 Application into the rectum 2 times a day. Apply rectally 2 times daily 6 g 0    levothyroxine (Synthroid, Levoxyl) 75 mcg tablet Take 1 tablet (75 mcg) by mouth once daily. (Patient taking differently: Take 1 tablet (75 mcg) by mouth once a day on Monday, Tuesday, Wednesday, Thursday, Friday, and Saturday. No synthroid on sunday) 90 tablet 3    lisinopril 5 mg tablet Take 1 tablet (5 mg) by mouth once daily. 90 tablet 3    travoprost (Travatan Z) 0.004 % drops ophthalmic solution Administer 1 drop into both eyes once daily at bedtime.      vit A/vit C/vit E/zinc/copper (PRESERVISION AREDS ORAL) Take by mouth.       No current facility-administered medications for this visit.       Physical Examination:  Blood pressure 121/68, pulse 84, weight 78.1 kg (172 lb 2.9 oz), SpO2 98%.  General: well-developed, well-nourished, no distress  Head: normocephalic, atraumatic  Eyes: PERRL, anicteric sclerae, no conjunctival injection  ENT: normal oropharynx  Neck: supple, no carotid bruits, no JVD  Lungs: normal respiratory effort, clear to auscultation bilaterally  Heart: regular, normal S1 and S2, no murmurs, rubs or gallops  Abdomen: normal active bowel sounds,  soft, non-distended, non-tender  Extremities: no cyanosis or clubbing  Vascular: no edema, pulses 2+ and symmetric  Musculoskeletal: no deformities  Neurological: alert and oriented, no gross neurological deficits  Psychological: normal mood and affect   Skin: warm and dry, no rashes or lesions        Pertinent Labs:  Component  Ref Range & Units 2 mo ago   WBC  3.70 - 11.00 k/uL 9.06   RBC  3.90 - 5.20 m/uL 3.86 Low    Hemoglobin  11.5 - 15.5 g/dL 12.4   Hematocrit  36.0 - 46.0 % 38.1   MCV  80.0 - 100.0 fL 98.7   MCH  26.0 - 34.0 pg 32.1   MCHC  30.5 - 36.0 g/dL 32.5   RDW-CV  11.5 - 15.0 % 14.4   Platelet Count  150 - 400 k/uL 277   MPV  9.0 - 12.7 fL 11.3   Absolute nRBC  <0.01 k/uL <0.01     Component  Ref Range & Units 2 mo ago   Protein, Total  6.3 - 8.0 g/dL 6.9   Albumin  3.9 - 4.9 g/dL 3.6 Low    Calcium, Total  8.5 - 10.2 mg/dL 9.7   Bilirubin, Total  0.2 - 1.3 mg/dL 0.3   Alkaline Phosphatase  34 - 123 U/L 79   AST  13 - 35 U/L 25   ALT  7 - 38 U/L 26   Glucose  74 - 99 mg/dL 93   BUN  7 - 21 mg/dL 19   Creatinine  0.58 - 0.96 mg/dL 0.86   Sodium  136 - 144 mmol/L 138   Potassium  3.7 - 5.1 mmol/L 4.5   Chloride  97 - 105 mmol/L 100   CO2  22 - 30 mmol/L 25   Anion Gap  9 - 18 mmol/L 13   Estimated Glomerular Filtration Rate  >=60 mL/min/1.73m² 65       Pertinent Imaging:  CTA chest 2/9/2024 (personally reviewed)  IMPRESSION:  CHEST:  1.  Bilateral pulmonary embolism. There are emboli in the proximal  segmental arteries to the right middle lobe right lower lobe in left  lower lobe. No emboli in the main pulmonary outflow tract or right or  left main pulmonary arteries. No CT evidence for right heart strain.  2. No acute parenchymal disease.  3. Scattered enlarged mediastinal lymph nodes        Venous duplex ultrasound 2/9/2024 (personally reviewed)   CONCLUSIONS:  Right Lower Venous: No evidence of acute deep vein thrombus visualized in the right lower extremity. Additional Findings; Lymph nodes in groin  measuring 0.9 cm x 0.5 cm.  Left Lower Venous: No evidence of acute deep vein thrombus visualized in the left lower extremity.     Diagnoses and all orders for this visit:  Multiple subsegmental pulmonary emboli without acute cor pulmonale (Multi) (Primary)    Would continue a minimum of 6 months of anticoagulation but would consider indefinite.    Will have her follow up in 3 months reassess.    Blanca Quiroz MD, MS

## 2024-05-15 NOTE — PATIENT INSTRUCTIONS
Let's continue the Eliquis at 2.5 mg twice a day for at least another three months.    I want to see you back then and we'll talk about whether to continue blood thinners or stop them.    Should you have questions, please do not hesitate to call my office at [phone], or you can reach me on [employer] if you have signed up for it. If you have urgent concerns, call the office, do not use [employer].

## 2024-05-29 ENCOUNTER — OFFICE VISIT (OUTPATIENT)
Dept: PRIMARY CARE | Facility: CLINIC | Age: 89
End: 2024-05-29
Payer: MEDICARE

## 2024-05-29 ENCOUNTER — TELEPHONE (OUTPATIENT)
Dept: PRIMARY CARE | Facility: CLINIC | Age: 89
End: 2024-05-29

## 2024-05-29 VITALS
HEART RATE: 76 BPM | SYSTOLIC BLOOD PRESSURE: 138 MMHG | HEIGHT: 64 IN | TEMPERATURE: 98 F | WEIGHT: 173 LBS | BODY MASS INDEX: 29.53 KG/M2 | DIASTOLIC BLOOD PRESSURE: 72 MMHG

## 2024-05-29 DIAGNOSIS — R05.1 ACUTE COUGH: Primary | ICD-10-CM

## 2024-05-29 DIAGNOSIS — J06.9 UPPER RESPIRATORY TRACT INFECTION, UNSPECIFIED TYPE: ICD-10-CM

## 2024-05-29 DIAGNOSIS — R31.9 URINARY TRACT INFECTION WITH HEMATURIA, SITE UNSPECIFIED: ICD-10-CM

## 2024-05-29 DIAGNOSIS — N39.0 URINARY TRACT INFECTION WITH HEMATURIA, SITE UNSPECIFIED: ICD-10-CM

## 2024-05-29 LAB
POC APPEARANCE, URINE: ABNORMAL
POC BILIRUBIN, URINE: NEGATIVE
POC BLOOD, URINE: ABNORMAL
POC COLOR, URINE: ABNORMAL
POC GLUCOSE, URINE: NEGATIVE MG/DL
POC KETONES, URINE: NEGATIVE MG/DL
POC LEUKOCYTES, URINE: ABNORMAL
POC NITRITE,URINE: POSITIVE
POC PH, URINE: 7.5 PH
POC PROTEIN, URINE: ABNORMAL MG/DL
POC SPECIFIC GRAVITY, URINE: 1.01
POC UROBILINOGEN, URINE: 0.2 EU/DL
RBC #/AREA URNS AUTO: >20 /HPF
WBC #/AREA URNS AUTO: ABNORMAL /HPF

## 2024-05-29 PROCEDURE — 1160F RVW MEDS BY RX/DR IN RCRD: CPT | Performed by: NURSE PRACTITIONER

## 2024-05-29 PROCEDURE — 81001 URINALYSIS AUTO W/SCOPE: CPT

## 2024-05-29 PROCEDURE — 81002 URINALYSIS NONAUTO W/O SCOPE: CPT | Performed by: NURSE PRACTITIONER

## 2024-05-29 PROCEDURE — 1126F AMNT PAIN NOTED NONE PRSNT: CPT | Performed by: NURSE PRACTITIONER

## 2024-05-29 PROCEDURE — 1124F ACP DISCUSS-NO DSCNMKR DOCD: CPT | Performed by: NURSE PRACTITIONER

## 2024-05-29 PROCEDURE — 1036F TOBACCO NON-USER: CPT | Performed by: NURSE PRACTITIONER

## 2024-05-29 PROCEDURE — 87086 URINE CULTURE/COLONY COUNT: CPT

## 2024-05-29 PROCEDURE — 1157F ADVNC CARE PLAN IN RCRD: CPT | Performed by: NURSE PRACTITIONER

## 2024-05-29 PROCEDURE — 99214 OFFICE O/P EST MOD 30 MIN: CPT | Performed by: NURSE PRACTITIONER

## 2024-05-29 PROCEDURE — 1159F MED LIST DOCD IN RCRD: CPT | Performed by: NURSE PRACTITIONER

## 2024-05-29 RX ORDER — BENZONATATE 200 MG/1
200 CAPSULE ORAL 3 TIMES DAILY PRN
Qty: 42 CAPSULE | Refills: 0 | Status: SHIPPED | OUTPATIENT
Start: 2024-05-29 | End: 2024-11-25

## 2024-05-29 RX ORDER — AMOXICILLIN AND CLAVULANATE POTASSIUM 875; 125 MG/1; MG/1
875 TABLET, FILM COATED ORAL 2 TIMES DAILY
Qty: 14 TABLET | Refills: 0 | Status: SHIPPED | OUTPATIENT
Start: 2024-05-29 | End: 2024-06-05

## 2024-05-29 RX ORDER — ASPIRIN 325 MG
TABLET ORAL EVERY 24 HOURS
COMMUNITY

## 2024-05-29 RX ORDER — TRIAMCINOLONE ACETONIDE 55 UG/1
SPRAY, METERED NASAL EVERY 24 HOURS
COMMUNITY

## 2024-05-29 RX ORDER — SULFAMETHOXAZOLE AND TRIMETHOPRIM 800; 160 MG/1; MG/1
1 TABLET ORAL 2 TIMES DAILY
Qty: 6 TABLET | Refills: 0 | Status: CANCELLED | OUTPATIENT
Start: 2024-05-29 | End: 2024-06-01

## 2024-05-29 RX ORDER — ERYTHROMYCIN 5 MG/G
OINTMENT OPHTHALMIC
COMMUNITY

## 2024-05-29 RX ORDER — ONDANSETRON 4 MG/1
TABLET, FILM COATED ORAL EVERY 24 HOURS
COMMUNITY

## 2024-05-29 ASSESSMENT — COPD QUESTIONNAIRES: COPD: 0

## 2024-05-29 ASSESSMENT — LIFESTYLE VARIABLES
HOW OFTEN DURING THE LAST YEAR HAVE YOU FOUND THAT YOU WERE NOT ABLE TO STOP DRINKING ONCE YOU HAD STARTED: NEVER
HOW OFTEN DURING THE LAST YEAR HAVE YOU NEEDED AN ALCOHOLIC DRINK FIRST THING IN THE MORNING TO GET YOURSELF GOING AFTER A NIGHT OF HEAVY DRINKING: NEVER
HOW OFTEN DURING THE LAST YEAR HAVE YOU HAD A FEELING OF GUILT OR REMORSE AFTER DRINKING: NEVER
SKIP TO QUESTIONS 9-10: 1
HAS A RELATIVE, FRIEND, DOCTOR, OR ANOTHER HEALTH PROFESSIONAL EXPRESSED CONCERN ABOUT YOUR DRINKING OR SUGGESTED YOU CUT DOWN: NO
HOW OFTEN DURING THE LAST YEAR HAVE YOU FAILED TO DO WHAT WAS NORMALLY EXPECTED FROM YOU BECAUSE OF DRINKING: NEVER
HOW OFTEN DO YOU HAVE SIX OR MORE DRINKS ON ONE OCCASION: NEVER
HAVE YOU OR SOMEONE ELSE BEEN INJURED AS A RESULT OF YOUR DRINKING: NO
HOW OFTEN DO YOU HAVE A DRINK CONTAINING ALCOHOL: MONTHLY OR LESS
AUDIT TOTAL SCORE: 1
HOW OFTEN DURING THE LAST YEAR HAVE YOU BEEN UNABLE TO REMEMBER WHAT HAPPENED THE NIGHT BEFORE BECAUSE YOU HAD BEEN DRINKING: NEVER
HOW MANY STANDARD DRINKS CONTAINING ALCOHOL DO YOU HAVE ON A TYPICAL DAY: 1 OR 2
AUDIT-C TOTAL SCORE: 1

## 2024-05-29 ASSESSMENT — ENCOUNTER SYMPTOMS
HEMATURIA: 1
HEADACHES: 0
RHINORRHEA: 1
OCCASIONAL FEELINGS OF UNSTEADINESS: 1
LOSS OF SENSATION IN FEET: 0
DEPRESSION: 0
FEVER: 0
CHILLS: 0
COUGH: 1
FREQUENCY: 1

## 2024-05-29 ASSESSMENT — PAIN SCALES - GENERAL: PAINLEVEL: 0-NO PAIN

## 2024-05-29 NOTE — TELEPHONE ENCOUNTER
"Patient's daughter called in stating patient had gone to the bathroom and her hemorrhoid is bleeding \"a lot\", and wanted you to be aware that urine in blood today at visit might have been from that.   "

## 2024-05-29 NOTE — PROGRESS NOTES
Subjective   Patient ID: Nanette Flynn is a 89 y.o. female who presents for UTI (Urine is bright red blood.).    Urinary Tract Infection  Patient complains of burning with urination and hematuria. She has had symptoms for 1 day. Patient also complains of cough. Patient denies fever. Patient does have a history of recurrent UTI. Patient does not have a history of pyelonephritis.    Cough  Patient complains of productive cough. Symptoms began 8 days ago. Symptoms have been gradually improving since that time.The cough is productive and is aggravated by nothing. Associated symptoms include: change in voice. Patient does not have new pets. Patient does not have a history of asthma. Patient does not have a history of environmental allergens. Patient has not traveled recently. Patient does not have a history of smoking. Patient has not had a previous chest x-ray. Patient has not had a PPD done.    Urine cultures obtained and sent.       UTI   This is a new problem. The current episode started today. The problem occurs every urination. The problem has been unchanged. The quality of the pain is described as burning. The pain is at a severity of 6/10. The pain is moderate. There has been no fever. She is Not sexually active. There is No history of pyelonephritis. Associated symptoms include frequency, hematuria and urgency. Pertinent negatives include no chills. She has tried nothing for the symptoms. The treatment provided no relief. Her past medical history is significant for recurrent UTIs.   Cough  This is a new problem. The current episode started 1 to 4 weeks ago. The problem has been gradually improving. The problem occurs every few minutes. The cough is Productive of purulent sputum. Associated symptoms include postnasal drip and rhinorrhea. Pertinent negatives include no chills, ear pain, fever, headaches or nasal congestion. Nothing aggravates the symptoms. She has tried OTC cough suppressant for the symptoms.  "The treatment provided moderate relief. There is no history of asthma, COPD or pneumonia.        Review of Systems   Constitutional:  Negative for chills and fever.   HENT:  Positive for postnasal drip and rhinorrhea. Negative for ear pain.    Respiratory:  Positive for cough.    Genitourinary:  Positive for frequency, hematuria and urgency.   Neurological:  Negative for headaches.       Objective   /72 (BP Location: Left arm, Patient Position: Sitting)   Pulse 76   Temp 36.7 °C (98 °F) (Temporal)   Ht 1.626 m (5' 4\")   Wt 78.5 kg (173 lb)   BMI 29.70 kg/m²     Physical Exam  Constitutional:       Appearance: She is normal weight.   HENT:      Head: Normocephalic.      Right Ear: Tympanic membrane and external ear normal.      Left Ear: Tympanic membrane and external ear normal.      Nose: Nose normal.      Mouth/Throat:      Mouth: Mucous membranes are moist.      Pharynx: Oropharynx is clear.   Eyes:      Pupils: Pupils are equal, round, and reactive to light.   Cardiovascular:      Rate and Rhythm: Normal rate and regular rhythm.      Pulses: Normal pulses.      Heart sounds: Normal heart sounds.   Pulmonary:      Effort: Pulmonary effort is normal.      Breath sounds: Normal breath sounds.   Abdominal:      General: Abdomen is flat. Bowel sounds are normal.   Musculoskeletal:         General: Normal range of motion.      Cervical back: Normal range of motion.   Skin:     General: Skin is warm.      Capillary Refill: Capillary refill takes less than 2 seconds.   Neurological:      General: No focal deficit present.      Mental Status: She is alert.   Psychiatric:         Mood and Affect: Mood normal.         Assessment/Plan   Problem List Items Addressed This Visit    None  Visit Diagnoses         Codes    Acute cough    -  Primary R05.1    Relevant Medications    benzonatate (Tessalon) 200 mg capsule    amoxicillin-pot clavulanate (Augmentin) 875-125 mg tablet    Urinary tract infection with " hematuria, site unspecified     N39.0, R31.9    Relevant Medications    amoxicillin-pot clavulanate (Augmentin) 875-125 mg tablet    Other Relevant Orders    POCT UA (nonautomated) manually resulted (Completed)    Urine Culture    Microscopic Only, Urine    Upper respiratory tract infection, unspecified type     J06.9    Relevant Medications    benzonatate (Tessalon) 200 mg capsule    amoxicillin-pot clavulanate (Augmentin) 875-125 mg tablet          Increase hydration. Increase fluids continue with cranberry juice.   Will notify you when receive urine culture.  Make sure to drink plenty of fluids  Wipe from front to back   Avoid fragranced shower gels and soaps    Start Augmentin, 2 times daily.  For 7 days.  Take until finished even if you feel better before then.   Call for worsening or not improving symptoms  Start Tessalon Pearles every 8 hours as needed for cough

## 2024-05-29 NOTE — PATIENT INSTRUCTIONS
Increase hydration. Increase fluids continue with cranberry juice.   Will notify you when receive urine culture.  Make sure to drink plenty of fluids  Wipe from front to back   Avoid fragranced shower gels and soaps    Start Augmentin 2 times daily.  For 7 days.  Take until finished even if you feel better before then.   Call for worsening or not improving symptoms  Start Tessalon Pearles every 8 hours as needed for cough       83 none

## 2024-05-30 ENCOUNTER — PATIENT OUTREACH (OUTPATIENT)
Dept: PRIMARY CARE | Facility: CLINIC | Age: 89
End: 2024-05-30
Payer: MEDICARE

## 2024-05-30 NOTE — PROGRESS NOTES
Patient has met target of no readmission for (90) days post  rehab  discharge and is graduated from Transitional Care Management program at this time.     Encouraged [facility] to call providers for any questions concerns or change in condition

## 2024-05-30 NOTE — PROGRESS NOTES
Primary Care Physician: Salud Walker MD   Date of Visit: 04/05/2024 11:00 AM EDT  Type of Visit: Follow-up    Chief Complaint:  No chief complaint on file.       HPI  Nanette Flynn 89 y.o. female who presents for follow-up.    She was admitted from 2/22/2024 to 2/26/2024 with concern for cardiac arrest versus syncope with placement of Holter monitor.    She is doing well with no further episodes of syncope.    Review of Systems   12 point review of systems was obtained in detail and is negative other than that noted above or below.     Past Medical/Surgical History:  Nanette has a past medical history of Other specified postprocedural states, Personal history of other diseases of the circulatory system, Personal history of other diseases of the circulatory system, and Personal history of other medical treatment.    Nanette has a past surgical history that includes Hysterectomy (08/20/2018); Appendectomy (08/20/2018); Other surgical history (08/20/2018); CT angio head w and wo IV contrast (9/24/2013); and CT angio neck (9/24/2013).    Social/Family History:  aNnette reports that she has quit smoking. Her smoking use included cigarettes. She started smoking about 70 years ago. She has never used smokeless tobacco. She reports that she does not currently use alcohol. She reports that she does not use drugs.    Nanette's family history includes Cancer in her maternal grandmother; Colon cancer in her maternal grandmother; Heart disease in her father and mother; Hypertension in her mother; Transient ischemic attack in her mother.    Allergies:  Allergies   Allergen Reactions    Gabapentin Confusion     FELL ASLEEP WITH ONE DOSE AND NO RECALL OF THAT DAY        Medications:  Current Outpatient Medications on File Prior to Visit   Medication Sig    apixaban (Eliquis) 5 mg tablet Take 1 tablet (5 mg) by mouth 2 times a day. (Patient taking differently: Take 0.5 tablets (2.5 mg) by mouth 2 times a  day.)    Ca carb-Ca gluc-Mg ox-Mg gluco 500 mg calcium -250 mg tablet Take 1 tablet by mouth 2 times a day.    cholecalciferol (Vitamin D-3) 5,000 Units tablet Take 1 tablet (5,000 Units) by mouth once daily.    CYANOCOBALAMIN, VITAMIN B-12, ORAL Take 1 tablet by mouth once daily.    levothyroxine (Synthroid, Levoxyl) 75 mcg tablet Take 1 tablet (75 mcg) by mouth once daily. (Patient taking differently: Take 1 tablet (75 mcg) by mouth once a day on Monday, Tuesday, Wednesday, Thursday, Friday, and Saturday. No synthroid on sunday)    lisinopril 5 mg tablet Take 1 tablet (5 mg) by mouth once daily.    travoprost (Travatan Z) 0.004 % drops ophthalmic solution Administer 1 drop into both eyes once daily at bedtime.    vit A/vit C/vit E/zinc/copper (PRESERVISION AREDS ORAL) Take by mouth.     No current facility-administered medications on file prior to visit.       Objective:   Vitals:    04/05/24 1106   BP: 148/71   Pulse: 83   SpO2: 97%       S1-S2 normal, regular rate and rhythm, no murmurs, rubs, or gallops  Clear to auscultation bilaterally    Labs and Imaging:      Latest Ref Rng & Units 2/9/2024    10:13 AM 2/12/2024     5:56 AM 2/14/2024     5:57 AM 2/22/2024    10:27 AM 2/23/2024     7:33 AM 2/24/2024     8:22 AM 2/25/2024     6:32 AM   Electrolytes   Na 136 - 145 mmol/L 136  139  139  138  137  137  139    K 3.5 - 5.3 mmol/L 3.9  3.5  3.1  4.1  4.0  4.2  4.2    Cl 98 - 107 mmol/L 99  101  102  100  103  102  103    CO2 21 - 32 mmol/L 27  27  27  29  25  26  28    Cr 0.50 - 1.05 mg/dL 0.72  0.67  0.64  0.90  0.73  0.79  0.86    Ca 8.6 - 10.3 mg/dL 9.3  8.7  8.5  9.4  9.0  9.1  9.2    Phos 2.5 - 4.9 mg/dL     3.4   3.7          Latest Ref Rng & Units 2/11/2024    12:47 AM 2/11/2024     9:54 AM 2/14/2024     5:57 AM 2/22/2024    10:27 AM 2/23/2024     7:33 AM 2/24/2024     8:22 AM 2/25/2024     6:32 AM   CBC   Hb 12.0 - 16.0 g/dL 11.5  12.2  11.9  12.1  11.8  12.9  12.8    Hct 36.0 - 46.0 % 34.9  37.7  35.8   "37.7  37.3  40.4  40.3    MCV 80 - 100 fL 96  97  96  97  100  99  99    RDW 11.5 - 14.5 % 13.8  13.6  13.6  14.1  14.2  14.1  14.2          Latest Ref Rng & Units 7/25/2023     4:00 PM 1/25/2024     4:03 PM 2/9/2024    10:13 AM 2/22/2024    10:27 AM 2/23/2024     7:33 AM 2/24/2024     8:22 AM 2/25/2024     6:32 AM   Lipids   Chol 133 - 200 MG/          HDL >50 MG/DL 50          LDL 65 - 130 MG/DL 92          Trig 40 - 150 MG/          ALT 7 - 45 U/L 14  22  13  38  31  29  26    AST 9 - 39 U/L 23  25  23  36  26  23  20          Latest Ref Rng & Units 7/27/2020     1:19 PM 5/25/2021     2:20 PM 9/13/2022    12:13 PM 7/25/2023     4:00 PM 1/25/2024     4:03 PM 2/12/2024     5:56 AM 2/23/2024     7:33 AM   Thyroid   T4 Free 0.61 - 1.12 ng/dL    1.2  0.94  0.92     TSH 0.44 - 3.98 mIU/L 1.77  2.91  3.79  4.74  1.64  4.54  2.62           No data to display                 No results found for: \"HGBA1C\", \"ALBUR\"     The ASCVD Risk score (Desiree DK, et al., 2019) failed to calculate for the following reasons:    The 2019 ASCVD risk score is only valid for ages 40 to 79     Echocardiogram: No results found for this or any previous visit.     Echocardiogram: No results found for this or any previous visit from the past 1825 days.    Stress Testing: No results found for this or any previous visit from the past 1825 days.    Cardiac Catheterization: No results found for this or any previous visit from the past 1825 days.    Cardiac Scoring: No results found for this or any previous visit from the past 1825 days.    AAA : No results found for this or any previous visit from the past 1825 days.    OTHER: No results found for this or any previous visit from the past 1825 days.        Assessment/Plan:   No diagnosis found.     Nanette Flynn 89 y.o. female who presents for follow-up:    1.  Syncope versus cardiac arrest    Unclear what exactly happened to her prior to her hospitalization but it appears as if she " was briefly unresponsive with slow heart rates and a nonpalpable pulse, CPR was initiated with return of spontaneous circulation of under 2 minutes after which the patient was awake, alert, and oriented.  She had echocardiogram with structurally normal heart with preserved left ventricular systolic function.  Tacky or bradycardia arrhythmias were noted on telemetry in the hospital and her outpatient monitor also does not demonstrate any concerning tacky or bradycardia arrhythmias.    Note, she has a history of subsegmental pulmonary embolism without any previous evidence of right heart strain none noted on the echocardiogram as well-this was not thought to be the etiology of syncope either.    I do not think any further workup is warranted at this time; she has a structurally normal heart without any evidence of significant arrhythmias.  Will follow her longitudinally.     ____________________________________________________________  Steve Gordon MD

## 2024-05-31 LAB — BACTERIA UR CULT: NORMAL

## 2024-06-04 ENCOUNTER — APPOINTMENT (OUTPATIENT)
Dept: GASTROENTEROLOGY | Facility: CLINIC | Age: 89
End: 2024-06-04
Payer: MEDICARE

## 2024-07-25 ENCOUNTER — APPOINTMENT (OUTPATIENT)
Dept: PRIMARY CARE | Facility: CLINIC | Age: 89
End: 2024-07-25
Payer: MEDICARE

## 2024-08-01 ENCOUNTER — APPOINTMENT (OUTPATIENT)
Dept: PRIMARY CARE | Facility: CLINIC | Age: 89
End: 2024-08-01
Payer: MEDICARE

## 2024-08-01 VITALS
TEMPERATURE: 98.1 F | WEIGHT: 168.8 LBS | OXYGEN SATURATION: 97 % | HEIGHT: 64 IN | BODY MASS INDEX: 28.82 KG/M2 | HEART RATE: 82 BPM | SYSTOLIC BLOOD PRESSURE: 130 MMHG | DIASTOLIC BLOOD PRESSURE: 60 MMHG

## 2024-08-01 DIAGNOSIS — R53.82 CHRONIC FATIGUE: ICD-10-CM

## 2024-08-01 DIAGNOSIS — R05.3 CHRONIC COUGH: Primary | ICD-10-CM

## 2024-08-01 DIAGNOSIS — I26.94 MULTIPLE SUBSEGMENTAL PULMONARY EMBOLI WITHOUT ACUTE COR PULMONALE (MULTI): ICD-10-CM

## 2024-08-01 DIAGNOSIS — E03.9 ACQUIRED HYPOTHYROIDISM: ICD-10-CM

## 2024-08-01 LAB
ALBUMIN SERPL BCP-MCNC: 4.2 G/DL (ref 3.4–5)
ALP SERPL-CCNC: 53 U/L (ref 33–136)
ALT SERPL W P-5'-P-CCNC: 14 U/L (ref 7–45)
ANION GAP SERPL CALC-SCNC: 14 MMOL/L (ref 10–20)
AST SERPL W P-5'-P-CCNC: 21 U/L (ref 9–39)
BASOPHILS # BLD AUTO: 0.07 X10*3/UL (ref 0–0.1)
BASOPHILS NFR BLD AUTO: 0.7 %
BILIRUB SERPL-MCNC: 0.5 MG/DL (ref 0–1.2)
BUN SERPL-MCNC: 21 MG/DL (ref 6–23)
CALCIUM SERPL-MCNC: 9.7 MG/DL (ref 8.6–10.3)
CHLORIDE SERPL-SCNC: 100 MMOL/L (ref 98–107)
CO2 SERPL-SCNC: 29 MMOL/L (ref 21–32)
CREAT SERPL-MCNC: 1.26 MG/DL (ref 0.5–1.05)
EGFRCR SERPLBLD CKD-EPI 2021: 41 ML/MIN/1.73M*2
EOSINOPHIL # BLD AUTO: 0.34 X10*3/UL (ref 0–0.4)
EOSINOPHIL NFR BLD AUTO: 3.3 %
ERYTHROCYTE [DISTWIDTH] IN BLOOD BY AUTOMATED COUNT: 12.9 % (ref 11.5–14.5)
GLUCOSE SERPL-MCNC: 94 MG/DL (ref 74–99)
HCT VFR BLD AUTO: 39.2 % (ref 36–46)
HGB BLD-MCNC: 12.5 G/DL (ref 12–16)
IMM GRANULOCYTES # BLD AUTO: 0.05 X10*3/UL (ref 0–0.5)
IMM GRANULOCYTES NFR BLD AUTO: 0.5 % (ref 0–0.9)
LYMPHOCYTES # BLD AUTO: 3.91 X10*3/UL (ref 0.8–3)
LYMPHOCYTES NFR BLD AUTO: 38.1 %
MCH RBC QN AUTO: 31.3 PG (ref 26–34)
MCHC RBC AUTO-ENTMCNC: 31.9 G/DL (ref 32–36)
MCV RBC AUTO: 98 FL (ref 80–100)
MONOCYTES # BLD AUTO: 1 X10*3/UL (ref 0.05–0.8)
MONOCYTES NFR BLD AUTO: 9.7 %
NEUTROPHILS # BLD AUTO: 4.89 X10*3/UL (ref 1.6–5.5)
NEUTROPHILS NFR BLD AUTO: 47.7 %
NRBC BLD-RTO: 0 /100 WBCS (ref 0–0)
PLATELET # BLD AUTO: 253 X10*3/UL (ref 150–450)
POTASSIUM SERPL-SCNC: 4.9 MMOL/L (ref 3.5–5.3)
PROT SERPL-MCNC: 7.2 G/DL (ref 6.4–8.2)
RBC # BLD AUTO: 4 X10*6/UL (ref 4–5.2)
SODIUM SERPL-SCNC: 138 MMOL/L (ref 136–145)
T4 FREE SERPL-MCNC: 0.98 NG/DL (ref 0.61–1.12)
TSH SERPL-ACNC: 2.87 MIU/L (ref 0.44–3.98)
WBC # BLD AUTO: 10.3 X10*3/UL (ref 4.4–11.3)

## 2024-08-01 PROCEDURE — 1036F TOBACCO NON-USER: CPT | Performed by: FAMILY MEDICINE

## 2024-08-01 PROCEDURE — 84439 ASSAY OF FREE THYROXINE: CPT

## 2024-08-01 PROCEDURE — 85025 COMPLETE CBC W/AUTO DIFF WBC: CPT

## 2024-08-01 PROCEDURE — 1159F MED LIST DOCD IN RCRD: CPT | Performed by: FAMILY MEDICINE

## 2024-08-01 PROCEDURE — 36415 COLL VENOUS BLD VENIPUNCTURE: CPT

## 2024-08-01 PROCEDURE — 80053 COMPREHEN METABOLIC PANEL: CPT

## 2024-08-01 PROCEDURE — 1126F AMNT PAIN NOTED NONE PRSNT: CPT | Performed by: FAMILY MEDICINE

## 2024-08-01 PROCEDURE — 1157F ADVNC CARE PLAN IN RCRD: CPT | Performed by: FAMILY MEDICINE

## 2024-08-01 PROCEDURE — 84443 ASSAY THYROID STIM HORMONE: CPT

## 2024-08-01 PROCEDURE — 1124F ACP DISCUSS-NO DSCNMKR DOCD: CPT | Performed by: FAMILY MEDICINE

## 2024-08-01 PROCEDURE — 99214 OFFICE O/P EST MOD 30 MIN: CPT | Performed by: FAMILY MEDICINE

## 2024-08-01 ASSESSMENT — ENCOUNTER SYMPTOMS
FEVER: 0
COUGH: 1
SORE THROAT: 0
DYSURIA: 0
SINUS PAIN: 0
ABDOMINAL PAIN: 0
FATIGUE: 0
DIARRHEA: 1
CONSTIPATION: 0
VOMITING: 0
CHILLS: 0
SHORTNESS OF BREATH: 0
HEADACHES: 0
NAUSEA: 0
SINUS PRESSURE: 0

## 2024-08-01 ASSESSMENT — PATIENT HEALTH QUESTIONNAIRE - PHQ9
2. FEELING DOWN, DEPRESSED OR HOPELESS: NOT AT ALL
1. LITTLE INTEREST OR PLEASURE IN DOING THINGS: NOT AT ALL
SUM OF ALL RESPONSES TO PHQ9 QUESTIONS 1 AND 2: 0

## 2024-08-01 ASSESSMENT — LIFESTYLE VARIABLES
HOW OFTEN DO YOU HAVE A DRINK CONTAINING ALCOHOL: NEVER
HOW OFTEN DO YOU HAVE A DRINK CONTAINING ALCOHOL: NEVER

## 2024-08-01 ASSESSMENT — PAIN SCALES - GENERAL: PAINLEVEL: 0-NO PAIN

## 2024-08-01 NOTE — PROGRESS NOTES
"Subjective   Patient ID: Nanette Flynn is a 89 y.o. female who presents for Follow-up.    HPI still coughing but better, dry. No shortness of breath, no chest pain. On ace inhibitor.     Review of Systems   Constitutional:  Negative for chills, fatigue and fever.   HENT:  Positive for congestion. Negative for sinus pressure, sinus pain and sore throat.    Eyes:  Negative for visual disturbance.   Respiratory:  Positive for cough. Negative for shortness of breath.    Cardiovascular:  Negative for chest pain.   Gastrointestinal:  Positive for diarrhea. Negative for abdominal pain, constipation, nausea and vomiting.   Genitourinary:  Negative for dysuria.   Neurological:  Negative for headaches.       Objective   /60   Pulse 82   Temp 36.7 °C (98.1 °F)   Ht 1.626 m (5' 4\")   Wt 76.6 kg (168 lb 12.8 oz)   SpO2 97%   BMI 28.97 kg/m²     Physical Exam  Constitutional:       General: She is not in acute distress.     Appearance: Normal appearance.   HENT:      Head: Normocephalic.      Right Ear: Tympanic membrane normal.      Left Ear: Tympanic membrane normal.      Nose: Congestion present.      Mouth/Throat:      Mouth: Mucous membranes are moist.      Pharynx: Posterior oropharyngeal erythema present.   Eyes:      Extraocular Movements: Extraocular movements intact.      Pupils: Pupils are equal, round, and reactive to light.   Cardiovascular:      Rate and Rhythm: Normal rate and regular rhythm.      Heart sounds: Normal heart sounds.   Pulmonary:      Effort: Pulmonary effort is normal.      Breath sounds: Normal breath sounds. No wheezing or rhonchi.   Abdominal:      General: There is no distension.      Palpations: Abdomen is soft.      Tenderness: There is no abdominal tenderness.   Musculoskeletal:      Right lower leg: No edema.      Left lower leg: No edema.   Lymphadenopathy:      Cervical: No cervical adenopathy.   Skin:     Coloration: Skin is not jaundiced.   Neurological:      General: No " focal deficit present.      Mental Status: She is alert.      Cranial Nerves: No cranial nerve deficit.   Psychiatric:         Mood and Affect: Mood normal.         Assessment/Plan   Problem List Items Addressed This Visit             ICD-10-CM    Chronic fatigue R53.82    Relevant Orders    CBC and Auto Differential    Comprehensive Metabolic Panel    Multiple subsegmental pulmonary emboli without acute cor pulmonale (Multi) I26.94     Other Visit Diagnoses         Codes    Chronic cough    -  Primary R05.3    Acquired hypothyroidism     E03.9    Relevant Orders    Thyroid Stimulating Hormone    Thyroxine, Free        Ask if losartan would be ok to change for lisinopril due to cough-ace?  Also try otc allergy meds daily for next couple days to see if post nasal drip related

## 2024-08-05 ENCOUNTER — TELEPHONE (OUTPATIENT)
Dept: PRIMARY CARE | Facility: CLINIC | Age: 89
End: 2024-08-05
Payer: MEDICARE

## 2024-08-05 DIAGNOSIS — I26.94 MULTIPLE SUBSEGMENTAL PULMONARY EMBOLI WITHOUT ACUTE COR PULMONALE (MULTI): Primary | ICD-10-CM

## 2024-08-14 ENCOUNTER — TELEMEDICINE (OUTPATIENT)
Dept: CARDIOLOGY | Facility: HOSPITAL | Age: 89
End: 2024-08-14
Payer: MEDICARE

## 2024-08-14 DIAGNOSIS — I26.94 MULTIPLE SUBSEGMENTAL PULMONARY EMBOLI WITHOUT ACUTE COR PULMONALE (MULTI): Primary | ICD-10-CM

## 2024-08-14 PROCEDURE — 1036F TOBACCO NON-USER: CPT | Performed by: INTERNAL MEDICINE

## 2024-08-14 PROCEDURE — 99213 OFFICE O/P EST LOW 20 MIN: CPT | Performed by: INTERNAL MEDICINE

## 2024-08-14 PROCEDURE — 1159F MED LIST DOCD IN RCRD: CPT | Performed by: INTERNAL MEDICINE

## 2024-08-14 PROCEDURE — 1157F ADVNC CARE PLAN IN RCRD: CPT | Performed by: INTERNAL MEDICINE

## 2024-08-14 PROCEDURE — 1160F RVW MEDS BY RX/DR IN RCRD: CPT | Performed by: INTERNAL MEDICINE

## 2024-08-14 NOTE — PROGRESS NOTES
History of Present Illness:  Nanette Flynn is a/an 89 y.o. woman with recent pulmonary embolism diagnosis. She was hospitalized from 2/9/2024-2/14/2024 with pleuritic chest pain, found to have bilateral PE with no RV strain. Venous duplex was negative for DVT. She is following up after about six months on anticoagulation.     She was treated with unfractionated heparin initially then transitioned to apixaban. She had a subsequent readmission later in February after she suffered possible cardiac arrest in nursing facility (breathing and had a pulse when brought to ED, but reportedly had chest compressions at the facility).     She had hemorrhoidal bleeding in April and was seen in the ED. Someone told her to reduce her apixaban to 2.5 mg bid.     She has no prior personal of VTE. Daughter had a blood clot and is on warfarin.     Had injections of her varicose veins sometime in 2023 with Dr. Mohseni.     Has been pretty healthy. Denies significant immobility, trauma, surgery, hospitalization, or medical illness (including COVID) in the preceding three months.     She denies bleeding including epistaxis, gingival bleeding, hemoptysis, hematemesis, hematochezia, melena, hematuria, and vaginal bleeding.      Past Medical History:   Diagnosis Date    Arthritis     Disease of thyroid gland     Glaucoma     Hypertension     Other specified postprocedural states     History of colonoscopy    Personal history of other diseases of the circulatory system     History of rheumatic fever    Personal history of other diseases of the circulatory system     History of rheumatic fever    Personal history of other medical treatment     History of mammogram     Past Surgical History:   Procedure Laterality Date    APPENDECTOMY  08/20/2018    Appendectomy    COLON SURGERY      CT ANGIO NECK  09/24/2013    CT NECK ANGIO W AND WO IV CONTRAST [facility]    CT HEAD ANGIO W AND WO IV CONTRAST  09/24/2013    CT HEAD  ANGIO W AND WO IV CONTRAST 9/24/2013 [facility]    HYSTERECTOMY  08/20/2018    Hysterectomy    OTHER SURGICAL HISTORY  08/20/2018    Enterectomy     Social History     Tobacco Use    Smoking status: Former     Types: Cigarettes     Start date: [birth year]1/1953    Smokeless tobacco: Never   Vaping Use    Vaping status: Never Used   Substance Use Topics    Alcohol use: Not Currently    Drug use: Not Currently     Family History   Problem Relation Name Age of Onset    Heart disease Mother Leanna     Hypertension Mother Leanna     Transient ischemic attack Mother Leanna     Heart disease Father Richard     Cancer Maternal Grandmother Maternal grandmother     Colon cancer Maternal Grandmother Maternal grandmother      Current Outpatient Medications   Medication Sig Dispense Refill    apixaban (Eliquis) 2.5 mg tablet Take 1 tablet (2.5 mg) by mouth 2 times a day. 60 tablet 11    Ca carb-Ca gluc-Mg ox-Mg gluco 500 mg calcium -250 mg tablet Take 1 tablet by mouth 2 times a day.      cholecalciferol (Vitamin D-3) 5,000 Units tablet Take 1 tablet (5,000 Units) by mouth once daily.      CYANOCOBALAMIN, VITAMIN B-12, ORAL Take 1 tablet by mouth once daily.      levothyroxine (Synthroid, Levoxyl) 75 mcg tablet Take 1 tablet (75 mcg) by mouth once daily. (Patient taking differently: Take 1 tablet (75 mcg) by mouth once a day on Monday, Tuesday, Wednesday, Thursday, Friday, and Saturday. No synthroid on sunday) 90 tablet 3    lisinopril 5 mg tablet Take 1 tablet (5 mg) by mouth once daily. 90 tablet 3    ondansetron (Zofran) 4 mg tablet Take by mouth once every 24 hours.      travoprost (Travatan Z) 0.004 % drops ophthalmic solution Administer 1 drop into both eyes once daily at bedtime.      triamcinolone (Nasacort) 55 mcg nasal inhaler Administer into affected nostril(s) once every 24 hours.      vit A/vit C/vit E/zinc/copper (PRESERVISION AREDS ORAL) Take by mouth.      hydrocortisone (Anusol-HC) 2.5 % rectal cream Insert 1  Application into the rectum 2 times a day. Apply rectally 2 times daily 6 g 0     No current facility-administered medications for this visit.       Physical Examination:  NA    Pertinent Labs:    Pertinent Imaging:  CTA chest 2/9/2024 (personally reviewed)  IMPRESSION:  CHEST:  1.  Bilateral pulmonary embolism. There are emboli in the proximal  segmental arteries to the right middle lobe right lower lobe in left  lower lobe. No emboli in the main pulmonary outflow tract or right or  left main pulmonary arteries. No CT evidence for right heart strain.  2. No acute parenchymal disease.  3. Scattered enlarged mediastinal lymph nodes        Venous duplex ultrasound 2/9/2024 (personally reviewed)   CONCLUSIONS:  Right Lower Venous: No evidence of acute deep vein thrombus visualized in the right lower extremity. Additional Findings; Lymph nodes in groin measuring 0.9 cm x 0.5 cm.  Left Lower Venous: No evidence of acute deep vein thrombus visualized in the left lower extremity.    Diagnoses and all orders for this visit:  Multiple subsegmental pulmonary emboli without acute cor pulmonale (Multi) (Primary)  Continue indefinite low-dose apixaban    Follow up in 6 months.    Blanca Quiroz MD, MS

## 2024-09-14 DIAGNOSIS — E07.9 THYROID DISEASE: Primary | ICD-10-CM

## 2024-09-16 PROBLEM — M79.671 RIGHT FOOT PAIN: Status: RESOLVED | Noted: 2024-01-10 | Resolved: 2024-09-16

## 2024-09-16 RX ORDER — LEVOTHYROXINE SODIUM 75 UG/1
75 TABLET ORAL DAILY
Qty: 90 TABLET | Refills: 0 | Status: SHIPPED | OUTPATIENT
Start: 2024-09-16

## 2024-09-16 RX ORDER — ERYTHROMYCIN 5 MG/G
OINTMENT OPHTHALMIC
COMMUNITY
Start: 2024-09-06

## 2024-11-07 ENCOUNTER — TELEMEDICINE (OUTPATIENT)
Dept: PRIMARY CARE | Facility: CLINIC | Age: 89
End: 2024-11-07
Payer: MEDICARE

## 2024-11-07 DIAGNOSIS — L03.119 CELLULITIS OF LOWER EXTREMITY, UNSPECIFIED LATERALITY: Primary | ICD-10-CM

## 2024-11-07 PROCEDURE — 99214 OFFICE O/P EST MOD 30 MIN: CPT | Performed by: NURSE PRACTITIONER

## 2024-11-07 PROCEDURE — 1157F ADVNC CARE PLAN IN RCRD: CPT | Performed by: NURSE PRACTITIONER

## 2024-11-08 ENCOUNTER — APPOINTMENT (OUTPATIENT)
Dept: RADIOLOGY | Facility: HOSPITAL | Age: 89
DRG: 603 | End: 2024-11-08
Payer: MEDICARE

## 2024-11-08 ENCOUNTER — HOSPITAL ENCOUNTER (INPATIENT)
Facility: HOSPITAL | Age: 89
LOS: 1 days | Discharge: HOME | End: 2024-11-10
Attending: STUDENT IN AN ORGANIZED HEALTH CARE EDUCATION/TRAINING PROGRAM | Admitting: FAMILY MEDICINE
Payer: MEDICARE

## 2024-11-08 DIAGNOSIS — M79.89 LEG SWELLING: ICD-10-CM

## 2024-11-08 DIAGNOSIS — L03.119 CELLULITIS OF LOWER EXTREMITY, UNSPECIFIED LATERALITY: ICD-10-CM

## 2024-11-08 DIAGNOSIS — I87.8 VENOUS STASIS: ICD-10-CM

## 2024-11-08 DIAGNOSIS — L03.90 CELLULITIS, UNSPECIFIED CELLULITIS SITE: Primary | ICD-10-CM

## 2024-11-08 DIAGNOSIS — L29.9 ITCHING: ICD-10-CM

## 2024-11-08 PROBLEM — H35.3130 BILATERAL NONEXUDATIVE AGE-RELATED MACULAR DEGENERATION: Status: RESOLVED | Noted: 2022-10-03 | Resolved: 2024-11-08

## 2024-11-08 PROBLEM — R41.82 ALTERED MENTAL STATUS: Status: RESOLVED | Noted: 2024-03-27 | Resolved: 2024-11-08

## 2024-11-08 PROBLEM — R00.1 BRADYCARDIA: Status: RESOLVED | Noted: 2024-03-27 | Resolved: 2024-11-08

## 2024-11-08 LAB
ALBUMIN SERPL BCP-MCNC: 4.1 G/DL (ref 3.4–5)
ALP SERPL-CCNC: 49 U/L (ref 33–136)
ALT SERPL W P-5'-P-CCNC: 12 U/L (ref 7–45)
ANION GAP SERPL CALC-SCNC: 13 MMOL/L (ref 10–20)
APPEARANCE UR: CLEAR
AST SERPL W P-5'-P-CCNC: 19 U/L (ref 9–39)
BASOPHILS # BLD AUTO: 0.03 X10*3/UL (ref 0–0.1)
BASOPHILS NFR BLD AUTO: 0.3 %
BILIRUB SERPL-MCNC: 0.5 MG/DL (ref 0–1.2)
BILIRUB UR STRIP.AUTO-MCNC: NEGATIVE MG/DL
BNP SERPL-MCNC: 172 PG/ML (ref 0–99)
BUN SERPL-MCNC: 21 MG/DL (ref 6–23)
CALCIUM SERPL-MCNC: 9.3 MG/DL (ref 8.6–10.3)
CHLORIDE SERPL-SCNC: 101 MMOL/L (ref 98–107)
CO2 SERPL-SCNC: 27 MMOL/L (ref 21–32)
COLOR UR: COLORLESS
CREAT SERPL-MCNC: 1 MG/DL (ref 0.5–1.05)
EGFRCR SERPLBLD CKD-EPI 2021: 54 ML/MIN/1.73M*2
EOSINOPHIL # BLD AUTO: 0.25 X10*3/UL (ref 0–0.4)
EOSINOPHIL NFR BLD AUTO: 2.8 %
ERYTHROCYTE [DISTWIDTH] IN BLOOD BY AUTOMATED COUNT: 12.7 % (ref 11.5–14.5)
GLUCOSE SERPL-MCNC: 90 MG/DL (ref 74–99)
GLUCOSE UR STRIP.AUTO-MCNC: NORMAL MG/DL
HCT VFR BLD AUTO: 35.3 % (ref 36–46)
HGB BLD-MCNC: 11.6 G/DL (ref 12–16)
IMM GRANULOCYTES # BLD AUTO: 0.05 X10*3/UL (ref 0–0.5)
IMM GRANULOCYTES NFR BLD AUTO: 0.6 % (ref 0–0.9)
KETONES UR STRIP.AUTO-MCNC: NEGATIVE MG/DL
LACTATE SERPL-SCNC: 0.9 MMOL/L (ref 0.4–2)
LEUKOCYTE ESTERASE UR QL STRIP.AUTO: NEGATIVE
LIPASE SERPL-CCNC: 44 U/L (ref 9–82)
LYMPHOCYTES # BLD AUTO: 2.99 X10*3/UL (ref 0.8–3)
LYMPHOCYTES NFR BLD AUTO: 34.1 %
MAGNESIUM SERPL-MCNC: 2.06 MG/DL (ref 1.6–2.4)
MCH RBC QN AUTO: 32 PG (ref 26–34)
MCHC RBC AUTO-ENTMCNC: 32.9 G/DL (ref 32–36)
MCV RBC AUTO: 97 FL (ref 80–100)
MONOCYTES # BLD AUTO: 0.87 X10*3/UL (ref 0.05–0.8)
MONOCYTES NFR BLD AUTO: 9.9 %
NEUTROPHILS # BLD AUTO: 4.59 X10*3/UL (ref 1.6–5.5)
NEUTROPHILS NFR BLD AUTO: 52.3 %
NITRITE UR QL STRIP.AUTO: NEGATIVE
NRBC BLD-RTO: 0 /100 WBCS (ref 0–0)
PH UR STRIP.AUTO: 7 [PH]
PLATELET # BLD AUTO: 230 X10*3/UL (ref 150–450)
POTASSIUM SERPL-SCNC: 4.5 MMOL/L (ref 3.5–5.3)
PROT SERPL-MCNC: 7.6 G/DL (ref 6.4–8.2)
PROT UR STRIP.AUTO-MCNC: NEGATIVE MG/DL
RBC # BLD AUTO: 3.63 X10*6/UL (ref 4–5.2)
RBC # UR STRIP.AUTO: ABNORMAL /UL
RBC #/AREA URNS AUTO: NORMAL /HPF
SODIUM SERPL-SCNC: 136 MMOL/L (ref 136–145)
SP GR UR STRIP.AUTO: 1
SQUAMOUS #/AREA URNS AUTO: NORMAL /HPF
UROBILINOGEN UR STRIP.AUTO-MCNC: NORMAL MG/DL
WBC # BLD AUTO: 8.8 X10*3/UL (ref 4.4–11.3)
WBC #/AREA URNS AUTO: NORMAL /HPF

## 2024-11-08 PROCEDURE — 87040 BLOOD CULTURE FOR BACTERIA: CPT | Mod: GEALAB | Performed by: STUDENT IN AN ORGANIZED HEALTH CARE EDUCATION/TRAINING PROGRAM

## 2024-11-08 PROCEDURE — G0378 HOSPITAL OBSERVATION PER HR: HCPCS

## 2024-11-08 PROCEDURE — 85025 COMPLETE CBC W/AUTO DIFF WBC: CPT | Performed by: STUDENT IN AN ORGANIZED HEALTH CARE EDUCATION/TRAINING PROGRAM

## 2024-11-08 PROCEDURE — 93970 EXTREMITY STUDY: CPT

## 2024-11-08 PROCEDURE — 96367 TX/PROPH/DG ADDL SEQ IV INF: CPT

## 2024-11-08 PROCEDURE — 96366 THER/PROPH/DIAG IV INF ADDON: CPT

## 2024-11-08 PROCEDURE — 83735 ASSAY OF MAGNESIUM: CPT | Performed by: STUDENT IN AN ORGANIZED HEALTH CARE EDUCATION/TRAINING PROGRAM

## 2024-11-08 PROCEDURE — 2500000001 HC RX 250 WO HCPCS SELF ADMINISTERED DRUGS (ALT 637 FOR MEDICARE OP): Performed by: FAMILY MEDICINE

## 2024-11-08 PROCEDURE — 36415 COLL VENOUS BLD VENIPUNCTURE: CPT | Performed by: STUDENT IN AN ORGANIZED HEALTH CARE EDUCATION/TRAINING PROGRAM

## 2024-11-08 PROCEDURE — 2500000004 HC RX 250 GENERAL PHARMACY W/ HCPCS (ALT 636 FOR OP/ED): Performed by: FAMILY MEDICINE

## 2024-11-08 PROCEDURE — 99285 EMERGENCY DEPT VISIT HI MDM: CPT | Mod: 25

## 2024-11-08 PROCEDURE — 93971 EXTREMITY STUDY: CPT | Performed by: RADIOLOGY

## 2024-11-08 PROCEDURE — 83690 ASSAY OF LIPASE: CPT | Performed by: STUDENT IN AN ORGANIZED HEALTH CARE EDUCATION/TRAINING PROGRAM

## 2024-11-08 PROCEDURE — 83605 ASSAY OF LACTIC ACID: CPT | Performed by: STUDENT IN AN ORGANIZED HEALTH CARE EDUCATION/TRAINING PROGRAM

## 2024-11-08 PROCEDURE — 80053 COMPREHEN METABOLIC PANEL: CPT | Performed by: STUDENT IN AN ORGANIZED HEALTH CARE EDUCATION/TRAINING PROGRAM

## 2024-11-08 PROCEDURE — 99222 1ST HOSP IP/OBS MODERATE 55: CPT | Performed by: FAMILY MEDICINE

## 2024-11-08 PROCEDURE — 96365 THER/PROPH/DIAG IV INF INIT: CPT

## 2024-11-08 PROCEDURE — 81001 URINALYSIS AUTO W/SCOPE: CPT | Performed by: STUDENT IN AN ORGANIZED HEALTH CARE EDUCATION/TRAINING PROGRAM

## 2024-11-08 PROCEDURE — 83880 ASSAY OF NATRIURETIC PEPTIDE: CPT | Performed by: STUDENT IN AN ORGANIZED HEALTH CARE EDUCATION/TRAINING PROGRAM

## 2024-11-08 PROCEDURE — 2500000004 HC RX 250 GENERAL PHARMACY W/ HCPCS (ALT 636 FOR OP/ED): Performed by: STUDENT IN AN ORGANIZED HEALTH CARE EDUCATION/TRAINING PROGRAM

## 2024-11-08 RX ORDER — PNV NO.95/FERROUS FUM/FOLIC AC 28MG-0.8MG
100 TABLET ORAL DAILY
Status: DISCONTINUED | OUTPATIENT
Start: 2024-11-08 | End: 2024-11-08

## 2024-11-08 RX ORDER — LISINOPRIL 5 MG/1
5 TABLET ORAL DAILY
Status: DISCONTINUED | OUTPATIENT
Start: 2024-11-09 | End: 2024-11-10 | Stop reason: HOSPADM

## 2024-11-08 RX ORDER — ACETAMINOPHEN 500 MG
5000 TABLET ORAL DAILY
Status: DISCONTINUED | OUTPATIENT
Start: 2024-11-08 | End: 2024-11-08 | Stop reason: CLARIF

## 2024-11-08 RX ORDER — ACETAMINOPHEN 160 MG/5ML
650 SOLUTION ORAL EVERY 6 HOURS PRN
Status: DISCONTINUED | OUTPATIENT
Start: 2024-11-08 | End: 2024-11-10 | Stop reason: HOSPADM

## 2024-11-08 RX ORDER — VANCOMYCIN HYDROCHLORIDE 1 G/200ML
1000 INJECTION, SOLUTION INTRAVENOUS EVERY 24 HOURS
Status: DISCONTINUED | OUTPATIENT
Start: 2024-11-09 | End: 2024-11-10 | Stop reason: DRUGHIGH

## 2024-11-08 RX ORDER — VIT C/E/ZN/COPPR/LUTEIN/ZEAXAN 250MG-90MG
5000 CAPSULE ORAL DAILY
Status: DISCONTINUED | OUTPATIENT
Start: 2024-11-08 | End: 2024-11-10 | Stop reason: HOSPADM

## 2024-11-08 RX ORDER — ACETAMINOPHEN 650 MG/1
650 SUPPOSITORY RECTAL EVERY 6 HOURS PRN
Status: DISCONTINUED | OUTPATIENT
Start: 2024-11-08 | End: 2024-11-10 | Stop reason: HOSPADM

## 2024-11-08 RX ORDER — ONDANSETRON 4 MG/1
4 TABLET, ORALLY DISINTEGRATING ORAL EVERY 8 HOURS PRN
Status: DISCONTINUED | OUTPATIENT
Start: 2024-11-08 | End: 2024-11-10 | Stop reason: HOSPADM

## 2024-11-08 RX ORDER — TRAVOPROST OPHTHALMIC SOLUTION 0.04 MG/ML
1 SOLUTION OPHTHALMIC NIGHTLY
Status: DISCONTINUED | OUTPATIENT
Start: 2024-11-08 | End: 2024-11-10 | Stop reason: HOSPADM

## 2024-11-08 RX ORDER — ONDANSETRON HYDROCHLORIDE 2 MG/ML
4 INJECTION, SOLUTION INTRAVENOUS EVERY 8 HOURS PRN
Status: DISCONTINUED | OUTPATIENT
Start: 2024-11-08 | End: 2024-11-10 | Stop reason: HOSPADM

## 2024-11-08 RX ORDER — VANCOMYCIN HYDROCHLORIDE 1 G/20ML
INJECTION, POWDER, LYOPHILIZED, FOR SOLUTION INTRAVENOUS DAILY PRN
Status: DISCONTINUED | OUTPATIENT
Start: 2024-11-08 | End: 2024-11-08

## 2024-11-08 RX ORDER — DOCUSATE SODIUM 100 MG/1
100 CAPSULE, LIQUID FILLED ORAL 2 TIMES DAILY
Status: DISCONTINUED | OUTPATIENT
Start: 2024-11-08 | End: 2024-11-10 | Stop reason: HOSPADM

## 2024-11-08 RX ORDER — DIPHENHYDRAMINE HYDROCHLORIDE 50 MG/ML
25 INJECTION INTRAMUSCULAR; INTRAVENOUS ONCE
Status: DISCONTINUED | OUTPATIENT
Start: 2024-11-08 | End: 2024-11-10 | Stop reason: HOSPADM

## 2024-11-08 RX ORDER — VANCOMYCIN HYDROCHLORIDE 1 G/20ML
INJECTION, POWDER, LYOPHILIZED, FOR SOLUTION INTRAVENOUS DAILY PRN
Status: DISCONTINUED | OUTPATIENT
Start: 2024-11-08 | End: 2024-11-10 | Stop reason: HOSPADM

## 2024-11-08 RX ORDER — LEVOTHYROXINE SODIUM 75 UG/1
75 TABLET ORAL DAILY
Status: DISCONTINUED | OUTPATIENT
Start: 2024-11-09 | End: 2024-11-10 | Stop reason: HOSPADM

## 2024-11-08 RX ORDER — ACETAMINOPHEN 325 MG/1
650 TABLET ORAL EVERY 6 HOURS PRN
Status: DISCONTINUED | OUTPATIENT
Start: 2024-11-08 | End: 2024-11-10 | Stop reason: HOSPADM

## 2024-11-08 RX ADMIN — VANCOMYCIN HYDROCHLORIDE 1250 MG: 1.25 INJECTION, POWDER, LYOPHILIZED, FOR SOLUTION INTRAVENOUS at 13:19

## 2024-11-08 RX ADMIN — Medication 125 MCG: at 17:25

## 2024-11-08 RX ADMIN — PIPERACILLIN SODIUM AND TAZOBACTAM SODIUM 3.38 G: 3; .375 INJECTION, SOLUTION INTRAVENOUS at 20:06

## 2024-11-08 RX ADMIN — APIXABAN 2.5 MG: 2.5 TABLET, FILM COATED ORAL at 20:06

## 2024-11-08 RX ADMIN — PSYLLIUM HUSK 1 PACKET: 3.4 POWDER ORAL at 18:32

## 2024-11-08 RX ADMIN — PIPERACILLIN SODIUM AND TAZOBACTAM SODIUM 3.38 G: 3; .375 INJECTION, SOLUTION INTRAVENOUS at 13:04

## 2024-11-08 RX ADMIN — DIPHENHYDRAMINE HYDROCHLORIDE, ZINC ACETATE 1 APPLICATION: 2; .1 CREAM TOPICAL at 22:18

## 2024-11-08 SDOH — ECONOMIC STABILITY: HOUSING INSECURITY: AT ANY TIME IN THE PAST 12 MONTHS, WERE YOU HOMELESS OR LIVING IN A SHELTER (INCLUDING NOW)?: NO

## 2024-11-08 SDOH — SOCIAL STABILITY: SOCIAL INSECURITY: HAVE YOU HAD THOUGHTS OF HARMING ANYONE ELSE?: NO

## 2024-11-08 SDOH — SOCIAL STABILITY: SOCIAL INSECURITY: WITHIN THE LAST YEAR, HAVE YOU BEEN HUMILIATED OR EMOTIONALLY ABUSED IN OTHER WAYS BY YOUR PARTNER OR EX-PARTNER?: NO

## 2024-11-08 SDOH — ECONOMIC STABILITY: HOUSING INSECURITY: IN THE PAST 12 MONTHS, HOW MANY TIMES HAVE YOU MOVED WHERE YOU WERE LIVING?: 0

## 2024-11-08 SDOH — ECONOMIC STABILITY: TRANSPORTATION INSECURITY

## 2024-11-08 SDOH — HEALTH STABILITY: MENTAL HEALTH: HOW OFTEN DO YOU HAVE A DRINK CONTAINING ALCOHOL?: NEVER

## 2024-11-08 SDOH — SOCIAL STABILITY: SOCIAL INSECURITY: HAVE YOU HAD ANY THOUGHTS OF HARMING ANYONE ELSE?: NO

## 2024-11-08 SDOH — SOCIAL STABILITY: SOCIAL INSECURITY: HAS ANYONE EVER THREATENED TO HURT YOUR FAMILY OR YOUR PETS?: NO

## 2024-11-08 SDOH — SOCIAL STABILITY: SOCIAL INSECURITY: WERE YOU ABLE TO COMPLETE ALL THE BEHAVIORAL HEALTH SCREENINGS?: YES

## 2024-11-08 SDOH — SOCIAL STABILITY: SOCIAL INSECURITY: DO YOU FEEL UNSAFE GOING BACK TO THE PLACE WHERE YOU ARE LIVING?: NO

## 2024-11-08 SDOH — ECONOMIC STABILITY: HOUSING INSECURITY
IN THE LAST 12 MONTHS, WAS THERE A TIME WHEN YOU DID NOT HAVE A STEADY PLACE TO SLEEP OR SLEPT IN A SHELTER (INCLUDING NOW)?: NO

## 2024-11-08 SDOH — HEALTH STABILITY: MENTAL HEALTH
DO YOU FEEL STRESS - TENSE, RESTLESS, NERVOUS, OR ANXIOUS, OR UNABLE TO SLEEP AT NIGHT BECAUSE YOUR MIND IS TROUBLED ALL THE TIME - THESE DAYS?: NOT AT ALL

## 2024-11-08 SDOH — SOCIAL STABILITY: SOCIAL INSECURITY: WITHIN THE LAST YEAR, HAVE YOU BEEN AFRAID OF YOUR PARTNER OR EX-PARTNER?: NO

## 2024-11-08 SDOH — ECONOMIC STABILITY: TRANSPORTATION INSECURITY: IN THE PAST 12 MONTHS, HAS LACK OF TRANSPORTATION KEPT YOU FROM MEDICAL APPOINTMENTS OR FROM GETTING MEDICATIONS?: NO

## 2024-11-08 SDOH — ECONOMIC STABILITY: FOOD INSECURITY: WITHIN THE PAST 12 MONTHS, THE FOOD YOU BOUGHT JUST DIDN'T LAST AND YOU DIDN'T HAVE MONEY TO GET MORE.: NEVER TRUE

## 2024-11-08 SDOH — ECONOMIC STABILITY: HOUSING INSECURITY: IN THE PAST 12 MONTHS HAS THE ELECTRIC, GAS, OIL, OR WATER COMPANY THREATENED TO SHUT OFF SERVICES IN YOUR HOME?: NO

## 2024-11-08 SDOH — HEALTH STABILITY: MENTAL HEALTH: HOW OFTEN DO YOU HAVE SIX OR MORE DRINKS ON ONE OCCASION?: NEVER

## 2024-11-08 SDOH — SOCIAL STABILITY: SOCIAL INSECURITY: ARE YOU OR HAVE YOU BEEN THREATENED OR ABUSED PHYSICALLY, EMOTIONALLY, OR SEXUALLY BY ANYONE?: NO

## 2024-11-08 SDOH — ECONOMIC STABILITY: FOOD INSECURITY: WITHIN THE PAST 12 MONTHS, YOU WORRIED THAT YOUR FOOD WOULD RUN OUT BEFORE YOU GOT THE MONEY TO BUY MORE.: NEVER TRUE

## 2024-11-08 SDOH — SOCIAL STABILITY: SOCIAL INSECURITY: DOES ANYONE TRY TO KEEP YOU FROM HAVING/CONTACTING OTHER FRIENDS OR DOING THINGS OUTSIDE YOUR HOME?: NO

## 2024-11-08 SDOH — SOCIAL STABILITY: SOCIAL INSECURITY: ABUSE: ADULT

## 2024-11-08 SDOH — ECONOMIC STABILITY: HOUSING INSECURITY: IN THE LAST 12 MONTHS, WAS THERE A TIME WHEN YOU WERE NOT ABLE TO PAY THE MORTGAGE OR RENT ON TIME?: NO

## 2024-11-08 SDOH — ECONOMIC STABILITY: FOOD INSECURITY: WITHIN THE PAST 12 MONTHS, YOU WORRIED THAT YOUR FOOD WOULD RUN OUT BEFORE YOU GOT MONEY TO BUY MORE.: NEVER TRUE

## 2024-11-08 SDOH — SOCIAL STABILITY: SOCIAL INSECURITY: DO YOU FEEL ANYONE HAS EXPLOITED OR TAKEN ADVANTAGE OF YOU FINANCIALLY OR OF YOUR PERSONAL PROPERTY?: NO

## 2024-11-08 SDOH — ECONOMIC STABILITY: TRANSPORTATION INSECURITY
IN THE PAST 12 MONTHS, HAS LACK OF TRANSPORTATION KEPT YOU FROM MEETINGS, WORK, OR FROM GETTING THINGS NEEDED FOR DAILY LIVING?: NO

## 2024-11-08 SDOH — ECONOMIC STABILITY: HOUSING INSECURITY

## 2024-11-08 SDOH — HEALTH STABILITY: MENTAL HEALTH: HOW MANY DRINKS CONTAINING ALCOHOL DO YOU HAVE ON A TYPICAL DAY WHEN YOU ARE DRINKING?: PATIENT DOES NOT DRINK

## 2024-11-08 SDOH — SOCIAL STABILITY: SOCIAL INSECURITY: ARE THERE ANY APPARENT SIGNS OF INJURIES/BEHAVIORS THAT COULD BE RELATED TO ABUSE/NEGLECT?: NO

## 2024-11-08 SDOH — ECONOMIC STABILITY: INCOME INSECURITY: IN THE PAST 12 MONTHS HAS THE ELECTRIC, GAS, OIL, OR WATER COMPANY THREATENED TO SHUT OFF SERVICES IN YOUR HOME?: NO

## 2024-11-08 SDOH — ECONOMIC STABILITY: FOOD INSECURITY: HOW HARD IS IT FOR YOU TO PAY FOR THE VERY BASICS LIKE FOOD, HOUSING, MEDICAL CARE, AND HEATING?: NOT HARD AT ALL

## 2024-11-08 SDOH — SOCIAL STABILITY: SOCIAL INSECURITY
WITHIN THE LAST YEAR, HAVE YOU BEEN RAPED OR FORCED TO HAVE ANY KIND OF SEXUAL ACTIVITY BY YOUR PARTNER OR EX-PARTNER?: NO

## 2024-11-08 SDOH — ECONOMIC STABILITY: GENERAL

## 2024-11-08 SDOH — ECONOMIC STABILITY: TRANSPORTATION INSECURITY
IN THE PAST 12 MONTHS, HAS THE LACK OF TRANSPORTATION KEPT YOU FROM MEDICAL APPOINTMENTS OR FROM GETTING MEDICATIONS?: NO

## 2024-11-08 SDOH — ECONOMIC STABILITY: FOOD INSECURITY

## 2024-11-08 SDOH — SOCIAL STABILITY: SOCIAL INSECURITY
WITHIN THE LAST YEAR, HAVE YOU BEEN KICKED, HIT, SLAPPED, OR OTHERWISE PHYSICALLY HURT BY YOUR PARTNER OR EX-PARTNER?: NO

## 2024-11-08 SDOH — ECONOMIC STABILITY: INCOME INSECURITY: IN THE LAST 12 MONTHS, WAS THERE A TIME WHEN YOU WERE NOT ABLE TO PAY THE MORTGAGE OR RENT ON TIME?: NO

## 2024-11-08 SDOH — ECONOMIC STABILITY: HOUSING INSECURITY: IN THE LAST 12 MONTHS, HOW MANY PLACES HAVE YOU LIVED?: 1

## 2024-11-08 SDOH — SOCIAL STABILITY: SOCIAL NETWORK: IN A TYPICAL WEEK, HOW MANY TIMES DO YOU TALK ON THE PHONE WITH FAMILY, FRIENDS, OR NEIGHBORS?: NEVER

## 2024-11-08 ASSESSMENT — PATIENT HEALTH QUESTIONNAIRE - PHQ9
2. FEELING DOWN, DEPRESSED OR HOPELESS: NOT AT ALL
1. LITTLE INTEREST OR PLEASURE IN DOING THINGS: NOT AT ALL
1. LITTLE INTEREST OR PLEASURE IN DOING THINGS: NOT AT ALL
SUM OF ALL RESPONSES TO PHQ9 QUESTIONS 1 & 2: 0

## 2024-11-08 ASSESSMENT — COGNITIVE AND FUNCTIONAL STATUS - GENERAL
DAILY ACTIVITIY SCORE: 24
MOBILITY SCORE: 24
DAILY ACTIVITIY SCORE: 24
PATIENT BASELINE BEDBOUND: NO
MOBILITY SCORE: 24

## 2024-11-08 ASSESSMENT — ENCOUNTER SYMPTOMS
DIZZINESS: 0
WOUND: 1
CONSTIPATION: 0
PALPITATIONS: 0
JOINT SWELLING: 1
VOMITING: 0
NUMBNESS: 0
CHEST TIGHTNESS: 0
ABDOMINAL PAIN: 0
CHILLS: 0
DIARRHEA: 0
FEVER: 0
DIFFICULTY URINATING: 0
NAUSEA: 0
SHORTNESS OF BREATH: 0

## 2024-11-08 ASSESSMENT — LIFESTYLE VARIABLES
HOW OFTEN DO YOU HAVE A DRINK CONTAINING ALCOHOL: NEVER
AUDIT-C TOTAL SCORE: 0
HOW MANY STANDARD DRINKS CONTAINING ALCOHOL DO YOU HAVE ON A TYPICAL DAY: PATIENT DOES NOT DRINK
PRESCIPTION_ABUSE_PAST_12_MONTHS: NO
EVER HAD A DRINK FIRST THING IN THE MORNING TO STEADY YOUR NERVES TO GET RID OF A HANGOVER: NO
HAVE PEOPLE ANNOYED YOU BY CRITICIZING YOUR DRINKING: NO
HOW OFTEN DO YOU HAVE 6 OR MORE DRINKS ON ONE OCCASION: NEVER
SUBSTANCE_ABUSE_PAST_12_MONTHS: NO
TOTAL SCORE: 0
SKIP TO QUESTIONS 9-10: 1
SKIP TO QUESTIONS 9-10: 1
AUDIT-C TOTAL SCORE: 0
EVER FELT BAD OR GUILTY ABOUT YOUR DRINKING: NO
AUDIT-C TOTAL SCORE: 0
HAVE YOU EVER FELT YOU SHOULD CUT DOWN ON YOUR DRINKING: NO

## 2024-11-08 ASSESSMENT — ACTIVITIES OF DAILY LIVING (ADL)
BATHING: INDEPENDENT
LACK_OF_TRANSPORTATION: NO
JUDGMENT_ADEQUATE_SAFELY_COMPLETE_DAILY_ACTIVITIES: YES
HEARING - RIGHT EAR: FUNCTIONAL
TOILETING: INDEPENDENT
WALKS IN HOME: NEEDS ASSISTANCE
GROOMING: INDEPENDENT
DRESSING YOURSELF: INDEPENDENT
ADEQUATE_TO_COMPLETE_ADL: YES
LACK_OF_TRANSPORTATION: NO
FEEDING YOURSELF: INDEPENDENT
LACK_OF_TRANSPORTATION: NO
ASSISTIVE_DEVICE: WALKER;EYEGLASSES;DENTURES LOWER;DENTURES UPPER
PATIENT'S MEMORY ADEQUATE TO SAFELY COMPLETE DAILY ACTIVITIES?: YES
HEARING - LEFT EAR: FUNCTIONAL

## 2024-11-08 ASSESSMENT — PAIN - FUNCTIONAL ASSESSMENT
PAIN_FUNCTIONAL_ASSESSMENT: 0-10
PAIN_FUNCTIONAL_ASSESSMENT: 0-10

## 2024-11-08 ASSESSMENT — PAIN DESCRIPTION - LOCATION: LOCATION: LEG

## 2024-11-08 ASSESSMENT — PAIN SCALES - GENERAL
PAINLEVEL_OUTOF10: 0 - NO PAIN
PAINLEVEL_OUTOF10: 0 - NO PAIN
PAINLEVEL_OUTOF10: 3
PAINLEVEL_OUTOF10: 0 - NO PAIN

## 2024-11-08 ASSESSMENT — PAIN DESCRIPTION - ORIENTATION: ORIENTATION: RIGHT;LEFT;LOWER

## 2024-11-08 ASSESSMENT — PAIN DESCRIPTION - PAIN TYPE: TYPE: ACUTE PAIN

## 2024-11-08 ASSESSMENT — SOCIAL DETERMINANTS OF HEALTH (SDOH): IN THE PAST 12 MONTHS, HAS THE ELECTRIC, GAS, OIL, OR WATER COMPANY THREATENED TO SHUT OFF SERVICE IN YOUR HOME?: NO

## 2024-11-08 NOTE — PROGRESS NOTES
Pharmacy Medication History Review    Nanette Flynn is a 89 y.o. female admitted for Cellulitis. Pharmacy reviewed the patient's [id number] medications and allergies for accuracy.    The list below reflectives the updated PTA list. Please review each medication in order reconciliation for additional clarification and justification.  Prior to Admission Medications   Prescriptions Last Dose Informant Patient Reported? Taking?   CYANOCOBALAMIN, VITAMIN B-12, ORAL 11/7/2024 Self Yes Yes   Sig: Take 1 tablet by mouth once daily.   Ca carb-Ca gluc-Mg ox-Mg gluco 500 mg calcium -250 mg tablet 11/7/2024 Evening Self Yes Yes   Sig: Take 1 tablet by mouth 2 times a day.   Synthroid 75 mcg tablet 11/8/2024 at  6:00 AM Self Yes Yes   Sig: Take 1 tablet by mouth once daily   apixaban (Eliquis) 2.5 mg tablet 11/8/2024 at  8:30 AM Self Yes Yes   Sig: Take 1 tablet (2.5 mg) by mouth 2 times a day.   cholecalciferol (Vitamin D-3) 5,000 Units tablet 11/7/2024 Self Yes Yes   Sig: Take 1 tablet (5,000 Units) by mouth once daily.   hydrocortisone (Anusol-HC) 2.5 % rectal cream   No No   Sig: Insert 1 Application into the rectum 2 times a day. Apply rectally 2 times daily   lisinopril 5 mg tablet 11/8/2024 at  8:30 AM Self Yes Yes   Sig: Take 1 tablet (5 mg) by mouth once daily.   ondansetron (Zofran) 4 mg tablet More than a month Self Yes No   Sig: Take by mouth once every 24 hours.   Patient not taking: Reported on 11/8/2024   psyllium (Metamucil) powder 11/8/2024 Morning Self Yes Yes   Sig: Take 1 Dose (5.8 g) by mouth 2 times a day.   travoprost (Travatan Z) 0.004 % drops ophthalmic solution 11/7/2024 Evening Self Yes Yes   Sig: Administer 1 drop into both eyes once daily at bedtime.   triamcinolone (Nasacort) 55 mcg nasal inhaler Unknown Self Yes No   Sig: Administer into affected nostril(s) once every 24 hours.   vit A/vit C/vit E/zinc/copper (PRESERVISION AREDS ORAL) 11/8/2024 Morning Self Yes Yes   Sig: Take 1  capsule by mouth 2 times a day.      Facility-Administered Medications: None           The list below reflectives the updated allergy list. Please review each documented allergy for additional clarification and justification.  Allergies  Reviewed by Deanne Hernandez RN on 11/8/2024        Severity Reactions Comments    Gabapentin Medium Confusion FELL ASLEEP WITH ONE DOSE AND NO RECALL OF THAT DAY             Below are additional concerns with the patient's PTA list.      Lizy Raines

## 2024-11-08 NOTE — PROGRESS NOTES
Subjective   Patient ID: Nanette Flynn is a 89 y.o. female who presents with her daughter for possible cellulitis. I performed this visit using real-time telehealth tools, including an audio/video connection between Nanette Flynn and myself, Ping Ferguson CNP, within the state of [place].  Consent has been obtained for this visit. I have verbally confirmed with Nanette Flynn (or parent if under 18) that they are physically located in the [facility] during this virtual visit. Telemedicine appropriate evaluation completed.     HPI     Patient reports 4-5 day history of bilateral lower extremity swelling and redness. Symptoms seem to be worsening as the days go on. She has diffuse redness to both legs and she has scattered blisters to both legs, some have popped and are weeping a clear fluid. She doesn't necessarily have pain but states it's uncomfortable. Patient denies any fevers, chills, or body aches. Her temp is 97.7F. Has been applying 1% silver sulfadiazine cream without relief in symptoms. Tried to get in with her PCP but was told there are no available appts until December.     Review of Systems  ROS negative except as noted above in HPI.     Objective   There were no vitals taken for this visit.    Physical Exam  A full physical exam was unable to be completed due to the limitations of the telehealth visit, but those observed are noted below.     Constitutional: Alert and in no acute distress  Pulmonary: No respiratory distress  Neurologic: Normal cortical function  Psychiatric: Normal judgment and insight. Normal mood and affect  Skin: See photos             Assessment/Plan   Cellulitis of BLE  - Cellulitis seems to be pretty extensive, she likely needs some blood work and IV antibiotics, possibly an ultrasound of her legs  - Recommend that patient be evaluated in the ED, they are agreeable but stated they probably wont go until tomorrow, encouraged to go today    SUSANNAH Cali-TYSON  [facility]

## 2024-11-08 NOTE — PROGRESS NOTES
11/08/24 1602   Discharge Planning   Living Arrangements Alone   Support Systems Children   Assistance Needed A&Ox3, independent with ADLs, utilizes a rollator,room air at baseline, does have macular degeneration so can not read info. Does not drive(family transports)   Type of Residence Private residence   Number of Stairs to Enter Residence 0  (ramp)   Number of Stairs Within Residence 0   Do you have animals or pets at home? Yes   Type of Animals or Pets 1dog   Home or Post Acute Services None   Expected Discharge Disposition Home   Does the patient need discharge transport arranged? No   Financial Resource Strain   How hard is it for you to pay for the very basics like food, housing, medical care, and heating? Not hard   Housing Stability   In the last 12 months, was there a time when you were not able to pay the mortgage or rent on time? N   At any time in the past 12 months, were you homeless or living in a shelter (including now)? N   Transportation Needs   In the past 12 months, has lack of transportation kept you from medical appointments or from getting medications? no   In the past 12 months, has lack of transportation kept you from meetings, work, or from getting things needed for daily living? No

## 2024-11-09 PROBLEM — L03.90 CELLULITIS, UNSPECIFIED CELLULITIS SITE: Status: ACTIVE | Noted: 2024-11-09

## 2024-11-09 LAB
ANION GAP SERPL CALC-SCNC: 14 MMOL/L (ref 10–20)
BUN SERPL-MCNC: 21 MG/DL (ref 6–23)
CALCIUM SERPL-MCNC: 8.7 MG/DL (ref 8.6–10.3)
CHLORIDE SERPL-SCNC: 103 MMOL/L (ref 98–107)
CO2 SERPL-SCNC: 25 MMOL/L (ref 21–32)
CREAT SERPL-MCNC: 1.19 MG/DL (ref 0.5–1.05)
EGFRCR SERPLBLD CKD-EPI 2021: 44 ML/MIN/1.73M*2
ERYTHROCYTE [DISTWIDTH] IN BLOOD BY AUTOMATED COUNT: 12.8 % (ref 11.5–14.5)
GLUCOSE SERPL-MCNC: 90 MG/DL (ref 74–99)
HCT VFR BLD AUTO: 35.9 % (ref 36–46)
HGB BLD-MCNC: 11.5 G/DL (ref 12–16)
HOLD SPECIMEN: NORMAL
MCH RBC QN AUTO: 31.9 PG (ref 26–34)
MCHC RBC AUTO-ENTMCNC: 32 G/DL (ref 32–36)
MCV RBC AUTO: 100 FL (ref 80–100)
NRBC BLD-RTO: 0 /100 WBCS (ref 0–0)
PLATELET # BLD AUTO: 223 X10*3/UL (ref 150–450)
POTASSIUM SERPL-SCNC: 4.3 MMOL/L (ref 3.5–5.3)
RBC # BLD AUTO: 3.6 X10*6/UL (ref 4–5.2)
SODIUM SERPL-SCNC: 138 MMOL/L (ref 136–145)
VANCOMYCIN SERPL-MCNC: 11 UG/ML (ref 5–20)
WBC # BLD AUTO: 9.6 X10*3/UL (ref 4.4–11.3)

## 2024-11-09 PROCEDURE — 85027 COMPLETE CBC AUTOMATED: CPT | Performed by: FAMILY MEDICINE

## 2024-11-09 PROCEDURE — 2500000001 HC RX 250 WO HCPCS SELF ADMINISTERED DRUGS (ALT 637 FOR MEDICARE OP): Performed by: FAMILY MEDICINE

## 2024-11-09 PROCEDURE — 1100000001 HC PRIVATE ROOM DAILY

## 2024-11-09 PROCEDURE — 2500000004 HC RX 250 GENERAL PHARMACY W/ HCPCS (ALT 636 FOR OP/ED): Performed by: FAMILY MEDICINE

## 2024-11-09 PROCEDURE — 2500000002 HC RX 250 W HCPCS SELF ADMINISTERED DRUGS (ALT 637 FOR MEDICARE OP, ALT 636 FOR OP/ED): Performed by: FAMILY MEDICINE

## 2024-11-09 PROCEDURE — 80202 ASSAY OF VANCOMYCIN: CPT | Performed by: FAMILY MEDICINE

## 2024-11-09 PROCEDURE — 99232 SBSQ HOSP IP/OBS MODERATE 35: CPT

## 2024-11-09 PROCEDURE — 80048 BASIC METABOLIC PNL TOTAL CA: CPT | Performed by: FAMILY MEDICINE

## 2024-11-09 PROCEDURE — 36415 COLL VENOUS BLD VENIPUNCTURE: CPT | Performed by: FAMILY MEDICINE

## 2024-11-09 RX ORDER — LANOLIN ALCOHOL/MO/W.PET/CERES
400 CREAM (GRAM) TOPICAL DAILY
Status: DISCONTINUED | OUTPATIENT
Start: 2024-11-09 | End: 2024-11-10 | Stop reason: HOSPADM

## 2024-11-09 RX ORDER — FERROUS SULFATE, DRIED 160(50) MG
1 TABLET, EXTENDED RELEASE ORAL DAILY
Status: DISCONTINUED | OUTPATIENT
Start: 2024-11-09 | End: 2024-11-10 | Stop reason: HOSPADM

## 2024-11-09 RX ADMIN — PIPERACILLIN SODIUM AND TAZOBACTAM SODIUM 3.38 G: 3; .375 INJECTION, SOLUTION INTRAVENOUS at 13:52

## 2024-11-09 RX ADMIN — APIXABAN 2.5 MG: 2.5 TABLET, FILM COATED ORAL at 08:57

## 2024-11-09 RX ADMIN — Medication 400 MG: at 17:59

## 2024-11-09 RX ADMIN — DOCUSATE SODIUM 100 MG: 100 CAPSULE, LIQUID FILLED ORAL at 08:57

## 2024-11-09 RX ADMIN — VANCOMYCIN HYDROCHLORIDE 1000 MG: 1 INJECTION, SOLUTION INTRAVENOUS at 14:36

## 2024-11-09 RX ADMIN — PSYLLIUM HUSK 1 PACKET: 3.4 POWDER ORAL at 08:57

## 2024-11-09 RX ADMIN — ACETAMINOPHEN 650 MG: 325 TABLET ORAL at 01:14

## 2024-11-09 RX ADMIN — LEVOTHYROXINE SODIUM 75 MCG: 75 TABLET ORAL at 06:45

## 2024-11-09 RX ADMIN — PIPERACILLIN SODIUM AND TAZOBACTAM SODIUM 3.38 G: 3; .375 INJECTION, SOLUTION INTRAVENOUS at 19:57

## 2024-11-09 RX ADMIN — APIXABAN 2.5 MG: 2.5 TABLET, FILM COATED ORAL at 19:57

## 2024-11-09 RX ADMIN — DIPHENHYDRAMINE HYDROCHLORIDE, ZINC ACETATE 1 APPLICATION: 2; .1 CREAM TOPICAL at 20:55

## 2024-11-09 RX ADMIN — PIPERACILLIN SODIUM AND TAZOBACTAM SODIUM 3.38 G: 3; .375 INJECTION, SOLUTION INTRAVENOUS at 00:57

## 2024-11-09 RX ADMIN — Medication 1 TABLET: at 17:59

## 2024-11-09 RX ADMIN — LISINOPRIL 5 MG: 5 TABLET ORAL at 08:57

## 2024-11-09 RX ADMIN — Medication 125 MCG: at 08:57

## 2024-11-09 RX ADMIN — PSYLLIUM HUSK 1 PACKET: 3.4 POWDER ORAL at 17:33

## 2024-11-09 RX ADMIN — PIPERACILLIN SODIUM AND TAZOBACTAM SODIUM 3.38 G: 3; .375 INJECTION, SOLUTION INTRAVENOUS at 06:45

## 2024-11-09 ASSESSMENT — PAIN DESCRIPTION - ORIENTATION: ORIENTATION: RIGHT

## 2024-11-09 ASSESSMENT — COGNITIVE AND FUNCTIONAL STATUS - GENERAL
MOBILITY SCORE: 24
DAILY ACTIVITIY SCORE: 24
DAILY ACTIVITIY SCORE: 24
MOBILITY SCORE: 24

## 2024-11-09 ASSESSMENT — PAIN DESCRIPTION - LOCATION: LOCATION: KNEE

## 2024-11-09 ASSESSMENT — PAIN - FUNCTIONAL ASSESSMENT: PAIN_FUNCTIONAL_ASSESSMENT: UNABLE TO SELF-REPORT

## 2024-11-09 ASSESSMENT — PAIN SCALES - GENERAL
PAINLEVEL_OUTOF10: 3
PAINLEVEL_OUTOF10: 0 - NO PAIN

## 2024-11-09 NOTE — PROGRESS NOTES
Vancomycin Dosing by Pharmacy- FOLLOW UP    Nanette Flynn is a 89 y.o. year old female who Pharmacy has been consulted for vancomycin dosing for cellulitis, skin and soft tissue. Based on the patient's indication and renal status this patient is being dosed based on a goal AUC of 400-600.     Renal function is currently declining.    Current vancomycin dose: 1000 mg given every 24 hours    Estimated vancomycin AUC on current dose: 548 mg/L.hr     Visit Vitals  /71 (BP Location: Right arm, Patient Position: Lying)   Pulse 66   Temp 36.5 °C (97.7 °F) (Temporal)   Resp 18        Lab Results   Component Value Date    CREATININE 1.19 (H) 2024    CREATININE 1.00 2024    CREATININE 1.26 (H) 2024    CREATININE 0.86 2024        Patient weight is as follows:   Vitals:    24 1605   Weight: 79.3 kg (174 lb 14.4 oz)       Cultures:  No results found for the encounter in last 14 days.       I/O last 3 completed shifts:  In: 240 (3 mL/kg) [P.O.:240]  Out: 0 (0 mL/kg)   Weight: 79.3 kg   I/O during current shift:  No intake/output data recorded.    Temp (24hrs), Av.7 °C (98.1 °F), Min:36.2 °C (97.2 °F), Max:37.4 °C (99.3 °F)      Assessment/Plan    Within goal AUC range. Continue current vancomycin regimen.    This dosing regimen is predicted by [employer]ight[employer] to result in the following pharmacokinetic parameters:  Loading dose: 1250mg  Regimen: 1000 mg IV every 24 hours.  Start time: 13:19 on 2024  Exposure target: AUC24 (range)400-600 mg/L.hr   ADX89-86: 470 mg/L.hr  AUC24,ss: 548 mg/L.hr  Probability of AUC24 > 400: 96 %  Ctrough,ss: 17.2 mg/L  Probability of Ctrough,ss > 20: 28 %    The next level will be obtained on  at 6am. May be obtained sooner if clinically indicated.   Will continue to monitor renal function daily while on vancomycin and order serum creatinine at least every 48 hours if not already ordered.  Follow for continued vancomycin needs, clinical response, and  signs/symptoms of toxicity.       Indiana Greer, [facility]

## 2024-11-09 NOTE — PROGRESS NOTES
"Nanette Flynn is a 89 y.o. female on day 0 of admission presenting with Cellulitis.    Subjective   Patient alert and sitting up in bed on examination this morning.  No acute overnight events.  Reports legs feel more or less the same in terms of pain, reports they feel like a bad sunburn.  Pain does get worse when her legs are not elevated to heart level (ie sitting in a chair). Believes the erythema and warmth are improving.  No fevers, chills.       Objective     Physical Exam  HENT:      Head: Normocephalic and atraumatic.   Eyes:      Extraocular Movements: Extraocular movements intact.      Conjunctiva/sclera: Conjunctivae normal.      Pupils: Pupils are equal, round, and reactive to light.   Cardiovascular:      Rate and Rhythm: Normal rate and regular rhythm.      Heart sounds: No murmur heard.  Pulmonary:      Effort: Pulmonary effort is normal. No respiratory distress.      Breath sounds: Normal breath sounds. No wheezing.   Abdominal:      General: Abdomen is flat. Bowel sounds are normal. There is no distension.      Palpations: Abdomen is soft.      Tenderness: There is no abdominal tenderness.   Skin:     General: Skin is warm and dry.      Capillary Refill: Capillary refill takes less than 2 seconds.      Findings: Erythema and rash present.      Comments: BLE: erythematous, maculopapular rash spanning from the ankles to midshin, no apparent drainage. Superficial ulcerated wound noted on right lateral aspect of shin, nondraining, 1 cm diameter. Superficial ulcerated wound noted on left medial aspect of shin, nondraining, 1.5 cm diameter    Neurological:      Mental Status: She is oriented to person, place, and time.   Psychiatric:         Mood and Affect: Mood normal.         Behavior: Behavior normal.         Last Recorded Vitals  Blood pressure 121/72, pulse 69, temperature 36.7 °C (98.1 °F), temperature source Temporal, resp. rate 18, height 1.626 m (5' 4\"), weight 79.3 kg (174 lb 14.4 oz), " SpO2 96%.  Intake/Output last 3 Shifts:  I/O last 3 completed shifts:  In: 240 (3 mL/kg) [P.O.:240]  Out: 0 (0 mL/kg)   Weight: 79.3 kg     Relevant Results  Scheduled medications  apixaban, 2.5 mg, oral, BID  cholecalciferol, 5,000 Units, oral, Daily  diphenhydrAMINE, 25 mg, intravenous, Once  docusate sodium, 100 mg, oral, BID  levothyroxine, 75 mcg, oral, Daily  lisinopril, 5 mg, oral, Daily  piperacillin-tazobactam, 3.375 g, intravenous, q6h  psyllium, 1 packet, oral, BID after meals  travoprost, 1 drop, Both Eyes, Nightly  vancomycin, 1,000 mg, intravenous, q24h      Continuous medications     PRN medications  PRN medications: acetaminophen **OR** acetaminophen **OR** acetaminophen, diphenhydramine-zinc acetate, ondansetron ODT **OR** ondansetron, vancomycin    Results for orders placed or performed during the hospital encounter of 11/08/24 (from the past 24 hours)   CBC   Result Value Ref Range    WBC 9.6 4.4 - 11.3 x10*3/uL    nRBC 0.0 0.0 - 0.0 /100 WBCs    RBC 3.60 (L) 4.00 - 5.20 x10*6/uL    Hemoglobin 11.5 (L) 12.0 - 16.0 g/dL    Hematocrit 35.9 (L) 36.0 - 46.0 %     80 - 100 fL    MCH 31.9 26.0 - 34.0 pg    MCHC 32.0 32.0 - 36.0 g/dL    RDW 12.8 11.5 - 14.5 %    Platelets 223 150 - 450 x10*3/uL   Basic metabolic panel   Result Value Ref Range    Glucose 90 74 - 99 mg/dL    Sodium 138 136 - 145 mmol/L    Potassium 4.3 3.5 - 5.3 mmol/L    Chloride 103 98 - 107 mmol/L    Bicarbonate 25 21 - 32 mmol/L    Anion Gap 14 10 - 20 mmol/L    Urea Nitrogen 21 6 - 23 mg/dL    Creatinine 1.19 (H) 0.50 - 1.05 mg/dL    eGFR 44 (L) >60 mL/min/1.73m*2    Calcium 8.7 8.6 - 10.3 mg/dL   Vancomycin   Result Value Ref Range    Vancomycin 11.0 5.0 - 20.0 ug/mL     Vascular US lower extremity venous duplex bilateral    Result Date: 11/8/2024  Interpreted By:  Yohannes Young, STUDY: [facility] US LOWER EXTREMITY VENOUS DUPLEX BILATERAL; 11/8/2024 1:02 pm   INDICATION: Signs/Symptoms:swelling   COMPARISON: February 2020    ACCESSION NUMBER(S): [id number]   ORDERING CLINICIAN: MARKY FRENCH   TECHNIQUE: Using a combination of gray scale ultrasound, spectral waveform analysis, compression technique, pulsed and color Doppler, the deep venous system of the  lower extremities bilaterally was examined.   FINDINGS: The common femoral vein, superficial femoral vein, popliteal vein and the deep veins of the calf are compressible and display normal flow signal. There is no evidence of deep venous thrombosis. Lymph nodes are present in both inguinal regions, measuring 2.4 by 0.6 x 1.1 cm on the right and 2.1 x 0.6 x 0.8 cm on the left.       No deep venous thrombosis in the lower extremities bilaterally.   Signed by: Yohannes Young 11/8/2024 1:18 PM Dictation workstation:   [id number]           Assessment/Plan   Assessment & Plan  Cellulitis    Cellulitis, unspecified cellulitis site    Nanette is an 88 yo F with PMHx of HTN, hypothyroidism, PE on eliquis, and varicose veins who presented to the ED yesterday 11/8 with bilateral lower extremity redness subsequently admitted for cellulitis.         LE cellulitis, improving    - Day 2 IV Vancomycin/Zosyn given concern for MRSA    - WBC normal, remains afebrile, hemodynamically stable   - Wound care consulted for additional blister/scab management   - DVT US negative in ED yesterday       Elevated creatinine  - Creatinine increased, does not meet criteria for LALITA   - Continue monitoring with AM BMPs       HTN   - C/h lisinopril      Hypothyroidism   - C/h levothyroxine       Hx of PE   - Follows with vascular surgery, last seen via telemedicine on 8/14, at that time recommended continuing indefinite low-dose apixaban due to history of hemorrhoidal bleeding         Dispo: Plan to transition to oral Bactrim tomorrow if continues to remain afebrile, HDS, showing continued clinical improvement         Sanya Dailey DO  PGY-1, Family Medicine

## 2024-11-09 NOTE — PROGRESS NOTES
Vancomycin Dosing by Pharmacy- INITIAL    Nanette Flynn is a 89 y.o. year old female who Pharmacy has been consulted for vancomycin dosing for cellulitis, skin and soft tissue. Based on the patient's indication and renal status this patient will be dosed based on a goal AUC of 400-600.     Renal function is currently stable.    Visit Vitals  /82 (BP Location: Right arm, Patient Position: Sitting)   Pulse 69   Temp 36.7 °C (98.1 °F) (Temporal)   Resp 18        Lab Results   Component Value Date    CREATININE 1.00 2024    CREATININE 1.26 (H) 2024    CREATININE 0.86 2024    CREATININE 0.79 2024        Patient weight is as follows:   Vitals:    24 1605   Weight: 79.3 kg (174 lb 14.4 oz)       Cultures:  No results found for the encounter in last 14 days.        No intake/output data recorded.  I/O during current shift:  No intake/output data recorded.    Temp (24hrs), Av °C (98.6 °F), Min:36.7 °C (98.1 °F), Max:37.4 °C (99.3 °F)         Assessment/Plan     Patient has already been given a loading dose of 1,250 mg on   Will initiate vancomycin maintenance,  1,000 mg every 24 hours.    This dosing regimen is predicted by Insight[employer] to result in the following pharmacokinetic parameters:  Loading dose: 1,250mg on   Regimen: 1000 mg IV every 24 hours.  Start time: 13:19 on 2024  Exposure target: AUC24 (range)400-600 mg/L.hr   LJZ50-60: 393 mg/L.hr  AUC24,ss: 446 mg/L.hr  Probability of AUC24 > 400: 63 %  Ctrough,ss: 13.9 mg/L  Probability of Ctrough,ss > 20: 18 %      Follow-up level will be ordered on  with AM labs unless clinically indicated sooner.  Will continue to monitor renal function daily while on vancomycin and order serum creatinine at least every 48 hours if not already ordered.  Follow for continued vancomycin needs, clinical response, and signs/symptoms of toxicity.       Shanti Teixeira, PharmD   PGY1 Pharmacy Resident

## 2024-11-09 NOTE — H&P
History Of Present Illness  Nanette Flynn is a 89 y.o. female with PMH of HTN, hypothyroidism, PE on eliquis, and varicose veins that presented to the ED with bilateral lower extremity redness. Patient states that she has blisters develop on her legs all the time that then resolve on their own without any issues. She states she developed blisters on her bilateral legs over the weekend then on Tuesday on burst and then she started noticing redness on both of her legs. Patient states that she called her PCP but was unable to get in so she came to the ED. She did try some lotion on her legs but no prescriptions for her symptoms. Patient denies any fever/chills, chest pain, dyspnea, N/V, abdominal pain, and changes in urinary or bowel habits.      Past Medical History  HTN, hypothyroidism, PE on eliquis, and varicose veins    Surgical History  Appendectomy with small bowel resection, SHANTELL     Social History  Former tobacco abuse, denies ETOH and illicit drug use    Family History  Family History   Problem Relation Name Age of Onset    Heart disease Mother Leanna     Hypertension Mother Leanna     Transient ischemic attack Mother Leanna     Heart disease Father Richard     Cancer Maternal Grandmother Maternal grandmother     Colon cancer Maternal Grandmother Maternal grandmother         Allergies  Gabapentin    Review of Systems   Constitutional:  Negative for chills and fever.   HENT:  Negative for congestion.    Eyes:  Negative for visual disturbance.   Respiratory:  Negative for chest tightness and shortness of breath.    Cardiovascular:  Negative for chest pain and palpitations.   Gastrointestinal:  Negative for abdominal pain, constipation, diarrhea, nausea and vomiting.   Genitourinary:  Negative for difficulty urinating.   Musculoskeletal:  Positive for joint swelling.   Skin:  Positive for rash and wound.   Neurological:  Negative for dizziness and numbness.        Physical Exam  Constitutional: alert and oriented x  3, awake, cooperative, no acute distress  Skin: warm and dry, bilateral lower extremities edematous on shins with small blisters noted on medial aspect of both ankles; multiple scabs noted on both legs of previous blisters healing, warm to touch  Head/Neck: Normocephalic, atraumatic  Eyes: clear sclera  ENMT: mucous membranes moist  Cardio: Regular rate and rhythm  Resp: CTA bilaterally, good respiratory effort  Gastrointestinal: Soft, nontender, nondistended  Musculoskeletal: ROM intact, no joint swelling  Extremities: No edema, cyanosis, or clubbing  Neuro: alert and oriented x 3  Psychological: Appropriate mood and behavior    Last Recorded Vitals  /82 (BP Location: Right arm, Patient Position: Sitting)   Pulse 69   Temp 36.7 °C (98.1 °F) (Temporal)   Resp 18   Wt 79.3 kg (174 lb 14.4 oz)   SpO2 98%     Relevant Results  Scheduled medications  apixaban, 2.5 mg, oral, BID  cholecalciferol, 5,000 Units, oral, Daily  diphenhydrAMINE, 25 mg, intravenous, Once  docusate sodium, 100 mg, oral, BID  [START ON 11/9/2024] levothyroxine, 75 mcg, oral, Daily  [START ON 11/9/2024] lisinopril, 5 mg, oral, Daily  piperacillin-tazobactam, 3.375 g, intravenous, q6h  psyllium, 1 packet, oral, BID after meals  travoprost, 1 drop, Both Eyes, Nightly  [START ON 11/9/2024] vancomycin, 1,000 mg, intravenous, q24h      Continuous medications     PRN medications  PRN medications: acetaminophen **OR** acetaminophen **OR** acetaminophen, diphenhydramine-zinc acetate, ondansetron ODT **OR** ondansetron, vancomycin    Results for orders placed or performed during the hospital encounter of 11/08/24 (from the past 24 hours)   CBC and Auto Differential   Result Value Ref Range    WBC 8.8 4.4 - 11.3 x10*3/uL    nRBC 0.0 0.0 - 0.0 /100 WBCs    RBC 3.63 (L) 4.00 - 5.20 x10*6/uL    Hemoglobin 11.6 (L) 12.0 - 16.0 g/dL    Hematocrit 35.3 (L) 36.0 - 46.0 %    MCV 97 80 - 100 fL    MCH 32.0 26.0 - 34.0 pg    MCHC 32.9 32.0 - 36.0 g/dL     RDW 12.7 11.5 - 14.5 %    Platelets 230 150 - 450 x10*3/uL    Neutrophils % 52.3 40.0 - 80.0 %    Immature Granulocytes %, Automated 0.6 0.0 - 0.9 %    Lymphocytes % 34.1 13.0 - 44.0 %    Monocytes % 9.9 2.0 - 10.0 %    Eosinophils % 2.8 0.0 - 6.0 %    Basophils % 0.3 0.0 - 2.0 %    Neutrophils Absolute 4.59 1.60 - 5.50 x10*3/uL    Immature Granulocytes Absolute, Automated 0.05 0.00 - 0.50 x10*3/uL    Lymphocytes Absolute 2.99 0.80 - 3.00 x10*3/uL    Monocytes Absolute 0.87 (H) 0.05 - 0.80 x10*3/uL    Eosinophils Absolute 0.25 0.00 - 0.40 x10*3/uL    Basophils Absolute 0.03 0.00 - 0.10 x10*3/uL   Magnesium   Result Value Ref Range    Magnesium 2.06 1.60 - 2.40 mg/dL   Comprehensive metabolic panel   Result Value Ref Range    Glucose 90 74 - 99 mg/dL    Sodium 136 136 - 145 mmol/L    Potassium 4.5 3.5 - 5.3 mmol/L    Chloride 101 98 - 107 mmol/L    Bicarbonate 27 21 - 32 mmol/L    Anion Gap 13 10 - 20 mmol/L    Urea Nitrogen 21 6 - 23 mg/dL    Creatinine 1.00 0.50 - 1.05 mg/dL    eGFR 54 (L) >60 mL/min/1.73m*2    Calcium 9.3 8.6 - 10.3 mg/dL    Albumin 4.1 3.4 - 5.0 g/dL    Alkaline Phosphatase 49 33 - 136 U/L    Total Protein 7.6 6.4 - 8.2 g/dL    AST 19 9 - 39 U/L    Bilirubin, Total 0.5 0.0 - 1.2 mg/dL    ALT 12 7 - 45 U/L   Lipase   Result Value Ref Range    Lipase 44 9 - 82 U/L   Lactate   Result Value Ref Range    Lactate 0.9 0.4 - 2.0 mmol/L   Blood Culture    Specimen: Peripheral Venipuncture; Blood culture   Result Value Ref Range    Blood Culture Loaded on Instrument - Culture in progress    Blood Culture    Specimen: Peripheral Venipuncture; Blood culture   Result Value Ref Range    Blood Culture Loaded on Instrument - Culture in progress    Urinalysis with Reflex Culture and Microscopic   Result Value Ref Range    Color, Urine Colorless (N) Light-Yellow, Yellow, Dark-Yellow    Appearance, Urine Clear Clear    Specific Gravity, Urine 1.005 1.005 - 1.035    pH, Urine 7.0 5.0, 5.5, 6.0, 6.5, 7.0, 7.5, 8.0     Protein, Urine NEGATIVE NEGATIVE, 10 (TRACE), 20 (TRACE) mg/dL    Glucose, Urine Normal Normal mg/dL    Blood, Urine 0.03 (TRACE) (A) NEGATIVE    Ketones, Urine NEGATIVE NEGATIVE mg/dL    Bilirubin, Urine NEGATIVE NEGATIVE    Urobilinogen, Urine Normal Normal mg/dL    Nitrite, Urine NEGATIVE NEGATIVE    Leukocyte Esterase, Urine NEGATIVE NEGATIVE   Urinalysis Microscopic   Result Value Ref Range    WBC, Urine 1-5 1-5, NONE /HPF    RBC, Urine 1-2 NONE, 1-2, 3-5 /HPF    Squamous Epithelial Cells, Urine 1-9 (SPARSE) Reference range not established. /HPF   B-Type Natriuretic Peptide   Result Value Ref Range     (H) 0 - 99 pg/mL     Vascular US lower extremity venous duplex bilateral    Result Date: 11/8/2024  Interpreted By:  Yohannes Young, STUDY: [facility] US LOWER EXTREMITY VENOUS DUPLEX BILATERAL; 11/8/2024 1:02 pm   INDICATION: Signs/Symptoms:swelling   COMPARISON: February 2020   ACCESSION NUMBER(S): [id number]   ORDERING CLINICIAN: MARKY FRENCH   TECHNIQUE: Using a combination of gray scale ultrasound, spectral waveform analysis, compression technique, pulsed and color Doppler, the deep venous system of the  lower extremities bilaterally was examined.   FINDINGS: The common femoral vein, superficial femoral vein, popliteal vein and the deep veins of the calf are compressible and display normal flow signal. There is no evidence of deep venous thrombosis. Lymph nodes are present in both inguinal regions, measuring 2.4 by 0.6 x 1.1 cm on the right and 2.1 x 0.6 x 0.8 cm on the left.       No deep venous thrombosis in the lower extremities bilaterally.   Signed by: Yohannes Young 11/8/2024 1:18 PM Dictation workstation:   [id number]     Assessment & Plan      89 y.o. female with PMH of HTN, hypothyroidism, PE on eliquis, and varicose veins that presented to the ED with bilateral lower extremity redness and was admitted for cellulitis.    Lower extremity cellulitis  - start IV vanc/zosyn  - DVT ultrasound  negative in ED  - wound consulted to help with blister/scab treatment  - continue to monitor    Hypertension  - continue home lisinopril    Hypothyroidism  - continue home levothyroxine    PE  - hx of PE was recently seen by vascular and plan to continue indefinite low dose eliquis due to hx of hemorrhoids  - continue home eliquis    DVT Proph: eliquis    Dispo: Patient requires close inpatient monitoring in setting of cellulitis requiring antibiotics, hospital stay likely >2midnights.     Puja Corbett, DO

## 2024-11-09 NOTE — CARE PLAN
The patient's goals for the shift include comfort and sleep    The clinical goals for the shift include pt will continue to have minimal c/o pain this shift

## 2024-11-09 NOTE — CARE PLAN
Uneventful night. Pt rested comfortably. Minimal c/o pain this shift. Pt continues to progress towards meeting goals. VSS. Will continue to monitor.

## 2024-11-10 VITALS
DIASTOLIC BLOOD PRESSURE: 71 MMHG | HEART RATE: 70 BPM | WEIGHT: 174.9 LBS | OXYGEN SATURATION: 94 % | BODY MASS INDEX: 29.86 KG/M2 | SYSTOLIC BLOOD PRESSURE: 117 MMHG | HEIGHT: 64 IN | RESPIRATION RATE: 18 BRPM | TEMPERATURE: 99.3 F

## 2024-11-10 PROBLEM — L03.90 CELLULITIS, UNSPECIFIED CELLULITIS SITE: Status: RESOLVED | Noted: 2024-11-09 | Resolved: 2024-11-10

## 2024-11-10 LAB
ANION GAP SERPL CALC-SCNC: 12 MMOL/L (ref 10–20)
BACTERIA BLD CULT: NORMAL
BACTERIA BLD CULT: NORMAL
BUN SERPL-MCNC: 23 MG/DL (ref 6–23)
CALCIUM SERPL-MCNC: 8.9 MG/DL (ref 8.6–10.3)
CHLORIDE SERPL-SCNC: 103 MMOL/L (ref 98–107)
CO2 SERPL-SCNC: 24 MMOL/L (ref 21–32)
CREAT SERPL-MCNC: 1.29 MG/DL (ref 0.5–1.05)
EGFRCR SERPLBLD CKD-EPI 2021: 40 ML/MIN/1.73M*2
ERYTHROCYTE [DISTWIDTH] IN BLOOD BY AUTOMATED COUNT: 12.8 % (ref 11.5–14.5)
GLUCOSE SERPL-MCNC: 96 MG/DL (ref 74–99)
HCT VFR BLD AUTO: 34.6 % (ref 36–46)
HGB BLD-MCNC: 11 G/DL (ref 12–16)
MCH RBC QN AUTO: 31.9 PG (ref 26–34)
MCHC RBC AUTO-ENTMCNC: 31.8 G/DL (ref 32–36)
MCV RBC AUTO: 100 FL (ref 80–100)
NRBC BLD-RTO: 0 /100 WBCS (ref 0–0)
PLATELET # BLD AUTO: 217 X10*3/UL (ref 150–450)
POTASSIUM SERPL-SCNC: 4.1 MMOL/L (ref 3.5–5.3)
RBC # BLD AUTO: 3.45 X10*6/UL (ref 4–5.2)
SODIUM SERPL-SCNC: 135 MMOL/L (ref 136–145)
WBC # BLD AUTO: 9.4 X10*3/UL (ref 4.4–11.3)

## 2024-11-10 PROCEDURE — 2500000004 HC RX 250 GENERAL PHARMACY W/ HCPCS (ALT 636 FOR OP/ED): Performed by: FAMILY MEDICINE

## 2024-11-10 PROCEDURE — 2500000002 HC RX 250 W HCPCS SELF ADMINISTERED DRUGS (ALT 637 FOR MEDICARE OP, ALT 636 FOR OP/ED): Performed by: FAMILY MEDICINE

## 2024-11-10 PROCEDURE — 85027 COMPLETE CBC AUTOMATED: CPT | Performed by: FAMILY MEDICINE

## 2024-11-10 PROCEDURE — 36415 COLL VENOUS BLD VENIPUNCTURE: CPT | Performed by: FAMILY MEDICINE

## 2024-11-10 PROCEDURE — 2500000005 HC RX 250 GENERAL PHARMACY W/O HCPCS

## 2024-11-10 PROCEDURE — 80048 BASIC METABOLIC PNL TOTAL CA: CPT | Performed by: FAMILY MEDICINE

## 2024-11-10 PROCEDURE — 2500000001 HC RX 250 WO HCPCS SELF ADMINISTERED DRUGS (ALT 637 FOR MEDICARE OP): Performed by: FAMILY MEDICINE

## 2024-11-10 PROCEDURE — 99239 HOSP IP/OBS DSCHRG MGMT >30: CPT

## 2024-11-10 RX ORDER — BACITRACIN ZINC 500 UNIT/G
OINTMENT IN PACKET (EA) TOPICAL 3 TIMES DAILY PRN
Qty: 144 EACH | Refills: 0 | Status: SHIPPED | OUTPATIENT
Start: 2024-11-10

## 2024-11-10 RX ORDER — SULFAMETHOXAZOLE AND TRIMETHOPRIM 800; 160 MG/1; MG/1
1 TABLET ORAL DAILY
Qty: 5 TABLET | Refills: 0 | Status: SHIPPED | OUTPATIENT
Start: 2024-11-10 | End: 2024-11-15

## 2024-11-10 RX ORDER — BACITRACIN ZINC 500 UNIT/G
OINTMENT IN PACKET (EA) TOPICAL 3 TIMES DAILY PRN
Status: DISCONTINUED | OUTPATIENT
Start: 2024-11-10 | End: 2024-11-10 | Stop reason: HOSPADM

## 2024-11-10 RX ORDER — VANCOMYCIN HYDROCHLORIDE 750 MG/150ML
750 INJECTION, SOLUTION INTRAVENOUS EVERY 24 HOURS
Status: DISCONTINUED | OUTPATIENT
Start: 2024-11-10 | End: 2024-11-10 | Stop reason: HOSPADM

## 2024-11-10 RX ADMIN — LISINOPRIL 5 MG: 5 TABLET ORAL at 08:31

## 2024-11-10 RX ADMIN — DIPHENHYDRAMINE HYDROCHLORIDE, ZINC ACETATE 1 APPLICATION: 2; .1 CREAM TOPICAL at 04:12

## 2024-11-10 RX ADMIN — Medication 400 MG: at 08:31

## 2024-11-10 RX ADMIN — APIXABAN 2.5 MG: 2.5 TABLET, FILM COATED ORAL at 08:31

## 2024-11-10 RX ADMIN — LEVOTHYROXINE SODIUM 75 MCG: 75 TABLET ORAL at 04:10

## 2024-11-10 RX ADMIN — Medication 1 TABLET: at 08:31

## 2024-11-10 RX ADMIN — DOCUSATE SODIUM 100 MG: 100 CAPSULE, LIQUID FILLED ORAL at 08:31

## 2024-11-10 RX ADMIN — PIPERACILLIN SODIUM AND TAZOBACTAM SODIUM 3.38 G: 3; .375 INJECTION, SOLUTION INTRAVENOUS at 08:32

## 2024-11-10 RX ADMIN — Medication 125 MCG: at 08:31

## 2024-11-10 RX ADMIN — VANCOMYCIN HYDROCHLORIDE 750 MG: 750 INJECTION, SOLUTION INTRAVENOUS at 12:31

## 2024-11-10 RX ADMIN — PSYLLIUM HUSK 1 PACKET: 3.4 POWDER ORAL at 08:30

## 2024-11-10 RX ADMIN — PIPERACILLIN SODIUM AND TAZOBACTAM SODIUM 3.38 G: 3; .375 INJECTION, SOLUTION INTRAVENOUS at 02:05

## 2024-11-10 RX ADMIN — BACITRACIN 1 APPLICATION: 500 OINTMENT TOPICAL at 10:11

## 2024-11-10 NOTE — CARE PLAN
Problem: Pain - Adult  Goal: Verbalizes/displays adequate comfort level or baseline comfort level  Outcome: Progressing     Problem: Safety - Adult  Goal: Free from fall injury  Outcome: Progressing     Problem: Discharge Planning  Goal: Discharge to home or other facility with appropriate resources  Outcome: Progressing     Problem: Chronic Conditions and Co-morbidities  Goal: Patient's chronic conditions and co-morbidity symptoms are monitored and maintained or improved  Outcome: Progressing   The patient's goals for the shift include  sleep    The clinical goals for the shift include Patient will verbalize a decrease in itching this shift    Over the shift, the patient did not make progress toward the following goals. Barriers to progression include itching. Recommendations to address these barriers include medications and rest. Patient tolerated ambulation to the bathroom and in the room this shift. Patient was able to void.   Patient was able to sleep at least 6 hours this shift, on and off.

## 2024-11-10 NOTE — DISCHARGE SUMMARY
Discharge Diagnosis  Cellulitis    Issues Requiring Follow-Up  Follow up with your PCP in one week  Follow up with the wound clinic for lower extremity wound management   Elevated creatinine on discharge likely 2/2 IV antibiotics vs dehydration, monitor resolution       Test Results Pending At Discharge  Pending Labs       Order Current Status    Blood Culture Preliminary result    Blood Culture Preliminary result            Hospital Course   Nanette Flynn is an 89 yo F with PMHx of HTN, hypothyroidism, PE on eliquis, and varicose veins who presented to the ED on 11/8 with bilateral lower extremity redness subsequently admitted for cellulitis.  Patient reportedly develops blisters on her legs all the time that resolve without any needed intervention, but this time states she developed blisters on her bilateral legs over this past weekend, and then on November 5 some of these blisters burst. Patient subsequently reported noticing increased redness warmth and pain in both of her legs, prompting her to seek evaluation in the emergency department. She was hemodynamically stable on presentation to the emergency department, afebrile, with no evidence of leukocytosis. She was subsequently admitted to the floor where she completed two days of IV Vancomycin and Zosyn. Creatinine has been trending up through admission, likely secondary to vancomycin, but will need to follow-up with PCP outpatient regarding this.  Throughout her admission, she again remained hemodynamically stable, afebrile, and without a white count. She will be discharged home today in stable condition on a five day course of PO Bactrim with close PCP follow-up as well as establishing with the wound clinic.       Pertinent Physical Exam At Time of Discharge  HENT:     Head: Normocephalic and atraumatic.   Eyes:      Extraocular Movements: Extraocular movements intact.      Conjunctiva/sclera: Conjunctivae normal.      Pupils: Pupils are equal, round, and  reactive to light.   Cardiovascular:      Rate and Rhythm: Normal rate and regular rhythm.      Heart sounds: No murmur heard.  Pulmonary:      Effort: Pulmonary effort is normal. No respiratory distress.      Breath sounds: Normal breath sounds. No wheezing.   Abdominal:      General: Abdomen is flat. Bowel sounds are normal. There is no distension.      Palpations: Abdomen is soft.      Tenderness: There is no abdominal tenderness.   Skin:     General: Skin is warm and dry.      Capillary Refill: Capillary refill takes less than 2 seconds.      Findings: Erythema and rash present.      Comments: BLE: erythematous, maculopapular rash spanning from the ankles to midshin, no apparent drainage. Superficial ulcerated wound noted on right lateral aspect of shin, nondraining, 1 cm diameter. Superficial ulcerated wound noted on left medial aspect of shin, nondraining, 1.5 cm diameter    Neurological:      Mental Status: She is oriented to person, place, and time.   Psychiatric:         Mood and Affect: Mood normal.         Behavior: Behavior normal.       Home Medications     Medication List      START taking these medications     bacitracin 500 unit/gram ointment in packet; Apply topically 3 times a   day as needed (apply to lower extremity wounds).   diphenhydramine-zinc acetate cream; Apply 1 Application topically 3   times a day as needed for itching.   sulfamethoxazole-trimethoprim 800-160 mg tablet; Commonly known as:   Bactrim DS; Take 1 tablet by mouth once daily for 5 days.     CONTINUE taking these medications     apixaban 2.5 mg tablet; Commonly known as: Eliquis; Take 1 tablet (2.5   mg) by mouth 2 times a day.   calcium carb,gluc-mag gluc,ox 500 mg calcium- 250 mg tablet   cholecalciferol 5,000 Units tablet; Commonly known as: Vitamin D-3   CYANOCOBALAMIN (VITAMIN B-12) ORAL   hydrocortisone 2.5 % rectal cream; Commonly known as: Anusol-HC; Insert   1 Application into the rectum 2 times a day. Apply rectally  2 times daily   lisinopril 5 mg tablet; Take 1 tablet (5 mg) by mouth once daily.   PRESERVISION AREDS ORAL   psyllium powder; Commonly known as: Metamucil   Synthroid 75 mcg tablet; Generic drug: levothyroxine; Take 1 tablet by   mouth once daily   Travatan Z 0.004 % drops ophthalmic solution; Generic drug: travoprost   triamcinolone 55 mcg nasal inhaler; Commonly known as: Nasacort     STOP taking these medications     ondansetron 4 mg tablet; Commonly known as: Zofran       Outpatient Follow-Up  Future Appointments   Date Time Provider Department Center   2/4/2025  2:30 PM Salud Walker MD [id number] [facility]   3/5/2025  1:00 PM Blanca Quiroz MD, MS [id number] [facility]       Sanya Dailey DO  PGY-1, Family Medicine

## 2024-11-10 NOTE — PROGRESS NOTES
Vancomycin Dosing by Pharmacy- FOLLOW UP    Nanette Flynn is a 89 y.o. year old female who Pharmacy has been consulted for vancomycin dosing for cellulitis, skin and soft tissue. Based on the patient's indication and renal status this patient is being dosed based on a goal AUC of 400-600.     Renal function is currently declining, Scr increased to 1.29, Crcl = 30.7    Current vancomycin dose: 1000 mg given every 24 hours    Estimated vancomycin AUC on current dose: 595 mg/L.hr     Visit Vitals  /71   Pulse 70   Temp 37.4 °C (99.3 °F)   Resp 18        Lab Results   Component Value Date    CREATININE 1.29 (H) 11/10/2024    CREATININE 1.19 (H) 2024    CREATININE 1.00 2024    CREATININE 1.26 (H) 2024        Patient weight is as follows:   Vitals:    24 1605   Weight: 79.3 kg (174 lb 14.4 oz)       Cultures:  No results found for the encounter in last 14 days.       I/O last 3 completed shifts:  In: 1260 (15.9 mL/kg) [P.O.:810; IV Piggyback:450]  Out: 0 (0 mL/kg)   Weight: 79.3 kg   I/O during current shift:  No intake/output data recorded.    Temp (24hrs), Av °C (98.6 °F), Min:36.7 °C (98.1 °F), Max:37.4 °C (99.3 °F)      Assessment/Plan    Above goal AUC. Orders placed for new vancomcyin regimen of 750mg  every 24 hours to begin at 1pm.    This dosing regimen is predicted by [employer] to result in the following pharmacokinetic parameters:  Loading dose: 1250mg OT   Regimen: 750 mg IV every 24 hours.  Start time: 14:36 on 11/10/2024  Exposure target: AUC24 (range)400-600 mg/L.hr   [id number]: 428 mg/L.hr  AUC24,ss: 453 mg/L.hr  Probability of AUC24 > 400: 71 %  Ctrough,ss: 14.4 mg/L  Probability of Ctrough,ss > 20: 13 %      The next level will be obtained on  at 6am. May be obtained sooner if clinically indicated.   Will continue to monitor renal function daily while on vancomycin and order serum creatinine at least every 48 hours if not already ordered.  Follow for  continued vancomycin needs, clinical response, and signs/symptoms of toxicity.       Indiana Greer, h

## 2024-11-11 ENCOUNTER — PATIENT OUTREACH (OUTPATIENT)
Dept: PRIMARY CARE | Facility: CLINIC | Age: 89
End: 2024-11-11
Payer: MEDICARE

## 2024-11-11 NOTE — PROGRESS NOTES
Discharge Facility:  [facility]     Discharge Diagnosis:    Cellulitis     Issues Requiring Follow-Up  Follow up with your PCP in one week  Follow up with the wound clinic for lower extremity wound management   Elevated creatinine on discharge likely 2/2 IV antibiotics vs dehydration, monitor resolution     Admission Date: 11/8/2024   Discharge Date:  11/10/2024     PCP Appointment Date: 11/21/2024 , Tasked office sooner F/U if possible D/T DC states one week     Specialist Appointment Date:     11/14/2024 Wound care [facility] Encounter and Summary Linked: Yes    Two attempts were made to reach patient within two business days after discharge. Voicemail left with contact information for patient to call back with any non-emergent questions or concerns.

## 2024-11-12 LAB
BACTERIA BLD CULT: NORMAL
BACTERIA BLD CULT: NORMAL

## 2024-11-13 NOTE — SIGNIFICANT EVENT
Follow Up Phone Call    Outgoing phone call    Spoke to: Nanette Flynn Relationship:self   Phone number: [phone]      Outcome: No answer/busy signal   Chief Complaint   Patient presents with   • Wound Check     Pt bilat lower legs red and painful with some open areas.          Diagnosis:Not applicable

## 2024-11-14 ENCOUNTER — OFFICE VISIT (OUTPATIENT)
Dept: WOUND CARE | Facility: HOSPITAL | Age: 89
End: 2024-11-14
Payer: MEDICARE

## 2024-11-14 DIAGNOSIS — I87.8 VENOUS STASIS: ICD-10-CM

## 2024-11-14 DIAGNOSIS — M79.89 LEG SWELLING: ICD-10-CM

## 2024-11-14 DIAGNOSIS — L97.922 NON-PRESSURE CHRONIC ULCER OF LEFT LOWER LEG WITH FAT LAYER EXPOSED (MULTI): Primary | ICD-10-CM

## 2024-11-14 DIAGNOSIS — L03.119 CELLULITIS OF LOWER EXTREMITY, UNSPECIFIED LATERALITY: ICD-10-CM

## 2024-11-14 PROCEDURE — 99213 OFFICE O/P EST LOW 20 MIN: CPT | Mod: 25

## 2024-11-14 PROCEDURE — 11042 DBRDMT SUBQ TIS 1ST 20SQCM/<: CPT

## 2024-11-14 RX ORDER — HONEY 100 %
1 PASTE (ML) TOPICAL DAILY
Qty: 15 ML | Refills: 1 | Status: SHIPPED | OUTPATIENT
Start: 2024-11-14

## 2024-11-15 NOTE — ED PROVIDER NOTES
Chief Complaint: Wound infections  HPI: This is an 89-year-old female, presenting to the emergency department for pain and swelling in her bilateral lower extremities.  Patient states that she has chronic wounds to the medial aspect of her legs, worse on the left.  She states that today, the wound became more painful, with worsening redness surrounding it, and she was concerned so presents to the emergency department.  She denies any chest pain, shortness of breath, fevers, chills.    Past Medical History:   Diagnosis Date    Arthritis     Disease of thyroid gland     Glaucoma     Hypertension     Other specified postprocedural states     History of colonoscopy    Personal history of other diseases of the circulatory system     History of rheumatic fever    Personal history of other diseases of the circulatory system     History of rheumatic fever    Personal history of other medical treatment     History of mammogram      Past Surgical History:   Procedure Laterality Date    APPENDECTOMY  08/20/2018    Appendectomy    COLON SURGERY      CT ANGIO NECK  09/24/2013    CT NECK ANGIO W AND WO IV CONTRAST 9/24/2013 [facility]    CT HEAD ANGIO W AND WO IV CONTRAST  09/24/2013    CT HEAD ANGIO W AND WO IV CONTRAST 9/24/2013 [facility]    HYSTERECTOMY  08/20/2018    Hysterectomy    OTHER SURGICAL HISTORY  08/20/2018    Enterectomy       Physical Exam  Vitals and nursing note reviewed.   Constitutional:       Appearance: Normal appearance.   HENT:      Head: Normocephalic and atraumatic.      Mouth/Throat:      Mouth: Mucous membranes are moist.   Eyes:      Conjunctiva/sclera: Conjunctivae normal.   Cardiovascular:      Rate and Rhythm: Normal rate.   Pulmonary:      Effort: Pulmonary effort is normal.   Abdominal:      General: Abdomen is flat.      Palpations: Abdomen is soft.   Musculoskeletal:         General: Normal range of motion.      Cervical back: Normal range of motion.   Skin:     General:  Skin is warm and dry.      Findings: Erythema and lesion present.      Comments: Erythema to the bilateral lower extremities, worse on the left, with open wound that does appear to be infected to the medial aspect of the left ankle, as well as some open areas to the medial aspect of the right ankle.   Neurological:      General: No focal deficit present.      Mental Status: She is alert.   Psychiatric:         Mood and Affect: Mood normal.            ED Course/MDM  Diagnoses as of 11/15/24 1356   Cellulitis, unspecified cellulitis site   Cellulitis of lower extremity, unspecified laterality   Venous stasis   Itching     This is a 89 y.o. female presenting to the ED for evaluation of wound infection to the bilateral lower extremities.  Patient states that the pain and swelling became more severe today, and so she presents to the emergency department.  On physical exam, the patient is resting comfortably in bed, no acute distress.  There is erythema to the bilateral lower extremities, worse on the left.  There is an open wound on the left lower extremity that is concerning for infection, there is also some open areas on the right ankle, however there is less erythema on the right leg.  Patient was started on antibiotics.  Lab work was obtained and is overall grossly unremarkable.  DVT ultrasound of the lower extremities were obtained, and were nonconcerning for any DVTs, however I do feel the patient has cellulitis but will require inpatient treatment with IV antibiotics.  She was admitted in stable condition.    Final Impression  1.  Cellulitis  Disposition/Plan: Admit to medicine  Condition at disposition: Stable.     Evelyn Gil DO  Emergency Medicine Physician     Evelyn Gil,   11/15/24 1970

## 2024-11-21 ENCOUNTER — OFFICE VISIT (OUTPATIENT)
Dept: WOUND CARE | Facility: HOSPITAL | Age: 89
End: 2024-11-21
Payer: MEDICARE

## 2024-11-21 ENCOUNTER — APPOINTMENT (OUTPATIENT)
Dept: PRIMARY CARE | Facility: CLINIC | Age: 89
End: 2024-11-21
Payer: MEDICARE

## 2024-11-21 VITALS
TEMPERATURE: 98.2 F | BODY MASS INDEX: 30.11 KG/M2 | OXYGEN SATURATION: 95 % | WEIGHT: 176.4 LBS | DIASTOLIC BLOOD PRESSURE: 56 MMHG | HEART RATE: 67 BPM | HEIGHT: 64 IN | SYSTOLIC BLOOD PRESSURE: 122 MMHG

## 2024-11-21 DIAGNOSIS — L30.9 DERMATITIS: ICD-10-CM

## 2024-11-21 DIAGNOSIS — I87.8 VENOUS STASIS: ICD-10-CM

## 2024-11-21 DIAGNOSIS — L03.119 CELLULITIS OF LOWER EXTREMITY, UNSPECIFIED LATERALITY: ICD-10-CM

## 2024-11-21 DIAGNOSIS — R53.82 CHRONIC FATIGUE: Primary | ICD-10-CM

## 2024-11-21 LAB
ANION GAP SERPL CALC-SCNC: 13 MMOL/L (ref 10–20)
BASOPHILS # BLD AUTO: 0.07 X10*3/UL (ref 0–0.1)
BASOPHILS NFR BLD AUTO: 0.7 %
BUN SERPL-MCNC: 18 MG/DL (ref 6–23)
CALCIUM SERPL-MCNC: 9.6 MG/DL (ref 8.6–10.3)
CHLORIDE SERPL-SCNC: 101 MMOL/L (ref 98–107)
CO2 SERPL-SCNC: 27 MMOL/L (ref 21–32)
CREAT SERPL-MCNC: 1.15 MG/DL (ref 0.5–1.05)
EGFRCR SERPLBLD CKD-EPI 2021: 46 ML/MIN/1.73M*2
EOSINOPHIL # BLD AUTO: 0.43 X10*3/UL (ref 0–0.4)
EOSINOPHIL NFR BLD AUTO: 4.5 %
ERYTHROCYTE [DISTWIDTH] IN BLOOD BY AUTOMATED COUNT: 12.8 % (ref 11.5–14.5)
GLUCOSE SERPL-MCNC: 101 MG/DL (ref 74–99)
HCT VFR BLD AUTO: 36.5 % (ref 36–46)
HGB BLD-MCNC: 11.9 G/DL (ref 12–16)
IMM GRANULOCYTES # BLD AUTO: 0.06 X10*3/UL (ref 0–0.5)
IMM GRANULOCYTES NFR BLD AUTO: 0.6 % (ref 0–0.9)
LYMPHOCYTES # BLD AUTO: 3.58 X10*3/UL (ref 0.8–3)
LYMPHOCYTES NFR BLD AUTO: 37.6 %
MCH RBC QN AUTO: 32 PG (ref 26–34)
MCHC RBC AUTO-ENTMCNC: 32.6 G/DL (ref 32–36)
MCV RBC AUTO: 98 FL (ref 80–100)
MONOCYTES # BLD AUTO: 0.86 X10*3/UL (ref 0.05–0.8)
MONOCYTES NFR BLD AUTO: 9 %
NEUTROPHILS # BLD AUTO: 4.52 X10*3/UL (ref 1.6–5.5)
NEUTROPHILS NFR BLD AUTO: 47.6 %
NRBC BLD-RTO: 0 /100 WBCS (ref 0–0)
PLATELET # BLD AUTO: 229 X10*3/UL (ref 150–450)
POTASSIUM SERPL-SCNC: 4.7 MMOL/L (ref 3.5–5.3)
RBC # BLD AUTO: 3.72 X10*6/UL (ref 4–5.2)
SODIUM SERPL-SCNC: 136 MMOL/L (ref 136–145)
WBC # BLD AUTO: 9.5 X10*3/UL (ref 4.4–11.3)

## 2024-11-21 PROCEDURE — 80048 BASIC METABOLIC PNL TOTAL CA: CPT

## 2024-11-21 PROCEDURE — 85025 COMPLETE CBC W/AUTO DIFF WBC: CPT

## 2024-11-21 PROCEDURE — 1157F ADVNC CARE PLAN IN RCRD: CPT | Performed by: FAMILY MEDICINE

## 2024-11-21 PROCEDURE — 1126F AMNT PAIN NOTED NONE PRSNT: CPT | Performed by: FAMILY MEDICINE

## 2024-11-21 PROCEDURE — 11042 DBRDMT SUBQ TIS 1ST 20SQCM/<: CPT | Mod: LT,RT

## 2024-11-21 PROCEDURE — 1036F TOBACCO NON-USER: CPT | Performed by: FAMILY MEDICINE

## 2024-11-21 PROCEDURE — 1159F MED LIST DOCD IN RCRD: CPT | Performed by: FAMILY MEDICINE

## 2024-11-21 PROCEDURE — 1111F DSCHRG MED/CURRENT MED MERGE: CPT | Performed by: FAMILY MEDICINE

## 2024-11-21 PROCEDURE — 1124F ACP DISCUSS-NO DSCNMKR DOCD: CPT | Performed by: FAMILY MEDICINE

## 2024-11-21 PROCEDURE — 99495 TRANSJ CARE MGMT MOD F2F 14D: CPT | Performed by: FAMILY MEDICINE

## 2024-11-21 ASSESSMENT — ENCOUNTER SYMPTOMS
DYSURIA: 0
NUMBNESS: 1
NAUSEA: 0
DIARRHEA: 0
SHORTNESS OF BREATH: 0
HEADACHES: 0
FATIGUE: 0
WOUND: 1
CHILLS: 0
FEVER: 0
COUGH: 0
SORE THROAT: 0
VOMITING: 0
CONSTIPATION: 0
ABDOMINAL PAIN: 0

## 2024-11-21 ASSESSMENT — LIFESTYLE VARIABLES: HOW OFTEN DO YOU HAVE A DRINK CONTAINING ALCOHOL: NEVER

## 2024-11-21 ASSESSMENT — PAIN SCALES - GENERAL: PAINLEVEL_OUTOF10: 0-NO PAIN

## 2024-11-21 NOTE — PROGRESS NOTES
"Subjective   Patient ID: Nanette Flynn is a 89 y.o. female who presents for Hospital Follow-up (Bilateral lower leg cellulitis/Declines Flu Vaccine.).    HPI  here for hospital follow up, had increased edema and went to er- wounds infected, put on vanco and zosyn for 2 days then dischargd on 5 days of bactrim, renal function needs rechecked. Seeing wound clinic, improvement noted with use of medi honey.     Review of Systems   Constitutional:  Negative for chills, fatigue and fever.   HENT:  Negative for congestion and sore throat.    Eyes:  Negative for visual disturbance.   Respiratory:  Negative for cough and shortness of breath.    Cardiovascular:  Positive for leg swelling. Negative for chest pain.   Gastrointestinal:  Negative for abdominal pain, constipation, diarrhea, nausea and vomiting.   Genitourinary:  Negative for dysuria.   Skin:  Positive for rash and wound.   Neurological:  Positive for numbness. Negative for headaches.       Objective   /56   Pulse 67   Temp 36.8 °C (98.2 °F)   Ht 1.626 m (5' 4\")   Wt 80 kg (176 lb 6.4 oz)   SpO2 95%   BMI 30.28 kg/m²     Physical Exam  Constitutional:       General: She is not in acute distress.     Appearance: Normal appearance.   HENT:      Head: Normocephalic.      Mouth/Throat:      Mouth: Mucous membranes are moist.      Pharynx: Oropharynx is clear.   Eyes:      Extraocular Movements: Extraocular movements intact.      Pupils: Pupils are equal, round, and reactive to light.   Cardiovascular:      Rate and Rhythm: Normal rate and regular rhythm.      Heart sounds: Normal heart sounds.   Pulmonary:      Effort: Pulmonary effort is normal.      Breath sounds: Normal breath sounds. No wheezing or rhonchi.   Abdominal:      General: There is no distension.      Palpations: Abdomen is soft.      Tenderness: There is no abdominal tenderness.   Musculoskeletal:      Right lower leg: Edema present.      Left lower leg: Edema present. "   Lymphadenopathy:      Cervical: No cervical adenopathy.   Skin:     Coloration: Skin is not jaundiced.      Findings: Lesion and rash (back with maculopapular rash t10-12) present.   Neurological:      General: No focal deficit present.      Mental Status: She is alert.      Cranial Nerves: No cranial nerve deficit.   Psychiatric:         Mood and Affect: Mood normal.         Assessment/Plan   Problem List Items Addressed This Visit             ICD-10-CM    Chronic fatigue - Primary R53.82    Cellulitis L03.90     Other Visit Diagnoses         Codes    Venous stasis     I87.8    Dermatitis     L30.9          Recheck bmp, topical trimacinolone for back rash

## 2024-11-21 NOTE — LETTER
November 22, 2024     Nanette Flynn  [address]  [facility] [address]      Dear Ms. Flynn:    Below are the results from your recent visit:  kidney function is improving to where it was several weeks ago, but not quite back to where it was six months ago and her anemia is also improved. At this point, we will just continue to monitor at her next visit as scheduled. No other changes needed.     Resulted Orders   CBC and Auto Differential   Result Value Ref Range    WBC 9.5 4.4 - 11.3 x10*3/uL    nRBC 0.0 0.0 - 0.0 /100 WBCs    RBC 3.72 (L) 4.00 - 5.20 x10*6/uL    Hemoglobin 11.9 (L) 12.0 - 16.0 g/dL    Hematocrit 36.5 36.0 - 46.0 %    MCV 98 80 - 100 fL    MCH 32.0 26.0 - 34.0 pg    MCHC 32.6 32.0 - 36.0 g/dL    RDW 12.8 11.5 - 14.5 %    Platelets 229 150 - 450 x10*3/uL    Neutrophils % 47.6 40.0 - 80.0 %    Immature Granulocytes %, Automated 0.6 0.0 - 0.9 %      Comment:      Immature Granulocyte Count (IG) includes promyelocytes, myelocytes and metamyelocytes but does not include bands. Percent differential counts (%) should be interpreted in the context of the absolute cell counts (cells/UL).    Lymphocytes % 37.6 13.0 - 44.0 %    Monocytes % 9.0 2.0 - 10.0 %    Eosinophils % 4.5 0.0 - 6.0 %    Basophils % 0.7 0.0 - 2.0 %    Neutrophils Absolute 4.52 1.60 - 5.50 x10*3/uL      Comment:      Percent differential counts (%) should be interpreted in the context of the absolute cell counts (cells/uL).    Immature Granulocytes Absolute, Automated 0.06 0.00 - 0.50 x10*3/uL    Lymphocytes Absolute 3.58 (H) 0.80 - 3.00 x10*3/uL    Monocytes Absolute 0.86 (H) 0.05 - 0.80 x10*3/uL    Eosinophils Absolute 0.43 (H) 0.00 - 0.40 x10*3/uL    Basophils Absolute 0.07 0.00 - 0.10 x10*3/uL   Basic Metabolic Panel   Result Value Ref Range    Glucose 101 (H) 74 - 99 mg/dL    Sodium 136 136 - 145 mmol/L    Potassium 4.7 3.5 - 5.3 mmol/L    Chloride 101 98 - 107 mmol/L    Bicarbonate 27 21 - 32 mmol/L    Anion Gap 13 10  - 20 mmol/L    Urea Nitrogen 18 6 - 23 mg/dL    Creatinine 1.15 (H) 0.50 - 1.05 mg/dL    eGFR 46 (L) >60 mL/min/1.73m*2      Comment:      Calculations of estimated GFR are performed using the 2021 CKD-EPI Study Refit equation without the race variable for the IDMS-Traceable creatinine methods.  https://jasn.asnjournals.org/content/early/2021/09/22/ASN.6811585837    Calcium 9.6 8.6 - 10.3 mg/dL         If you have any questions or concerns, please don't hesitate to call.         Sincerely,      [facility]

## 2024-11-22 ENCOUNTER — PATIENT OUTREACH (OUTPATIENT)
Dept: PRIMARY CARE | Facility: CLINIC | Age: 89
End: 2024-11-22
Payer: MEDICARE

## 2024-11-22 NOTE — PROGRESS NOTES
Call regarding appt. with PCP on 11/21/2024  after hospitalization, I spoke with Lupe AU     At time of outreach call the patient feels as if their condition has improved since last visit.    Reviewed the PCP appointment with the pt and addressed any questions or concerns, has no questions or concerns at this time.    Encouraged Lupe  to call providers for any questions concerns or change in condition

## 2024-12-03 ENCOUNTER — PATIENT OUTREACH (OUTPATIENT)
Dept: PRIMARY CARE | Facility: CLINIC | Age: 89
End: 2024-12-03
Payer: MEDICARE

## 2024-12-05 ENCOUNTER — APPOINTMENT (OUTPATIENT)
Dept: WOUND CARE | Facility: HOSPITAL | Age: 89
End: 2024-12-05
Payer: MEDICARE

## 2024-12-09 ENCOUNTER — OFFICE VISIT (OUTPATIENT)
Dept: WOUND CARE | Facility: HOSPITAL | Age: 89
End: 2024-12-09
Payer: MEDICARE

## 2024-12-09 PROCEDURE — 99213 OFFICE O/P EST LOW 20 MIN: CPT

## 2024-12-09 PROCEDURE — 99213 OFFICE O/P EST LOW 20 MIN: CPT | Performed by: SURGERY

## 2024-12-13 DIAGNOSIS — E07.9 THYROID DISEASE: Primary | ICD-10-CM

## 2024-12-14 RX ORDER — LEVOTHYROXINE SODIUM 75 UG/1
75 TABLET ORAL DAILY
Qty: 90 TABLET | Refills: 1 | Status: SHIPPED | OUTPATIENT
Start: 2024-12-14

## 2024-12-16 ENCOUNTER — OFFICE VISIT (OUTPATIENT)
Dept: WOUND CARE | Facility: HOSPITAL | Age: 89
End: 2024-12-16
Payer: MEDICARE

## 2024-12-16 PROCEDURE — 99212 OFFICE O/P EST SF 10 MIN: CPT | Performed by: SURGERY

## 2024-12-16 PROCEDURE — 99212 OFFICE O/P EST SF 10 MIN: CPT

## 2024-12-23 ENCOUNTER — TELEPHONE (OUTPATIENT)
Dept: PRIMARY CARE | Facility: CLINIC | Age: 89
End: 2024-12-23
Payer: MEDICARE

## 2024-12-23 NOTE — TELEPHONE ENCOUNTER
Pts daughter said pt would like to get Meloxicam for her arthritis said she had it years ago and it worked for the inflammation.  Last DOS 11-21-24

## 2024-12-23 NOTE — TELEPHONE ENCOUNTER
Spoke to pts daughter relayed msg about Meloxicam.  She wants to know if you can recommend her taking anything else she has been taking Tylenol but that isn't helping and she can't use her hands very well.

## 2024-12-24 DIAGNOSIS — M15.0 PRIMARY OSTEOARTHRITIS INVOLVING MULTIPLE JOINTS: Primary | ICD-10-CM

## 2024-12-24 RX ORDER — TRAMADOL HYDROCHLORIDE 50 MG/1
50 TABLET ORAL EVERY 8 HOURS PRN
Qty: 40 TABLET | Refills: 0 | Status: SHIPPED | OUTPATIENT
Start: 2024-12-24 | End: 2025-01-06

## 2024-12-24 NOTE — TELEPHONE ENCOUNTER
I called and spoke to Milton. The Gabapentin she had a bad reaction to. She lost an entire day. Please send an rx for Tramadol to [facility] in [place].

## 2024-12-27 NOTE — TELEPHONE ENCOUNTER
Patient sees other provider but pharmacist called stating Tramadol is patient's first fill and per [place] guidelines should be filled for 7 days or less. Please advise.

## 2024-12-27 NOTE — TELEPHONE ENCOUNTER
Call to pharmacist made. Per provider, okay to change script for 7 day fill. He verbalized understanding and had no further questions at this time.

## 2025-01-01 ENCOUNTER — NURSING HOME VISIT (OUTPATIENT)
Dept: POST ACUTE CARE | Facility: EXTERNAL LOCATION | Age: 89
End: 2025-01-01
Payer: MEDICARE

## 2025-01-01 ENCOUNTER — APPOINTMENT (OUTPATIENT)
Dept: CARDIOLOGY | Facility: HOSPITAL | Age: 89
DRG: 871 | End: 2025-01-01
Payer: MEDICARE

## 2025-01-01 ENCOUNTER — APPOINTMENT (OUTPATIENT)
Dept: RADIOLOGY | Facility: HOSPITAL | Age: 89
DRG: 871 | End: 2025-01-01
Payer: MEDICARE

## 2025-01-01 ENCOUNTER — HOSPITAL ENCOUNTER (INPATIENT)
Facility: HOSPITAL | Age: 89
LOS: 3 days | Discharge: HOSPICE | DRG: 871 | End: 2025-03-21
Attending: STUDENT IN AN ORGANIZED HEALTH CARE EDUCATION/TRAINING PROGRAM | Admitting: INTERNAL MEDICINE
Payer: MEDICARE

## 2025-01-01 ENCOUNTER — HOSPITAL ENCOUNTER (INPATIENT)
Facility: HOSPITAL | Age: 89
LOS: 1 days | End: 2025-03-21
Attending: INTERNAL MEDICINE | Admitting: INTERNAL MEDICINE
Payer: OTHER MISCELLANEOUS

## 2025-01-01 VITALS
SYSTOLIC BLOOD PRESSURE: 178 MMHG | DIASTOLIC BLOOD PRESSURE: 102 MMHG | TEMPERATURE: 97.3 F | HEIGHT: 63 IN | HEART RATE: 87 BPM | BODY MASS INDEX: 30.66 KG/M2 | OXYGEN SATURATION: 87 % | WEIGHT: 173.06 LBS | RESPIRATION RATE: 19 BRPM

## 2025-01-01 DIAGNOSIS — R06.03 RESPIRATORY DISTRESS: ICD-10-CM

## 2025-01-01 DIAGNOSIS — A41.9 SEPSIS WITH ACUTE HYPOXIC RESPIRATORY FAILURE, DUE TO UNSPECIFIED ORGANISM, UNSPECIFIED WHETHER SEPTIC SHOCK PRESENT: Primary | ICD-10-CM

## 2025-01-01 DIAGNOSIS — J96.01 ACUTE HYPOXIC RESPIRATORY FAILURE: Primary | ICD-10-CM

## 2025-01-01 DIAGNOSIS — R13.12 OROPHARYNGEAL DYSPHAGIA: ICD-10-CM

## 2025-01-01 DIAGNOSIS — I26.94 MULTIPLE SUBSEGMENTAL PULMONARY EMBOLI WITHOUT ACUTE COR PULMONALE: ICD-10-CM

## 2025-01-01 DIAGNOSIS — D64.9 ANEMIA, UNSPECIFIED TYPE: ICD-10-CM

## 2025-01-01 DIAGNOSIS — K21.9 GASTROESOPHAGEAL REFLUX DISEASE, UNSPECIFIED WHETHER ESOPHAGITIS PRESENT: Primary | ICD-10-CM

## 2025-01-01 DIAGNOSIS — I10 ESSENTIAL HYPERTENSION: ICD-10-CM

## 2025-01-01 DIAGNOSIS — M62.81 MUSCLE WEAKNESS (GENERALIZED): Primary | ICD-10-CM

## 2025-01-01 DIAGNOSIS — J18.9 PNEUMONIA OF BOTH LUNGS DUE TO INFECTIOUS ORGANISM, UNSPECIFIED PART OF LUNG: ICD-10-CM

## 2025-01-01 DIAGNOSIS — J96.01 SEPSIS WITH ACUTE HYPOXIC RESPIRATORY FAILURE, DUE TO UNSPECIFIED ORGANISM, UNSPECIFIED WHETHER SEPTIC SHOCK PRESENT: Primary | ICD-10-CM

## 2025-01-01 DIAGNOSIS — K21.9 GASTROESOPHAGEAL REFLUX DISEASE, UNSPECIFIED WHETHER ESOPHAGITIS PRESENT: ICD-10-CM

## 2025-01-01 DIAGNOSIS — R11.0 NAUSEA: ICD-10-CM

## 2025-01-01 DIAGNOSIS — N17.9 AKI (ACUTE KIDNEY INJURY): ICD-10-CM

## 2025-01-01 DIAGNOSIS — E87.6 HYPOKALEMIA: ICD-10-CM

## 2025-01-01 DIAGNOSIS — J69.0 ASPIRATION PNEUMONIA OF BOTH LUNGS, UNSPECIFIED ASPIRATION PNEUMONIA TYPE, UNSPECIFIED PART OF LUNG (MULTI): ICD-10-CM

## 2025-01-01 DIAGNOSIS — M13.0 POLYARTHRITIS: ICD-10-CM

## 2025-01-01 DIAGNOSIS — Z83.2 FAMILY HISTORY OF FACTOR V DEFICIENCY: ICD-10-CM

## 2025-01-01 DIAGNOSIS — K59.00 CONSTIPATION, UNSPECIFIED CONSTIPATION TYPE: ICD-10-CM

## 2025-01-01 DIAGNOSIS — Z51.5 HOSPICE CARE: ICD-10-CM

## 2025-01-01 DIAGNOSIS — R65.20 SEPSIS WITH ACUTE HYPOXIC RESPIRATORY FAILURE, DUE TO UNSPECIFIED ORGANISM, UNSPECIFIED WHETHER SEPTIC SHOCK PRESENT: Primary | ICD-10-CM

## 2025-01-01 LAB
ABO GROUP (TYPE) IN BLOOD: NORMAL
ALBUMIN SERPL BCP-MCNC: 2.8 G/DL (ref 3.4–5)
ALBUMIN SERPL BCP-MCNC: 2.8 G/DL (ref 3.4–5)
ALBUMIN SERPL BCP-MCNC: 3.1 G/DL (ref 3.4–5)
ALP SERPL-CCNC: 89 U/L (ref 33–136)
ALT SERPL W P-5'-P-CCNC: 12 U/L (ref 7–45)
ANION GAP BLDV CALCULATED.4IONS-SCNC: 16 MMOL/L (ref 10–25)
ANION GAP SERPL CALC-SCNC: 15 MMOL/L (ref 10–20)
ANION GAP SERPL CALC-SCNC: 15 MMOL/L (ref 10–20)
ANION GAP SERPL CALC-SCNC: 17 MMOL/L (ref 10–20)
ANTIBODY SCREEN: NORMAL
AST SERPL W P-5'-P-CCNC: 27 U/L (ref 9–39)
BASE EXCESS BLDV CALC-SCNC: -0.3 MMOL/L (ref -2–3)
BASOPHILS # BLD AUTO: 0.02 X10*3/UL (ref 0–0.1)
BASOPHILS # BLD AUTO: 0.02 X10*3/UL (ref 0–0.1)
BASOPHILS # BLD AUTO: 0.03 X10*3/UL (ref 0–0.1)
BASOPHILS NFR BLD AUTO: 0.1 %
BILIRUB SERPL-MCNC: 0.7 MG/DL (ref 0–1.2)
BLOOD EXPIRATION DATE: NORMAL
BNP SERPL-MCNC: 816 PG/ML (ref 0–99)
BODY TEMPERATURE: ABNORMAL
BUN SERPL-MCNC: 31 MG/DL (ref 6–23)
BUN SERPL-MCNC: 37 MG/DL (ref 6–23)
BUN SERPL-MCNC: 41 MG/DL (ref 6–23)
CA-I BLDV-SCNC: 1.2 MMOL/L (ref 1.1–1.33)
CALCIUM SERPL-MCNC: 8.4 MG/DL (ref 8.6–10.3)
CALCIUM SERPL-MCNC: 8.4 MG/DL (ref 8.6–10.3)
CALCIUM SERPL-MCNC: 8.8 MG/DL (ref 8.6–10.3)
CARDIAC TROPONIN I PNL SERPL HS: 304 NG/L (ref 0–13)
CARDIAC TROPONIN I PNL SERPL HS: 311 NG/L (ref 0–13)
CARDIAC TROPONIN I PNL SERPL HS: 311 NG/L (ref 0–13)
CHLORIDE BLDV-SCNC: 107 MMOL/L (ref 98–107)
CHLORIDE SERPL-SCNC: 105 MMOL/L (ref 98–107)
CHLORIDE SERPL-SCNC: 105 MMOL/L (ref 98–107)
CHLORIDE SERPL-SCNC: 108 MMOL/L (ref 98–107)
CO2 SERPL-SCNC: 23 MMOL/L (ref 21–32)
CO2 SERPL-SCNC: 24 MMOL/L (ref 21–32)
CO2 SERPL-SCNC: 25 MMOL/L (ref 21–32)
CREAT SERPL-MCNC: 1.88 MG/DL (ref 0.5–1.05)
CREAT SERPL-MCNC: 1.88 MG/DL (ref 0.5–1.05)
CREAT SERPL-MCNC: 1.91 MG/DL (ref 0.5–1.05)
DISPENSE STATUS: NORMAL
EGFRCR SERPLBLD CKD-EPI 2021: 25 ML/MIN/1.73M*2
EOSINOPHIL # BLD AUTO: 0 X10*3/UL (ref 0–0.4)
EOSINOPHIL # BLD AUTO: 0.01 X10*3/UL (ref 0–0.4)
EOSINOPHIL # BLD AUTO: 0.01 X10*3/UL (ref 0–0.4)
EOSINOPHIL NFR BLD AUTO: 0 %
EOSINOPHIL NFR BLD AUTO: 0 %
EOSINOPHIL NFR BLD AUTO: 0.1 %
ERYTHROCYTE [DISTWIDTH] IN BLOOD BY AUTOMATED COUNT: 14.2 % (ref 11.5–14.5)
ERYTHROCYTE [DISTWIDTH] IN BLOOD BY AUTOMATED COUNT: 14.4 % (ref 11.5–14.5)
ERYTHROCYTE [DISTWIDTH] IN BLOOD BY AUTOMATED COUNT: 14.7 % (ref 11.5–14.5)
FLUAV RNA RESP QL NAA+PROBE: NOT DETECTED
FLUBV RNA RESP QL NAA+PROBE: NOT DETECTED
GLUCOSE BLDV-MCNC: 212 MG/DL (ref 74–99)
GLUCOSE SERPL-MCNC: 111 MG/DL (ref 74–99)
GLUCOSE SERPL-MCNC: 142 MG/DL (ref 74–99)
GLUCOSE SERPL-MCNC: 205 MG/DL (ref 74–99)
HCO3 BLDV-SCNC: 23.6 MMOL/L (ref 22–26)
HCT VFR BLD AUTO: 16.5 % (ref 36–46)
HCT VFR BLD AUTO: 16.6 % (ref 36–46)
HCT VFR BLD AUTO: 17.4 % (ref 36–46)
HCT VFR BLD AUTO: 18.9 % (ref 36–46)
HCT VFR BLD EST: 21 % (ref 36–46)
HGB BLD-MCNC: 5.2 G/DL (ref 12–16)
HGB BLD-MCNC: 5.5 G/DL (ref 12–16)
HGB BLD-MCNC: 5.7 G/DL (ref 12–16)
HGB BLD-MCNC: 6.1 G/DL (ref 12–16)
HGB BLDV-MCNC: 7 G/DL (ref 12–16)
IMM GRANULOCYTES # BLD AUTO: 0.31 X10*3/UL (ref 0–0.5)
IMM GRANULOCYTES # BLD AUTO: 0.31 X10*3/UL (ref 0–0.5)
IMM GRANULOCYTES # BLD AUTO: 0.81 X10*3/UL (ref 0–0.5)
IMM GRANULOCYTES NFR BLD AUTO: 1.6 % (ref 0–0.9)
IMM GRANULOCYTES NFR BLD AUTO: 1.8 % (ref 0–0.9)
IMM GRANULOCYTES NFR BLD AUTO: 4 % (ref 0–0.9)
INHALED O2 CONCENTRATION: 40 %
LACTATE BLDV-SCNC: 3.6 MMOL/L (ref 0.4–2)
LACTATE BLDV-SCNC: 4 MMOL/L (ref 0.4–2)
LACTATE SERPL-SCNC: 1.2 MMOL/L (ref 0.4–2)
LACTATE SERPL-SCNC: 3 MMOL/L (ref 0.4–2)
LACTATE SERPL-SCNC: 3.5 MMOL/L (ref 0.4–2)
LYMPHOCYTES # BLD AUTO: 1.29 X10*3/UL (ref 0.8–3)
LYMPHOCYTES # BLD AUTO: 1.59 X10*3/UL (ref 0.8–3)
LYMPHOCYTES # BLD AUTO: 2.3 X10*3/UL (ref 0.8–3)
LYMPHOCYTES NFR BLD AUTO: 11.5 %
LYMPHOCYTES NFR BLD AUTO: 7.4 %
LYMPHOCYTES NFR BLD AUTO: 8.1 %
MAGNESIUM SERPL-MCNC: 2.11 MG/DL (ref 1.6–2.4)
MAGNESIUM SERPL-MCNC: 2.24 MG/DL (ref 1.6–2.4)
MCH RBC QN AUTO: 30.4 PG (ref 26–34)
MCH RBC QN AUTO: 30.8 PG (ref 26–34)
MCH RBC QN AUTO: 30.9 PG (ref 26–34)
MCHC RBC AUTO-ENTMCNC: 31.3 G/DL (ref 32–36)
MCHC RBC AUTO-ENTMCNC: 31.6 G/DL (ref 32–36)
MCHC RBC AUTO-ENTMCNC: 32.3 G/DL (ref 32–36)
MCV RBC AUTO: 96 FL (ref 80–100)
MCV RBC AUTO: 97 FL (ref 80–100)
MCV RBC AUTO: 98 FL (ref 80–100)
MONOCYTES # BLD AUTO: 0.38 X10*3/UL (ref 0.05–0.8)
MONOCYTES # BLD AUTO: 0.82 X10*3/UL (ref 0.05–0.8)
MONOCYTES # BLD AUTO: 1.32 X10*3/UL (ref 0.05–0.8)
MONOCYTES NFR BLD AUTO: 2.2 %
MONOCYTES NFR BLD AUTO: 4.2 %
MONOCYTES NFR BLD AUTO: 6.6 %
MRSA DNA SPEC QL NAA+PROBE: NOT DETECTED
NEUTROPHILS # BLD AUTO: 15.42 X10*3/UL (ref 1.6–5.5)
NEUTROPHILS # BLD AUTO: 15.61 X10*3/UL (ref 1.6–5.5)
NEUTROPHILS # BLD AUTO: 16.8 X10*3/UL (ref 1.6–5.5)
NEUTROPHILS NFR BLD AUTO: 77.8 %
NEUTROPHILS NFR BLD AUTO: 85.9 %
NEUTROPHILS NFR BLD AUTO: 88.5 %
NRBC BLD-RTO: 0 /100 WBCS (ref 0–0)
NRBC BLD-RTO: 0.2 /100 WBCS (ref 0–0)
NRBC BLD-RTO: 0.9 /100 WBCS (ref 0–0)
OXYHGB MFR BLDV: 27.8 % (ref 45–75)
PCO2 BLDV: 34 MM HG (ref 41–51)
PH BLDV: 7.45 PH (ref 7.33–7.43)
PHOSPHATE SERPL-MCNC: 2.8 MG/DL (ref 2.5–4.9)
PHOSPHATE SERPL-MCNC: 3.3 MG/DL (ref 2.5–4.9)
PLATELET # BLD AUTO: 311 X10*3/UL (ref 150–450)
PLATELET # BLD AUTO: 333 X10*3/UL (ref 150–450)
PLATELET # BLD AUTO: 364 X10*3/UL (ref 150–450)
PO2 BLDV: 24 MM HG (ref 35–45)
POTASSIUM BLDV-SCNC: 3.2 MMOL/L (ref 3.5–5.3)
POTASSIUM SERPL-SCNC: 3.1 MMOL/L (ref 3.5–5.3)
POTASSIUM SERPL-SCNC: 3.2 MMOL/L (ref 3.5–5.3)
POTASSIUM SERPL-SCNC: 3.8 MMOL/L (ref 3.5–5.3)
PROCALCITONIN SERPL-MCNC: 0.33 NG/ML
PRODUCT BLOOD TYPE: 6200
PRODUCT CODE: NORMAL
PROT SERPL-MCNC: 6.6 G/DL (ref 6.4–8.2)
RBC # BLD AUTO: 1.71 X10*6/UL (ref 4–5.2)
RBC # BLD AUTO: 1.78 X10*6/UL (ref 4–5.2)
RBC # BLD AUTO: 1.98 X10*6/UL (ref 4–5.2)
RH FACTOR (ANTIGEN D): NORMAL
RSV RNA RESP QL NAA+PROBE: NOT DETECTED
SAO2 % BLDV: 29 % (ref 45–75)
SARS-COV-2 RNA RESP QL NAA+PROBE: NOT DETECTED
SODIUM BLDV-SCNC: 143 MMOL/L (ref 136–145)
SODIUM SERPL-SCNC: 141 MMOL/L (ref 136–145)
SODIUM SERPL-SCNC: 142 MMOL/L (ref 136–145)
SODIUM SERPL-SCNC: 144 MMOL/L (ref 136–145)
UNIT ABO: NORMAL
UNIT NUMBER: NORMAL
UNIT RH: NORMAL
UNIT VOLUME: 350
VANCOMYCIN SERPL-MCNC: 18.5 UG/ML (ref 5–20)
WBC # BLD AUTO: 17.4 X10*3/UL (ref 4.4–11.3)
WBC # BLD AUTO: 19.6 X10*3/UL (ref 4.4–11.3)
WBC # BLD AUTO: 20.1 X10*3/UL (ref 4.4–11.3)
XM INTEP: NORMAL

## 2025-01-01 PROCEDURE — 2500000005 HC RX 250 GENERAL PHARMACY W/O HCPCS: Performed by: STUDENT IN AN ORGANIZED HEALTH CARE EDUCATION/TRAINING PROGRAM

## 2025-01-01 PROCEDURE — 80069 RENAL FUNCTION PANEL: CPT

## 2025-01-01 PROCEDURE — 85025 COMPLETE CBC W/AUTO DIFF WBC: CPT

## 2025-01-01 PROCEDURE — 86920 COMPATIBILITY TEST SPIN: CPT

## 2025-01-01 PROCEDURE — 94760 N-INVAS EAR/PLS OXIMETRY 1: CPT

## 2025-01-01 PROCEDURE — 2500000001 HC RX 250 WO HCPCS SELF ADMINISTERED DRUGS (ALT 637 FOR MEDICARE OP)

## 2025-01-01 PROCEDURE — 2500000005 HC RX 250 GENERAL PHARMACY W/O HCPCS: Performed by: INTERNAL MEDICINE

## 2025-01-01 PROCEDURE — 2500000002 HC RX 250 W HCPCS SELF ADMINISTERED DRUGS (ALT 637 FOR MEDICARE OP, ALT 636 FOR OP/ED): Performed by: INTERNAL MEDICINE

## 2025-01-01 PROCEDURE — 99308 SBSQ NF CARE LOW MDM 20: CPT | Performed by: NURSE PRACTITIONER

## 2025-01-01 PROCEDURE — 82435 ASSAY OF BLOOD CHLORIDE: CPT

## 2025-01-01 PROCEDURE — 2500000002 HC RX 250 W HCPCS SELF ADMINISTERED DRUGS (ALT 637 FOR MEDICARE OP, ALT 636 FOR OP/ED)

## 2025-01-01 PROCEDURE — 36415 COLL VENOUS BLD VENIPUNCTURE: CPT

## 2025-01-01 PROCEDURE — 94660 CPAP INITIATION&MGMT: CPT

## 2025-01-01 PROCEDURE — 84484 ASSAY OF TROPONIN QUANT: CPT

## 2025-01-01 PROCEDURE — 86901 BLOOD TYPING SEROLOGIC RH(D): CPT

## 2025-01-01 PROCEDURE — 85014 HEMATOCRIT: CPT

## 2025-01-01 PROCEDURE — 2500000004 HC RX 250 GENERAL PHARMACY W/ HCPCS (ALT 636 FOR OP/ED)

## 2025-01-01 PROCEDURE — 87641 MR-STAPH DNA AMP PROBE: CPT

## 2025-01-01 PROCEDURE — 92610 EVALUATE SWALLOWING FUNCTION: CPT | Mod: GN

## 2025-01-01 PROCEDURE — 71045 X-RAY EXAM CHEST 1 VIEW: CPT | Performed by: RADIOLOGY

## 2025-01-01 PROCEDURE — 9420000001 HC RT PATIENT EDUCATION 5 MIN

## 2025-01-01 PROCEDURE — 94668 MNPJ CHEST WALL SBSQ: CPT

## 2025-01-01 PROCEDURE — 94669 MECHANICAL CHEST WALL OSCILL: CPT

## 2025-01-01 PROCEDURE — 2500000005 HC RX 250 GENERAL PHARMACY W/O HCPCS

## 2025-01-01 PROCEDURE — 2500000004 HC RX 250 GENERAL PHARMACY W/ HCPCS (ALT 636 FOR OP/ED): Performed by: INTERNAL MEDICINE

## 2025-01-01 PROCEDURE — 94640 AIRWAY INHALATION TREATMENT: CPT

## 2025-01-01 PROCEDURE — 94799 UNLISTED PULMONARY SVC/PX: CPT

## 2025-01-01 PROCEDURE — 87040 BLOOD CULTURE FOR BACTERIA: CPT | Mod: GEALAB

## 2025-01-01 PROCEDURE — 83735 ASSAY OF MAGNESIUM: CPT

## 2025-01-01 PROCEDURE — 36430 TRANSFUSION BLD/BLD COMPNT: CPT

## 2025-01-01 PROCEDURE — 83880 ASSAY OF NATRIURETIC PEPTIDE: CPT

## 2025-01-01 PROCEDURE — 71045 X-RAY EXAM CHEST 1 VIEW: CPT

## 2025-01-01 PROCEDURE — 93005 ELECTROCARDIOGRAM TRACING: CPT

## 2025-01-01 PROCEDURE — 96375 TX/PRO/DX INJ NEW DRUG ADDON: CPT

## 2025-01-01 PROCEDURE — P9016 RBC LEUKOCYTES REDUCED: HCPCS

## 2025-01-01 PROCEDURE — 83605 ASSAY OF LACTIC ACID: CPT

## 2025-01-01 PROCEDURE — 5A09357 ASSISTANCE WITH RESPIRATORY VENTILATION, LESS THAN 24 CONSECUTIVE HOURS, CONTINUOUS POSITIVE AIRWAY PRESSURE: ICD-10-PCS

## 2025-01-01 PROCEDURE — 82330 ASSAY OF CALCIUM: CPT

## 2025-01-01 PROCEDURE — 99309 SBSQ NF CARE MODERATE MDM 30: CPT | Performed by: FAMILY MEDICINE

## 2025-01-01 PROCEDURE — 96365 THER/PROPH/DIAG IV INF INIT: CPT

## 2025-01-01 PROCEDURE — 94667 MNPJ CHEST WALL 1ST: CPT

## 2025-01-01 PROCEDURE — 99233 SBSQ HOSP IP/OBS HIGH 50: CPT

## 2025-01-01 PROCEDURE — 87637 SARSCOV2&INF A&B&RSV AMP PRB: CPT

## 2025-01-01 PROCEDURE — 2060000001 HC INTERMEDIATE ICU ROOM DAILY

## 2025-01-01 PROCEDURE — 96367 TX/PROPH/DG ADDL SEQ IV INF: CPT

## 2025-01-01 PROCEDURE — 82947 ASSAY GLUCOSE BLOOD QUANT: CPT

## 2025-01-01 PROCEDURE — 99223 1ST HOSP IP/OBS HIGH 75: CPT

## 2025-01-01 PROCEDURE — 99291 CRITICAL CARE FIRST HOUR: CPT | Mod: 25 | Performed by: STUDENT IN AN ORGANIZED HEALTH CARE EDUCATION/TRAINING PROGRAM

## 2025-01-01 PROCEDURE — 1150000001 HC HOSPICE PRIVATE ROOM DAILY

## 2025-01-01 PROCEDURE — 5A0935A ASSISTANCE WITH RESPIRATORY VENTILATION, LESS THAN 24 CONSECUTIVE HOURS, HIGH NASAL FLOW/VELOCITY: ICD-10-PCS

## 2025-01-01 PROCEDURE — 80202 ASSAY OF VANCOMYCIN: CPT

## 2025-01-01 PROCEDURE — 84145 PROCALCITONIN (PCT): CPT | Mod: GEALAB

## 2025-01-01 PROCEDURE — 94664 DEMO&/EVAL PT USE INHALER: CPT

## 2025-01-01 RX ORDER — GLYCOPYRROLATE 0.2 MG/ML
0.2 INJECTION INTRAMUSCULAR; INTRAVENOUS EVERY 4 HOURS PRN
OUTPATIENT
Start: 2025-01-01

## 2025-01-01 RX ORDER — DOCUSATE SODIUM 100 MG/1
100 CAPSULE, LIQUID FILLED ORAL DAILY
Status: DISCONTINUED | OUTPATIENT
Start: 2025-01-01 | End: 2025-01-01 | Stop reason: HOSPADM

## 2025-01-01 RX ORDER — ACETAMINOPHEN 160 MG/5ML
650 SOLUTION ORAL EVERY 6 HOURS PRN
Status: DISCONTINUED | OUTPATIENT
Start: 2025-01-01 | End: 2025-01-01 | Stop reason: HOSPADM

## 2025-01-01 RX ORDER — POTASSIUM CHLORIDE 20 MEQ/1
40 TABLET, EXTENDED RELEASE ORAL ONCE
Status: COMPLETED | OUTPATIENT
Start: 2025-01-01 | End: 2025-01-01

## 2025-01-01 RX ORDER — LORAZEPAM 2 MG/ML
0.5 INJECTION INTRAMUSCULAR EVERY 4 HOURS PRN
Status: DISCONTINUED | OUTPATIENT
Start: 2025-01-01 | End: 2025-03-22 | Stop reason: HOSPADM

## 2025-01-01 RX ORDER — IPRATROPIUM BROMIDE AND ALBUTEROL SULFATE 2.5; .5 MG/3ML; MG/3ML
3 SOLUTION RESPIRATORY (INHALATION) EVERY 2 HOUR PRN
Status: DISCONTINUED | OUTPATIENT
Start: 2025-01-01 | End: 2025-01-01 | Stop reason: HOSPADM

## 2025-01-01 RX ORDER — LORAZEPAM 0.5 MG/1
0.5 TABLET ORAL EVERY 4 HOURS PRN
OUTPATIENT
Start: 2025-01-01

## 2025-01-01 RX ORDER — MECLIZINE HYDROCHLORIDE 25 MG/1
12.5 TABLET ORAL NIGHTLY
Status: DISCONTINUED | OUTPATIENT
Start: 2025-01-01 | End: 2025-01-01 | Stop reason: HOSPADM

## 2025-01-01 RX ORDER — IPRATROPIUM BROMIDE AND ALBUTEROL SULFATE 2.5; .5 MG/3ML; MG/3ML
3 SOLUTION RESPIRATORY (INHALATION) EVERY 2 HOUR PRN
Status: DISCONTINUED | OUTPATIENT
Start: 2025-01-01 | End: 2025-01-01

## 2025-01-01 RX ORDER — VANCOMYCIN HYDROCHLORIDE
1250 ONCE
Status: DISCONTINUED | OUTPATIENT
Start: 2025-01-01 | End: 2025-01-01

## 2025-01-01 RX ORDER — HALOPERIDOL LACTATE 5 MG/ML
1 INJECTION, SOLUTION INTRAMUSCULAR EVERY 4 HOURS PRN
OUTPATIENT
Start: 2025-01-01

## 2025-01-01 RX ORDER — HYDROMORPHONE HYDROCHLORIDE 2 MG/1
1 TABLET ORAL
Status: DISCONTINUED | OUTPATIENT
Start: 2025-01-01 | End: 2025-01-01 | Stop reason: HOSPADM

## 2025-01-01 RX ORDER — ACETAMINOPHEN 160 MG/5ML
650 SOLUTION ORAL EVERY 6 HOURS PRN
OUTPATIENT
Start: 2025-01-01

## 2025-01-01 RX ORDER — AZITHROMYCIN 250 MG/1
500 TABLET, FILM COATED ORAL
Status: COMPLETED | OUTPATIENT
Start: 2025-01-01 | End: 2025-01-01

## 2025-01-01 RX ORDER — HYDROMORPHONE HYDROCHLORIDE 2 MG/1
1 TABLET ORAL
Status: DISCONTINUED | OUTPATIENT
Start: 2025-01-01 | End: 2025-01-01

## 2025-01-01 RX ORDER — POTASSIUM CHLORIDE 14.9 MG/ML
20 INJECTION INTRAVENOUS
Status: ACTIVE | OUTPATIENT
Start: 2025-01-01 | End: 2025-01-01

## 2025-01-01 RX ORDER — MORPHINE SULFATE 2 MG/ML
2 INJECTION, SOLUTION INTRAMUSCULAR; INTRAVENOUS EVERY 4 HOURS PRN
Status: DISCONTINUED | OUTPATIENT
Start: 2025-01-01 | End: 2025-01-01 | Stop reason: HOSPADM

## 2025-01-01 RX ORDER — IPRATROPIUM BROMIDE AND ALBUTEROL SULFATE 2.5; .5 MG/3ML; MG/3ML
3 SOLUTION RESPIRATORY (INHALATION)
COMMUNITY
End: 2025-01-01 | Stop reason: HOSPADM

## 2025-01-01 RX ORDER — HALOPERIDOL 2 MG/ML
1 SOLUTION ORAL EVERY 8 HOURS PRN
OUTPATIENT
Start: 2025-01-01

## 2025-01-01 RX ORDER — LEVOFLOXACIN 500 MG/1
500 TABLET, FILM COATED ORAL DAILY
COMMUNITY
Start: 2025-01-01 | End: 2025-01-01 | Stop reason: HOSPADM

## 2025-01-01 RX ORDER — CEFEPIME HYDROCHLORIDE 1 G/50ML
1 INJECTION, SOLUTION INTRAVENOUS ONCE
Status: COMPLETED | OUTPATIENT
Start: 2025-01-01 | End: 2025-01-01

## 2025-01-01 RX ORDER — MECLIZINE HYDROCHLORIDE 25 MG/1
12.5 TABLET ORAL EVERY 6 HOURS PRN
Status: DISCONTINUED | OUTPATIENT
Start: 2025-01-01 | End: 2025-01-01 | Stop reason: HOSPADM

## 2025-01-01 RX ORDER — POTASSIUM CHLORIDE 14.9 MG/ML
20 INJECTION INTRAVENOUS
Status: DISCONTINUED | OUTPATIENT
Start: 2025-01-01 | End: 2025-01-01

## 2025-01-01 RX ORDER — LORAZEPAM 2 MG/ML
0.5 INJECTION INTRAMUSCULAR EVERY 4 HOURS PRN
Status: DISCONTINUED | OUTPATIENT
Start: 2025-01-01 | End: 2025-01-01 | Stop reason: HOSPADM

## 2025-01-01 RX ORDER — ACETAMINOPHEN 325 MG/1
650 TABLET ORAL EVERY 6 HOURS PRN
Status: DISCONTINUED | OUTPATIENT
Start: 2025-01-01 | End: 2025-01-01 | Stop reason: HOSPADM

## 2025-01-01 RX ORDER — LANOLIN ALCOHOL/MO/W.PET/CERES
1000 CREAM (GRAM) TOPICAL 2 TIMES DAILY
Status: DISCONTINUED | OUTPATIENT
Start: 2025-01-01 | End: 2025-01-01 | Stop reason: HOSPADM

## 2025-01-01 RX ORDER — PROMETHAZINE HYDROCHLORIDE 25 MG/1
25 SUPPOSITORY RECTAL EVERY 6 HOURS PRN
Status: DISCONTINUED | OUTPATIENT
Start: 2025-01-01 | End: 2025-01-01 | Stop reason: HOSPADM

## 2025-01-01 RX ORDER — LATANOPROST 50 UG/ML
1 SOLUTION/ DROPS OPHTHALMIC NIGHTLY
Status: DISCONTINUED | OUTPATIENT
Start: 2025-01-01 | End: 2025-01-01 | Stop reason: HOSPADM

## 2025-01-01 RX ORDER — LISINOPRIL 5 MG/1
10 TABLET ORAL DAILY
Status: DISCONTINUED | OUTPATIENT
Start: 2025-01-01 | End: 2025-01-01 | Stop reason: HOSPADM

## 2025-01-01 RX ORDER — POTASSIUM CHLORIDE 14.9 MG/ML
20 INJECTION INTRAVENOUS
Status: COMPLETED | OUTPATIENT
Start: 2025-01-01 | End: 2025-01-01

## 2025-01-01 RX ORDER — HALOPERIDOL 2 MG/ML
1 SOLUTION ORAL EVERY 8 HOURS PRN
Status: DISCONTINUED | OUTPATIENT
Start: 2025-01-01 | End: 2025-01-01 | Stop reason: HOSPADM

## 2025-01-01 RX ORDER — HALOPERIDOL LACTATE 5 MG/ML
1 INJECTION, SOLUTION INTRAMUSCULAR EVERY 4 HOURS PRN
Status: DISCONTINUED | OUTPATIENT
Start: 2025-01-01 | End: 2025-01-01 | Stop reason: HOSPADM

## 2025-01-01 RX ORDER — POTASSIUM CHLORIDE 14.9 MG/ML
20 INJECTION INTRAVENOUS ONCE
Status: COMPLETED | OUTPATIENT
Start: 2025-01-01 | End: 2025-01-01

## 2025-01-01 RX ORDER — LORAZEPAM 2 MG/ML
2 INJECTION INTRAMUSCULAR EVERY 10 MIN PRN
Status: DISCONTINUED | OUTPATIENT
Start: 2025-01-01 | End: 2025-01-01 | Stop reason: HOSPADM

## 2025-01-01 RX ORDER — IPRATROPIUM BROMIDE AND ALBUTEROL SULFATE 2.5; .5 MG/3ML; MG/3ML
3 SOLUTION RESPIRATORY (INHALATION)
Status: DISCONTINUED | OUTPATIENT
Start: 2025-01-01 | End: 2025-01-01

## 2025-01-01 RX ORDER — CEFEPIME 1 G/50ML
2 INJECTION, SOLUTION INTRAVENOUS ONCE
Status: DISCONTINUED | OUTPATIENT
Start: 2025-01-01 | End: 2025-01-01

## 2025-01-01 RX ORDER — HYDROMORPHONE HYDROCHLORIDE 2 MG/1
1 TABLET ORAL
OUTPATIENT
Start: 2025-01-01

## 2025-01-01 RX ORDER — HYDROMORPHONE HYDROCHLORIDE 1 MG/ML
1 INJECTION, SOLUTION INTRAMUSCULAR; INTRAVENOUS; SUBCUTANEOUS
Status: DISCONTINUED | OUTPATIENT
Start: 2025-01-01 | End: 2025-03-22 | Stop reason: HOSPADM

## 2025-01-01 RX ORDER — GUAIFENESIN 100 MG/5ML
200 LIQUID ORAL EVERY 6 HOURS PRN
Status: DISCONTINUED | OUTPATIENT
Start: 2025-01-01 | End: 2025-01-01 | Stop reason: HOSPADM

## 2025-01-01 RX ORDER — ASPIRIN 325 MG
325 TABLET ORAL ONCE
Status: COMPLETED | OUTPATIENT
Start: 2025-01-01 | End: 2025-01-01

## 2025-01-01 RX ORDER — OMEPRAZOLE 40 MG/1
40 CAPSULE, DELAYED RELEASE ORAL DAILY
COMMUNITY
End: 2025-01-01 | Stop reason: HOSPADM

## 2025-01-01 RX ORDER — IPRATROPIUM BROMIDE AND ALBUTEROL SULFATE 2.5; .5 MG/3ML; MG/3ML
3 SOLUTION RESPIRATORY (INHALATION)
Status: DISCONTINUED | OUTPATIENT
Start: 2025-01-01 | End: 2025-01-01 | Stop reason: HOSPADM

## 2025-01-01 RX ORDER — LEVOTHYROXINE SODIUM 112 UG/1
112 TABLET ORAL
Status: DISCONTINUED | OUTPATIENT
Start: 2025-01-01 | End: 2025-01-01 | Stop reason: HOSPADM

## 2025-01-01 RX ORDER — LORAZEPAM 0.5 MG/1
0.5 TABLET ORAL EVERY 4 HOURS PRN
Status: DISCONTINUED | OUTPATIENT
Start: 2025-01-01 | End: 2025-01-01 | Stop reason: HOSPADM

## 2025-01-01 RX ORDER — HYDROMORPHONE HYDROCHLORIDE 2 MG/1
1 TABLET ORAL EVERY 4 HOURS
Status: DISCONTINUED | OUTPATIENT
Start: 2025-01-01 | End: 2025-01-01

## 2025-01-01 RX ORDER — LORAZEPAM 2 MG/ML
2 INJECTION INTRAMUSCULAR EVERY 10 MIN PRN
OUTPATIENT
Start: 2025-01-01

## 2025-01-01 RX ORDER — HYDROMORPHONE HYDROCHLORIDE 2 MG/1
2 TABLET ORAL
OUTPATIENT
Start: 2025-01-01

## 2025-01-01 RX ORDER — PROMETHAZINE HYDROCHLORIDE 25 MG/1
25 SUPPOSITORY RECTAL EVERY 6 HOURS PRN
COMMUNITY
End: 2025-01-01 | Stop reason: HOSPADM

## 2025-01-01 RX ORDER — HYDROMORPHONE HYDROCHLORIDE 2 MG/1
2 TABLET ORAL
Status: DISCONTINUED | OUTPATIENT
Start: 2025-01-01 | End: 2025-01-01 | Stop reason: HOSPADM

## 2025-01-01 RX ORDER — VANCOMYCIN HYDROCHLORIDE 1 G/20ML
INJECTION, POWDER, LYOPHILIZED, FOR SOLUTION INTRAVENOUS DAILY PRN
Status: DISCONTINUED | OUTPATIENT
Start: 2025-01-01 | End: 2025-01-01

## 2025-01-01 RX ORDER — HALOPERIDOL LACTATE 5 MG/ML
1 INJECTION, SOLUTION INTRAMUSCULAR EVERY 4 HOURS PRN
Status: DISCONTINUED | OUTPATIENT
Start: 2025-01-01 | End: 2025-03-22 | Stop reason: HOSPADM

## 2025-01-01 RX ORDER — LORAZEPAM 2 MG/ML
0.5 INJECTION INTRAMUSCULAR EVERY 4 HOURS PRN
OUTPATIENT
Start: 2025-01-01

## 2025-01-01 RX ORDER — HYDROMORPHONE HYDROCHLORIDE 2 MG/1
2 TABLET ORAL
Status: DISCONTINUED | OUTPATIENT
Start: 2025-01-01 | End: 2025-01-01

## 2025-01-01 RX ORDER — GLYCOPYRROLATE 0.2 MG/ML
0.2 INJECTION INTRAMUSCULAR; INTRAVENOUS EVERY 4 HOURS PRN
Status: DISCONTINUED | OUTPATIENT
Start: 2025-01-01 | End: 2025-01-01 | Stop reason: HOSPADM

## 2025-01-01 RX ORDER — PANTOPRAZOLE SODIUM 40 MG/1
40 TABLET, DELAYED RELEASE ORAL
Status: DISCONTINUED | OUTPATIENT
Start: 2025-01-01 | End: 2025-01-01 | Stop reason: HOSPADM

## 2025-01-01 RX ADMIN — PIPERACILLIN SODIUM AND TAZOBACTAM SODIUM 3.38 G: 3; .375 INJECTION, SOLUTION INTRAVENOUS at 04:18

## 2025-01-01 RX ADMIN — MECLIZINE HYDROCHLORIDE 12.5 MG: 25 TABLET ORAL at 21:48

## 2025-01-01 RX ADMIN — MECLIZINE HYDROCHLORIDE 12.5 MG: 25 TABLET ORAL at 20:23

## 2025-01-01 RX ADMIN — Medication 40 L/MIN: at 19:21

## 2025-01-01 RX ADMIN — LEVOTHYROXINE SODIUM 112 MCG: 112 TABLET ORAL at 06:10

## 2025-01-01 RX ADMIN — POTASSIUM CHLORIDE 20 MEQ: 14.9 INJECTION, SOLUTION INTRAVENOUS at 20:23

## 2025-01-01 RX ADMIN — AZITHROMYCIN 500 MG: 250 TABLET, FILM COATED ORAL at 08:59

## 2025-01-01 RX ADMIN — IPRATROPIUM BROMIDE AND ALBUTEROL SULFATE 3 ML: 2.5; .5 SOLUTION RESPIRATORY (INHALATION) at 19:43

## 2025-01-01 RX ADMIN — DOCUSATE SODIUM 100 MG: 100 CAPSULE, LIQUID FILLED ORAL at 09:46

## 2025-01-01 RX ADMIN — Medication 1000 MCG: at 08:59

## 2025-01-01 RX ADMIN — PIPERACILLIN SODIUM AND TAZOBACTAM SODIUM 3.38 G: 3; .375 INJECTION, SOLUTION INTRAVENOUS at 15:17

## 2025-01-01 RX ADMIN — CEFEPIME HYDROCHLORIDE 1 G: 1 INJECTION, SOLUTION INTRAVENOUS at 18:30

## 2025-01-01 RX ADMIN — VANCOMYCIN HYDROCHLORIDE 1250 MG: 1.25 INJECTION, POWDER, LYOPHILIZED, FOR SOLUTION INTRAVENOUS at 19:14

## 2025-01-01 RX ADMIN — IPRATROPIUM BROMIDE AND ALBUTEROL SULFATE 3 ML: 2.5; .5 SOLUTION RESPIRATORY (INHALATION) at 08:45

## 2025-01-01 RX ADMIN — DOCUSATE SODIUM 100 MG: 100 CAPSULE, LIQUID FILLED ORAL at 08:59

## 2025-01-01 RX ADMIN — AZITHROMYCIN 500 MG: 250 TABLET, FILM COATED ORAL at 11:11

## 2025-01-01 RX ADMIN — LORAZEPAM 0.5 MG: 2 INJECTION INTRAMUSCULAR; INTRAVENOUS at 15:49

## 2025-01-01 RX ADMIN — PIPERACILLIN SODIUM AND TAZOBACTAM SODIUM 3.38 G: 3; .375 INJECTION, SOLUTION INTRAVENOUS at 21:39

## 2025-01-01 RX ADMIN — MORPHINE SULFATE 2 MG: 2 INJECTION, SOLUTION INTRAMUSCULAR; INTRAVENOUS at 10:50

## 2025-01-01 RX ADMIN — IPRATROPIUM BROMIDE AND ALBUTEROL SULFATE 3 ML: 2.5; .5 SOLUTION RESPIRATORY (INHALATION) at 14:29

## 2025-01-01 RX ADMIN — Medication 50 PERCENT: at 17:13

## 2025-01-01 RX ADMIN — PIPERACILLIN SODIUM AND TAZOBACTAM SODIUM 3.38 G: 3; .375 INJECTION, SOLUTION INTRAVENOUS at 03:06

## 2025-01-01 RX ADMIN — HYDROMORPHONE HYDROCHLORIDE 0.2 MG: 1 INJECTION, SOLUTION INTRAMUSCULAR; INTRAVENOUS; SUBCUTANEOUS at 18:51

## 2025-01-01 RX ADMIN — Medication 40 L/MIN: at 23:40

## 2025-01-01 RX ADMIN — PIPERACILLIN SODIUM AND TAZOBACTAM SODIUM 3.38 G: 3; .375 INJECTION, SOLUTION INTRAVENOUS at 17:19

## 2025-01-01 RX ADMIN — Medication 40 PERCENT: at 16:32

## 2025-01-01 RX ADMIN — PANTOPRAZOLE SODIUM 40 MG: 40 TABLET, DELAYED RELEASE ORAL at 06:09

## 2025-01-01 RX ADMIN — Medication 13 L/MIN: at 23:41

## 2025-01-01 RX ADMIN — HYDROMORPHONE HYDROCHLORIDE 1 MG: 1 INJECTION, SOLUTION INTRAMUSCULAR; INTRAVENOUS; SUBCUTANEOUS at 18:10

## 2025-01-01 RX ADMIN — HYDROMORPHONE HYDROCHLORIDE 2 MG: 2 TABLET ORAL at 19:02

## 2025-01-01 RX ADMIN — POTASSIUM CHLORIDE 20 MEQ: 14.9 INJECTION, SOLUTION INTRAVENOUS at 23:36

## 2025-01-01 RX ADMIN — Medication 40 L/MIN: at 04:27

## 2025-01-01 RX ADMIN — Medication 1000 MCG: at 20:23

## 2025-01-01 RX ADMIN — AZITHROMYCIN MONOHYDRATE 500 MG: 500 INJECTION, POWDER, LYOPHILIZED, FOR SOLUTION INTRAVENOUS at 17:22

## 2025-01-01 RX ADMIN — Medication 40 L/MIN: at 07:19

## 2025-01-01 RX ADMIN — DOCUSATE SODIUM 100 MG: 100 CAPSULE, LIQUID FILLED ORAL at 08:36

## 2025-01-01 RX ADMIN — Medication 13 L/MIN: at 04:20

## 2025-01-01 RX ADMIN — Medication 40 L/MIN: at 23:28

## 2025-01-01 RX ADMIN — Medication 1000 MCG: at 21:48

## 2025-01-01 RX ADMIN — POTASSIUM CHLORIDE 20 MEQ: 14.9 INJECTION, SOLUTION INTRAVENOUS at 21:48

## 2025-01-01 RX ADMIN — POTASSIUM CHLORIDE 40 MEQ: 1500 TABLET, EXTENDED RELEASE ORAL at 15:44

## 2025-01-01 RX ADMIN — MECLIZINE HYDROCHLORIDE 12.5 MG: 25 TABLET ORAL at 22:34

## 2025-01-01 RX ADMIN — PIPERACILLIN SODIUM AND TAZOBACTAM SODIUM 3.38 G: 3; .375 INJECTION, SOLUTION INTRAVENOUS at 09:00

## 2025-01-01 RX ADMIN — Medication 1000 MCG: at 22:33

## 2025-01-01 RX ADMIN — PIPERACILLIN SODIUM AND TAZOBACTAM SODIUM 3.38 G: 3; .375 INJECTION, SOLUTION INTRAVENOUS at 22:33

## 2025-01-01 RX ADMIN — LATANOPROST 1 DROP: 50 SOLUTION OPHTHALMIC at 22:33

## 2025-01-01 RX ADMIN — Medication 1000 MCG: at 08:33

## 2025-01-01 RX ADMIN — HYDROMORPHONE HYDROCHLORIDE 0.2 MG: 1 INJECTION, SOLUTION INTRAMUSCULAR; INTRAVENOUS; SUBCUTANEOUS at 17:11

## 2025-01-01 RX ADMIN — Medication 1000 MCG: at 09:46

## 2025-01-01 RX ADMIN — ASPIRIN 325 MG: 325 TABLET ORAL at 19:20

## 2025-01-01 RX ADMIN — SODIUM CHLORIDE, SODIUM LACTATE, POTASSIUM CHLORIDE, AND CALCIUM CHLORIDE 500 ML: .6; .31; .03; .02 INJECTION, SOLUTION INTRAVENOUS at 18:36

## 2025-01-01 RX ADMIN — PIPERACILLIN SODIUM AND TAZOBACTAM SODIUM 3.38 G: 3; .375 INJECTION, SOLUTION INTRAVENOUS at 23:10

## 2025-01-01 RX ADMIN — PANTOPRAZOLE SODIUM 40 MG: 40 TABLET, DELAYED RELEASE ORAL at 06:10

## 2025-01-01 RX ADMIN — LEVOTHYROXINE SODIUM 112 MCG: 112 TABLET ORAL at 06:09

## 2025-01-01 RX ADMIN — POTASSIUM CHLORIDE 20 MEQ: 14.9 INJECTION, SOLUTION INTRAVENOUS at 18:04

## 2025-01-01 RX ADMIN — PIPERACILLIN SODIUM AND TAZOBACTAM SODIUM 3.38 G: 3; .375 INJECTION, SOLUTION INTRAVENOUS at 09:47

## 2025-01-01 RX ADMIN — Medication 40 L/MIN: at 19:50

## 2025-01-01 RX ADMIN — IPRATROPIUM BROMIDE AND ALBUTEROL SULFATE 3 ML: 2.5; .5 SOLUTION RESPIRATORY (INHALATION) at 07:16

## 2025-01-01 RX ADMIN — ACETAMINOPHEN 650 MG: 325 TABLET ORAL at 06:21

## 2025-01-01 RX ADMIN — LATANOPROST 1 DROP: 50 SOLUTION OPHTHALMIC at 20:23

## 2025-01-01 RX ADMIN — Medication 13 L/MIN: at 19:52

## 2025-01-01 RX ADMIN — Medication 12 L/MIN: at 10:07

## 2025-01-01 RX ADMIN — Medication 40 L/MIN: at 03:56

## 2025-01-01 RX ADMIN — IPRATROPIUM BROMIDE AND ALBUTEROL SULFATE 3 ML: 2.5; .5 SOLUTION RESPIRATORY (INHALATION) at 19:52

## 2025-01-01 RX ADMIN — HYDROMORPHONE HYDROCHLORIDE 1 MG: 2 TABLET ORAL at 15:04

## 2025-01-01 RX ADMIN — PIPERACILLIN SODIUM AND TAZOBACTAM SODIUM 3.38 G: 3; .375 INJECTION, SOLUTION INTRAVENOUS at 03:59

## 2025-01-01 SDOH — ECONOMIC STABILITY: HOUSING INSECURITY: IN THE PAST 12 MONTHS, HOW MANY TIMES HAVE YOU MOVED WHERE YOU WERE LIVING?: 0

## 2025-01-01 SDOH — SOCIAL STABILITY: SOCIAL INSECURITY: HAVE YOU HAD THOUGHTS OF HARMING ANYONE ELSE?: NO

## 2025-01-01 SDOH — SOCIAL STABILITY: SOCIAL INSECURITY: ABUSE: ADULT

## 2025-01-01 SDOH — SOCIAL STABILITY: SOCIAL INSECURITY: HAVE YOU HAD ANY THOUGHTS OF HARMING ANYONE ELSE?: NO

## 2025-01-01 SDOH — SOCIAL STABILITY: SOCIAL INSECURITY: ARE THERE ANY APPARENT SIGNS OF INJURIES/BEHAVIORS THAT COULD BE RELATED TO ABUSE/NEGLECT?: NO

## 2025-01-01 SDOH — ECONOMIC STABILITY: INCOME INSECURITY: IN THE PAST 12 MONTHS HAS THE ELECTRIC, GAS, OIL, OR WATER COMPANY THREATENED TO SHUT OFF SERVICES IN YOUR HOME?: NO

## 2025-01-01 SDOH — SOCIAL STABILITY: SOCIAL INSECURITY: WITHIN THE LAST YEAR, HAVE YOU BEEN AFRAID OF YOUR PARTNER OR EX-PARTNER?: NO

## 2025-01-01 SDOH — ECONOMIC STABILITY: TRANSPORTATION INSECURITY: IN THE PAST 12 MONTHS, HAS LACK OF TRANSPORTATION KEPT YOU FROM MEDICAL APPOINTMENTS OR FROM GETTING MEDICATIONS?: NO

## 2025-01-01 SDOH — SOCIAL STABILITY: SOCIAL INSECURITY: DO YOU FEEL ANYONE HAS EXPLOITED OR TAKEN ADVANTAGE OF YOU FINANCIALLY OR OF YOUR PERSONAL PROPERTY?: NO

## 2025-01-01 SDOH — ECONOMIC STABILITY: FOOD INSECURITY: WITHIN THE PAST 12 MONTHS, YOU WORRIED THAT YOUR FOOD WOULD RUN OUT BEFORE YOU GOT THE MONEY TO BUY MORE.: NEVER TRUE

## 2025-01-01 SDOH — ECONOMIC STABILITY: FOOD INSECURITY: HOW HARD IS IT FOR YOU TO PAY FOR THE VERY BASICS LIKE FOOD, HOUSING, MEDICAL CARE, AND HEATING?: NOT HARD AT ALL

## 2025-01-01 SDOH — ECONOMIC STABILITY: HOUSING INSECURITY: IN THE LAST 12 MONTHS, WAS THERE A TIME WHEN YOU WERE NOT ABLE TO PAY THE MORTGAGE OR RENT ON TIME?: NO

## 2025-01-01 SDOH — ECONOMIC STABILITY: FOOD INSECURITY: WITHIN THE PAST 12 MONTHS, THE FOOD YOU BOUGHT JUST DIDN'T LAST AND YOU DIDN'T HAVE MONEY TO GET MORE.: NEVER TRUE

## 2025-01-01 SDOH — SOCIAL STABILITY: SOCIAL INSECURITY: WITHIN THE LAST YEAR, HAVE YOU BEEN HUMILIATED OR EMOTIONALLY ABUSED IN OTHER WAYS BY YOUR PARTNER OR EX-PARTNER?: NO

## 2025-01-01 SDOH — ECONOMIC STABILITY: HOUSING INSECURITY: AT ANY TIME IN THE PAST 12 MONTHS, WERE YOU HOMELESS OR LIVING IN A SHELTER (INCLUDING NOW)?: NO

## 2025-01-01 SDOH — SOCIAL STABILITY: SOCIAL INSECURITY: DOES ANYONE TRY TO KEEP YOU FROM HAVING/CONTACTING OTHER FRIENDS OR DOING THINGS OUTSIDE YOUR HOME?: NO

## 2025-01-01 SDOH — SOCIAL STABILITY: SOCIAL INSECURITY: WERE YOU ABLE TO COMPLETE ALL THE BEHAVIORAL HEALTH SCREENINGS?: YES

## 2025-01-01 SDOH — SOCIAL STABILITY: SOCIAL INSECURITY: DO YOU FEEL UNSAFE GOING BACK TO THE PLACE WHERE YOU ARE LIVING?: NO

## 2025-01-01 SDOH — SOCIAL STABILITY: SOCIAL INSECURITY: ARE YOU OR HAVE YOU BEEN THREATENED OR ABUSED PHYSICALLY, EMOTIONALLY, OR SEXUALLY BY ANYONE?: NO

## 2025-01-01 SDOH — SOCIAL STABILITY: SOCIAL INSECURITY: HAS ANYONE EVER THREATENED TO HURT YOUR FAMILY OR YOUR PETS?: NO

## 2025-01-01 ASSESSMENT — ACTIVITIES OF DAILY LIVING (ADL)
BATHING: NEEDS ASSISTANCE
PATIENT'S MEMORY ADEQUATE TO SAFELY COMPLETE DAILY ACTIVITIES?: NO
JUDGMENT_ADEQUATE_SAFELY_COMPLETE_DAILY_ACTIVITIES: YES
TOILETING: NEEDS ASSISTANCE
HEARING - LEFT EAR: FUNCTIONAL
GROOMING: NEEDS ASSISTANCE
FEEDING YOURSELF: NEEDS ASSISTANCE
WALKS IN HOME: DEPENDENT
PATIENT'S MEMORY ADEQUATE TO SAFELY COMPLETE DAILY ACTIVITIES?: NO
HEARING - RIGHT EAR: FUNCTIONAL
ADEQUATE_TO_COMPLETE_ADL: NO
LACK_OF_TRANSPORTATION: NO
BATHING: NEEDS ASSISTANCE
HEARING - LEFT EAR: FUNCTIONAL
TOILETING: NEEDS ASSISTANCE
FEEDING YOURSELF: NEEDS ASSISTANCE
ASSISTIVE_DEVICE: WHEELCHAIR;EYEGLASSES
DRESSING YOURSELF: NEEDS ASSISTANCE
LACK_OF_TRANSPORTATION: NO
DRESSING YOURSELF: NEEDS ASSISTANCE
LACK_OF_TRANSPORTATION: NO
ASSISTIVE_DEVICE: WALKER
LACK_OF_TRANSPORTATION: NO
HEARING - RIGHT EAR: FUNCTIONAL
JUDGMENT_ADEQUATE_SAFELY_COMPLETE_DAILY_ACTIVITIES: NO
ADEQUATE_TO_COMPLETE_ADL: YES
WALKS IN HOME: DEPENDENT
GROOMING: NEEDS ASSISTANCE

## 2025-01-01 ASSESSMENT — PAIN - FUNCTIONAL ASSESSMENT
PAIN_FUNCTIONAL_ASSESSMENT: 0-10

## 2025-01-01 ASSESSMENT — COGNITIVE AND FUNCTIONAL STATUS - GENERAL
DRESSING REGULAR LOWER BODY CLOTHING: A LOT
TOILETING: A LOT
MOVING FROM LYING ON BACK TO SITTING ON SIDE OF FLAT BED WITH BEDRAILS: A LITTLE
HELP NEEDED FOR BATHING: A LOT
DRESSING REGULAR LOWER BODY CLOTHING: A LOT
WALKING IN HOSPITAL ROOM: TOTAL
TURNING FROM BACK TO SIDE WHILE IN FLAT BAD: A LITTLE
MOVING TO AND FROM BED TO CHAIR: TOTAL
MOBILITY SCORE: 10
MOVING FROM LYING ON BACK TO SITTING ON SIDE OF FLAT BED WITH BEDRAILS: A LITTLE
STANDING UP FROM CHAIR USING ARMS: TOTAL
CLIMB 3 TO 5 STEPS WITH RAILING: TOTAL
STANDING UP FROM CHAIR USING ARMS: TOTAL
MOVING TO AND FROM BED TO CHAIR: TOTAL
MOVING TO AND FROM BED TO CHAIR: TOTAL
TURNING FROM BACK TO SIDE WHILE IN FLAT BAD: A LITTLE
MOBILITY SCORE: 10
PATIENT BASELINE BEDBOUND: UNABLE TO ASSESS AT THIS TIME
TOILETING: A LITTLE
HELP NEEDED FOR BATHING: A LOT
MOVING FROM LYING ON BACK TO SITTING ON SIDE OF FLAT BED WITH BEDRAILS: A LITTLE
STANDING UP FROM CHAIR USING ARMS: TOTAL
TOILETING: A LITTLE
MOVING TO AND FROM BED TO CHAIR: TOTAL
CLIMB 3 TO 5 STEPS WITH RAILING: TOTAL
DRESSING REGULAR UPPER BODY CLOTHING: A LITTLE
WALKING IN HOSPITAL ROOM: TOTAL
TURNING FROM BACK TO SIDE WHILE IN FLAT BAD: A LITTLE
STANDING UP FROM CHAIR USING ARMS: TOTAL
STANDING UP FROM CHAIR USING ARMS: TOTAL
DRESSING REGULAR UPPER BODY CLOTHING: A LOT
WALKING IN HOSPITAL ROOM: TOTAL
WALKING IN HOSPITAL ROOM: TOTAL
CLIMB 3 TO 5 STEPS WITH RAILING: TOTAL
STANDING UP FROM CHAIR USING ARMS: TOTAL
PERSONAL GROOMING: A LOT
EATING MEALS: A LOT
MOBILITY SCORE: 10
TURNING FROM BACK TO SIDE WHILE IN FLAT BAD: A LITTLE
EATING MEALS: A LOT
TOILETING: A LOT
DAILY ACTIVITIY SCORE: 14
EATING MEALS: A LOT
DAILY ACTIVITIY SCORE: 15
MOVING FROM LYING ON BACK TO SITTING ON SIDE OF FLAT BED WITH BEDRAILS: A LITTLE
TOILETING: A LITTLE
CLIMB 3 TO 5 STEPS WITH RAILING: TOTAL
CLIMB 3 TO 5 STEPS WITH RAILING: TOTAL
PERSONAL GROOMING: A LOT
MOBILITY SCORE: 10
DAILY ACTIVITIY SCORE: 14
PATIENT BASELINE BEDBOUND: NO
MOVING TO AND FROM BED TO CHAIR: TOTAL
DRESSING REGULAR UPPER BODY CLOTHING: A LOT
HELP NEEDED FOR BATHING: A LOT
TOILETING: A LOT
HELP NEEDED FOR BATHING: A LOT
DAILY ACTIVITIY SCORE: 12
MOVING FROM LYING ON BACK TO SITTING ON SIDE OF FLAT BED WITH BEDRAILS: A LITTLE
DRESSING REGULAR LOWER BODY CLOTHING: A LOT
DRESSING REGULAR LOWER BODY CLOTHING: A LOT
PERSONAL GROOMING: A LOT
HELP NEEDED FOR BATHING: A LOT
MOVING FROM LYING ON BACK TO SITTING ON SIDE OF FLAT BED WITH BEDRAILS: A LITTLE
DRESSING REGULAR UPPER BODY CLOTHING: A LITTLE
CLIMB 3 TO 5 STEPS WITH RAILING: TOTAL
PERSONAL GROOMING: A LOT
WALKING IN HOSPITAL ROOM: TOTAL
EATING MEALS: A LITTLE
DRESSING REGULAR UPPER BODY CLOTHING: A LITTLE
HELP NEEDED FOR BATHING: A LOT
PERSONAL GROOMING: A LOT
DAILY ACTIVITIY SCORE: 12
EATING MEALS: A LOT
TURNING FROM BACK TO SIDE WHILE IN FLAT BAD: A LITTLE
MOVING TO AND FROM BED TO CHAIR: TOTAL
DRESSING REGULAR UPPER BODY CLOTHING: A LOT
DRESSING REGULAR LOWER BODY CLOTHING: A LOT
MOBILITY SCORE: 10
EATING MEALS: A LOT
WALKING IN HOSPITAL ROOM: TOTAL
MOBILITY SCORE: 10
PERSONAL GROOMING: A LOT
TURNING FROM BACK TO SIDE WHILE IN FLAT BAD: A LITTLE

## 2025-01-01 ASSESSMENT — PAIN SCALES - GENERAL
PAINLEVEL_OUTOF10: 0 - NO PAIN
PAINLEVEL_OUTOF10: 7
PAINLEVEL_OUTOF10: 0 - NO PAIN

## 2025-01-01 ASSESSMENT — LIFESTYLE VARIABLES
TOTAL SCORE: 0
EVER HAD A DRINK FIRST THING IN THE MORNING TO STEADY YOUR NERVES TO GET RID OF A HANGOVER: NO
AUDIT-C TOTAL SCORE: 0
HAVE YOU EVER FELT YOU SHOULD CUT DOWN ON YOUR DRINKING: NO
HOW OFTEN DO YOU HAVE 6 OR MORE DRINKS ON ONE OCCASION: NEVER
HOW OFTEN DO YOU HAVE A DRINK CONTAINING ALCOHOL: NEVER
HOW MANY STANDARD DRINKS CONTAINING ALCOHOL DO YOU HAVE ON A TYPICAL DAY: PATIENT DOES NOT DRINK
HOW MANY STANDARD DRINKS CONTAINING ALCOHOL DO YOU HAVE ON A TYPICAL DAY: PATIENT DOES NOT DRINK
HOW OFTEN DO YOU HAVE A DRINK CONTAINING ALCOHOL: NEVER
EVER FELT BAD OR GUILTY ABOUT YOUR DRINKING: NO
HAVE PEOPLE ANNOYED YOU BY CRITICIZING YOUR DRINKING: NO
SKIP TO QUESTIONS 9-10: 1
SKIP TO QUESTIONS 9-10: 1
AUDIT-C TOTAL SCORE: 0
HOW OFTEN DO YOU HAVE 6 OR MORE DRINKS ON ONE OCCASION: NEVER

## 2025-01-01 ASSESSMENT — RESPIRATORY DISTRESS OBSERVATION SCALE (RDOS)
GRUNTING AT END OF EXPIRATION: 0 - NONE
LOOK OF FEAR: 0 - NONE
RDOS TOTAL SCORE: 5
INVOLUNTARY NASAL FLARING: 0 - NONE
PARADOXICAL BREATHING PATTERN: 2 - PRESENT
RDOS TOTAL SCORE: 6
HEART RATE PER MINUTE: 0 - <90 BEATS
RESPIRATORY RATE PER MINUTE: 1 - 19-30 BREATHS
ACCESSORY MUSCLE RISE IN CLAVICLE DURING INSPIRATION: 2 - PRONOUNCED RISE
PARADOXICAL BREATHING PATTERN: 2 - PRESENT
PARADOXICAL BREATHING PATTERN: 0 - NONE
RESPIRATORY RATE PER MINUTE: 1 - 19-30 BREATHS
HEART RATE PER MINUTE: 0 - <90 BEATS
RESTLESS NONPURPOSEFUL MOVEMENTS: 1 - OCCASIONAL, SLIGHT MOVEMENTS
LOOK OF FEAR: 0 - NONE
LOOK OF FEAR: 0 - NONE
GRUNTING AT END OF EXPIRATION: 0 - NONE
ACCESSORY MUSCLE RISE IN CLAVICLE DURING INSPIRATION: 1 - SLIGHT RISE
RDOS TOTAL SCORE: 2
GRUNTING AT END OF EXPIRATION: 0 - NONE
ACCESSORY MUSCLE RISE IN CLAVICLE DURING INSPIRATION: 1 - SLIGHT RISE
INVOLUNTARY NASAL FLARING: 0 - NONE
RESTLESS NONPURPOSEFUL MOVEMENTS: 0 - NONE
INVOLUNTARY NASAL FLARING: 0 - NONE
RESPIRATORY RATE PER MINUTE: 1 - 19-30 BREATHS
RESTLESS NONPURPOSEFUL MOVEMENTS: 1 - OCCASIONAL, SLIGHT MOVEMENTS
HEART RATE PER MINUTE: 0 - <90 BEATS

## 2025-01-01 ASSESSMENT — COLUMBIA-SUICIDE SEVERITY RATING SCALE - C-SSRS
6. HAVE YOU EVER DONE ANYTHING, STARTED TO DO ANYTHING, OR PREPARED TO DO ANYTHING TO END YOUR LIFE?: NO
1. IN THE PAST MONTH, HAVE YOU WISHED YOU WERE DEAD OR WISHED YOU COULD GO TO SLEEP AND NOT WAKE UP?: NO
2. HAVE YOU ACTUALLY HAD ANY THOUGHTS OF KILLING YOURSELF?: NO
2. HAVE YOU ACTUALLY HAD ANY THOUGHTS OF KILLING YOURSELF?: NO
6. HAVE YOU EVER DONE ANYTHING, STARTED TO DO ANYTHING, OR PREPARED TO DO ANYTHING TO END YOUR LIFE?: NO
1. IN THE PAST MONTH, HAVE YOU WISHED YOU WERE DEAD OR WISHED YOU COULD GO TO SLEEP AND NOT WAKE UP?: NO

## 2025-01-01 ASSESSMENT — PATIENT HEALTH QUESTIONNAIRE - PHQ9
1. LITTLE INTEREST OR PLEASURE IN DOING THINGS: NOT AT ALL
2. FEELING DOWN, DEPRESSED OR HOPELESS: NOT AT ALL
2. FEELING DOWN, DEPRESSED OR HOPELESS: NOT AT ALL
SUM OF ALL RESPONSES TO PHQ9 QUESTIONS 1 & 2: 0
SUM OF ALL RESPONSES TO PHQ9 QUESTIONS 1 & 2: 2
1. LITTLE INTEREST OR PLEASURE IN DOING THINGS: MORE THAN HALF THE DAYS

## 2025-01-01 ASSESSMENT — PAIN DESCRIPTION - LOCATION: LOCATION: SHOULDER

## 2025-01-01 ASSESSMENT — PAIN DESCRIPTION - ORIENTATION: ORIENTATION: RIGHT

## 2025-01-06 ENCOUNTER — HOSPITAL ENCOUNTER (OUTPATIENT)
Facility: HOSPITAL | Age: 89
Setting detail: OBSERVATION
Discharge: NURSING, REHAB OR LONG-TERM CARE | End: 2025-01-09
Attending: STUDENT IN AN ORGANIZED HEALTH CARE EDUCATION/TRAINING PROGRAM | Admitting: INTERNAL MEDICINE
Payer: MEDICARE

## 2025-01-06 ENCOUNTER — TELEPHONE (OUTPATIENT)
Dept: PRIMARY CARE | Facility: CLINIC | Age: 89
End: 2025-01-06

## 2025-01-06 ENCOUNTER — APPOINTMENT (OUTPATIENT)
Dept: RADIOLOGY | Facility: HOSPITAL | Age: 89
End: 2025-01-06
Payer: MEDICARE

## 2025-01-06 ENCOUNTER — APPOINTMENT (OUTPATIENT)
Dept: CARDIOLOGY | Facility: HOSPITAL | Age: 89
End: 2025-01-06
Payer: MEDICARE

## 2025-01-06 DIAGNOSIS — G56.03 BILATERAL CARPAL TUNNEL SYNDROME: Primary | ICD-10-CM

## 2025-01-06 DIAGNOSIS — R42 DIZZINESS: ICD-10-CM

## 2025-01-06 DIAGNOSIS — R09.81 CONGESTION OF NASAL SINUS: ICD-10-CM

## 2025-01-06 DIAGNOSIS — E86.0 DEHYDRATION: ICD-10-CM

## 2025-01-06 DIAGNOSIS — I10 PRIMARY HYPERTENSION: ICD-10-CM

## 2025-01-06 DIAGNOSIS — R26.2 AMBULATORY DYSFUNCTION: ICD-10-CM

## 2025-01-06 PROBLEM — R53.1 GENERALIZED WEAKNESS: Status: ACTIVE | Noted: 2025-01-06

## 2025-01-06 LAB
ALBUMIN SERPL BCP-MCNC: 4 G/DL (ref 3.4–5)
ALP SERPL-CCNC: 56 U/L (ref 33–136)
ALT SERPL W P-5'-P-CCNC: 13 U/L (ref 7–45)
ANION GAP SERPL CALC-SCNC: 14 MMOL/L (ref 10–20)
APPEARANCE UR: CLEAR
AST SERPL W P-5'-P-CCNC: 18 U/L (ref 9–39)
ATRIAL RATE: 66 BPM
BACTERIA #/AREA URNS AUTO: ABNORMAL /HPF
BASOPHILS # BLD AUTO: 0.05 X10*3/UL (ref 0–0.1)
BASOPHILS NFR BLD AUTO: 0.4 %
BILIRUB SERPL-MCNC: 0.6 MG/DL (ref 0–1.2)
BILIRUB UR STRIP.AUTO-MCNC: NEGATIVE MG/DL
BUN SERPL-MCNC: 24 MG/DL (ref 6–23)
CALCIUM SERPL-MCNC: 9.1 MG/DL (ref 8.6–10.3)
CHLORIDE SERPL-SCNC: 100 MMOL/L (ref 98–107)
CO2 SERPL-SCNC: 26 MMOL/L (ref 21–32)
COLOR UR: COLORLESS
CREAT SERPL-MCNC: 1.07 MG/DL (ref 0.5–1.05)
CRP SERPL-MCNC: 1.52 MG/DL
EGFRCR SERPLBLD CKD-EPI 2021: 50 ML/MIN/1.73M*2
EOSINOPHIL # BLD AUTO: 0.31 X10*3/UL (ref 0–0.4)
EOSINOPHIL NFR BLD AUTO: 2.8 %
ERYTHROCYTE [DISTWIDTH] IN BLOOD BY AUTOMATED COUNT: 12.6 % (ref 11.5–14.5)
FLUAV RNA RESP QL NAA+PROBE: NOT DETECTED
FLUBV RNA RESP QL NAA+PROBE: NOT DETECTED
GLUCOSE SERPL-MCNC: 98 MG/DL (ref 74–99)
GLUCOSE UR STRIP.AUTO-MCNC: NORMAL MG/DL
HCT VFR BLD AUTO: 32.8 % (ref 36–46)
HGB BLD-MCNC: 11.2 G/DL (ref 12–16)
HOLD SPECIMEN: NORMAL
IMM GRANULOCYTES # BLD AUTO: 0.09 X10*3/UL (ref 0–0.5)
IMM GRANULOCYTES NFR BLD AUTO: 0.8 % (ref 0–0.9)
KETONES UR STRIP.AUTO-MCNC: NEGATIVE MG/DL
LEUKOCYTE ESTERASE UR QL STRIP.AUTO: NEGATIVE
LYMPHOCYTES # BLD AUTO: 3.03 X10*3/UL (ref 0.8–3)
LYMPHOCYTES NFR BLD AUTO: 27.1 %
MAGNESIUM SERPL-MCNC: 2.03 MG/DL (ref 1.6–2.4)
MCH RBC QN AUTO: 31.8 PG (ref 26–34)
MCHC RBC AUTO-ENTMCNC: 34.1 G/DL (ref 32–36)
MCV RBC AUTO: 93 FL (ref 80–100)
MONOCYTES # BLD AUTO: 1.04 X10*3/UL (ref 0.05–0.8)
MONOCYTES NFR BLD AUTO: 9.3 %
NEUTROPHILS # BLD AUTO: 6.68 X10*3/UL (ref 1.6–5.5)
NEUTROPHILS NFR BLD AUTO: 59.6 %
NITRITE UR QL STRIP.AUTO: NEGATIVE
NRBC BLD-RTO: 0 /100 WBCS (ref 0–0)
P AXIS: 64 DEGREES
P OFFSET: 154 MS
P ONSET: 118 MS
PH UR STRIP.AUTO: 7.5 [PH]
PLATELET # BLD AUTO: 264 X10*3/UL (ref 150–450)
POTASSIUM SERPL-SCNC: 4.3 MMOL/L (ref 3.5–5.3)
PR INTERVAL: 196 MS
PROT SERPL-MCNC: 7.5 G/DL (ref 6.4–8.2)
PROT UR STRIP.AUTO-MCNC: NEGATIVE MG/DL
Q ONSET: 216 MS
QRS COUNT: 11 BEATS
QRS DURATION: 92 MS
QT INTERVAL: 398 MS
QTC CALCULATION(BAZETT): 417 MS
QTC FREDERICIA: 411 MS
R AXIS: -2 DEGREES
RBC # BLD AUTO: 3.52 X10*6/UL (ref 4–5.2)
RBC # UR STRIP.AUTO: ABNORMAL /UL
RBC #/AREA URNS AUTO: ABNORMAL /HPF
SARS-COV-2 RNA RESP QL NAA+PROBE: NOT DETECTED
SODIUM SERPL-SCNC: 136 MMOL/L (ref 136–145)
SP GR UR STRIP.AUTO: 1
SQUAMOUS #/AREA URNS AUTO: ABNORMAL /HPF
T AXIS: 76 DEGREES
T OFFSET: 415 MS
T4 FREE SERPL-MCNC: 1 NG/DL (ref 0.61–1.12)
TSH SERPL-ACNC: 6.66 MIU/L (ref 0.44–3.98)
UROBILINOGEN UR STRIP.AUTO-MCNC: NORMAL MG/DL
VENTRICULAR RATE: 66 BPM
WBC # BLD AUTO: 11.2 X10*3/UL (ref 4.4–11.3)
WBC #/AREA URNS AUTO: ABNORMAL /HPF

## 2025-01-06 PROCEDURE — 87636 SARSCOV2 & INF A&B AMP PRB: CPT | Performed by: STUDENT IN AN ORGANIZED HEALTH CARE EDUCATION/TRAINING PROGRAM

## 2025-01-06 PROCEDURE — 84439 ASSAY OF FREE THYROXINE: CPT

## 2025-01-06 PROCEDURE — 99285 EMERGENCY DEPT VISIT HI MDM: CPT | Mod: 25 | Performed by: STUDENT IN AN ORGANIZED HEALTH CARE EDUCATION/TRAINING PROGRAM

## 2025-01-06 PROCEDURE — 96360 HYDRATION IV INFUSION INIT: CPT | Mod: 59

## 2025-01-06 PROCEDURE — 81001 URINALYSIS AUTO W/SCOPE: CPT

## 2025-01-06 PROCEDURE — 99223 1ST HOSP IP/OBS HIGH 75: CPT | Performed by: INTERNAL MEDICINE

## 2025-01-06 PROCEDURE — 83735 ASSAY OF MAGNESIUM: CPT

## 2025-01-06 PROCEDURE — 73110 X-RAY EXAM OF WRIST: CPT | Mod: LT

## 2025-01-06 PROCEDURE — 94760 N-INVAS EAR/PLS OXIMETRY 1: CPT

## 2025-01-06 PROCEDURE — 87899 AGENT NOS ASSAY W/OPTIC: CPT | Mod: GEALAB | Performed by: INTERNAL MEDICINE

## 2025-01-06 PROCEDURE — 85025 COMPLETE CBC W/AUTO DIFF WBC: CPT

## 2025-01-06 PROCEDURE — 71045 X-RAY EXAM CHEST 1 VIEW: CPT | Mod: FOREIGN READ | Performed by: RADIOLOGY

## 2025-01-06 PROCEDURE — 71045 X-RAY EXAM CHEST 1 VIEW: CPT

## 2025-01-06 PROCEDURE — 73130 X-RAY EXAM OF HAND: CPT | Mod: LT

## 2025-01-06 PROCEDURE — G0378 HOSPITAL OBSERVATION PER HR: HCPCS

## 2025-01-06 PROCEDURE — 36415 COLL VENOUS BLD VENIPUNCTURE: CPT

## 2025-01-06 PROCEDURE — 2500000001 HC RX 250 WO HCPCS SELF ADMINISTERED DRUGS (ALT 637 FOR MEDICARE OP)

## 2025-01-06 PROCEDURE — 2500000001 HC RX 250 WO HCPCS SELF ADMINISTERED DRUGS (ALT 637 FOR MEDICARE OP): Performed by: STUDENT IN AN ORGANIZED HEALTH CARE EDUCATION/TRAINING PROGRAM

## 2025-01-06 PROCEDURE — 73130 X-RAY EXAM OF HAND: CPT | Mod: LEFT SIDE | Performed by: STUDENT IN AN ORGANIZED HEALTH CARE EDUCATION/TRAINING PROGRAM

## 2025-01-06 PROCEDURE — 86140 C-REACTIVE PROTEIN: CPT | Performed by: INTERNAL MEDICINE

## 2025-01-06 PROCEDURE — 73110 X-RAY EXAM OF WRIST: CPT | Mod: LEFT SIDE | Performed by: STUDENT IN AN ORGANIZED HEALTH CARE EDUCATION/TRAINING PROGRAM

## 2025-01-06 PROCEDURE — 87449 NOS EACH ORGANISM AG IA: CPT | Mod: GEALAB | Performed by: INTERNAL MEDICINE

## 2025-01-06 PROCEDURE — 2500000004 HC RX 250 GENERAL PHARMACY W/ HCPCS (ALT 636 FOR OP/ED)

## 2025-01-06 PROCEDURE — 93005 ELECTROCARDIOGRAM TRACING: CPT

## 2025-01-06 PROCEDURE — 29125 APPL SHORT ARM SPLINT STATIC: CPT

## 2025-01-06 PROCEDURE — 84443 ASSAY THYROID STIM HORMONE: CPT

## 2025-01-06 PROCEDURE — 80053 COMPREHEN METABOLIC PANEL: CPT

## 2025-01-06 PROCEDURE — 96361 HYDRATE IV INFUSION ADD-ON: CPT | Mod: 59

## 2025-01-06 RX ORDER — LEVOTHYROXINE SODIUM 75 UG/1
75 TABLET ORAL DAILY
Status: DISCONTINUED | OUTPATIENT
Start: 2025-01-07 | End: 2025-01-09 | Stop reason: HOSPADM

## 2025-01-06 RX ORDER — FLUTICASONE FUROATE AND VILANTEROL 100; 25 UG/1; UG/1
1 POWDER RESPIRATORY (INHALATION) DAILY
Status: DISCONTINUED | OUTPATIENT
Start: 2025-01-06 | End: 2025-01-06 | Stop reason: ALTCHOICE

## 2025-01-06 RX ORDER — IPRATROPIUM BROMIDE AND ALBUTEROL SULFATE 2.5; .5 MG/3ML; MG/3ML
3 SOLUTION RESPIRATORY (INHALATION)
Status: DISCONTINUED | OUTPATIENT
Start: 2025-01-06 | End: 2025-01-06

## 2025-01-06 RX ORDER — AMOXICILLIN 250 MG
2 CAPSULE ORAL 2 TIMES DAILY
Status: DISCONTINUED | OUTPATIENT
Start: 2025-01-06 | End: 2025-01-09 | Stop reason: HOSPADM

## 2025-01-06 RX ORDER — ACETAMINOPHEN 325 MG/1
975 TABLET ORAL EVERY 8 HOURS
Status: DISCONTINUED | OUTPATIENT
Start: 2025-01-06 | End: 2025-01-09 | Stop reason: HOSPADM

## 2025-01-06 RX ORDER — IPRATROPIUM BROMIDE AND ALBUTEROL SULFATE 2.5; .5 MG/3ML; MG/3ML
3 SOLUTION RESPIRATORY (INHALATION) EVERY 2 HOUR PRN
Status: DISCONTINUED | OUTPATIENT
Start: 2025-01-06 | End: 2025-01-09 | Stop reason: HOSPADM

## 2025-01-06 RX ORDER — POLYETHYLENE GLYCOL 3350 17 G/17G
17 POWDER, FOR SOLUTION ORAL DAILY
Status: DISCONTINUED | OUTPATIENT
Start: 2025-01-06 | End: 2025-01-09 | Stop reason: HOSPADM

## 2025-01-06 RX ORDER — GUAIFENESIN/DEXTROMETHORPHAN 100-10MG/5
5 SYRUP ORAL EVERY 4 HOURS PRN
Status: DISCONTINUED | OUTPATIENT
Start: 2025-01-06 | End: 2025-01-09 | Stop reason: HOSPADM

## 2025-01-06 RX ORDER — PANTOPRAZOLE SODIUM 40 MG/10ML
40 INJECTION, POWDER, LYOPHILIZED, FOR SOLUTION INTRAVENOUS
Status: DISCONTINUED | OUTPATIENT
Start: 2025-01-07 | End: 2025-01-09 | Stop reason: HOSPADM

## 2025-01-06 RX ORDER — LATANOPROST 50 UG/ML
1 SOLUTION/ DROPS OPHTHALMIC NIGHTLY
Status: DISCONTINUED | OUTPATIENT
Start: 2025-01-06 | End: 2025-01-09 | Stop reason: HOSPADM

## 2025-01-06 RX ORDER — ONDANSETRON HYDROCHLORIDE 2 MG/ML
4 INJECTION, SOLUTION INTRAVENOUS EVERY 8 HOURS PRN
Status: DISCONTINUED | OUTPATIENT
Start: 2025-01-06 | End: 2025-01-09 | Stop reason: HOSPADM

## 2025-01-06 RX ORDER — LABETALOL HYDROCHLORIDE 5 MG/ML
10 INJECTION, SOLUTION INTRAVENOUS EVERY 6 HOURS PRN
Status: DISCONTINUED | OUTPATIENT
Start: 2025-01-06 | End: 2025-01-09 | Stop reason: HOSPADM

## 2025-01-06 RX ORDER — PROCHLORPERAZINE EDISYLATE 5 MG/ML
10 INJECTION INTRAMUSCULAR; INTRAVENOUS EVERY 6 HOURS PRN
Status: DISCONTINUED | OUTPATIENT
Start: 2025-01-06 | End: 2025-01-09 | Stop reason: HOSPADM

## 2025-01-06 RX ORDER — PANTOPRAZOLE SODIUM 40 MG/1
40 TABLET, DELAYED RELEASE ORAL
Status: DISCONTINUED | OUTPATIENT
Start: 2025-01-07 | End: 2025-01-09 | Stop reason: HOSPADM

## 2025-01-06 RX ORDER — LISINOPRIL 5 MG/1
5 TABLET ORAL DAILY
Status: DISCONTINUED | OUTPATIENT
Start: 2025-01-07 | End: 2025-01-08

## 2025-01-06 RX ORDER — ACETAMINOPHEN 500 MG
5 TABLET ORAL NIGHTLY
Status: DISCONTINUED | OUTPATIENT
Start: 2025-01-06 | End: 2025-01-09 | Stop reason: HOSPADM

## 2025-01-06 RX ORDER — LISINOPRIL 5 MG/1
5 TABLET ORAL ONCE
Status: COMPLETED | OUTPATIENT
Start: 2025-01-06 | End: 2025-01-06

## 2025-01-06 RX ORDER — OXYCODONE HYDROCHLORIDE 5 MG/1
2.5 TABLET ORAL EVERY 6 HOURS PRN
Status: DISCONTINUED | OUTPATIENT
Start: 2025-01-06 | End: 2025-01-09 | Stop reason: HOSPADM

## 2025-01-06 RX ORDER — TRAMADOL HYDROCHLORIDE 50 MG/1
50 TABLET ORAL EVERY 8 HOURS PRN
Status: CANCELLED | OUTPATIENT
Start: 2025-01-06

## 2025-01-06 RX ORDER — BUDESONIDE 0.25 MG/2ML
0.25 INHALANT ORAL 2 TIMES DAILY
Status: DISCONTINUED | OUTPATIENT
Start: 2025-01-06 | End: 2025-01-08

## 2025-01-06 RX ORDER — CHOLECALCIFEROL (VITAMIN D3) 25 MCG
5000 TABLET ORAL DAILY
Status: DISCONTINUED | OUTPATIENT
Start: 2025-01-06 | End: 2025-01-09 | Stop reason: HOSPADM

## 2025-01-06 RX ORDER — OXYCODONE HYDROCHLORIDE 5 MG/1
5 TABLET ORAL EVERY 6 HOURS PRN
Status: DISCONTINUED | OUTPATIENT
Start: 2025-01-06 | End: 2025-01-09 | Stop reason: HOSPADM

## 2025-01-06 RX ORDER — IPRATROPIUM BROMIDE AND ALBUTEROL SULFATE 2.5; .5 MG/3ML; MG/3ML
3 SOLUTION RESPIRATORY (INHALATION) EVERY 4 HOURS PRN
Status: DISCONTINUED | OUTPATIENT
Start: 2025-01-06 | End: 2025-01-06

## 2025-01-06 RX ORDER — FLUTICASONE PROPIONATE 50 MCG
1 SPRAY, SUSPENSION (ML) NASAL DAILY
Status: DISCONTINUED | OUTPATIENT
Start: 2025-01-06 | End: 2025-01-09 | Stop reason: HOSPADM

## 2025-01-06 RX ADMIN — APIXABAN 2.5 MG: 5 TABLET, FILM COATED ORAL at 15:18

## 2025-01-06 RX ADMIN — LISINOPRIL 5 MG: 5 TABLET ORAL at 11:03

## 2025-01-06 RX ADMIN — SODIUM CHLORIDE 1000 ML: 9 INJECTION, SOLUTION INTRAVENOUS at 10:04

## 2025-01-06 SDOH — SOCIAL STABILITY: SOCIAL INSECURITY: ARE YOU OR HAVE YOU BEEN THREATENED OR ABUSED PHYSICALLY, EMOTIONALLY, OR SEXUALLY BY ANYONE?: NO

## 2025-01-06 SDOH — ECONOMIC STABILITY: HOUSING INSECURITY: IN THE LAST 12 MONTHS, WAS THERE A TIME WHEN YOU WERE NOT ABLE TO PAY THE MORTGAGE OR RENT ON TIME?: NO

## 2025-01-06 SDOH — ECONOMIC STABILITY: HOUSING INSECURITY: IN THE PAST 12 MONTHS, HOW MANY TIMES HAVE YOU MOVED WHERE YOU WERE LIVING?: 0

## 2025-01-06 SDOH — SOCIAL STABILITY: SOCIAL INSECURITY: WITHIN THE LAST YEAR, HAVE YOU BEEN AFRAID OF YOUR PARTNER OR EX-PARTNER?: NO

## 2025-01-06 SDOH — SOCIAL STABILITY: SOCIAL INSECURITY: DOES ANYONE TRY TO KEEP YOU FROM HAVING/CONTACTING OTHER FRIENDS OR DOING THINGS OUTSIDE YOUR HOME?: NO

## 2025-01-06 SDOH — SOCIAL STABILITY: SOCIAL INSECURITY: WERE YOU ABLE TO COMPLETE ALL THE BEHAVIORAL HEALTH SCREENINGS?: YES

## 2025-01-06 SDOH — HEALTH STABILITY: MENTAL HEALTH: HOW MANY DRINKS CONTAINING ALCOHOL DO YOU HAVE ON A TYPICAL DAY WHEN YOU ARE DRINKING?: 1 OR 2

## 2025-01-06 SDOH — HEALTH STABILITY: MENTAL HEALTH: HOW OFTEN DO YOU HAVE SIX OR MORE DRINKS ON ONE OCCASION?: NEVER

## 2025-01-06 SDOH — SOCIAL STABILITY: SOCIAL INSECURITY: HAS ANYONE EVER THREATENED TO HURT YOUR FAMILY OR YOUR PETS?: NO

## 2025-01-06 SDOH — ECONOMIC STABILITY: HOUSING INSECURITY: AT ANY TIME IN THE PAST 12 MONTHS, WERE YOU HOMELESS OR LIVING IN A SHELTER (INCLUDING NOW)?: NO

## 2025-01-06 SDOH — SOCIAL STABILITY: SOCIAL INSECURITY: DO YOU FEEL ANYONE HAS EXPLOITED OR TAKEN ADVANTAGE OF YOU FINANCIALLY OR OF YOUR PERSONAL PROPERTY?: NO

## 2025-01-06 SDOH — ECONOMIC STABILITY: TRANSPORTATION INSECURITY: IN THE PAST 12 MONTHS, HAS LACK OF TRANSPORTATION KEPT YOU FROM MEDICAL APPOINTMENTS OR FROM GETTING MEDICATIONS?: NO

## 2025-01-06 SDOH — ECONOMIC STABILITY: FOOD INSECURITY: WITHIN THE PAST 12 MONTHS, THE FOOD YOU BOUGHT JUST DIDN'T LAST AND YOU DIDN'T HAVE MONEY TO GET MORE.: NEVER TRUE

## 2025-01-06 SDOH — SOCIAL STABILITY: SOCIAL INSECURITY: HAVE YOU HAD THOUGHTS OF HARMING ANYONE ELSE?: NO

## 2025-01-06 SDOH — SOCIAL STABILITY: SOCIAL INSECURITY: DO YOU FEEL UNSAFE GOING BACK TO THE PLACE WHERE YOU ARE LIVING?: NO

## 2025-01-06 SDOH — SOCIAL STABILITY: SOCIAL INSECURITY: WITHIN THE LAST YEAR, HAVE YOU BEEN HUMILIATED OR EMOTIONALLY ABUSED IN OTHER WAYS BY YOUR PARTNER OR EX-PARTNER?: NO

## 2025-01-06 SDOH — ECONOMIC STABILITY: INCOME INSECURITY: IN THE PAST 12 MONTHS HAS THE ELECTRIC, GAS, OIL, OR WATER COMPANY THREATENED TO SHUT OFF SERVICES IN YOUR HOME?: NO

## 2025-01-06 SDOH — ECONOMIC STABILITY: FOOD INSECURITY: WITHIN THE PAST 12 MONTHS, YOU WORRIED THAT YOUR FOOD WOULD RUN OUT BEFORE YOU GOT THE MONEY TO BUY MORE.: NEVER TRUE

## 2025-01-06 SDOH — HEALTH STABILITY: MENTAL HEALTH: HOW OFTEN DO YOU HAVE A DRINK CONTAINING ALCOHOL?: MONTHLY OR LESS

## 2025-01-06 SDOH — ECONOMIC STABILITY: FOOD INSECURITY: HOW HARD IS IT FOR YOU TO PAY FOR THE VERY BASICS LIKE FOOD, HOUSING, MEDICAL CARE, AND HEATING?: NOT HARD AT ALL

## 2025-01-06 SDOH — SOCIAL STABILITY: SOCIAL INSECURITY: ABUSE: ADULT

## 2025-01-06 SDOH — SOCIAL STABILITY: SOCIAL INSECURITY: HAVE YOU HAD ANY THOUGHTS OF HARMING ANYONE ELSE?: NO

## 2025-01-06 SDOH — SOCIAL STABILITY: SOCIAL INSECURITY: ARE THERE ANY APPARENT SIGNS OF INJURIES/BEHAVIORS THAT COULD BE RELATED TO ABUSE/NEGLECT?: NO

## 2025-01-06 ASSESSMENT — COGNITIVE AND FUNCTIONAL STATUS - GENERAL
HELP NEEDED FOR BATHING: A LITTLE
MOBILITY SCORE: 17
WALKING IN HOSPITAL ROOM: A LITTLE
PATIENT BASELINE BEDBOUND: NO
MOVING TO AND FROM BED TO CHAIR: A LITTLE
MOVING TO AND FROM BED TO CHAIR: A LITTLE
MOBILITY SCORE: 17
MOVING FROM LYING ON BACK TO SITTING ON SIDE OF FLAT BED WITH BEDRAILS: A LITTLE
STANDING UP FROM CHAIR USING ARMS: A LITTLE
DAILY ACTIVITIY SCORE: 22
DAILY ACTIVITIY SCORE: 22
DRESSING REGULAR LOWER BODY CLOTHING: A LITTLE
CLIMB 3 TO 5 STEPS WITH RAILING: A LOT
TURNING FROM BACK TO SIDE WHILE IN FLAT BAD: A LITTLE
HELP NEEDED FOR BATHING: A LITTLE
WALKING IN HOSPITAL ROOM: A LITTLE
MOVING FROM LYING ON BACK TO SITTING ON SIDE OF FLAT BED WITH BEDRAILS: A LITTLE
TURNING FROM BACK TO SIDE WHILE IN FLAT BAD: A LITTLE
DRESSING REGULAR LOWER BODY CLOTHING: A LITTLE
CLIMB 3 TO 5 STEPS WITH RAILING: A LOT
STANDING UP FROM CHAIR USING ARMS: A LITTLE

## 2025-01-06 ASSESSMENT — ACTIVITIES OF DAILY LIVING (ADL)
LACK_OF_TRANSPORTATION: NO
TOILETING: INDEPENDENT
JUDGMENT_ADEQUATE_SAFELY_COMPLETE_DAILY_ACTIVITIES: YES
WALKS IN HOME: NEEDS ASSISTANCE
LACK_OF_TRANSPORTATION: NO
HEARING - RIGHT EAR: FUNCTIONAL
PATIENT'S MEMORY ADEQUATE TO SAFELY COMPLETE DAILY ACTIVITIES?: YES
GROOMING: INDEPENDENT
FEEDING YOURSELF: INDEPENDENT
DRESSING YOURSELF: INDEPENDENT
BATHING: INDEPENDENT
ADEQUATE_TO_COMPLETE_ADL: YES
HEARING - LEFT EAR: FUNCTIONAL

## 2025-01-06 ASSESSMENT — PATIENT HEALTH QUESTIONNAIRE - PHQ9
SUM OF ALL RESPONSES TO PHQ9 QUESTIONS 1 & 2: 0
2. FEELING DOWN, DEPRESSED OR HOPELESS: NOT AT ALL
1. LITTLE INTEREST OR PLEASURE IN DOING THINGS: NOT AT ALL

## 2025-01-06 ASSESSMENT — LIFESTYLE VARIABLES
SKIP TO QUESTIONS 9-10: 1
HAVE YOU EVER FELT YOU SHOULD CUT DOWN ON YOUR DRINKING: NO
EVER HAD A DRINK FIRST THING IN THE MORNING TO STEADY YOUR NERVES TO GET RID OF A HANGOVER: NO
EVER FELT BAD OR GUILTY ABOUT YOUR DRINKING: NO
HAVE PEOPLE ANNOYED YOU BY CRITICIZING YOUR DRINKING: NO
AUDIT-C TOTAL SCORE: 1
TOTAL SCORE: 0

## 2025-01-06 ASSESSMENT — PAIN - FUNCTIONAL ASSESSMENT
PAIN_FUNCTIONAL_ASSESSMENT: 0-10

## 2025-01-06 ASSESSMENT — PAIN SCALES - GENERAL
PAINLEVEL_OUTOF10: 10 - WORST POSSIBLE PAIN
PAINLEVEL_OUTOF10: 0 - NO PAIN
PAINLEVEL_OUTOF10: 0 - NO PAIN

## 2025-01-06 ASSESSMENT — PAIN DESCRIPTION - FREQUENCY: FREQUENCY: CONSTANT/CONTINUOUS

## 2025-01-06 ASSESSMENT — PAIN DESCRIPTION - LOCATION: LOCATION: HAND

## 2025-01-06 ASSESSMENT — PAIN DESCRIPTION - ORIENTATION: ORIENTATION: LEFT

## 2025-01-06 ASSESSMENT — PAIN DESCRIPTION - ONSET: ONSET: ONGOING

## 2025-01-06 ASSESSMENT — PAIN DESCRIPTION - DESCRIPTORS: DESCRIPTORS: NUMBNESS

## 2025-01-06 NOTE — ED TRIAGE NOTES
Pt arrived to ED via EMS from home with 10/10 left hand pain and numbness. Pt states no injury. Pt describes the numbness goes from her fingers to her wrist.

## 2025-01-06 NOTE — PROGRESS NOTES
01/06/25 1111   Discharge Planning   Living Arrangements Alone;Other (Comment)  (Son lives in connected apartment-but he works during the day)   Support Systems Children   Assistance Needed A&OX4; needs assist for ADLs recently and uses walker; doesn't drive (legally blind); room air baseline and currently room air; PCP Dr Cole; on Eliquis prior to hospitalization   Type of Residence Private residence   Number of Stairs to Enter Residence 0  (ramp)   Number of Stairs Within Residence 0   Do you have animals or pets at home? Yes   Type of Animals or Pets 1dog (son is watching)   Who is requesting discharge planning? Provider   Expected Discharge Disposition SNF  (Pending PT eval but probable snf needed. Precert for snf. Pt with [employer] Medicare so could transition to snf if observation or admitted. Preference is [facility] for skilled and then transition to long term. Referral to be sent and reviewed by SNF)   Does the patient need discharge transport arranged? Yes   RoundTrip coordination needed? Yes   Has discharge transport been arranged? No   Financial Resource Strain   How hard is it for you to pay for the very basics like food, housing, medical care, and heating? Not hard   Housing Stability   In the last 12 months, was there a time when you were not able to pay the mortgage or rent on time? N   In the past 12 months, how many times have you moved where you were living? 0   At any time in the past 12 months, were you homeless or living in a shelter (including now)? N   Transportation Needs   In the past 12 months, has lack of transportation kept you from medical appointments or from getting medications? no   In the past 12 months, has lack of transportation kept you from meetings, work, or from getting things needed for daily living? No   Intensity of Service   Intensity of Service 0-30 min     01/06/2025 1114am  Spoke with patient and patient's daughters (Milton and Merna) bedside in ED. Plan is [facility] (pending PT eval) and then transition to long term. Daughter (Milton AU) has started the Medicaid application/process. Pt with $7000 in assets - no home

## 2025-01-06 NOTE — ED PROVIDER NOTES
HPI   Chief Complaint   Patient presents with    left hand numbness       Nanette Flynn is an 89-year-old female with PMHx significant for lateral carpal tunnel syndrome (previously evaluated and surgery deferred) who presents with complaint of left hand pain, left digit 1-3 numbness, and weakness x1 week.  Denies injury/fall.  Also reports intermittent dizziness and nausea when sitting from a lying position, or upon standing.  Only lasts a few seconds and resolves on its own.  Denies syncope, SOB, chest pain, abdominal pain, vomiting, diarrhea, constipation.       PMHx: PE (on Eliquis, 2/2024), hypothyroidism, idiopathic neuropathy, BLE cellulitis, HTN  Meds: Eliquis, levothyroxine, lisinopril  Social: Former smoker, no alcohol use, no drug use                Patient History   Past Medical History:   Diagnosis Date    Arthritis     Disease of thyroid gland     Glaucoma     Hypertension     Other specified postprocedural states     History of colonoscopy    Personal history of other diseases of the circulatory system     History of rheumatic fever    Personal history of other diseases of the circulatory system     History of rheumatic fever    Personal history of other medical treatment     History of mammogram     Past Surgical History:   Procedure Laterality Date    APPENDECTOMY  08/20/2018    Appendectomy    COLON SURGERY      CT ANGIO NECK  09/24/2013    CT NECK ANGIO W AND WO IV CONTRAST 9/24/2013 [facility]    CT HEAD ANGIO W AND WO IV CONTRAST  09/24/2013    CT HEAD ANGIO W AND WO IV CONTRAST 9/24/2013 [facility]    HYSTERECTOMY  08/20/2018    Hysterectomy    OTHER SURGICAL HISTORY  08/20/2018    Enterectomy     Family History   Problem Relation Name Age of Onset    Heart disease Mother Leanna     Hypertension Mother Leanna     Transient ischemic attack Mother Leanna     Heart disease Father Richard     Cancer Maternal Grandmother Maternal grandmother     Colon cancer Maternal Grandmother  Maternal grandmother      Social History     Tobacco Use    Smoking status: Former     Types: Cigarettes     Start date: 1/1/1954    Smokeless tobacco: Never   Vaping Use    Vaping status: Never Used   Substance Use Topics    Alcohol use: Not Currently    Drug use: Not Currently       Physical Exam   ED Triage Vitals [01/06/25 0821]   Temperature Heart Rate Respirations BP   37.2 °C (99 °F) 77 16 (!) 183/81      Pulse Ox Temp Source Heart Rate Source Patient Position   96 % Temporal Monitor Lying      BP Location      Right arm        Physical Exam  HENT:      Head: Normocephalic and atraumatic.      Mouth/Throat:      Mouth: Mucous membranes are moist.   Eyes:      Extraocular Movements: Extraocular movements intact.   Cardiovascular:      Rate and Rhythm: Normal rate and regular rhythm.      Pulses: Normal pulses.      Heart sounds: No murmur heard.  Pulmonary:      Effort: No respiratory distress.      Breath sounds: No wheezing.   Abdominal:      General: Bowel sounds are normal. There is no distension.      Palpations: Abdomen is soft.      Tenderness: There is abdominal tenderness (Diffuse).   Musculoskeletal:      Right lower leg: No edema.      Left lower leg: No edema.      Comments: Tenderness to palpation along the left first MCP, decreased sensation of the left hand and digits 1-3, pain/numbness worse with Phalen's test on the left, no tenderness to palpation of the right hand, sensation intact right hand, full ROM bilateral shoulder, decreased  strength left compared to right   Skin:     Findings: No bruising.   Neurological:      Mental Status: She is alert and oriented to person, place, and time.   Psychiatric:         Mood and Affect: Mood normal.         ED Course & MDM   ED Course as of 01/06/25 1347   Mon Jan 06, 2025   0901 ECG 12 lead  NSR, rate 66 bpm, QTc 417 ms, no acute signs of ischemic change [CK]   0951 HEMOGLOBIN(!): 11.2  Within baseline ISO chronic anemia [CK]   0952 Creatinine(!):  1.07  Within baseline [CK]   0956 Will give 1 L NS for suspected dehydration [CK]   0957 Discussed suspicion for carpal tunnel syndrome, patient reports she has been previously evaluated for carpal tunnel with EMG.  No surgery was recommended at that time as she was not having weakness.  Will order her wrist splints to wear at night to improve carpal tunnel symptoms.  Discussed likely need for surgical reevaluation given new onset weakness. [CK]   1016 Thyroid Stimulating Hormone(!): 6.66  Discussed findings with patient, states she has been taking her levothyroxine as prescribed (75 mcg daily).  She was encouraged to call her PCP for outpatient thyroid management.  [CK]   1016 Thyroxine, Free: 1.00 [CK]   1019 UA negative for UTI [CK]   1036 Elevated BP. States she is due for her morning dose of lisinopril  [CK]   1055 PT ordered for home health evaluation [CK]   1344 Ambulation trial failed.  Patient unable to get out of bed unassisted.  Difficult transfer to bedside commode.  [CK]      ED Course User Index  [CK] Jesica Verdugo, DO         Diagnoses as of 01/06/25 1347   Bilateral carpal tunnel syndrome   Dizziness   Dehydration   Ambulatory dysfunction       Medical Decision Making  89-year-old female with PMHx significant for idiopathic neuropathy and bilateral carpal tunnel syndrome who presents with left hand numbness/pain/weakness.  Equally 2/2 neuropathy vs progressive carpal tunnel syndrome.  Carpal tunnel was previously evaluated by EMG and surgery was not recommended at that time given no weakness.  TSH elevated despite taking levothyroxine as prescribed.  This may be contributing to worsening carpal tunnel symptoms.  Wrist splint placed. Likely will require reevaluation outpatient for carpal tunnel release surgery.  Additional complaint of dizziness and nausea likely 2/2 dehydration, will order IV fluids.  Following fluids, patient symptoms are not improved.  She is unable to ambulate or transfer to  bedside commode.  She will likely need admission for generalized weakness, ambulatory dysfunction.  She will likely need placement upon discharge from the hospital.        Splint Application    Performed by: Jesica Verdugo DO  Authorized by: Jet Dalton MD    Consent:     Consent obtained:  Verbal    Consent given by:  Patient    Risks, benefits, and alternatives were discussed: yes      Risks discussed:  Discoloration, numbness, pain and swelling    Alternatives discussed:  No treatment, delayed treatment, alternative treatment, observation and referral  Universal protocol:     Procedure explained and questions answered to patient or proxy's satisfaction: yes      Patient identity confirmed:  Verbally with patient  Pre-procedure details:     Distal neurologic exam:  Numbness and weakness    Distal perfusion: distal pulses strong and brisk capillary refill    Procedure details:     Location:  Wrist    Wrist location:  L wrist    Strapping: yes      Splint type:  Wrist  Post-procedure details:     Distal neurologic exam:  Unchanged    Distal perfusion: distal pulses strong and brisk capillary refill      Procedure completion:  Tolerated        Jesica Verdugo DO  Resident  01/06/25 2580

## 2025-01-06 NOTE — H&P
[facility] Hospitalist History and Physical Note         Nanette Flynn  :  1935(89 y.o.)  MRN:  [id number]  PCP: Salud Walker MD    Assessment & Plan  Generalized weakness      Assessment and Plan:     Nanette Flynn is a 89 y.o. female with PMH hypothyroidism, HTN, PE, chronic B/L wounds and left hand carpal tunnel presented from home with worsening dizzy spells. Worse with standing which impairs mobility.No alleviating factors Associated with nausea. Denies CP, dyspnea, LE edema, LOC, emesis.  Per family at bed side, pt has been having working left hand and wrist pain but has made it difficult for the pt to use her lilibeth that includes drinking  and eating. Reportedly pt was evaluated by hand surgeon decades ago but opted not to pursue surgery as her symptoms were no disabling.    In ED VS is significant for accelerate HTN, transient hypoxia (93%) which improved w/o intervention. No acute findings on labs. Neg resp infectious (covid, flu, rsv) work up. No malignant arrhythmia of acute ST changes on ECG. CXR suggestive of emphysematous changes. Patient was admitted for debility and inability to care for herself.    #Postural dizziness with near syncope  #Accelerated HTN  #Debility  #Left hand pain likely due to carpal tunnel syndrome- improving with hand brace placed in ED  #Transient hypoxia, asymptomatic and resolved w/o intervention  #Emphysema on CXR w/o h/o obstructive lung disease  #Hx PE on apixaban  #Hypothyroidism on synthroid   #CKD III  #Remote smoking hx and second hand tobacco exposure  -check orthostatic VS  -continuous pulse ox and monitor on tele  -trial of bronchodilators  -PRN labetalol for uncontrolled BP  -will rec permissive HTN goal (<150/100) due to frailty in advanced age while on DOAC  -PT/OT  -wound care  -check XR of left wrist  -rec outpt PFT  -family is open to SNF placement with plans for long term placement    Disposition: await clinical improvement and  await placement    DVT Prophylaxis: full anticoagulation apixaban    Code status: Full Code  Diet: Adult diet Regular    BMI Classification: Body mass index is 31.09 kg/m². Class I obesity BMI 30-34.9    Level of MDM:  Moderate    discussed with nurse, patient and family updated, I personally examined the patient, and I personally reviewed chart, data, labs radiology reports    Family Communication  Number:   Name of Designated Family Representative:       Total time spent: 75 minutes, of total time providing counseling or in coordination of care.  Total time on this day of visit includes record and documentation review before and after visit including documentation and time not explicitly included on EMR time stamp.    [id number]: Please page me for patient care issues.  [phone]: Please page night hospitalist for any issues.        Subjective:     Chief Complaint   Patient presents with    left hand numbness     Interval History:  Nanette Flynn is a 89 y.o. female with PMH hypothyroidism, HTN, PE, chronic B/L wounds and left hand carpal tunnel presented from home with worsening dizzy spells. Worse with standing which impairs mobility.No alleviating facotrs Associated with nausea. Denies CP, dyspnea, LE edema, LOC, emesis.  Per family at bed side, pt has been having working left hand and wrist pain but has made it difficult for the pt to use her lilibeth that includes drinking  and eating. Reportedly pt was evaluated by hand surgeon decades ago but opted not to pursue surgery as her symptoms were no disabling.    In ED VS is significant for accelerate HTN, transient hypoxia (93%) which improved w/o intervention. No acute findings on labs. Neg resp infectious (covid, flu, rsv) work up. No malignant arrhythmia of acute ST changes on ECG. CXR suggestive of emphysematous changes. Patient was admitted for debility and inability to care for herself.    Temperature:  [37.2 °C (99 °F)] 37.2 °C (99 °F)  Heart Rate:   [74-78] 75  Respirations:  [15-17] 15  BP: (139-183)/() 145/70  Lab Results   Component Value Date    GLUCOSE 98 01/06/2025    CALCIUM 9.1 01/06/2025     01/06/2025    K 4.3 01/06/2025    CO2 26 01/06/2025     01/06/2025    BUN 24 (H) 01/06/2025    CREATININE 1.07 (H) 01/06/2025     Lab Results   Component Value Date    WBC 11.2 01/06/2025    HGB 11.2 (L) 01/06/2025    HCT 32.8 (L) 01/06/2025    MCV 93 01/06/2025     01/06/2025     IMAGES:  Encounter Date: 01/06/25   ECG 12 lead   Result Value    Ventricular Rate 66    Atrial Rate 66    WV Interval 196    QRS Duration 92    QT Interval 398    QTC Calculation(Bazett) 417    P Axis 64    R Axis -2    T Axis 76    QRS Count 11    Q Onset 216    P Onset 118    P Offset 154    T Offset 415    QTC Fredericia 411    Narrative    Normal sinus rhythm  Inferior infarct , age undetermined  Cannot rule out Anterior infarct (cited on or before 09-FEB-2024)  Abnormal ECG  When compared with ECG of 22-FEB-2024 10:23,  No significant change was found        Transthoracic Echo (TTE) Complete    Result Date: 2/13/2024   [facility], [address] [address]               Tel [phone] and Fax [phone] TRANSTHORACIC ECHOCARDIOGRAM REPORT  Patient Name:      KELLY Rondon Physician:    [id number] Irving Ibanez MD Study Date:        2/13/2024            Ordering Provider:    [id number] EM GALLAGHER MRN/PID:           [id number]             Fellow: Accession#:        [id number]         Nurse: Date of Birth/Age: 1935 / 88 years Sonographer:          Marilou Chakraborty RDCS Gender:            F                    Additional Staff: Height:            162.56 cm            Admit Date:           2/9/2024 Weight:            74.84 kg              Admission Status:     Inpatient -                                                               Routine BSA / BMI:         1.80 m2 / 28.32      Encounter#:           [id number]                    kg/m2                                         Department Location:  [facility] Non                                                               Invasive Blood Pressure: 162 /81 mmHg Study Type:    TRANSTHORACIC ECHO (TTE) COMPLETE Diagnosis/ICD: Other pulmonary embolism without acute cor pulmonale-I26.99 Indication:    PE CPT Code:      Echo Complete w Full Doppler-[id number] Patient History: Pertinent History: Abdominal pain. Study Detail: The following Echo studies were performed: 2D, M-Mode, Doppler and               color flow.  PHYSICIAN INTERPRETATION: Left Ventricle: The left ventricular systolic function is normal, with an estimated ejection fraction of 65-70%. The left ventricular cavity size is normal. Spectral Doppler shows an impaired relaxation pattern of left ventricular diastolic filling. Left Atrium: The left atrium is normal in size. Right Ventricle: The right ventricle is normal in size. There is normal right ventricular global systolic function. RV free wall is not well visualized. Right Atrium: The right atrium is normal in size. Aortic Valve: The aortic valve is trileaflet. There is mild aortic valve cusp calcification. There is trace to mild aortic valve regurgitation. The peak instantaneous gradient of the aortic valve is 12.9 mmHg. The mean gradient of the aortic valve is 7.6 mmHg. Mitral Valve: The mitral valve is mildly thickened. There is mild mitral annular calcification. There is trace to mild mitral valve regurgitation. Tricuspid Valve: The tricuspid valve is structurally normal. There is trace to mild tricuspid regurgitation. The Doppler estimated RVSP is slightly elevated at 32.5 mmHg. Pulmonic Valve: The pulmonic valve is structurally normal. There is trace to mild pulmonic valve  regurgitation. Pericardium: There is no pericardial effusion noted. Aorta: The aortic root is normal. Systemic Veins: The inferior vena cava appears to be of normal size. There is IVC inspiratory collapse greater than 50%. In comparison to the previous echocardiogram(s): There are no prior studies on this patient for comparison purposes.  CONCLUSIONS:  1. Left ventricular systolic function is normal with a 65-70% estimated ejection fraction.  2. Spectral Doppler shows an impaired relaxation pattern of left ventricular diastolic filling.  3. Slightly elevated RVSP. QUANTITATIVE DATA SUMMARY: 2D MEASUREMENTS:                          Normal Ranges: IVSd:          1.09 cm   (0.6-1.1cm) LVPWd:         0.80 cm   (0.6-1.1cm) LVIDd:         4.31 cm   (3.9-5.9cm) LVIDs:         2.87 cm LV Mass Index: 73.6 g/m2 LV % FS        33.4 % LA VOLUME:                              Normal Ranges: LA Vol A4C:       25.5 ml    (22+/-6mL/m2) LA Vol A2C:       29.2 ml LA Vol BP:        28.4 ml LA Vol Index A4C: 14.1 ml/m2 LA Vol Index A2C: 16.2 ml/m2 LA Vol Index BP:  15.8 ml/m2 LA Volume Index:  15.8 ml/m2 LA Vol A4C:       23.9 ml LA Vol A2C:       27.8 ml RA VOLUME BY A/L METHOD:                       Normal Ranges: RA Area A4C: 14.7 cm2 M-MODE MEASUREMENTS:                  Normal Ranges: Ao Root: 3.30 cm (2.0-3.7cm) LAs:     2.76 cm (2.7-4.0cm) LV SYSTOLIC FUNCTION BY 2D PLANIMETRY (MOD):                     Normal Ranges: EF-A4C View: 67.3 % (>=55%) EF-A2C View: 67.8 % EF-Biplane:  67.2 % LV DIASTOLIC FUNCTION:                             Normal Ranges: MV Peak E:      1.10 m/s    (0.7-1.2 m/s) MV Peak A:      1.36 m/s    (0.42-0.7 m/s) E/A Ratio:      0.81        (1.0-2.2) MV e'           0.08 m/s    (>8.0) MV lateral e'   0.08 m/s MV medial e'    0.06 m/s MV A Dur:       129.18 msec E/e' Ratio:     13.74       (<8.0) PulmV Sys Brooks:  47.67 cm/s PulmV Haddad Brooks: 27.08 cm/s PulmV S/D Brooks:  1.76 MITRAL VALVE:                 Normal  Ranges: MV DT: 313 msec (150-240msec) AORTIC VALVE:                                    Normal Ranges: AoV Vmax:                1.79 m/s  (<=1.7m/s) AoV Peak P.9 mmHg (<20mmHg) AoV Mean P.6 mmHg  (1.7-11.5mmHg) LVOT Max Brooks:            0.98 m/s  (<=1.1m/s) AoV VTI:                 37.39 cm  (18-25cm) LVOT VTI:                22.05 cm LVOT Diameter:           2.00 cm   (1.8-2.4cm) AoV Area, VTI:           1.85 cm2  (2.5-5.5cm2) AoV Area,Vmax:           1.71 cm2  (2.5-4.5cm2) AoV Dimensionless Index: 0.59 AORTIC INSUFFICIENCY: AI Vmax:       4.99 m/s AI Half-time:  546 msec AI Decel Time: 1884 msec AI Decel Rate: 264.63 cm/s2  RIGHT VENTRICLE: RV Basal 3.60 cm RV Mid   2.40 cm RV Major 7.6 cm TAPSE:   24.0 mm RV s'    0.17 m/s TRICUSPID VALVE/RVSP:                             Normal Ranges: Peak TR Velocity: 2.72 m/s RV Syst Pressure: 32.5 mmHg (< 30mmHg) IVC Diam:         1.60 cm Pulmonary Veins: PulmV Haddad Brooks: 27.08 cm/s PulmV S/D Brooks:  1.76 PulmV Sys Brooks:  47.67 cm/s  [id number] Irving Ibanez MD Electronically signed on 2024 at 9:19:47 AM  ** Final **    === 25 ===    XR CHEST 1 VIEW    - Impression -  Emphysematous changes without acute process.  Signed by Vic Maxwell, DO  === 24 ===    CT HEAD WO IV CONTRAST    - Impression -  Mild-to-moderate age related degenerative change progressed from the  previous exam, but otherwise without acute findings.    Signed by: Boy Ho 2024 11:30 AM  Dictation workstation:   [id number]  === 25 ===    XR CHEST 1 VIEW    - Impression -  Emphysematous changes without acute process.  Signed by Vic Maxwell, DO  === 13 ===    MRI BRAIN WO CONTRAST    - Impression -  Mild parenchymal volume loss with minimal small vessel ischemic  changeDiagnostic interpretation  was performed on the campus of [facility].    Past Medical History:   Diagnosis Date    Arthritis     Disease of thyroid  gland     Glaucoma     Hypertension     Other specified postprocedural states     History of colonoscopy    Personal history of other diseases of the circulatory system     History of rheumatic fever    Personal history of other diseases of the circulatory system     History of rheumatic fever    Personal history of other medical treatment     History of mammogram     Past Surgical History:   Procedure Laterality Date    APPENDECTOMY  08/20/2018    Appendectomy    COLON SURGERY      CT ANGIO NECK  09/24/2013    CT NECK ANGIO W AND WO IV CONTRAST 9/24/2013 [facility]    CT HEAD ANGIO W AND WO IV CONTRAST  09/24/2013    CT HEAD ANGIO W AND WO IV CONTRAST 9/24/2013 [facility]    HYSTERECTOMY  08/20/2018    Hysterectomy    OTHER SURGICAL HISTORY  08/20/2018    Enterectomy     Family History   Problem Relation Name Age of Onset    Heart disease Mother Leanna     Hypertension Mother Leanna     Transient ischemic attack Mother Leanna     Heart disease Father Richard     Cancer Maternal Grandmother Maternal grandmother     Colon cancer Maternal Grandmother Maternal grandmother      Social History     Socioeconomic History    Marital status:      Spouse name: Not on file    Number of children: Not on file    Years of education: Not on file    Highest education level: Not on file   Occupational History    Not on file   Tobacco Use    Smoking status: Former     Types: Cigarettes     Start date: 1/1/1954    Smokeless tobacco: Never   Vaping Use    Vaping status: Never Used   Substance and Sexual Activity    Alcohol use: Not Currently    Drug use: Not Currently    Sexual activity: Not Currently     Partners: Male     Birth control/protection: None   Other Topics Concern    Not on file   Social History Narrative    Not on file     Social Drivers of Health     Financial Resource Strain: Low Risk  (1/6/2025)    Overall Financial Resource Strain (CARDIA)     Difficulty of Paying Living Expenses: Not hard at all    Food Insecurity: No Food Insecurity (11/8/2024)    Hunger Vital Sign     Worried About Running Out of Food in the Last Year: Never true     Ran Out of Food in the Last Year: Never true   Transportation Needs: No Transportation Needs (1/6/2025)    PRAPARE - Transportation     Lack of Transportation (Medical): No     Lack of Transportation (Non-Medical): No   Physical Activity: Not on file   Stress: No Stress Concern Present (11/8/2024)    [employer] [place] [employer] - Occupational Stress Questionnaire     Feeling of Stress : Not at all   Social Connections: Unknown (11/8/2024)    Social Connection and Isolation Panel [NHANES]     Frequency of Communication with Friends and Family: Never     Frequency of Social Gatherings with Friends and Family: Not on file     Attends Jewish Services: Not on file     Active Member of Clubs or Organizations: Not on file     Attends Club or Organization Meetings: Not on file     Marital Status: Not on file   Intimate Partner Violence: Not At Risk (11/8/2024)    Humiliation, Afraid, Rape, and Kick questionnaire     Fear of Current or Ex-Partner: No     Emotionally Abused: No     Physically Abused: No     Sexually Abused: No   Housing Stability: Low Risk  (1/6/2025)    Housing Stability Vital Sign     Unable to Pay for Housing in the Last Year: No     Number of Times Moved in the Last Year: 0     Homeless in the Last Year: No       Allergies   Allergen Reactions    Gabapentin Confusion     FELL ASLEEP WITH ONE DOSE AND NO RECALL OF THAT DAY      Prior to Admission medications    Medication Sig Start Date End Date Taking? Authorizing Provider   apixaban (Eliquis) 2.5 mg tablet Take 1 tablet (2.5 mg) by mouth 2 times a day. 8/5/24 8/5/25  Salud Walker MD   Ca carb-Ca gluc-Mg ox-Mg gluco 500 mg calcium -250 mg tablet Take 1 tablet by mouth 2 times a day.    Historical Provider, MD   cholecalciferol (Vitamin D-3) 5,000 Units tablet Take 1 tablet (5,000 Units)  by mouth once daily.    Historical Provider, MD   CYANOCOBALAMIN, VITAMIN B-12, ORAL Take 1 tablet by mouth once daily.    Historical Provider, MD   honey (MediHoney, honey,) gel topical gel Apply 1 Application topically once daily. 11/14/24   Terry Cook MD   lisinopril 5 mg tablet Take 1 tablet (5 mg) by mouth once daily. 3/28/24 3/28/25  Salud Walker MD   psyllium (Metamucil) powder Take 1 Dose (5.8 g) by mouth 2 times a day.    Historical Provider, MD   Synthroid 75 mcg tablet Take 1 tablet by mouth once daily 12/14/24   Salud Walker MD   traMADol (Ultram) 50 mg tablet Take 1 tablet (50 mg) by mouth every 8 hours if needed for severe pain (7 - 10) for up to 13 days. 12/24/24 1/6/25  Salud Walker MD   travoprost (Travatan Z) 0.004 % drops ophthalmic solution Administer 1 drop into both eyes once daily at bedtime. 8/20/18   Historical Provider, MD   triamcinolone (Nasacort) 55 mcg nasal inhaler Administer into affected nostril(s) once every 24 hours.    Historical Provider, MD   vit A/vit C/vit E/zinc/copper (PRESERVISION AREDS ORAL) Take 1 capsule by mouth 2 times a day.    Historical Provider, MD       Review of Systems   All other systems reviewed and are negative.      Objective:     Vitals:    01/06/25 1100 01/06/25 1200 01/06/25 1300 01/06/25 1400   BP: (!) 157/96 (!) 141/116 143/80 145/70   BP Location: Right arm Right arm Right arm Right arm   Patient Position: Lying Lying Lying Lying   Pulse: 76 77 74 75   Resp: 16 15 17 15   Temp:       TempSrc:       SpO2: 94% (!) 93% 96% 96%   Weight:       Height:         Physical Exam  Vitals and nursing note reviewed.   Constitutional:       Appearance: Normal appearance.   HENT:      Head: Normocephalic and atraumatic.      Right Ear: External ear normal.      Left Ear: External ear normal.      Nose: Nose normal.      Mouth/Throat:      Mouth: Mucous membranes are moist.   Eyes:      General: No scleral icterus.         Right eye: No discharge.         Left eye: No discharge.      Extraocular Movements: Extraocular movements intact.      Conjunctiva/sclera: Conjunctivae normal.      Pupils: Pupils are equal, round, and reactive to light.   Cardiovascular:      Rate and Rhythm: Normal rate and regular rhythm.   Pulmonary:      Effort: Pulmonary effort is normal.      Breath sounds: Normal breath sounds.   Abdominal:      General: Abdomen is flat. Bowel sounds are normal.      Palpations: Abdomen is soft.   Musculoskeletal:         General: Tenderness present.      Left wrist: Tenderness present. No swelling, deformity, effusion or snuff box tenderness. Decreased range of motion (due to pain).      Right lower leg: No edema.      Left lower leg: No edema.   Skin:     General: Skin is warm and dry.      Capillary Refill: Capillary refill takes less than 2 seconds.      Findings: Wound (small healing B/L shin chronic wounds following veriicose vein intervention per family) present.   Neurological:      General: No focal deficit present.   Psychiatric:         Mood and Affect: Mood normal.         Thought Content: Thought content normal.         Judgment: Judgment normal.         Lab Results   Component Value Date     01/06/2025    K 4.3 01/06/2025     01/06/2025    CO2 26 01/06/2025    BUN 24 (H) 01/06/2025    CREATININE 1.07 (H) 01/06/2025    GLUCOSE 98 01/06/2025    CALCIUM 9.1 01/06/2025    PROT 7.5 01/06/2025    BILITOT 0.6 01/06/2025    ALKPHOS 56 01/06/2025    AST 18 01/06/2025    ALT 13 01/06/2025    GLOB 3.8 (H) 07/25/2023     Lab Results   Component Value Date    WBC 11.2 01/06/2025    HGB 11.2 (L) 01/06/2025    HCT 32.8 (L) 01/06/2025    MCV 93 01/06/2025     01/06/2025     Lab Results   Component Value Date    TSH 6.66 (H) 01/06/2025     Lab Results   Component Value Date    LACTATE 0.9 11/08/2024     (H) 11/08/2024    INR 1.4 (H) 02/25/2024     Additional results since admission have been  reviewed.    Dictated using 3KeyIt Version 2.4  Proof read however unrecognized voice recognition errors may have occurred     Electronically signed by Reema Iglesias DO on 01/06/25 at 3:38 PM

## 2025-01-06 NOTE — PROGRESS NOTES
Pharmacy Medication History Review    Nanette Flynn is a 89 y.o. female admitted for Generalized weakness. Pharmacy reviewed the patient's [id number] medications and allergies for accuracy.    The list below reflectives the updated PTA list. Please review each medication in order reconciliation for additional clarification and justification.  Prior to Admission Medications   Prescriptions Last Dose Informant Patient Reported? Taking?   CYANOCOBALAMIN, VITAMIN B-12, ORAL 1/5/2025 Morning Self Yes Yes   Sig: Take 1 tablet by mouth once daily.   Ca carb-Ca gluc-Mg ox-Mg gluco 500 mg calcium -250 mg tablet 1/4/2025 Self Yes Yes   Sig: Take 1 tablet by mouth 2 times a day.   Lactobacillus acidophilus (PROBIOTIC ORAL) 1/5/2025 Morning Self Yes Yes   Sig: Take 1 Dose by mouth once daily.   Synthroid 75 mcg tablet 1/6/2025 Morning  Yes Yes   Sig: Take 1 tablet by mouth once daily   apixaban (Eliquis) 2.5 mg tablet 1/5/2025 Morning Self Yes Yes   Sig: Take 1 tablet (2.5 mg) by mouth 2 times a day.   cholecalciferol (Vitamin D-3) 5,000 Units tablet 1/5/2025 Morning Self Yes Yes   Sig: Take 1 tablet (5,000 Units) by mouth once daily.   honey (MediHoney, honey,) gel topical gel Not Taking  Not using  Not using   Sig: Apply 1 Application topically once daily.   Patient not taking: Reported on 1/6/2025   lisinopril 5 mg tablet 1/5/2025 Morning Self Yes Yes   Sig: Take 1 tablet (5 mg) by mouth once daily.   traMADol (Ultram) 50 mg tablet Not Taking  Not taking  Not taking    Sig: Take 1 tablet (50 mg) by mouth every 8 hours if needed for severe pain (7 - 10) for up to 13 days.   Patient not taking: Reported on 1/6/2025   travoprost (Travatan Z) 0.004 % drops ophthalmic solution 1/5/2025 Evening Self Yes Yes   Sig: Administer 1 drop into both eyes once daily at bedtime.   vit A/vit C/vit E/zinc/copper (PRESERVISION AREDS ORAL) 1/5/2025 Morning Self Yes Yes   Sig: Take 1 capsule by mouth 2 times a day.       Facility-Administered Medications: None           The list below reflectives the updated allergy list. Please review each documented allergy for additional clarification and justification.  Allergies  Reviewed by Aida Cartwright RN on 1/6/2025        Severity Reactions Comments    Gabapentin Medium Confusion FELL ASLEEP WITH ONE DOSE AND NO RECALL OF THAT DAY             Below are additional concerns with the patient's PTA list.      MEREDITH IJANG

## 2025-01-06 NOTE — CARE PLAN
The patient's goals for the shift include      The clinical goals for the shift include patient will have decreased numbess    Pt arrived from ED in stable condition with family at bedside. No c/o pain, bed alarm on, call light within reach.

## 2025-01-07 LAB
ALBUMIN SERPL BCP-MCNC: 3.5 G/DL (ref 3.4–5)
ANION GAP SERPL CALC-SCNC: 14 MMOL/L (ref 10–20)
BASOPHILS # BLD AUTO: 0.05 X10*3/UL (ref 0–0.1)
BASOPHILS NFR BLD AUTO: 0.4 %
BUN SERPL-MCNC: 21 MG/DL (ref 6–23)
CALCIUM SERPL-MCNC: 8.3 MG/DL (ref 8.6–10.3)
CHLORIDE SERPL-SCNC: 104 MMOL/L (ref 98–107)
CO2 SERPL-SCNC: 23 MMOL/L (ref 21–32)
CREAT SERPL-MCNC: 1.04 MG/DL (ref 0.5–1.05)
CRP SERPL-MCNC: 1.66 MG/DL
EGFRCR SERPLBLD CKD-EPI 2021: 52 ML/MIN/1.73M*2
EOSINOPHIL # BLD AUTO: 0.3 X10*3/UL (ref 0–0.4)
EOSINOPHIL NFR BLD AUTO: 2.3 %
ERYTHROCYTE [DISTWIDTH] IN BLOOD BY AUTOMATED COUNT: 12.6 % (ref 11.5–14.5)
GLUCOSE SERPL-MCNC: 102 MG/DL (ref 74–99)
HCT VFR BLD AUTO: 32.5 % (ref 36–46)
HGB BLD-MCNC: 10.7 G/DL (ref 12–16)
IMM GRANULOCYTES # BLD AUTO: 0.11 X10*3/UL (ref 0–0.5)
IMM GRANULOCYTES NFR BLD AUTO: 0.9 % (ref 0–0.9)
LEGIONELLA AG UR QL: NEGATIVE
LYMPHOCYTES # BLD AUTO: 3.85 X10*3/UL (ref 0.8–3)
LYMPHOCYTES NFR BLD AUTO: 30.1 %
MAGNESIUM SERPL-MCNC: 2.07 MG/DL (ref 1.6–2.4)
MCH RBC QN AUTO: 32 PG (ref 26–34)
MCHC RBC AUTO-ENTMCNC: 32.9 G/DL (ref 32–36)
MCV RBC AUTO: 97 FL (ref 80–100)
MONOCYTES # BLD AUTO: 1.43 X10*3/UL (ref 0.05–0.8)
MONOCYTES NFR BLD AUTO: 11.2 %
NEUTROPHILS # BLD AUTO: 7.06 X10*3/UL (ref 1.6–5.5)
NEUTROPHILS NFR BLD AUTO: 55.1 %
NRBC BLD-RTO: 0 /100 WBCS (ref 0–0)
PHOSPHATE SERPL-MCNC: 3.1 MG/DL (ref 2.5–4.9)
PLATELET # BLD AUTO: 222 X10*3/UL (ref 150–450)
POTASSIUM SERPL-SCNC: 4.1 MMOL/L (ref 3.5–5.3)
PROCALCITONIN SERPL-MCNC: 0.02 NG/ML
RBC # BLD AUTO: 3.34 X10*6/UL (ref 4–5.2)
S PNEUM AG UR QL: NEGATIVE
SODIUM SERPL-SCNC: 137 MMOL/L (ref 136–145)
URATE SERPL-MCNC: 6 MG/DL (ref 2.3–6.7)
WBC # BLD AUTO: 12.8 X10*3/UL (ref 4.4–11.3)

## 2025-01-07 PROCEDURE — 97162 PT EVAL MOD COMPLEX 30 MIN: CPT | Mod: GP

## 2025-01-07 PROCEDURE — 36415 COLL VENOUS BLD VENIPUNCTURE: CPT | Performed by: INTERNAL MEDICINE

## 2025-01-07 PROCEDURE — 99233 SBSQ HOSP IP/OBS HIGH 50: CPT | Performed by: PHYSICIAN ASSISTANT

## 2025-01-07 PROCEDURE — 2500000001 HC RX 250 WO HCPCS SELF ADMINISTERED DRUGS (ALT 637 FOR MEDICARE OP): Performed by: INTERNAL MEDICINE

## 2025-01-07 PROCEDURE — 80069 RENAL FUNCTION PANEL: CPT | Performed by: INTERNAL MEDICINE

## 2025-01-07 PROCEDURE — 84145 PROCALCITONIN (PCT): CPT | Mod: GEALAB | Performed by: INTERNAL MEDICINE

## 2025-01-07 PROCEDURE — 84550 ASSAY OF BLOOD/URIC ACID: CPT | Performed by: PHYSICIAN ASSISTANT

## 2025-01-07 PROCEDURE — 2500000002 HC RX 250 W HCPCS SELF ADMINISTERED DRUGS (ALT 637 FOR MEDICARE OP, ALT 636 FOR OP/ED): Mod: MUE | Performed by: INTERNAL MEDICINE

## 2025-01-07 PROCEDURE — 97165 OT EVAL LOW COMPLEX 30 MIN: CPT | Mod: GO

## 2025-01-07 PROCEDURE — 99232 SBSQ HOSP IP/OBS MODERATE 35: CPT | Performed by: STUDENT IN AN ORGANIZED HEALTH CARE EDUCATION/TRAINING PROGRAM

## 2025-01-07 PROCEDURE — 83735 ASSAY OF MAGNESIUM: CPT | Performed by: INTERNAL MEDICINE

## 2025-01-07 PROCEDURE — 97530 THERAPEUTIC ACTIVITIES: CPT | Mod: GO

## 2025-01-07 PROCEDURE — 86140 C-REACTIVE PROTEIN: CPT | Performed by: INTERNAL MEDICINE

## 2025-01-07 PROCEDURE — 94760 N-INVAS EAR/PLS OXIMETRY 1: CPT

## 2025-01-07 PROCEDURE — G0378 HOSPITAL OBSERVATION PER HR: HCPCS

## 2025-01-07 PROCEDURE — 94760 N-INVAS EAR/PLS OXIMETRY 1: CPT | Mod: MUE

## 2025-01-07 PROCEDURE — 85025 COMPLETE CBC W/AUTO DIFF WBC: CPT | Performed by: INTERNAL MEDICINE

## 2025-01-07 RX ADMIN — PSYLLIUM HUSK 1 PACKET: 3.4 POWDER ORAL at 08:06

## 2025-01-07 RX ADMIN — Medication 5000 UNITS: at 08:07

## 2025-01-07 RX ADMIN — APIXABAN 2.5 MG: 2.5 TABLET, FILM COATED ORAL at 08:07

## 2025-01-07 RX ADMIN — LEVOTHYROXINE SODIUM 75 MCG: 75 TABLET ORAL at 05:31

## 2025-01-07 RX ADMIN — APIXABAN 2.5 MG: 2.5 TABLET, FILM COATED ORAL at 20:18

## 2025-01-07 RX ADMIN — LISINOPRIL 5 MG: 5 TABLET ORAL at 08:07

## 2025-01-07 SDOH — ECONOMIC STABILITY: HOUSING INSECURITY: IN THE LAST 12 MONTHS, HOW MANY PLACES HAVE YOU LIVED?: 1

## 2025-01-07 SDOH — ECONOMIC STABILITY: INCOME INSECURITY: IN THE LAST 12 MONTHS, WAS THERE A TIME WHEN YOU WERE NOT ABLE TO PAY THE MORTGAGE OR RENT ON TIME?: NO

## 2025-01-07 SDOH — ECONOMIC STABILITY: HOUSING INSECURITY

## 2025-01-07 SDOH — ECONOMIC STABILITY: GENERAL

## 2025-01-07 SDOH — ECONOMIC STABILITY: HOUSING INSECURITY: IN THE LAST 12 MONTHS, WAS THERE A TIME WHEN YOU WERE NOT ABLE TO PAY THE MORTGAGE OR RENT ON TIME?: NO

## 2025-01-07 SDOH — ECONOMIC STABILITY: FOOD INSECURITY: WITHIN THE PAST 12 MONTHS, YOU WORRIED THAT YOUR FOOD WOULD RUN OUT BEFORE YOU GOT THE MONEY TO BUY MORE.: NEVER TRUE

## 2025-01-07 SDOH — ECONOMIC STABILITY: TRANSPORTATION INSECURITY: IN THE PAST 12 MONTHS, HAS LACK OF TRANSPORTATION KEPT YOU FROM MEDICAL APPOINTMENTS OR FROM GETTING MEDICATIONS?: NO

## 2025-01-07 SDOH — ECONOMIC STABILITY: FOOD INSECURITY: WITHIN THE PAST 12 MONTHS, YOU WORRIED THAT YOUR FOOD WOULD RUN OUT BEFORE YOU GOT MONEY TO BUY MORE.: NEVER TRUE

## 2025-01-07 SDOH — ECONOMIC STABILITY: FOOD INSECURITY

## 2025-01-07 SDOH — ECONOMIC STABILITY: HOUSING INSECURITY: IN THE PAST 12 MONTHS HAS THE ELECTRIC, GAS, OIL, OR WATER COMPANY THREATENED TO SHUT OFF SERVICES IN YOUR HOME?: NO

## 2025-01-07 SDOH — ECONOMIC STABILITY: FOOD INSECURITY: WITHIN THE PAST 12 MONTHS, THE FOOD YOU BOUGHT JUST DIDN'T LAST AND YOU DIDN'T HAVE MONEY TO GET MORE.: NEVER TRUE

## 2025-01-07 SDOH — ECONOMIC STABILITY: TRANSPORTATION INSECURITY

## 2025-01-07 ASSESSMENT — COGNITIVE AND FUNCTIONAL STATUS - GENERAL
STANDING UP FROM CHAIR USING ARMS: A LOT
DRESSING REGULAR LOWER BODY CLOTHING: TOTAL
HELP NEEDED FOR BATHING: A LOT
TURNING FROM BACK TO SIDE WHILE IN FLAT BAD: A LITTLE
PERSONAL GROOMING: A LITTLE
TOILETING: A LOT
WALKING IN HOSPITAL ROOM: TOTAL
MOVING TO AND FROM BED TO CHAIR: A LOT
MOVING TO AND FROM BED TO CHAIR: A LITTLE
WALKING IN HOSPITAL ROOM: TOTAL
TOILETING: A LOT
DAILY ACTIVITIY SCORE: 14
DRESSING REGULAR UPPER BODY CLOTHING: A LITTLE
CLIMB 3 TO 5 STEPS WITH RAILING: A LOT
MOBILITY SCORE: 12
HELP NEEDED FOR BATHING: A LOT
TURNING FROM BACK TO SIDE WHILE IN FLAT BAD: A LITTLE
DRESSING REGULAR UPPER BODY CLOTHING: A LITTLE
TOILETING: A LOT
CLIMB 3 TO 5 STEPS WITH RAILING: TOTAL
TURNING FROM BACK TO SIDE WHILE IN FLAT BAD: A LITTLE
MOVING FROM LYING ON BACK TO SITTING ON SIDE OF FLAT BED WITH BEDRAILS: A LITTLE
DAILY ACTIVITIY SCORE: 17
MOVING FROM LYING ON BACK TO SITTING ON SIDE OF FLAT BED WITH BEDRAILS: A LITTLE
MOVING FROM LYING ON BACK TO SITTING ON SIDE OF FLAT BED WITH BEDRAILS: A LITTLE
DRESSING REGULAR LOWER BODY CLOTHING: A LOT
STANDING UP FROM CHAIR USING ARMS: A LOT
DAILY ACTIVITIY SCORE: 17
EATING MEALS: A LITTLE
HELP NEEDED FOR BATHING: A LOT
WALKING IN HOSPITAL ROOM: A LOT
STANDING UP FROM CHAIR USING ARMS: A LOT
CLIMB 3 TO 5 STEPS WITH RAILING: TOTAL
MOVING TO AND FROM BED TO CHAIR: A LOT
DRESSING REGULAR UPPER BODY CLOTHING: A LITTLE
MOBILITY SCORE: 15
DRESSING REGULAR LOWER BODY CLOTHING: A LOT
MOBILITY SCORE: 12

## 2025-01-07 ASSESSMENT — PAIN - FUNCTIONAL ASSESSMENT
PAIN_FUNCTIONAL_ASSESSMENT: 0-10

## 2025-01-07 ASSESSMENT — ACTIVITIES OF DAILY LIVING (ADL)
LACK_OF_TRANSPORTATION: NO
BATHING_ASSISTANCE: MODERATE
ADL_ASSISTANCE: NEEDS ASSISTANCE

## 2025-01-07 ASSESSMENT — PAIN SCALES - GENERAL
PAINLEVEL_OUTOF10: 0 - NO PAIN

## 2025-01-07 ASSESSMENT — SOCIAL DETERMINANTS OF HEALTH (SDOH): IN THE PAST 12 MONTHS, HAS THE ELECTRIC, GAS, OIL, OR WATER COMPANY THREATENED TO SHUT OFF SERVICE IN YOUR HOME?: NO

## 2025-01-07 NOTE — SIGNIFICANT EVENT
Patient is refusing the trial of bronchodilators.  She is aware of the emphysematous changes seen on x-ray.  She wants to talk to her primary care physician before starting any bronchodilators.

## 2025-01-07 NOTE — NURSING NOTE
Assumed care of patient. Patient is A&Ox3 and visitor is at the bedside. Call light in reach. Patient denies pain. Patient is on room air. Patient is normal sinus on monitor @ 77bpm.

## 2025-01-07 NOTE — PROGRESS NOTES
Occupational Therapy    Evaluation    Patient Name: Nanette Flynn  MRN: [id number]  Department: [address]  Room: [address] [place], [place] [address]A  Today's Date: 1/7/2025  Time Calculation  Start Time: 0858  Stop Time: 0923  Time Calculation (min): 25 min    Assessment  IP OT Assessment  OT Assessment: Pt presents with decreased ADL performance, decreased functional mobility, decreased endurance. Continued skilled OT recommended to maximize pt safety and independence.  Prognosis: Good  Barriers to Discharge Home: No anticipated barriers  Evaluation/Treatment Tolerance: Patient limited by fatigue  Medical Staff Made Aware: Yes  End of Session Communication: Bedside nurse  End of Session Patient Position: Bed, 3 rail up, Alarm on  Plan:  Treatment Interventions: ADL retraining, Functional transfer training, UE strengthening/ROM, Endurance training  OT Frequency: 3 times per week  OT Discharge Recommendations: Moderate intensity level of continued care  Equipment Recommended upon Discharge: Wheeled walker (owns)  OT Recommended Transfer Status: Assist of 1  OT - OK to Discharge: Yes (per OT POC)    Subjective   Current Problem:  1. Bilateral carpal tunnel syndrome  Splint Application    Splint Application      2. Dizziness        3. Dehydration        4. Ambulatory dysfunction          General:  General  Reason for Referral: 88 yo female referred to OT for generalized weakness, impaired ADLs/mobility  Referred By: Reema Iglesias DO  Past Medical History Relevant to Rehab: hypothyroidism, HTN, PE, chronic B/L wounds and left hand carpal tunnel  Co-Treatment: PT  Co-Treatment Reason: maximize pt safety and outcomes  Prior to Session Communication: Bedside nurse  Patient Position Received: Bed, 3 rail up, Alarm on  General Comment: Pt pleasant, cooperative with therapy evaluation. Orthostatic BPs taken, given to nursing staff  Precautions:  Medical Precautions: Fall precautions (tele, purewick)  Braces Applied: L wrist splint    Vital Sign  (Past 2hrs)        Date/Time Vitals Session Patient Position Pulse Resp SpO2 BP MAP (mmHg)    01/07/25 0803 --  --  66  18  94 %  146/75  --     01/07/25 0924 --  --  72  --  --  167/92  --     01/07/25 0925 --  --  87  --  --  172/76  --     01/07/25 0926 --  --  90  --  --  169/77  --                        Pain:  Pain Assessment  Pain Assessment: 0-10  0-10 (Numeric) Pain Score: 0 - No pain    Objective   Cognition:  Overall Cognitive Status: Within Functional Limits  Arousal/Alertness: Appropriate responses to stimuli  Orientation Level: Oriented X4           Home Living:  Type of Home: Apartment (In-law suite attached to son's house)  Lives With: Adult children  Home Adaptive Equipment: Walker rolling or standard (rollator)  Home Layout: One level  Home Access: Ramped entrance  Bathroom Shower/Tub:  (spongebathes)  Bathroom Toilet: Handicapped height  Bathroom Equipment: Grab bars around toilet   Prior Function:  Level of [place]: Needs assistance with ADLs, Needs assistance with homemaking  Receives Help From: Family  ADL Assistance: Needs assistance (family assists with baths, LE ADL)  Homemaking Assistance: Needs assistance (family assists)  Ambulatory Assistance: Independent (with rollator)  IADL History:  Mode of Transportation: Family  ADL:  Eating Assistance: Stand by  Grooming Assistance: Minimal  Bathing Assistance: Moderate  UE Dressing Assistance: Stand by  LE Dressing Assistance: Maximal  Toileting Assistance with Device: Moderate  Functional Assistance: Minimal  ADL Comments: Assist to don shoes in long sitting. Other ADL performances anticipated d/t impaired hand coordination/strength  Activity Tolerance:  Endurance: Tolerates less than 10 min exercise, no significant change in vital signs  Bed Mobility/Transfers: Bed Mobility  Bed Mobility: Yes  Bed Mobility 1  Bed Mobility 1: Supine to sitting, Sitting to supine  Level of Assistance 1: Close supervision  Bed Mobility Comments 1: HOB  elevated    Transfers  Transfer: Yes  Transfer 1  Transfer From 1: Sit to  Transfer to 1: Stand  Technique 1: Sit to stand, Stand to sit  Transfer Device 1: Walker  Transfer Level of Assistance 1: Minimum assistance      Functional Mobility:  Functional Mobility  Functional Mobility Performed: Yes  Sitting Balance:  Static Sitting Balance  Static Sitting-Balance Support: Feet supported  Static Sitting-Level of Assistance: Close supervision  Standing Balance:  Static Standing Balance  Static Standing-Balance Support: Bilateral upper extremity supported  Static Standing-Level of Assistance: Contact guard     IADL's:   Mode of Transportation: Family    Strength:  Strength Comments: BUE grossly 4+/5     Coordination:  Movements are Fluid and Coordinated: Yes   Hand Function:  Hand Function  Gross Grasp: Impaired  Coordination: Impaired  Extremities: RUE   RUE : Within Functional Limits and LUE   LUE: Within Functional Limits    Outcome Measures: [facility] Daily Activity  Putting on and taking off regular lower body clothing: Total  Bathing (including washing, rinsing, drying): A lot  Putting on and taking off regular upper body clothing: A little  Toileting, which includes using toilet, bedpan or urinal: A lot  Taking care of personal grooming such as brushing teeth: A little  Eating Meals: A little  Daily Activity - Total Score: 14      Education Documentation  Body Mechanics, taught by Gen Love OT at 1/7/2025  9:48 AM.  Learner: Patient  Readiness: Acceptance  Method: Explanation  Response: Verbalizes Understanding    Precautions, taught by Gen Love OT at 1/7/2025  9:48 AM.  Learner: Patient  Readiness: Acceptance  Method: Explanation  Response: Verbalizes Understanding    ADL Training, taught by Gen Love OT at 1/7/2025  9:48 AM.  Learner: Patient  Readiness: Acceptance  Method: Explanation  Response: Verbalizes Understanding    Education Comments  No comments found.      Goals:    Encounter Problems       Encounter Problems (Active)       OT Goals       Pt will demo LE ADL completion with CGA, using AE if needed.        Start:  01/07/25    Expected End:  01/21/25            Pt will complete [id number]-zt-[id number] transfers using LRD in preparation for ADLs with supervision        Start:  01/07/25    Expected End:  01/21/25            Pt will tolerate 10min stand during functional task completion with no more than 1 rest break in order to increase endurance for functional task completion.        Start:  01/07/25    Expected End:  01/21/25            Pt will increase endurance to tolerate 15min of OOB activity with no more than 1 rest break in order to increase ability to engage in ADL completion.        Start:  01/07/25    Expected End:  01/21/25            Pt will demo and/or verbalize 2-3 energy conservation techniques to incorporate into functional mobility or ADL to improve performance and increase independence.        Start:  01/07/25    Expected End:  01/21/25

## 2025-01-07 NOTE — NURSING NOTE
1000 Assumed care of pt.     1043 Pt safe and comfortable. No complaints of pain or discomfort. Repositioned.

## 2025-01-07 NOTE — PROGRESS NOTES
Physical Therapy    Physical Therapy Evaluation    Patient Name: Nanette Flynn  MRN: [id number]  Department: [address]  [facility]  Today's Date: 1/7/2025   Time Calculation  Start Time: 0859  Stop Time: 0924  Time Calculation (min): 25 min    Assessment/Plan   PT Assessment  PT Assessment Results: Decreased strength, Decreased mobility, Impaired vision (deconditioning)  Rehab Prognosis: Good  Evaluation/Treatment Tolerance: Patient limited by fatigue  Medical Staff Made Aware: Yes  Strengths: Ability to acquire knowledge  Barriers to Participation: Comorbidities  End of Session Communication: Bedside nurse  End of Session Patient Position: Bed, 3 rail up, Alarm on  IP OR SWING BED PT PLAN  Inpatient or Swing Bed: Inpatient  PT Plan  Treatment/Interventions: Transfer training, Gait training, Stair training, Strengthening, Therapeutic exercise  PT Plan: Ongoing PT  PT Frequency: 3 times per week  PT Discharge Recommendations: Moderate intensity level of continued care  Equipment Recommended upon Discharge: Wheeled walker (Pt owns)  PT Recommended Transfer Status: Assist x1  PT - OK to Discharge: Yes (Per PT POC)    Subjective   General Visit Information:  General  Reason for Referral: 88 yo female admitted 2' to generalized weakness, dizzinesss, debility and referred to PT for  impaired mobilty  Referred By: Reema Iglesias DO  Past Medical History Relevant to Rehab: hypothyroidism, HTN, PE, chronic B/L wounds and left hand carpal tunnel  Family/Caregiver Present: No  Co-Treatment: OT  Co-Treatment Reason: maximize pt safety and outcomes  Prior to Session Communication: Bedside nurse  Patient Position Received: Bed, 3 rail up, Alarm on  General Comment: Pt pleasant, cooperative with therapy evaluation. Orthostatic BPs taken, given to nursing staff  Home Living:  Home Living  Type of Home: Apartment (in law suite attached to her son's house)  Lives With: Adult children  Home Adaptive Equipment: Walker rolling  or standard  Home Layout: One level  Home Access: Ramped entrance  Bathroom Shower/Tub:  (Pt spongebathes)  Bathroom Toilet: Handicapped height  Bathroom Equipment: Grab bars around toilet  Prior Level of Function:  Prior Function Per Pt/Caregiver Report  Level of [place]: Needs assistance with ADLs, Needs assistance with homemaking  Receives Help From: Family  ADL Assistance:  (family assists with baths and LE's)  Ambulatory Assistance: Independent (with rollator walker)  Precautions:  Precautions  Medical Precautions: Fall precautions (PureWick, telemetry)  Braces Applied: L wrist splint     Vital Signs (Past 2hrs)        Date/Time Vitals Session Patient Position Pulse Resp SpO2 BP MAP (mmHg)    01/07/25 0924 --  --  72  --  --  167/92  --     01/07/25 0925 --  --  87  --  --  172/76  --     01/07/25 0926 --  --  90  --  --  169/77  --                         Objective   Pain:  Pain Assessment  Pain Assessment: 0-10  0-10 (Numeric) Pain Score: 0 - No pain  Cognition:  Cognition  Overall Cognitive Status: Within Functional Limits  Arousal/Alertness: Appropriate responses to stimuli  Orientation Level: Oriented X4    General Assessments:  General Observation  General Observation: Pt is moving slowly and cautiously. She fatigues easily. Pt will benifit from MODERATE follow up services               Activity Tolerance  Endurance: Tolerates less than 10 min exercise, no significant change in vital signs    Sensation  Light Touch: No apparent deficits    Strength  Strength Comments: B LE are 4/4+ of 5  Static Sitting Balance  Static Sitting-Balance Support: Bilateral upper extremity supported  Static Sitting-Level of Assistance: Close supervision    Static Standing Balance  Static Standing-Balance Support: Bilateral upper extremity supported (wheeled walker)  Static Standing-Level of Assistance: Minimum assistance  Dynamic Standing Balance  Dynamic Standing-Balance Support: Bilateral upper extremity supported  (wheeled walker)  Dynamic Standing-Level of Assistance: Minimum assistance  Functional Assessments:  Bed Mobility  Bed Mobility: Yes  Bed Mobility 1  Bed Mobility 1: Supine to sitting, Sitting to supine  Level of Assistance 1: Close supervision  Bed Mobility Comments 1: HOB elevated    Transfers  Transfer: Yes  Transfer 1  Transfer From 1: Bed to  Transfer to 1: Stand  Technique 1: Sit to stand, Stand to sit  Transfer Device 1:  (wheeled walker)  Transfer Level of Assistance 1: Minimum assistance    Ambulation/Gait Training  Ambulation/Gait Training Performed: Yes  Ambulation/Gait Training 1  Surface 1: Level tile  Device 1: Rolling walker  Assistance 1: Minimum assistance  Quality of Gait 1: Decreased step length  Comments/Distance (ft) 1: side stepping down and up EOB    Stairs  Stairs: No  Extremity/Trunk Assessments:  RLE   RLE : Within Functional Limits  LLE   LLE : Within Functional Limits  Outcome Measures:  [facility] Basic Mobility  Turning from your back to your side while in a flat bed without using bedrails: A little  Moving from lying on your back to sitting on the side of a flat bed without using bedrails: A little  Moving to and from bed to chair (including a wheelchair): A little  Standing up from a chair using your arms (e.g. wheelchair or bedside chair): A lot  To walk in hospital room: A lot  Climbing 3-5 steps with railing: A lot  Basic Mobility - Total Score: 15    Encounter Problems       Encounter Problems (Active)       Mobility       STG - Patient will ambulate 50-80 feet MOD I with wheeled walker (Progressing)       Start:  01/07/25    Expected End:  01/21/25               PT Transfers       STG - Patient to transfer to and from sit to supine independently (Progressing)       Start:  01/07/25    Expected End:  01/21/25            STG - Patient will transfer sit to and from stand MOD I with wheeled walker (Progressing)       Start:  01/07/25    Expected End:  01/21/25                            Education Documentation  No documentation found.  Education Comments  No comments found.

## 2025-01-07 NOTE — PROGRESS NOTES
Nanette Flynn is a 89 y.o. female on day 0 of admission presenting with Generalized weakness.      Subjective   Does not feel SOB, does not want bronchodilators until she sees her PCP. Denies CP or left wrist pain. Endorses chronic paresthesias in left hand but overall improved with splint. Tolerating diet. Denies diarrhea/constipation.       Objective     Last Recorded Vitals  /77 (Patient Position: Standing)   Pulse 90   Temp 37 °C (98.6 °F) (Temporal)   Resp 18   Wt 77.3 kg (170 lb 6.4 oz)   SpO2 94%   Intake/Output last 3 Shifts:    Intake/Output Summary (Last 24 hours) at 1/7/2025 1221  Last data filed at 1/7/2025 0242  Gross per 24 hour   Intake 1240 ml   Output 500 ml   Net 740 ml       Admission Weight  Weight: 77.1 kg (170 lb) (01/06/25 0821)    Daily Weight  01/06/25 : 77.3 kg (170 lb 6.4 oz)    Image Results  XR hand left 3+ views  Narrative: Interpreted By:  Josse Abrams,   STUDY:  XR HAND LEFT 3+ VIEWS; ;  1/6/2025 6:10 pm      INDICATION:  Signs/Symptoms:rule out hand fracture, infection, dislocation.          COMPARISON:  None.      ACCESSION NUMBER(S):  [id number]      ORDERING CLINICIAN:  JOSE TODD      FINDINGS:  No acute fracture or malalignment. No significant soft tissue  swelling or abnormal radiopaque foreign bodies. Diffuse osseous  demineralization and mild carpal degenerative changes.      Impression: No acute fracture or malalignment.          MACRO:  None      Signed by: Josse Abrams 1/6/2025 7:53 PM  Dictation workstation:   [id number]  XR wrist left 3+ views  Narrative: Interpreted By:  Josse Abrams,   STUDY:  XR WRIST LEFT 3+ VIEWS; ;  1/6/2025 6:10 pm      INDICATION:  Signs/Symptoms:pain.          COMPARISON:  None.      ACCESSION NUMBER(S):  [id number]      ORDERING CLINICIAN:  AMANUEL ELIZABETH      FINDINGS:  No acute fracture or malalignment. Evidence of prior distal ulnar  styloid process trauma. Triscaphe degenerative changes. Diffuse  osseous  demineralization. Moderate wrist soft tissue swelling.      Impression: Chronic changes and moderate wrist soft tissue swelling.          MACRO:  None      Signed by: Josse Abrams 1/6/2025 7:28 PM  Dictation workstation:   [id number]  XR chest 1 view  Narrative: STUDY:  Chest Radiograph;  /6/2025 1:37PM1  INDICATION:  Intermittent dizziness and nausea..  COMPARISON:  None Available.  ACCESSION NUMBER(S):  [id number]  ORDERING CLINICIAN:  ZEINAB MARTINEZ  TECHNIQUE:  Frontal chest was obtained at 14:36 hours.  FINDINGS:  CARDIOMEDIASTINAL SILHOUETTE:  Cardiomediastinal silhouette is normal in size and configuration.   There is prominence of the aortic arch without atherosclerotic  changes.     LUNGS:  Lungs are hyperinflated with emphysematous changes and prominence of  the pulmonary interstitium.  There is no pneumothorax or pleural  effusion.  ABDOMEN:  No remarkable upper abdominal findings.     BONES:  No acute osseous changes.  Impression: Emphysematous changes without acute process.  Signed by Vic Maxwell,   ECG 12 lead  Normal sinus rhythm  Inferior infarct , age undetermined  Cannot rule out Anterior infarct (cited on or before 09-FEB-2024)  Abnormal ECG  When compared with ECG of 22-FEB-2024 10:23,  No significant change was found      Physical Exam  Physical Exam  Gen: NAD  Eyes:  EOM intact  ENT: MMM  Neck: No JVD  Respiratory: CTAB, no wheezes/rhonchi  Cardiac: RRR, no murmurs rubs or gallops  Abdomen: soft, NT, +BS  Extremities: no edema or cyanosis, left wrist splinted, chronic diminished sensation in left fingers  Neuro: No focal deficits, alert and oriented x 3, generalized weakness  Psych:  appropriate mood and behavior      Assessment/Plan      Nanette Flynn is a 89 y.o. female with PMH hypothyroidism, HTN, PE, chronic B/L wounds and left hand carpal tunnel presented from home with worsening dizzy spells. Worse with standing which impairs mobility.No alleviating factors  Associated with nausea.     Postural dizziness with near syncope/impaired mobility  -in the setting of possible undiagnosed emphysema  -orthostatic VS negative  -tele  -refuses bronchodilators  -on room air  -would benefit from outpt PFTs, walking pulse ox study  -PT/OT rec SNF, she is precert    Leukocytosis  -UA neg  -viral swabs neg  -urine Ag neg  -monitor    HTN  -lisinopril  -labetolol PRN    Left wrist pain 2/2 CTS  -improved with splint  -Xrays neg for acute fracture    CKD  -stable    History of PE  -eliquis    DVT prophy  -eliquis    Dispo: medically stable for SNF placement, updated TCC  D/w Dr. Ramon Paz, PA-C

## 2025-01-08 LAB
ALBUMIN SERPL BCP-MCNC: 3.6 G/DL (ref 3.4–5)
ANION GAP SERPL CALC-SCNC: 11 MMOL/L (ref 10–20)
BASOPHILS # BLD AUTO: 0.05 X10*3/UL (ref 0–0.1)
BASOPHILS NFR BLD AUTO: 0.5 %
BUN SERPL-MCNC: 21 MG/DL (ref 6–23)
CALCIUM SERPL-MCNC: 8.9 MG/DL (ref 8.6–10.3)
CHLORIDE SERPL-SCNC: 104 MMOL/L (ref 98–107)
CO2 SERPL-SCNC: 27 MMOL/L (ref 21–32)
CREAT SERPL-MCNC: 0.9 MG/DL (ref 0.5–1.05)
CRP SERPL-MCNC: 2.37 MG/DL
EGFRCR SERPLBLD CKD-EPI 2021: 62 ML/MIN/1.73M*2
EOSINOPHIL # BLD AUTO: 0.37 X10*3/UL (ref 0–0.4)
EOSINOPHIL NFR BLD AUTO: 3.6 %
ERYTHROCYTE [DISTWIDTH] IN BLOOD BY AUTOMATED COUNT: 12.5 % (ref 11.5–14.5)
GLUCOSE SERPL-MCNC: 100 MG/DL (ref 74–99)
HCT VFR BLD AUTO: 32.5 % (ref 36–46)
HGB BLD-MCNC: 10.8 G/DL (ref 12–16)
IMM GRANULOCYTES # BLD AUTO: 0.08 X10*3/UL (ref 0–0.5)
IMM GRANULOCYTES NFR BLD AUTO: 0.8 % (ref 0–0.9)
IRON SATN MFR SERPL: 15 % (ref 25–45)
IRON SERPL-MCNC: 36 UG/DL (ref 35–150)
LYMPHOCYTES # BLD AUTO: 3.28 X10*3/UL (ref 0.8–3)
LYMPHOCYTES NFR BLD AUTO: 32.1 %
MAGNESIUM SERPL-MCNC: 2.04 MG/DL (ref 1.6–2.4)
MCH RBC QN AUTO: 31.7 PG (ref 26–34)
MCHC RBC AUTO-ENTMCNC: 33.2 G/DL (ref 32–36)
MCV RBC AUTO: 95 FL (ref 80–100)
MONOCYTES # BLD AUTO: 0.98 X10*3/UL (ref 0.05–0.8)
MONOCYTES NFR BLD AUTO: 9.6 %
NEUTROPHILS # BLD AUTO: 5.46 X10*3/UL (ref 1.6–5.5)
NEUTROPHILS NFR BLD AUTO: 53.4 %
NRBC BLD-RTO: 0 /100 WBCS (ref 0–0)
PHOSPHATE SERPL-MCNC: 3.1 MG/DL (ref 2.5–4.9)
PLATELET # BLD AUTO: 251 X10*3/UL (ref 150–450)
POTASSIUM SERPL-SCNC: 3.7 MMOL/L (ref 3.5–5.3)
PROCALCITONIN SERPL-MCNC: 0.11 NG/ML
RBC # BLD AUTO: 3.41 X10*6/UL (ref 4–5.2)
SODIUM SERPL-SCNC: 138 MMOL/L (ref 136–145)
TIBC SERPL-MCNC: 242 UG/DL (ref 240–445)
UIBC SERPL-MCNC: 206 UG/DL (ref 110–370)
WBC # BLD AUTO: 10.2 X10*3/UL (ref 4.4–11.3)

## 2025-01-08 PROCEDURE — 83735 ASSAY OF MAGNESIUM: CPT | Performed by: INTERNAL MEDICINE

## 2025-01-08 PROCEDURE — 36415 COLL VENOUS BLD VENIPUNCTURE: CPT | Performed by: INTERNAL MEDICINE

## 2025-01-08 PROCEDURE — 97116 GAIT TRAINING THERAPY: CPT | Mod: GP,CQ

## 2025-01-08 PROCEDURE — 83540 ASSAY OF IRON: CPT | Performed by: PHYSICIAN ASSISTANT

## 2025-01-08 PROCEDURE — 86140 C-REACTIVE PROTEIN: CPT | Performed by: INTERNAL MEDICINE

## 2025-01-08 PROCEDURE — 2500000002 HC RX 250 W HCPCS SELF ADMINISTERED DRUGS (ALT 637 FOR MEDICARE OP, ALT 636 FOR OP/ED): Mod: MUE | Performed by: INTERNAL MEDICINE

## 2025-01-08 PROCEDURE — 85025 COMPLETE CBC W/AUTO DIFF WBC: CPT | Performed by: INTERNAL MEDICINE

## 2025-01-08 PROCEDURE — 99233 SBSQ HOSP IP/OBS HIGH 50: CPT | Performed by: PHYSICIAN ASSISTANT

## 2025-01-08 PROCEDURE — 80069 RENAL FUNCTION PANEL: CPT | Performed by: INTERNAL MEDICINE

## 2025-01-08 PROCEDURE — 94760 N-INVAS EAR/PLS OXIMETRY 1: CPT

## 2025-01-08 PROCEDURE — 2500000001 HC RX 250 WO HCPCS SELF ADMINISTERED DRUGS (ALT 637 FOR MEDICARE OP): Performed by: PHYSICIAN ASSISTANT

## 2025-01-08 PROCEDURE — 84145 PROCALCITONIN (PCT): CPT | Mod: GEALAB | Performed by: INTERNAL MEDICINE

## 2025-01-08 PROCEDURE — 2500000001 HC RX 250 WO HCPCS SELF ADMINISTERED DRUGS (ALT 637 FOR MEDICARE OP): Performed by: INTERNAL MEDICINE

## 2025-01-08 PROCEDURE — 99232 SBSQ HOSP IP/OBS MODERATE 35: CPT | Performed by: STUDENT IN AN ORGANIZED HEALTH CARE EDUCATION/TRAINING PROGRAM

## 2025-01-08 PROCEDURE — G0378 HOSPITAL OBSERVATION PER HR: HCPCS

## 2025-01-08 RX ORDER — FLUTICASONE PROPIONATE 50 MCG
1 SPRAY, SUSPENSION (ML) NASAL DAILY
Start: 2025-01-09 | End: 2025-01-08 | Stop reason: HOSPADM

## 2025-01-08 RX ORDER — LISINOPRIL 10 MG/1
10 TABLET ORAL DAILY
Status: DISCONTINUED | OUTPATIENT
Start: 2025-01-08 | End: 2025-01-09 | Stop reason: HOSPADM

## 2025-01-08 RX ORDER — LISINOPRIL 10 MG/1
10 TABLET ORAL DAILY
Start: 2025-01-09

## 2025-01-08 RX ADMIN — Medication 5000 UNITS: at 08:30

## 2025-01-08 RX ADMIN — LISINOPRIL 10 MG: 10 TABLET ORAL at 08:30

## 2025-01-08 RX ADMIN — APIXABAN 2.5 MG: 2.5 TABLET, FILM COATED ORAL at 08:30

## 2025-01-08 RX ADMIN — ACETAMINOPHEN 975 MG: 325 TABLET, FILM COATED ORAL at 01:35

## 2025-01-08 RX ADMIN — APIXABAN 2.5 MG: 2.5 TABLET, FILM COATED ORAL at 20:28

## 2025-01-08 RX ADMIN — LEVOTHYROXINE SODIUM 75 MCG: 75 TABLET ORAL at 06:09

## 2025-01-08 ASSESSMENT — COGNITIVE AND FUNCTIONAL STATUS - GENERAL
WALKING IN HOSPITAL ROOM: TOTAL
TURNING FROM BACK TO SIDE WHILE IN FLAT BAD: A LITTLE
CLIMB 3 TO 5 STEPS WITH RAILING: A LOT
MOVING FROM LYING ON BACK TO SITTING ON SIDE OF FLAT BED WITH BEDRAILS: A LITTLE
DAILY ACTIVITIY SCORE: 18
STANDING UP FROM CHAIR USING ARMS: A LOT
TURNING FROM BACK TO SIDE WHILE IN FLAT BAD: A LITTLE
DAILY ACTIVITIY SCORE: 18
STANDING UP FROM CHAIR USING ARMS: A LOT
TURNING FROM BACK TO SIDE WHILE IN FLAT BAD: A LITTLE
MOBILITY SCORE: 12
DRESSING REGULAR LOWER BODY CLOTHING: A LITTLE
PERSONAL GROOMING: A LITTLE
CLIMB 3 TO 5 STEPS WITH RAILING: A LOT
TOILETING: A LITTLE
HELP NEEDED FOR BATHING: A LOT
STANDING UP FROM CHAIR USING ARMS: A LOT
DRESSING REGULAR UPPER BODY CLOTHING: A LITTLE
TOILETING: A LOT
HELP NEEDED FOR BATHING: A LOT
MOVING FROM LYING ON BACK TO SITTING ON SIDE OF FLAT BED WITH BEDRAILS: A LITTLE
DRESSING REGULAR LOWER BODY CLOTHING: A LITTLE
MOBILITY SCORE: 14
CLIMB 3 TO 5 STEPS WITH RAILING: TOTAL
MOVING TO AND FROM BED TO CHAIR: A LITTLE
WALKING IN HOSPITAL ROOM: A LOT
MOVING TO AND FROM BED TO CHAIR: A LOT
MOBILITY SCORE: 16
MOVING FROM LYING ON BACK TO SITTING ON SIDE OF FLAT BED WITH BEDRAILS: A LITTLE
MOVING TO AND FROM BED TO CHAIR: A LOT
WALKING IN HOSPITAL ROOM: A LITTLE
DRESSING REGULAR UPPER BODY CLOTHING: A LITTLE

## 2025-01-08 ASSESSMENT — PAIN SCALES - GENERAL
PAINLEVEL_OUTOF10: 0 - NO PAIN
PAINLEVEL_OUTOF10: 0 - NO PAIN

## 2025-01-08 NOTE — PROGRESS NOTES
Nanette Flynn is a 89 y.o. female on day 0 of admission presenting with Generalized weakness.      Subjective   Feels fine, Denies CP or left wrist pain, BP still a bit up       Objective     Last Recorded Vitals  /77 (BP Location: Left arm, Patient Position: Lying)   Pulse 66   Temp 36 °C (96.8 °F) (Temporal)   Resp 17   Wt 77.3 kg (170 lb 6.4 oz)   SpO2 94%   Intake/Output last 3 Shifts:    Intake/Output Summary (Last 24 hours) at 1/8/2025 1213  Last data filed at 1/8/2025 1012  Gross per 24 hour   Intake 480 ml   Output 1500 ml   Net -1020 ml       Admission Weight  Weight: 77.1 kg (170 lb) (01/06/25 0821)    Daily Weight  01/06/25 : 77.3 kg (170 lb 6.4 oz)    Image Results  XR hand left 3+ views  Narrative: Interpreted By:  Josse Abrams,   STUDY:  XR HAND LEFT 3+ VIEWS; ;  1/6/2025 6:10 pm      INDICATION:  Signs/Symptoms:rule out hand fracture, infection, dislocation.          COMPARISON:  None.      ACCESSION NUMBER(S):  [id number]      ORDERING CLINICIAN:  JOSE TODD      FINDINGS:  No acute fracture or malalignment. No significant soft tissue  swelling or abnormal radiopaque foreign bodies. Diffuse osseous  demineralization and mild carpal degenerative changes.      Impression: No acute fracture or malalignment.          MACRO:  None      Signed by: Josse Abrams 1/6/2025 7:53 PM  Dictation workstation:   [id number]  XR wrist left 3+ views  Narrative: Interpreted By:  Josse Abrams,   STUDY:  XR WRIST LEFT 3+ VIEWS; ;  1/6/2025 6:10 pm      INDICATION:  Signs/Symptoms:pain.          COMPARISON:  None.      ACCESSION NUMBER(S):  [id number]      ORDERING CLINICIAN:  AMANUEL ELIZABETH      FINDINGS:  No acute fracture or malalignment. Evidence of prior distal ulnar  styloid process trauma. Triscaphe degenerative changes. Diffuse  osseous demineralization. Moderate wrist soft tissue swelling.      Impression: Chronic changes and moderate wrist soft tissue swelling.           MACRO:  None      Signed by: Josse Abrams 1/6/2025 7:28 PM  Dictation workstation:   [id number]  XR chest 1 view  Narrative: STUDY:  Chest Radiograph;  /6/2025 1:37PM1  INDICATION:  Intermittent dizziness and nausea..  COMPARISON:  None Available.  ACCESSION NUMBER(S):  [id number]  ORDERING CLINICIAN:  ZEINAB MARTINEZ  TECHNIQUE:  Frontal chest was obtained at 14:36 hours.  FINDINGS:  CARDIOMEDIASTINAL SILHOUETTE:  Cardiomediastinal silhouette is normal in size and configuration.   There is prominence of the aortic arch without atherosclerotic  changes.     LUNGS:  Lungs are hyperinflated with emphysematous changes and prominence of  the pulmonary interstitium.  There is no pneumothorax or pleural  effusion.  ABDOMEN:  No remarkable upper abdominal findings.     BONES:  No acute osseous changes.  Impression: Emphysematous changes without acute process.  Signed by Vic Maxwell,   ECG 12 lead  Normal sinus rhythm  Inferior infarct , age undetermined  Cannot rule out Anterior infarct (cited on or before 09-FEB-2024)  Abnormal ECG  When compared with ECG of 22-FEB-2024 10:23,  No significant change was found      Physical Exam  Physical Exam  Gen: NAD  Eyes:  EOM intact  ENT: MMM  Neck: No JVD  Respiratory: CTAB, no wheezes/rhonchi  Cardiac: RRR, no murmurs rubs or gallops  Abdomen: soft, NT, +BS  Extremities: no edema or cyanosis, left wrist splinted, chronic diminished sensation in left fingers  Neuro: No focal deficits, alert and oriented x 3, generalized weakness  Psych:  appropriate mood and behavior      Assessment/Plan      Nanette Flynn is a 89 y.o. female with PMH hypothyroidism, HTN, PE, chronic B/L wounds and left hand carpal tunnel presented from home with worsening dizzy spells. Worse with standing which impairs mobility.No alleviating factors Associated with nausea.     Postural dizziness with near syncope/impaired mobility  -in the setting of possible undiagnosed  emphysema  -orthostatic VS negative  -tele  -refuses bronchodilators  -on room air  -would benefit from outpt PFTs, walking pulse ox study  -PT/OT rec SNF, she is precert    Leukocytosis  -UA neg  -viral swabs neg  -urine Ag neg  -monitor    HTN  -lisinopril increased from 5 mg to 10 mg  -labetolol PRN    Left wrist pain 2/2 CTS  -improved with splint  -Xrays neg for acute fracture    CKD  -stable    History of PE  -eliquis    DVT prophy  -eliquis    Dispo: remains medically stable for SNF placement, updated TCC  D/w Dr. Ramon Paz, PA-C

## 2025-01-08 NOTE — NURSING NOTE
Assumed care of patient. Patient is A&Ox3 and is resting in bed on room air. Call light in reach. Patient denies pain and is normal sinus on monitor @ 72bpm.

## 2025-01-08 NOTE — PROGRESS NOTES
01/08/25 1128   Discharge Planning   Living Arrangements Alone;Children  ((Son lives in connected apartment-but he works during the day))   Support Systems Children  (Patient's daughter Milton is the patient's POA)   Assistance Needed Alert and oriented x 3; Needs assist for ADL's recently, walker is used at home, Doesn't drive (legally blind); Room air baseline and currently room air; PCP Dr Cole; on Eliquis prior to hospitalization  (A&OX4; needs assist for ADLs recently and uses walker; doesn't drive (legally blind); room air baseline and currently room air; PCP Dr Cole; on Eliquis prior to hospitalization)   Type of Residence Private residence   Number of Stairs to Enter Residence 0  (Ramp)   Number of Stairs Within Residence 0   Do you have animals or pets at home? Yes   Type of Animals or Pets 1dog (son is watching)   Who is requesting discharge planning? Provider   Home or Post Acute Services Post acute facilities (Rehab/SNF/etc)   Type of Post Acute Facility Services Skilled nursing   Expected Discharge Disposition SNF  (Plan is for [facility] for skilled rehab and care. Facility has accepted, awaiting for [place] office to ok financials from the patient's daughter Milton(POA), so that precert can be initiated.)   Does the patient need discharge transport arranged? Yes   RoundTrip coordination needed? Yes   Has discharge transport been arranged? No   Patient Choice   Provider Choice list and CMS website (https://medicare.gov/care-compare#search) for post-acute Quality and Resource Measure Data were provided and reviewed with: Patient;Family   Patient / Family choosing to utilize agency / facility established prior to hospitalization No   Stroke Family Assessment   Stroke Family Assessment Needed No   Intensity of Service   Intensity of Service 0-30 min

## 2025-01-08 NOTE — PROGRESS NOTES
"Physical Therapy    Physical Therapy Treatment    Patient Name: Nanette Flynn  MRN: [id number]  Department: [address]  [facility]  Today's Date: 1/8/2025  Time Calculation  Start Time: 1315  Stop Time: 1330  Time Calculation (min): 15 min         Assessment/Plan   PT Assessment  PT Assessment Results: Decreased strength, Decreased mobility, Impaired vision (deconditioning)  Rehab Prognosis: Good  Barriers to Discharge Home: Caregiver assistance, Physical needs  Caregiver Assistance: Patient lives alone and/or does not have reliable caregiver assistance  Physical Needs: 24hr mobility assistance needed, Intermittent ADL assistance needed  End of Session Communication: Bedside nurse  Assessment Comment: recommend Mod intensity skilled PT for gait, transfers, dynamic balance and endurance building to increase independence with functional mobility and decrease falls risk  End of Session Patient Position: Bed, 3 rail up, Alarm on     PT Plan  Treatment/Interventions: Transfer training, Gait training, Stair training, Strengthening, Therapeutic exercise  PT Plan: Ongoing PT  PT Frequency: 3 times per week  PT Discharge Recommendations: Moderate intensity level of continued care  Equipment Recommended upon Discharge: Wheeled walker (Pt owns)  PT Recommended Transfer Status: Assist x1  PT - OK to Discharge: Yes      General Visit Information:   PT  Visit  PT Received On: 01/08/25  General  Reason for Referral: 88 yo female admitted 2' to generalized weakness, dizzinesss, debility and referred to PT for  impaired mobilty  Referred By: Reema Iglesias DO  Past Medical History Relevant to Rehab: hypothyroidism, HTN, PE, chronic B/L wounds and left hand carpal tunnel  Family/Caregiver Present: No  Co-Treatment: OT  Co-Treatment Reason: maximize pt safety and outcomes  Prior to Session Communication: Bedside nurse  Patient Position Received: Bed, 3 rail up, Alarm on  General Comment: AXOX3, agreeable to \"help me transfer " "and walk back to bed, I'm a bit nauseated and tired for anything else\" stated patient    Subjective   Precautions:  Precautions  Medical Precautions: Fall precautions (PureWick, telemetry)  Braces Applied: L wrist splint    Vital Signs (Past 2hrs)        Date/Time Vitals Session Patient Position Pulse Resp SpO2 BP MAP (mmHg)    01/08/25 1546 --  --  67  16  98 %  144/76  --                         Objective   Pain:     Cognition:  Cognition  Orientation Level: Oriented X4  Coordination:  Movements are Fluid and Coordinated: Yes  Postural Control:  Static Sitting Balance  Static Sitting-Balance Support: Bilateral upper extremity supported  Static Sitting-Level of Assistance: Close supervision  Static Standing Balance  Static Standing-Balance Support: Bilateral upper extremity supported (wheeled walker)  Static Standing-Level of Assistance: Minimum assistance  Dynamic Standing Balance  Dynamic Standing-Balance Support: Bilateral upper extremity supported (wheeled walker)  Dynamic Standing-Level of Assistance: Minimum assistance  Extremity/Trunk Assessments:    Activity Tolerance:  Activity Tolerance  Endurance: Tolerates less than 10 min exercise, no significant change in vital signs, Tolerates 10 - 20 min exercise with multiple rests  Treatments:  Therapeutic Exercise  Therapeutic Exercise Performed: No                             Bed Mobility  Bed Mobility: Yes  Bed Mobility 1  Bed Mobility 1: Supine to sitting, Sitting to supine  Level of Assistance 1: Close supervision    Ambulation/Gait Training  Ambulation/Gait Training Performed: Yes  Ambulation/Gait Training 1  Surface 1: Level tile  Device 1: Rolling walker  Assistance 1: Minimum assistance, Contact guard  Quality of Gait 1: Decreased step length  Comments/Distance (ft) 1: 10ft x1  Transfers  Transfer: Yes  Transfer 1  Transfer From 1: Bed to  Transfer to 1: Stand  Technique 1: Sit to stand, Stand to sit  Transfer Device 1:  (wheeled walker)  Transfer Level " of Assistance 1: Minimum assistance, Contact guard  Trials/Comments 1: x1    Stairs  Stairs: No                     Outcome Measures:  [facility] Basic Mobility  Turning from your back to your side while in a flat bed without using bedrails: A little  Moving from lying on your back to sitting on the side of a flat bed without using bedrails: A little  Moving to and from bed to chair (including a wheelchair): A little  Standing up from a chair using your arms (e.g. wheelchair or bedside chair): A lot  To walk in hospital room: A little  Climbing 3-5 steps with railing: A lot  Basic Mobility - Total Score: 16    Education Documentation  Body Mechanics, taught by Lizy Campbell PTA at 1/8/2025  4:29 PM.  Learner: Patient  Readiness: Acceptance  Method: Explanation  Response: Verbalizes Understanding    Precautions, taught by Lizy Campbell PTA at 1/8/2025  4:29 PM.  Learner: Patient  Readiness: Acceptance  Method: Explanation  Response: Verbalizes Understanding    ADL Training, taught by Lizy Campbell PTA at 1/8/2025  4:29 PM.  Learner: Patient  Readiness: Acceptance  Method: Explanation  Response: Verbalizes Understanding    Education Comments  No comments found.        OP EDUCATION:       Encounter Problems       Encounter Problems (Active)       Mobility       STG - Patient will ambulate 50-80 feet MOD I with wheeled walker (Progressing)       Start:  01/07/25    Expected End:  01/21/25               PT Transfers       STG - Patient to transfer to and from sit to supine independently (Progressing)       Start:  01/07/25    Expected End:  01/21/25            STG - Patient will transfer sit to and from stand MOD I with wheeled walker (Progressing)       Start:  01/07/25    Expected End:  01/21/25               Safety       LTG - Patient will adhere to hip precautions during ADL's and transfers       Start:  01/06/25            LTG - Patient will demonstrate safety requirements appropriate to situation/environment        Start:  01/06/25            LTG - Patient will utilize safety techniques       Start:  01/06/25            STG - Patient locks brakes on wheelchair       Start:  01/06/25            STG - Patient uses call light consistently to request assistance with transfers       Start:  01/06/25            STG - Patient uses gait belt during all transfers       Start:  01/06/25

## 2025-01-08 NOTE — CARE PLAN
The patient's goals for the shift include      The clinical goals for the shift include maintain patient comfort    Assumed care of patient at 0700. Patient awake, alert and interactive. Denies any pain/discomforts. No c/o voiced at this time. Will cont. To monitor.

## 2025-01-09 VITALS
WEIGHT: 170.4 LBS | HEIGHT: 62 IN | RESPIRATION RATE: 18 BRPM | SYSTOLIC BLOOD PRESSURE: 159 MMHG | TEMPERATURE: 97.7 F | BODY MASS INDEX: 31.36 KG/M2 | DIASTOLIC BLOOD PRESSURE: 65 MMHG | OXYGEN SATURATION: 95 % | HEART RATE: 70 BPM

## 2025-01-09 PROCEDURE — 99239 HOSP IP/OBS DSCHRG MGMT >30: CPT | Performed by: PHYSICIAN ASSISTANT

## 2025-01-09 PROCEDURE — 99239 HOSP IP/OBS DSCHRG MGMT >30: CPT | Performed by: STUDENT IN AN ORGANIZED HEALTH CARE EDUCATION/TRAINING PROGRAM

## 2025-01-09 PROCEDURE — 2500000001 HC RX 250 WO HCPCS SELF ADMINISTERED DRUGS (ALT 637 FOR MEDICARE OP): Performed by: PHYSICIAN ASSISTANT

## 2025-01-09 PROCEDURE — 2500000002 HC RX 250 W HCPCS SELF ADMINISTERED DRUGS (ALT 637 FOR MEDICARE OP, ALT 636 FOR OP/ED): Mod: MUE | Performed by: INTERNAL MEDICINE

## 2025-01-09 PROCEDURE — G0378 HOSPITAL OBSERVATION PER HR: HCPCS

## 2025-01-09 PROCEDURE — 2500000001 HC RX 250 WO HCPCS SELF ADMINISTERED DRUGS (ALT 637 FOR MEDICARE OP): Performed by: INTERNAL MEDICINE

## 2025-01-09 RX ADMIN — ACETAMINOPHEN 975 MG: 325 TABLET, FILM COATED ORAL at 00:43

## 2025-01-09 RX ADMIN — APIXABAN 2.5 MG: 2.5 TABLET, FILM COATED ORAL at 08:10

## 2025-01-09 RX ADMIN — LISINOPRIL 10 MG: 10 TABLET ORAL at 08:09

## 2025-01-09 RX ADMIN — Medication 5000 UNITS: at 08:10

## 2025-01-09 RX ADMIN — LEVOTHYROXINE SODIUM 75 MCG: 75 TABLET ORAL at 05:18

## 2025-01-09 ASSESSMENT — COGNITIVE AND FUNCTIONAL STATUS - GENERAL
MOVING TO AND FROM BED TO CHAIR: A LOT
WALKING IN HOSPITAL ROOM: A LOT
DRESSING REGULAR UPPER BODY CLOTHING: A LITTLE
PERSONAL GROOMING: A LITTLE
STANDING UP FROM CHAIR USING ARMS: A LOT
MOBILITY SCORE: 15
DAILY ACTIVITIY SCORE: 18
TURNING FROM BACK TO SIDE WHILE IN FLAT BAD: A LITTLE
DRESSING REGULAR LOWER BODY CLOTHING: A LITTLE
CLIMB 3 TO 5 STEPS WITH RAILING: A LOT
TOILETING: A LITTLE
HELP NEEDED FOR BATHING: A LOT

## 2025-01-09 ASSESSMENT — PAIN SCALES - GENERAL: PAINLEVEL_OUTOF10: 0 - NO PAIN

## 2025-01-09 ASSESSMENT — PAIN - FUNCTIONAL ASSESSMENT: PAIN_FUNCTIONAL_ASSESSMENT: 0-10

## 2025-01-09 NOTE — DISCHARGE SUMMARY
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   calcium carb,gluc-mag gluc,ox 500 mg calcium- 250 mg tablet   cholecalciferol 5,000 Units tablet; Commonly known as: Vitamin D-3   CYANOCOBALAMIN (VITAMIN B-12) ORAL   PRESERVISION AREDS ORAL   PROBIOTIC ORAL   Synthroid 75 mcg tablet; Generic drug: levothyroxine; Take 1 tablet by   mouth once daily   Travatan Z 0.004 % drops ophthalmic solution; Generic drug: travoprost     STOP taking these medications     MediHoney (honey) gel topical gel; Generic drug: honey   traMADol 50 mg tablet; Commonly known as: Ultram   triamcinolone 55 mcg nasal inhaler; Commonly known as: Nasacort       Test Results Pending At Discharge  Pending Labs       No current pending labs.            Hospital Course   Per HPI:  Nanette Flynn is a 89 y.o. female with PMH hypothyroidism, HTN, PE, chronic B/L wounds and left hand carpal tunnel presented from home with worsening dizzy spells. Worse with standing which impairs mobility.No alleviating factors Associated with nausea. Found to have transient hypoxia with c/f emphysema on CXR, she refused treatment for emphysema and would like to follow up with PCP regarding. Left wrist pain improved with splint. BP improved with increase in lisinopril. PT/OT recommended SNF, dc delayed awaiting pre-cert. On  day of discharge, patient denied CP, SOB, n/v/diarrhea/constipation, was tolerating diet.  Patient appeared hemodynamically stable at time of discharge. Discussed with attending physician who agreed with discharge plan.  Time spent on discharge including patient evaluation, education, coordination of care with consultants, attending physician, care coordination and nursing was 35 minutes.        Pertinent Physical Exam At Time of Discharge  Physical Exam  Physical Exam  Gen: NAD  Eyes:  EOM intact  ENT: MMM  Neck: No JVD  Respiratory: CTAB, no wheezes/rhonchi  Cardiac: RRR, no murmurs rubs or gallops  Abdomen: soft, NT, +BS  Extremities: no edema or cyanosis, left wrist splinted, chronic diminished sensation in left fingers  Neuro: No focal deficits, alert and oriented x 3, generalized weakness  Psych:  appropriate mood and behavior  Outpatient Follow-Up  Future Appointments   Date Time Provider Department Center   2/4/2025  2:30 PM Salud Walker MD [id number] [facility]   3/5/2025  1:00 PM Blanca Quiroz MD, MS [id number] [facility]         Grace Paz PA-C

## 2025-01-09 NOTE — CONSULTS
Wound Care Consult     Visit Date: 1/8/2025      Patient Name: Nnaette Flynn         MRN: [id number]           YOB: 1935     Reason for Consult: wound        Wound History: being treated for wound, POA     Pertinent Labs:   Albumin   Date Value Ref Range Status   01/08/2025 3.6 3.4 - 5.0 g/dL Final     Albumin, SPE   Date Value Ref Range Status   07/25/2023   Final    4.1  Reference range: 3.4  to  5.0  Unit: g/dL    Test performed at:  [facility] [address] [facility] [address]         Wound Assessment:  Wound 02/22/24 Pressure Injury (Active)       Wound 11/08/24 Arterial Ulcer Pretibial Left (Active)   Wound Image   01/06/25 1818   Site Assessment Clean;Dry 01/06/25 1818   State of Healing Closed wound edges 01/06/25 1818   Drainage Description None 01/06/25 1818   Drainage Amount None 01/06/25 1818   Dressing Changed Changed 01/06/25 1818   Dressing Status Clean;Dry 01/06/25 1818       Wound 11/08/24 Arterial Ulcer Pretibial Right (Active)   Wound Image   01/06/25 1817   Site Assessment Red 01/06/25 1818   State of Healing Closed wound edges 01/06/25 1818   Drainage Description None 01/07/25 1010   Drainage Amount None 01/07/25 1010   Dressing Other (Comment) 01/07/25 1010   Dressing Changed Other (Comment) 01/07/25 1010   Dressing Status Clean 01/07/25 1010       Wound Team Summary Assessment: Very pleasant 89 year old lady from care of family of 8 children.  Son, Vic at bedside.    -- RLE 0.3 x 0.2 cm wound almost healed.  Patient up to bathroom in shoes with Rolator and stand by assist as requested.     Wound Team Plan: Continue care as previous, order obtained to use patient own collagen and bandage for continued healing.  Message with questions.     Jaci Mcmanus RN, [facility]  1/8/2025  10:08 PM

## 2025-01-15 LAB
ATRIAL RATE: 66 BPM
P AXIS: 64 DEGREES
P OFFSET: 154 MS
P ONSET: 118 MS
PR INTERVAL: 196 MS
Q ONSET: 216 MS
QRS COUNT: 11 BEATS
QRS DURATION: 92 MS
QT INTERVAL: 398 MS
QTC CALCULATION(BAZETT): 417 MS
QTC FREDERICIA: 411 MS
R AXIS: -2 DEGREES
T AXIS: 76 DEGREES
T OFFSET: 415 MS
VENTRICULAR RATE: 66 BPM

## 2025-01-24 ENCOUNTER — NURSING HOME VISIT (OUTPATIENT)
Dept: POST ACUTE CARE | Facility: EXTERNAL LOCATION | Age: 89
End: 2025-01-24
Payer: MEDICARE

## 2025-01-24 DIAGNOSIS — I10 ESSENTIAL HYPERTENSION: ICD-10-CM

## 2025-01-24 DIAGNOSIS — K21.9 GASTROESOPHAGEAL REFLUX DISEASE, UNSPECIFIED WHETHER ESOPHAGITIS PRESENT: ICD-10-CM

## 2025-01-24 DIAGNOSIS — R11.0 NAUSEA: ICD-10-CM

## 2025-01-24 DIAGNOSIS — R42 DIZZINESS: Primary | ICD-10-CM

## 2025-01-24 PROCEDURE — 99305 1ST NF CARE MODERATE MDM 35: CPT | Performed by: FAMILY MEDICINE

## 2025-01-24 NOTE — LETTER
Patient: Nanette Flynn  : 1935    Encounter Date: 2025    Subjective  Patient ID: Nanette Flynn is a 89 y.o. female who is acute skilled care being seen and evaluated for multiple medical problems.    HPI pt admitted here with recent hospital stay for dizziness. She has had this for months and is resistant to medications.inhalers recommended at hospital but she declined them. She continues to have dizziness, today reporting issues wut turning in bed. It is accompanied bynausea. Ent consult recommended or trial of meclizine. She is wanting definitive testing and results prior to trying eds.I did explain to her that meclizine would be therapeutic trial, help with diagnosis of issue.     Review of Systems   Constitutional:  Negative for chills, fatigue and fever.   HENT:  Negative for congestion and sore throat.    Eyes:  Negative for visual disturbance.   Respiratory:  Negative for cough and shortness of breath.    Cardiovascular:  Positive for leg swelling. Negative for chest pain.   Gastrointestinal:  Negative for abdominal pain, constipation, diarrhea, nausea and vomiting.   Genitourinary:  Negative for dysuria.   Skin:  Positive for rash and wound.   Neurological:  Positive for dizziness and numbness. Negative for headaches.       Objective  There were no vitals taken for this visit.    Physical Exam  Vitals reviewed: 127/78 88 97.6 wt 170.   Constitutional:       General: She is not in acute distress.     Appearance: Normal appearance.   HENT:      Head: Normocephalic.      Mouth/Throat:      Mouth: Mucous membranes are moist.      Pharynx: Oropharynx is clear.   Eyes:      Extraocular Movements: Extraocular movements intact.      Pupils: Pupils are equal, round, and reactive to light.   Cardiovascular:      Rate and Rhythm: Normal rate and regular rhythm.      Heart sounds: Normal heart sounds.   Pulmonary:      Effort: Pulmonary effort is normal.      Breath sounds: Normal breath sounds.  No wheezing or rhonchi.   Abdominal:      General: There is no distension.      Palpations: Abdomen is soft.      Tenderness: There is no abdominal tenderness.   Musculoskeletal:      Right lower leg: Edema present.      Left lower leg: Edema present.   Lymphadenopathy:      Cervical: No cervical adenopathy.   Skin:     Coloration: Skin is not jaundiced.      Findings: Rash: back with maculopapular rash t10-12.   Neurological:      General: No focal deficit present.      Mental Status: She is alert.      Cranial Nerves: No cranial nerve deficit.   Psychiatric:         Mood and Affect: Mood normal.         Assessment/Plan  Problem List Items Addressed This Visit    None  Visit Diagnoses         Codes    Dizziness    -  Primary R42    Nausea     R11.0    Essential hypertension     I10    Gastroesophageal reflux disease, unspecified whether esophagitis present     K21.9        Trial low dose meclizine  Monitor pulse ox  Consider ent referral  Continue pt/ot  Gfr 52, hgb 11.1 here-overall stable     Goals    None           Electronically Signed By: Salud Walker MD   1/26/25  9:21 PM

## 2025-01-26 ASSESSMENT — ENCOUNTER SYMPTOMS
HEADACHES: 0
CHILLS: 0
VOMITING: 0
SHORTNESS OF BREATH: 0
DIZZINESS: 1
FATIGUE: 0
CONSTIPATION: 0
DYSURIA: 0
NAUSEA: 0
DIARRHEA: 0
FEVER: 0
SORE THROAT: 0
ABDOMINAL PAIN: 0
WOUND: 1
NUMBNESS: 1
COUGH: 0

## 2025-01-27 NOTE — PROGRESS NOTES
Subjective   Patient ID: Nanette Flynn is a 89 y.o. female who is acute skilled care being seen and evaluated for multiple medical problems.    HPI pt admitted here with recent hospital stay for dizziness. She has had this for months and is resistant to medications.inhalers recommended at hospital but she declined them. She continues to have dizziness, today reporting issues wut turning in bed. It is accompanied bynausea. Ent consult recommended or trial of meclizine. She is wanting definitive testing and results prior to trying eds.I did explain to her that meclizine would be therapeutic trial, help with diagnosis of issue.     Review of Systems   Constitutional:  Negative for chills, fatigue and fever.   HENT:  Negative for congestion and sore throat.    Eyes:  Negative for visual disturbance.   Respiratory:  Negative for cough and shortness of breath.    Cardiovascular:  Positive for leg swelling. Negative for chest pain.   Gastrointestinal:  Negative for abdominal pain, constipation, diarrhea, nausea and vomiting.   Genitourinary:  Negative for dysuria.   Skin:  Positive for rash and wound.   Neurological:  Positive for dizziness and numbness. Negative for headaches.       Objective   There were no vitals taken for this visit.    Physical Exam  Vitals reviewed: 127/78 88 97.6 wt 170.   Constitutional:       General: She is not in acute distress.     Appearance: Normal appearance.   HENT:      Head: Normocephalic.      Mouth/Throat:      Mouth: Mucous membranes are moist.      Pharynx: Oropharynx is clear.   Eyes:      Extraocular Movements: Extraocular movements intact.      Pupils: Pupils are equal, round, and reactive to light.   Cardiovascular:      Rate and Rhythm: Normal rate and regular rhythm.      Heart sounds: Normal heart sounds.   Pulmonary:      Effort: Pulmonary effort is normal.      Breath sounds: Normal breath sounds. No wheezing or rhonchi.   Abdominal:      General: There is no distension.       Palpations: Abdomen is soft.      Tenderness: There is no abdominal tenderness.   Musculoskeletal:      Right lower leg: Edema present.      Left lower leg: Edema present.   Lymphadenopathy:      Cervical: No cervical adenopathy.   Skin:     Coloration: Skin is not jaundiced.      Findings: Rash: back with maculopapular rash t10-12.   Neurological:      General: No focal deficit present.      Mental Status: She is alert.      Cranial Nerves: No cranial nerve deficit.   Psychiatric:         Mood and Affect: Mood normal.         Assessment/Plan   Problem List Items Addressed This Visit    None  Visit Diagnoses         Codes    Dizziness    -  Primary R42    Nausea     R11.0    Essential hypertension     I10    Gastroesophageal reflux disease, unspecified whether esophagitis present     K21.9        Trial low dose meclizine  Monitor pulse ox  Consider ent referral  Continue pt/ot  Gfr 52, hgb 11.1 here-overall stable     Goals    None

## 2025-01-31 ENCOUNTER — NURSING HOME VISIT (OUTPATIENT)
Dept: POST ACUTE CARE | Facility: EXTERNAL LOCATION | Age: 89
End: 2025-01-31
Payer: MEDICARE

## 2025-01-31 DIAGNOSIS — R11.0 NAUSEA: ICD-10-CM

## 2025-01-31 DIAGNOSIS — R42 VERTIGO: Primary | ICD-10-CM

## 2025-01-31 PROCEDURE — 99308 SBSQ NF CARE LOW MDM 20: CPT | Performed by: FAMILY MEDICINE

## 2025-01-31 NOTE — LETTER
Patient: Nanette Flynn  : 1935    Encounter Date: 2025    Subjective  Patient ID: Nanette Flynn is a 89 y.o. female who is acute skilled care being seen and evaluated for multiple medical problems.    HPI pt admitted here with recent hospital stay for dizziness. She has had this for months and is resistant to medications.inhalers recommended at hospital but she declined them. She did agree to try low dose meclizine last week and now notes some improvement. Nausea treated with zofran.      Review of Systems   Constitutional:  Negative for chills, fatigue and fever.   HENT:  Negative for congestion and sore throat.    Eyes:  Negative for visual disturbance.   Respiratory:  Negative for cough and shortness of breath.    Cardiovascular:  Positive for leg swelling. Negative for chest pain.   Gastrointestinal:  Positive for nausea. Negative for abdominal pain, constipation, diarrhea and vomiting.   Genitourinary:  Negative for dysuria.   Skin:  Positive for rash and wound.   Neurological:  Positive for dizziness. Negative for headaches.       Objective  There were no vitals taken for this visit.    Physical Exam  Vitals reviewed: 127/78 88 97.6 wt 170.   Constitutional:       General: She is not in acute distress.     Appearance: Normal appearance.   HENT:      Head: Normocephalic.      Mouth/Throat:      Mouth: Mucous membranes are moist.      Pharynx: Oropharynx is clear.   Eyes:      Extraocular Movements: Extraocular movements intact.      Pupils: Pupils are equal, round, and reactive to light.   Cardiovascular:      Rate and Rhythm: Normal rate and regular rhythm.      Heart sounds: Normal heart sounds.   Pulmonary:      Effort: Pulmonary effort is normal.      Breath sounds: Normal breath sounds. No wheezing or rhonchi.   Abdominal:      General: There is no distension.      Palpations: Abdomen is soft.      Tenderness: There is no abdominal tenderness.   Musculoskeletal:      Right lower leg:  Edema present.      Left lower leg: Edema present.   Lymphadenopathy:      Cervical: No cervical adenopathy.   Skin:     Coloration: Skin is not jaundiced.   Neurological:      General: No focal deficit present.      Mental Status: She is alert.      Cranial Nerves: No cranial nerve deficit.   Psychiatric:         Mood and Affect: Mood normal.         Assessment/Plan  Problem List Items Addressed This Visit    None  Visit Diagnoses         Codes    Vertigo    -  Primary R42    Nausea     R11.0        Continue meclizine at bedtime and prn, zofran use lessening      Electronically Signed By: Salud Walker MD   2/2/25  8:01 PM

## 2025-02-02 ASSESSMENT — ENCOUNTER SYMPTOMS
WOUND: 1
FATIGUE: 0
HEADACHES: 0
CONSTIPATION: 0
NAUSEA: 1
ABDOMINAL PAIN: 0
SORE THROAT: 0
SHORTNESS OF BREATH: 0
DYSURIA: 0
DIARRHEA: 0
CHILLS: 0
VOMITING: 0
COUGH: 0
DIZZINESS: 1
FEVER: 0

## 2025-02-03 NOTE — PROGRESS NOTES
Subjective   Patient ID: Nanette Flynn is a 89 y.o. female who is acute skilled care being seen and evaluated for multiple medical problems.    HPI pt admitted here with recent hospital stay for dizziness. She has had this for months and is resistant to medications.inhalers recommended at hospital but she declined them. She did agree to try low dose meclizine last week and now notes some improvement. Nausea treated with zofran.      Review of Systems   Constitutional:  Negative for chills, fatigue and fever.   HENT:  Negative for congestion and sore throat.    Eyes:  Negative for visual disturbance.   Respiratory:  Negative for cough and shortness of breath.    Cardiovascular:  Positive for leg swelling. Negative for chest pain.   Gastrointestinal:  Positive for nausea. Negative for abdominal pain, constipation, diarrhea and vomiting.   Genitourinary:  Negative for dysuria.   Skin:  Positive for rash and wound.   Neurological:  Positive for dizziness. Negative for headaches.       Objective   There were no vitals taken for this visit.    Physical Exam  Vitals reviewed: 127/78 88 97.6 wt 170.   Constitutional:       General: She is not in acute distress.     Appearance: Normal appearance.   HENT:      Head: Normocephalic.      Mouth/Throat:      Mouth: Mucous membranes are moist.      Pharynx: Oropharynx is clear.   Eyes:      Extraocular Movements: Extraocular movements intact.      Pupils: Pupils are equal, round, and reactive to light.   Cardiovascular:      Rate and Rhythm: Normal rate and regular rhythm.      Heart sounds: Normal heart sounds.   Pulmonary:      Effort: Pulmonary effort is normal.      Breath sounds: Normal breath sounds. No wheezing or rhonchi.   Abdominal:      General: There is no distension.      Palpations: Abdomen is soft.      Tenderness: There is no abdominal tenderness.   Musculoskeletal:      Right lower leg: Edema present.      Left lower leg: Edema present.   Lymphadenopathy:       Cervical: No cervical adenopathy.   Skin:     Coloration: Skin is not jaundiced.   Neurological:      General: No focal deficit present.      Mental Status: She is alert.      Cranial Nerves: No cranial nerve deficit.   Psychiatric:         Mood and Affect: Mood normal.         Assessment/Plan   Problem List Items Addressed This Visit    None  Visit Diagnoses         Codes    Vertigo    -  Primary R42    Nausea     R11.0        Continue meclizine at bedtime and prn, zofran use lessening

## 2025-02-04 ENCOUNTER — APPOINTMENT (OUTPATIENT)
Dept: PRIMARY CARE | Facility: CLINIC | Age: 89
End: 2025-02-04
Payer: MEDICARE

## 2025-02-07 ENCOUNTER — TELEPHONE (OUTPATIENT)
Dept: PRIMARY CARE | Facility: CLINIC | Age: 89
End: 2025-02-07
Payer: MEDICARE

## 2025-02-07 ENCOUNTER — APPOINTMENT (OUTPATIENT)
Dept: RADIOLOGY | Facility: HOSPITAL | Age: 89
End: 2025-02-07
Payer: MEDICARE

## 2025-02-07 ENCOUNTER — HOSPITAL ENCOUNTER (EMERGENCY)
Facility: HOSPITAL | Age: 89
Discharge: HOME | End: 2025-02-08
Payer: MEDICARE

## 2025-02-07 DIAGNOSIS — J18.9 PNEUMONIA OF RIGHT MIDDLE LOBE DUE TO INFECTIOUS ORGANISM: ICD-10-CM

## 2025-02-07 DIAGNOSIS — J10.1 INFLUENZA A: Primary | ICD-10-CM

## 2025-02-07 LAB
ALBUMIN SERPL BCP-MCNC: 3.3 G/DL (ref 3.4–5)
ALP SERPL-CCNC: 56 U/L (ref 33–136)
ALT SERPL W P-5'-P-CCNC: 24 U/L (ref 7–45)
ANION GAP BLDV CALCULATED.4IONS-SCNC: 4 MMOL/L (ref 10–25)
ANION GAP SERPL CALC-SCNC: 15 MMOL/L (ref 10–20)
AST SERPL W P-5'-P-CCNC: 37 U/L (ref 9–39)
BASE EXCESS BLDV CALC-SCNC: 9.4 MMOL/L (ref -2–3)
BASOPHILS # BLD AUTO: 0.04 X10*3/UL (ref 0–0.1)
BASOPHILS NFR BLD AUTO: 0.3 %
BILIRUB SERPL-MCNC: 1 MG/DL (ref 0–1.2)
BNP SERPL-MCNC: 111 PG/ML (ref 0–99)
BODY TEMPERATURE: ABNORMAL
BUN SERPL-MCNC: 20 MG/DL (ref 6–23)
CA-I BLDV-SCNC: 1.23 MMOL/L (ref 1.1–1.33)
CALCIUM SERPL-MCNC: 9 MG/DL (ref 8.6–10.3)
CARDIAC TROPONIN I PNL SERPL HS: 16 NG/L (ref 0–13)
CHLORIDE BLDV-SCNC: 99 MMOL/L (ref 98–107)
CHLORIDE SERPL-SCNC: 94 MMOL/L (ref 98–107)
CO2 SERPL-SCNC: 28 MMOL/L (ref 21–32)
CREAT SERPL-MCNC: 1.07 MG/DL (ref 0.5–1.05)
EGFRCR SERPLBLD CKD-EPI 2021: 50 ML/MIN/1.73M*2
EOSINOPHIL # BLD AUTO: 0.18 X10*3/UL (ref 0–0.4)
EOSINOPHIL NFR BLD AUTO: 1.5 %
ERYTHROCYTE [DISTWIDTH] IN BLOOD BY AUTOMATED COUNT: 12.5 % (ref 11.5–14.5)
GLUCOSE BLDV-MCNC: 101 MG/DL (ref 74–99)
GLUCOSE SERPL-MCNC: 97 MG/DL (ref 74–99)
HCO3 BLDV-SCNC: 33.5 MMOL/L (ref 22–26)
HCT VFR BLD AUTO: 27.7 % (ref 36–46)
HCT VFR BLD EST: 29 % (ref 36–46)
HGB BLD-MCNC: 9 G/DL (ref 12–16)
HGB BLDV-MCNC: 9.8 G/DL (ref 12–16)
IMM GRANULOCYTES # BLD AUTO: 0.36 X10*3/UL (ref 0–0.5)
IMM GRANULOCYTES NFR BLD AUTO: 3 % (ref 0–0.9)
INHALED O2 CONCENTRATION: 3 %
LACTATE BLDV-SCNC: 1.3 MMOL/L (ref 0.4–2)
LYMPHOCYTES # BLD AUTO: 2.99 X10*3/UL (ref 0.8–3)
LYMPHOCYTES NFR BLD AUTO: 24.5 %
MCH RBC QN AUTO: 31.8 PG (ref 26–34)
MCHC RBC AUTO-ENTMCNC: 32.5 G/DL (ref 32–36)
MCV RBC AUTO: 98 FL (ref 80–100)
MONOCYTES # BLD AUTO: 1.31 X10*3/UL (ref 0.05–0.8)
MONOCYTES NFR BLD AUTO: 10.7 %
NEUTROPHILS # BLD AUTO: 7.31 X10*3/UL (ref 1.6–5.5)
NEUTROPHILS NFR BLD AUTO: 60 %
NRBC BLD-RTO: 0 /100 WBCS (ref 0–0)
OXYHGB MFR BLDV: 69.8 % (ref 45–75)
PCO2 BLDV: 43 MM HG (ref 41–51)
PH BLDV: 7.5 PH (ref 7.33–7.43)
PLATELET # BLD AUTO: 226 X10*3/UL (ref 150–450)
PO2 BLDV: 40 MM HG (ref 35–45)
POTASSIUM BLDV-SCNC: 3.8 MMOL/L (ref 3.5–5.3)
POTASSIUM SERPL-SCNC: 3.8 MMOL/L (ref 3.5–5.3)
PROT SERPL-MCNC: 6.7 G/DL (ref 6.4–8.2)
RBC # BLD AUTO: 2.83 X10*6/UL (ref 4–5.2)
SAO2 % BLDV: 72 % (ref 45–75)
SODIUM BLDV-SCNC: 133 MMOL/L (ref 136–145)
SODIUM SERPL-SCNC: 133 MMOL/L (ref 136–145)
WBC # BLD AUTO: 12.2 X10*3/UL (ref 4.4–11.3)

## 2025-02-07 PROCEDURE — 82435 ASSAY OF BLOOD CHLORIDE: CPT | Performed by: PHYSICIAN ASSISTANT

## 2025-02-07 PROCEDURE — 99284 EMERGENCY DEPT VISIT MOD MDM: CPT | Mod: 25

## 2025-02-07 PROCEDURE — 87077 CULTURE AEROBIC IDENTIFY: CPT | Mod: GEALAB | Performed by: PHYSICIAN ASSISTANT

## 2025-02-07 PROCEDURE — 84132 ASSAY OF SERUM POTASSIUM: CPT | Mod: 59 | Performed by: PHYSICIAN ASSISTANT

## 2025-02-07 PROCEDURE — 87075 CULTR BACTERIA EXCEPT BLOOD: CPT | Mod: GEALAB | Performed by: PHYSICIAN ASSISTANT

## 2025-02-07 PROCEDURE — 84132 ASSAY OF SERUM POTASSIUM: CPT | Performed by: PHYSICIAN ASSISTANT

## 2025-02-07 PROCEDURE — 85025 COMPLETE CBC W/AUTO DIFF WBC: CPT | Performed by: PHYSICIAN ASSISTANT

## 2025-02-07 PROCEDURE — 83880 ASSAY OF NATRIURETIC PEPTIDE: CPT | Performed by: PHYSICIAN ASSISTANT

## 2025-02-07 PROCEDURE — 36415 COLL VENOUS BLD VENIPUNCTURE: CPT | Performed by: PHYSICIAN ASSISTANT

## 2025-02-07 PROCEDURE — 84484 ASSAY OF TROPONIN QUANT: CPT | Performed by: PHYSICIAN ASSISTANT

## 2025-02-07 PROCEDURE — 87637 SARSCOV2&INF A&B&RSV AMP PRB: CPT | Performed by: PHYSICIAN ASSISTANT

## 2025-02-07 PROCEDURE — 71046 X-RAY EXAM CHEST 2 VIEWS: CPT

## 2025-02-07 PROCEDURE — 71046 X-RAY EXAM CHEST 2 VIEWS: CPT | Performed by: STUDENT IN AN ORGANIZED HEALTH CARE EDUCATION/TRAINING PROGRAM

## 2025-02-07 PROCEDURE — 96365 THER/PROPH/DIAG IV INF INIT: CPT

## 2025-02-07 PROCEDURE — 80053 COMPREHEN METABOLIC PANEL: CPT | Performed by: PHYSICIAN ASSISTANT

## 2025-02-07 PROCEDURE — 2500000004 HC RX 250 GENERAL PHARMACY W/ HCPCS (ALT 636 FOR OP/ED): Performed by: PHYSICIAN ASSISTANT

## 2025-02-07 RX ORDER — CEFTRIAXONE 2 G/50ML
2 INJECTION, SOLUTION INTRAVENOUS EVERY 24 HOURS
Status: DISCONTINUED | OUTPATIENT
Start: 2025-02-07 | End: 2025-02-08 | Stop reason: HOSPADM

## 2025-02-07 RX ADMIN — CEFTRIAXONE 2 G: 2 INJECTION, SOLUTION INTRAVENOUS at 23:34

## 2025-02-07 ASSESSMENT — PAIN - FUNCTIONAL ASSESSMENT: PAIN_FUNCTIONAL_ASSESSMENT: 0-10

## 2025-02-07 ASSESSMENT — PAIN SCALES - GENERAL: PAINLEVEL_OUTOF10: 0 - NO PAIN

## 2025-02-07 NOTE — TELEPHONE ENCOUNTER
Patient's son called office stating patient was diagnosed with pneumonia following X-ray. He states plan was to start IV antibiotics today, however he was told by facility staff, they would not start the antibiotics today, they would start tomorrow, due to need of PICC line placement and ordering of antibiotics. He verbalized concern over this due to patient's recent decline since he last seen her on Tuesday. He also was trying to avoid having to send her out to the ED, so wondering what else could be done.     I spoke to provider directly to voice these concerns and provider stated she spoke to facility staff and plan is to start peripheral antibiotics today. Provider also stated, other option would be IM Rocephin, but patient was already on Cefdinir and being they are similar medications, she didn't feel this would be beneficial as patient had gotten worse on the Cefdinir.     This nurse returned call to Yung and informed him of all above information from the provider. He verbalized understanding and stated feeling better about the situation. He stated he would find the nurse the provider spoke to, to discuss this plan with her also. Patient had no further questions at this time.

## 2025-02-08 VITALS
DIASTOLIC BLOOD PRESSURE: 67 MMHG | WEIGHT: 169.75 LBS | HEIGHT: 66 IN | HEART RATE: 70 BPM | BODY MASS INDEX: 27.28 KG/M2 | RESPIRATION RATE: 24 BRPM | OXYGEN SATURATION: 96 % | SYSTOLIC BLOOD PRESSURE: 132 MMHG | TEMPERATURE: 98.4 F

## 2025-02-08 LAB
CARDIAC TROPONIN I PNL SERPL HS: 15 NG/L (ref 0–13)
FLUAV RNA RESP QL NAA+PROBE: DETECTED
FLUBV RNA RESP QL NAA+PROBE: NOT DETECTED
RSV RNA RESP QL NAA+PROBE: NOT DETECTED
SARS-COV-2 RNA RESP QL NAA+PROBE: NOT DETECTED

## 2025-02-08 PROCEDURE — 2500000004 HC RX 250 GENERAL PHARMACY W/ HCPCS (ALT 636 FOR OP/ED): Performed by: PHYSICIAN ASSISTANT

## 2025-02-08 PROCEDURE — 96367 TX/PROPH/DG ADDL SEQ IV INF: CPT

## 2025-02-08 PROCEDURE — 96366 THER/PROPH/DIAG IV INF ADDON: CPT

## 2025-02-08 RX ADMIN — AZITHROMYCIN MONOHYDRATE 500 MG: 500 INJECTION, POWDER, LYOPHILIZED, FOR SOLUTION INTRAVENOUS at 01:37

## 2025-02-08 ASSESSMENT — LIFESTYLE VARIABLES
EVER HAD A DRINK FIRST THING IN THE MORNING TO STEADY YOUR NERVES TO GET RID OF A HANGOVER: NO
EVER FELT BAD OR GUILTY ABOUT YOUR DRINKING: NO
HAVE PEOPLE ANNOYED YOU BY CRITICIZING YOUR DRINKING: NO
TOTAL SCORE: 0
HAVE YOU EVER FELT YOU SHOULD CUT DOWN ON YOUR DRINKING: NO

## 2025-02-08 NOTE — ED PROVIDER NOTES
HPI   Chief Complaint   Patient presents with    Weakness, Gen       EKG taken on February 7, 2025 at 9:59 PM shows sinus rhythm at 78, no STEMI no ST depression normal axis QTc at 399 as interpreted by myself              Patient History   Past Medical History:   Diagnosis Date    Arthritis     Disease of thyroid gland     Glaucoma     Hypertension     Other specified postprocedural states     History of colonoscopy    Personal history of other diseases of the circulatory system     History of rheumatic fever    Personal history of other diseases of the circulatory system     History of rheumatic fever    Personal history of other medical treatment     History of mammogram     Past Surgical History:   Procedure Laterality Date    APPENDECTOMY  08/20/2018    Appendectomy    COLON SURGERY      CT ANGIO NECK  09/24/2013    CT NECK ANGIO W AND WO IV CONTRAST 9/24/2013 [facility]    CT HEAD ANGIO W AND WO IV CONTRAST  09/24/2013    CT HEAD ANGIO W AND WO IV CONTRAST 9/24/2013 [facility]    HYSTERECTOMY  08/20/2018    Hysterectomy    OTHER SURGICAL HISTORY  08/20/2018    Enterectomy     Family History   Problem Relation Name Age of Onset    Heart disease Mother Leanna     Hypertension Mother Leanna     Transient ischemic attack Mother Leanna     Heart disease Father Richard     Cancer Maternal Grandmother Maternal grandmother     Colon cancer Maternal Grandmother Maternal grandmother      Social History     Tobacco Use    Smoking status: Former     Types: Cigarettes     Start date: 1/1/1954    Smokeless tobacco: Never   Vaping Use    Vaping status: Never Used   Substance Use Topics    Alcohol use: Not Currently    Drug use: Not Currently       Physical Exam   ED Triage Vitals [02/07/25 2200]   Temperature Heart Rate Respirations BP   36.9 °C (98.4 °F) 81 16 154/82      Pulse Ox Temp src Heart Rate Source Patient Position   96 % -- -- Sitting      BP Location FiO2 (%)     Right arm --       Physical  Exam      ED Course & MDM   Diagnoses as of 02/08/25 0136   Influenza A   Pneumonia of right middle lobe due to infectious organism                 No data recorded     Athena Coma Scale Score: 15 (02/08/25 0114 : Tomeka Hollingsworth RN)                           Medical Decision Making      Procedure  Procedures   Date    APPENDECTOMY  08/20/2018    Appendectomy    COLON SURGERY      CT ANGIO NECK  09/24/2013    CT NECK ANGIO W AND WO IV CONTRAST 9/24/2013 [facility]    CT HEAD ANGIO W AND WO IV CONTRAST  09/24/2013    CT HEAD ANGIO W AND WO IV CONTRAST 9/24/2013 [facility]    HYSTERECTOMY  08/20/2018    Hysterectomy    OTHER SURGICAL HISTORY  08/20/2018    Enterectomy     Family History   Problem Relation Name Age of Onset    Heart disease Mother Leanna     Hypertension Mother Leanna     Transient ischemic attack Mother Leanna     Heart disease Father Richard     Cancer Maternal Grandmother Maternal grandmother     Colon cancer Maternal Grandmother Maternal grandmother      Social History     Tobacco Use    Smoking status: Former     Types: Cigarettes     Start date: 1/1/1954    Smokeless tobacco: Never   Vaping Use    Vaping status: Never Used   Substance Use Topics    Alcohol use: Not Currently    Drug use: Not Currently       Physical Exam   ED Triage Vitals [02/07/25 2200]   Temperature Heart Rate Respirations BP   36.9 °C (98.4 °F) 81 16 154/82      Pulse Ox Temp src Heart Rate Source Patient Position   96 % -- -- Sitting      BP Location FiO2 (%)     Right arm --       Physical Exam      ED Course & MDM   Diagnoses as of 02/08/25 0136   Influenza A   Pneumonia of right middle lobe due to infectious organism                 No data recorded     Sally Coma Scale Score: 15 (02/08/25 0114 : Tomeka Hollingsworth RN)                           Medical Decision Making      Procedure  Procedures     Gen Martinez PA-C  02/12/25 7222

## 2025-02-09 LAB
BACTERIA BLD AEROBE CULT: ABNORMAL
BACTERIA BLD AEROBE CULT: ABNORMAL
BACTERIA BLD CULT: ABNORMAL
BACTERIA BLD CULT: ABNORMAL
GRAM STN SPEC: ABNORMAL
GRAM STN SPEC: ABNORMAL

## 2025-02-09 NOTE — PROGRESS NOTES
Patient found to have positive blood cultures.  I reached out to her home for which she is staying at [place] and called there and updated their staff.         Note: This note was dictated by speech recognition. Minor errors in transcription may be present.

## 2025-02-13 ENCOUNTER — TELEPHONE (OUTPATIENT)
Dept: PHARMACY | Facility: HOSPITAL | Age: 89
End: 2025-02-13
Payer: MEDICARE

## 2025-02-13 NOTE — PROGRESS NOTES
EDPD Note: Lab/Chart Reviewed    Reviewed Mr./Mrs./Ms. Nanette Flynn 's chart regarding a positive blood culture/result that was taken during their recent emergency room visit. The patient was transferred back to their rehab/LTC facility .Therefore, I have faxed this information to [facility] at fax number [phone] .    Susceptibility data from last 90 days.  Collected Specimen Info Organism   02/07/25 Blood culture from Peripheral Venipuncture Staphylococcus epidermidis   02/07/25 Blood culture from Peripheral Venipuncture Bacillus species, not Bacillus anthracis       No further follow up needed from EDPD Team.     Lily Carlin, PharmD

## 2025-02-14 ENCOUNTER — NURSING HOME VISIT (OUTPATIENT)
Dept: POST ACUTE CARE | Facility: EXTERNAL LOCATION | Age: 89
End: 2025-02-14
Payer: MEDICARE

## 2025-02-14 DIAGNOSIS — J69.0 ASPIRATION PNEUMONIA OF BOTH LUNGS, UNSPECIFIED ASPIRATION PNEUMONIA TYPE, UNSPECIFIED PART OF LUNG (MULTI): Primary | ICD-10-CM

## 2025-02-14 DIAGNOSIS — K21.9 GASTROESOPHAGEAL REFLUX DISEASE, UNSPECIFIED WHETHER ESOPHAGITIS PRESENT: ICD-10-CM

## 2025-02-14 DIAGNOSIS — R53.82 CHRONIC FATIGUE: ICD-10-CM

## 2025-02-14 PROCEDURE — 99309 SBSQ NF CARE MODERATE MDM 30: CPT | Performed by: FAMILY MEDICINE

## 2025-02-14 NOTE — LETTER
Patient: Nanette Flynn  : 1935    Encounter Date: 2025    Subjective  Patient ID: Nanette Flynn is a 89 y.o. female who is here for long term care being seen and evaluated for multiple medical problems.    HPI pt admitted here with recent hospital stay for dizziness. She hadacute resp sx and then dx with pneumonia, not improved with po abx. Started on iv abx but went to er and sent back on same. Has picc line. Cough better today, repeat cxr due.       Review of Systems   Constitutional:  Positive for fatigue. Negative for chills and fever.   HENT:  Positive for congestion. Negative for sore throat.    Eyes:  Negative for visual disturbance.   Respiratory:  Positive for cough and shortness of breath.    Cardiovascular:  Negative for chest pain and leg swelling.   Gastrointestinal:  Negative for abdominal pain, constipation, diarrhea, nausea and vomiting.   Genitourinary:  Negative for dysuria.   Skin:  Negative for rash and wound.   Neurological:  Negative for dizziness and headaches.       Objective  There were no vitals taken for this visit.    Physical Exam  Vitals reviewed: 138/81 97% o2 72 98.3 wt 171.   Constitutional:       General: She is not in acute distress.     Appearance: Normal appearance.   HENT:      Head: Normocephalic.      Mouth/Throat:      Mouth: Mucous membranes are moist.      Pharynx: Oropharynx is clear.   Eyes:      Extraocular Movements: Extraocular movements intact.      Pupils: Pupils are equal, round, and reactive to light.   Cardiovascular:      Rate and Rhythm: Normal rate and regular rhythm.      Heart sounds: Normal heart sounds.   Pulmonary:      Effort: Pulmonary effort is normal.      Breath sounds: Wheezing present. No rhonchi.   Abdominal:      General: There is no distension.      Palpations: Abdomen is soft.      Tenderness: There is no abdominal tenderness.   Musculoskeletal:      Right lower leg: Edema present.      Left lower leg: Edema present.    Lymphadenopathy:      Cervical: No cervical adenopathy.   Skin:     Coloration: Skin is not jaundiced.   Neurological:      General: No focal deficit present.      Mental Status: She is alert.      Cranial Nerves: No cranial nerve deficit.   Psychiatric:         Mood and Affect: Mood normal.         Assessment/Plan  Problem List Items Addressed This Visit             ICD-10-CM    Chronic fatigue R53.82     Other Visit Diagnoses         Codes    Aspiration pneumonia of both lungs, unspecified aspiration pneumonia type, unspecified part of lung (Multi)    -  Primary J69.0    Gastroesophageal reflux disease, unspecified whether esophagitis present     K21.9        On iv zithro and rocephin, repeat cxr ordered  May need chest ct if sx not improving further       Electronically Signed By: Salud Walker MD   2/16/25  5:18 PM

## 2025-02-16 ASSESSMENT — ENCOUNTER SYMPTOMS
NAUSEA: 0
COUGH: 1
WOUND: 0
VOMITING: 0
DYSURIA: 0
SHORTNESS OF BREATH: 1
CONSTIPATION: 0
CHILLS: 0
FATIGUE: 1
ABDOMINAL PAIN: 0
DIARRHEA: 0
SORE THROAT: 0
DIZZINESS: 0
FEVER: 0
HEADACHES: 0

## 2025-02-16 NOTE — PROGRESS NOTES
Subjective   Patient ID: Nanette Flynn is a 89 y.o. female who is here for long term care being seen and evaluated for multiple medical problems.    HPI pt admitted here with recent hospital stay for dizziness. She hadacute resp sx and then dx with pneumonia, not improved with po abx. Started on iv abx but went to er and sent back on same. Has picc line. Cough better today, repeat cxr due.       Review of Systems   Constitutional:  Positive for fatigue. Negative for chills and fever.   HENT:  Positive for congestion. Negative for sore throat.    Eyes:  Negative for visual disturbance.   Respiratory:  Positive for cough and shortness of breath.    Cardiovascular:  Negative for chest pain and leg swelling.   Gastrointestinal:  Negative for abdominal pain, constipation, diarrhea, nausea and vomiting.   Genitourinary:  Negative for dysuria.   Skin:  Negative for rash and wound.   Neurological:  Negative for dizziness and headaches.       Objective   There were no vitals taken for this visit.    Physical Exam  Vitals reviewed: 138/81 97% o2 72 98.3 wt 171.   Constitutional:       General: She is not in acute distress.     Appearance: Normal appearance.   HENT:      Head: Normocephalic.      Mouth/Throat:      Mouth: Mucous membranes are moist.      Pharynx: Oropharynx is clear.   Eyes:      Extraocular Movements: Extraocular movements intact.      Pupils: Pupils are equal, round, and reactive to light.   Cardiovascular:      Rate and Rhythm: Normal rate and regular rhythm.      Heart sounds: Normal heart sounds.   Pulmonary:      Effort: Pulmonary effort is normal.      Breath sounds: Wheezing present. No rhonchi.   Abdominal:      General: There is no distension.      Palpations: Abdomen is soft.      Tenderness: There is no abdominal tenderness.   Musculoskeletal:      Right lower leg: Edema present.      Left lower leg: Edema present.   Lymphadenopathy:      Cervical: No cervical adenopathy.   Skin:      Coloration: Skin is not jaundiced.   Neurological:      General: No focal deficit present.      Mental Status: She is alert.      Cranial Nerves: No cranial nerve deficit.   Psychiatric:         Mood and Affect: Mood normal.         Assessment/Plan   Problem List Items Addressed This Visit             ICD-10-CM    Chronic fatigue R53.82     Other Visit Diagnoses         Codes    Aspiration pneumonia of both lungs, unspecified aspiration pneumonia type, unspecified part of lung (Multi)    -  Primary J69.0    Gastroesophageal reflux disease, unspecified whether esophagitis present     K21.9        On iv zithro and rocephin, repeat cxr ordered  May need chest ct if sx not improving further

## 2025-02-19 ENCOUNTER — APPOINTMENT (OUTPATIENT)
Dept: RADIOLOGY | Facility: HOSPITAL | Age: 89
DRG: 178 | End: 2025-02-19
Payer: MEDICARE

## 2025-02-19 ENCOUNTER — APPOINTMENT (OUTPATIENT)
Dept: CARDIOLOGY | Facility: HOSPITAL | Age: 89
DRG: 178 | End: 2025-02-19
Payer: MEDICARE

## 2025-02-19 ENCOUNTER — HOSPITAL ENCOUNTER (INPATIENT)
Facility: HOSPITAL | Age: 89
Discharge: NURSING, REHAB OR LONG-TERM CARE | DRG: 178 | End: 2025-02-19
Attending: EMERGENCY MEDICINE | Admitting: INTERNAL MEDICINE
Payer: MEDICARE

## 2025-02-19 DIAGNOSIS — Z78.9 FAILURE OF OUTPATIENT TREATMENT: ICD-10-CM

## 2025-02-19 DIAGNOSIS — R53.1 GENERALIZED WEAKNESS: ICD-10-CM

## 2025-02-19 DIAGNOSIS — J18.9 MULTIFOCAL PNEUMONIA: Primary | ICD-10-CM

## 2025-02-19 DIAGNOSIS — R06.02 SOB (SHORTNESS OF BREATH): ICD-10-CM

## 2025-02-19 LAB
ALBUMIN SERPL BCP-MCNC: 3.2 G/DL (ref 3.4–5)
ALP SERPL-CCNC: 58 U/L (ref 33–136)
ALT SERPL W P-5'-P-CCNC: 20 U/L (ref 7–45)
ANION GAP SERPL CALC-SCNC: 12 MMOL/L (ref 10–20)
AST SERPL W P-5'-P-CCNC: 25 U/L (ref 9–39)
ATRIAL RATE: 83 BPM
BASOPHILS # BLD AUTO: 0.05 X10*3/UL (ref 0–0.1)
BASOPHILS NFR BLD AUTO: 0.6 %
BILIRUB SERPL-MCNC: 0.4 MG/DL (ref 0–1.2)
BNP SERPL-MCNC: 246 PG/ML (ref 0–99)
BUN SERPL-MCNC: 11 MG/DL (ref 6–23)
CALCIUM SERPL-MCNC: 9.2 MG/DL (ref 8.6–10.3)
CARDIAC TROPONIN I PNL SERPL HS: 9 NG/L (ref 0–13)
CARDIAC TROPONIN I PNL SERPL HS: 9 NG/L (ref 0–13)
CHLORIDE SERPL-SCNC: 101 MMOL/L (ref 98–107)
CO2 SERPL-SCNC: 30 MMOL/L (ref 21–32)
CREAT SERPL-MCNC: 0.99 MG/DL (ref 0.5–1.05)
EGFRCR SERPLBLD CKD-EPI 2021: 55 ML/MIN/1.73M*2
EOSINOPHIL # BLD AUTO: 0.26 X10*3/UL (ref 0–0.4)
EOSINOPHIL NFR BLD AUTO: 2.9 %
ERYTHROCYTE [DISTWIDTH] IN BLOOD BY AUTOMATED COUNT: 13.2 % (ref 11.5–14.5)
FLUAV RNA RESP QL NAA+PROBE: NOT DETECTED
FLUBV RNA RESP QL NAA+PROBE: NOT DETECTED
GLUCOSE SERPL-MCNC: 96 MG/DL (ref 74–99)
HCT VFR BLD AUTO: 25 % (ref 36–46)
HGB BLD-MCNC: 8 G/DL (ref 12–16)
IMM GRANULOCYTES # BLD AUTO: 0.07 X10*3/UL (ref 0–0.5)
IMM GRANULOCYTES NFR BLD AUTO: 0.8 % (ref 0–0.9)
LYMPHOCYTES # BLD AUTO: 2.44 X10*3/UL (ref 0.8–3)
LYMPHOCYTES NFR BLD AUTO: 27.4 %
MCH RBC QN AUTO: 31.5 PG (ref 26–34)
MCHC RBC AUTO-ENTMCNC: 32 G/DL (ref 32–36)
MCV RBC AUTO: 98 FL (ref 80–100)
MONOCYTES # BLD AUTO: 0.89 X10*3/UL (ref 0.05–0.8)
MONOCYTES NFR BLD AUTO: 10 %
MRSA DNA SPEC QL NAA+PROBE: NOT DETECTED
NEUTROPHILS # BLD AUTO: 5.2 X10*3/UL (ref 1.6–5.5)
NEUTROPHILS NFR BLD AUTO: 58.3 %
NRBC BLD-RTO: 0 /100 WBCS (ref 0–0)
P AXIS: 65 DEGREES
P OFFSET: 159 MS
P ONSET: 126 MS
PLATELET # BLD AUTO: 420 X10*3/UL (ref 150–450)
POTASSIUM SERPL-SCNC: 3.9 MMOL/L (ref 3.5–5.3)
PR INTERVAL: 182 MS
PROT SERPL-MCNC: 6.7 G/DL (ref 6.4–8.2)
Q ONSET: 217 MS
QRS COUNT: 14 BEATS
QRS DURATION: 100 MS
QT INTERVAL: 376 MS
QTC CALCULATION(BAZETT): 441 MS
QTC FREDERICIA: 419 MS
R AXIS: 43 DEGREES
RBC # BLD AUTO: 2.54 X10*6/UL (ref 4–5.2)
RSV RNA RESP QL NAA+PROBE: NOT DETECTED
SARS-COV-2 RNA RESP QL NAA+PROBE: NOT DETECTED
SODIUM SERPL-SCNC: 139 MMOL/L (ref 136–145)
T AXIS: 37 DEGREES
T OFFSET: 405 MS
VENTRICULAR RATE: 83 BPM
WBC # BLD AUTO: 8.9 X10*3/UL (ref 4.4–11.3)

## 2025-02-19 PROCEDURE — 84484 ASSAY OF TROPONIN QUANT: CPT | Performed by: PHYSICIAN ASSISTANT

## 2025-02-19 PROCEDURE — 2550000001 HC RX 255 CONTRASTS: Performed by: PHYSICIAN ASSISTANT

## 2025-02-19 PROCEDURE — 2500000002 HC RX 250 W HCPCS SELF ADMINISTERED DRUGS (ALT 637 FOR MEDICARE OP, ALT 636 FOR OP/ED)

## 2025-02-19 PROCEDURE — 87449 NOS EACH ORGANISM AG IA: CPT | Mod: GEALAB

## 2025-02-19 PROCEDURE — 83880 ASSAY OF NATRIURETIC PEPTIDE: CPT | Performed by: PHYSICIAN ASSISTANT

## 2025-02-19 PROCEDURE — 96365 THER/PROPH/DIAG IV INF INIT: CPT

## 2025-02-19 PROCEDURE — 84145 PROCALCITONIN (PCT): CPT | Mod: GEALAB

## 2025-02-19 PROCEDURE — 36415 COLL VENOUS BLD VENIPUNCTURE: CPT | Performed by: PHYSICIAN ASSISTANT

## 2025-02-19 PROCEDURE — 87637 SARSCOV2&INF A&B&RSV AMP PRB: CPT | Performed by: PHYSICIAN ASSISTANT

## 2025-02-19 PROCEDURE — 87641 MR-STAPH DNA AMP PROBE: CPT | Performed by: PHYSICIAN ASSISTANT

## 2025-02-19 PROCEDURE — 2500000004 HC RX 250 GENERAL PHARMACY W/ HCPCS (ALT 636 FOR OP/ED): Performed by: PHYSICIAN ASSISTANT

## 2025-02-19 PROCEDURE — 96367 TX/PROPH/DG ADDL SEQ IV INF: CPT

## 2025-02-19 PROCEDURE — 2500000005 HC RX 250 GENERAL PHARMACY W/O HCPCS

## 2025-02-19 PROCEDURE — 71275 CT ANGIOGRAPHY CHEST: CPT | Mod: FOREIGN READ | Performed by: RADIOLOGY

## 2025-02-19 PROCEDURE — 99285 EMERGENCY DEPT VISIT HI MDM: CPT | Mod: 25 | Performed by: EMERGENCY MEDICINE

## 2025-02-19 PROCEDURE — 80053 COMPREHEN METABOLIC PANEL: CPT | Performed by: PHYSICIAN ASSISTANT

## 2025-02-19 PROCEDURE — 93005 ELECTROCARDIOGRAM TRACING: CPT

## 2025-02-19 PROCEDURE — 85025 COMPLETE CBC W/AUTO DIFF WBC: CPT | Performed by: PHYSICIAN ASSISTANT

## 2025-02-19 PROCEDURE — 2500000001 HC RX 250 WO HCPCS SELF ADMINISTERED DRUGS (ALT 637 FOR MEDICARE OP)

## 2025-02-19 PROCEDURE — 87899 AGENT NOS ASSAY W/OPTIC: CPT | Mod: GEALAB

## 2025-02-19 PROCEDURE — 1200000002 HC GENERAL ROOM WITH TELEMETRY DAILY

## 2025-02-19 PROCEDURE — 71275 CT ANGIOGRAPHY CHEST: CPT

## 2025-02-19 PROCEDURE — 87040 BLOOD CULTURE FOR BACTERIA: CPT | Mod: GEALAB | Performed by: PHYSICIAN ASSISTANT

## 2025-02-19 RX ORDER — IPRATROPIUM BROMIDE AND ALBUTEROL SULFATE 2.5; .5 MG/3ML; MG/3ML
3 SOLUTION RESPIRATORY (INHALATION) EVERY 4 HOURS PRN
Status: DISCONTINUED | OUTPATIENT
Start: 2025-02-19 | End: 2025-02-19

## 2025-02-19 RX ORDER — IPRATROPIUM BROMIDE AND ALBUTEROL SULFATE 2.5; .5 MG/3ML; MG/3ML
3 SOLUTION RESPIRATORY (INHALATION) EVERY 4 HOURS PRN
COMMUNITY

## 2025-02-19 RX ORDER — ACETAMINOPHEN 325 MG/1
650 TABLET ORAL EVERY 6 HOURS PRN
Status: DISCONTINUED | OUTPATIENT
Start: 2025-02-19 | End: 2025-02-24 | Stop reason: HOSPADM

## 2025-02-19 RX ORDER — PROCHLORPERAZINE MALEATE 5 MG
5 TABLET ORAL EVERY 6 HOURS PRN
Status: DISCONTINUED | OUTPATIENT
Start: 2025-02-19 | End: 2025-02-24 | Stop reason: HOSPADM

## 2025-02-19 RX ORDER — GUAIFENESIN 100 MG/5ML
200 SOLUTION ORAL EVERY 4 HOURS PRN
Status: DISCONTINUED | OUTPATIENT
Start: 2025-02-19 | End: 2025-02-24 | Stop reason: HOSPADM

## 2025-02-19 RX ORDER — MECLIZINE HCL 12.5 MG 12.5 MG/1
12.5 TABLET ORAL EVERY 6 HOURS PRN
Status: DISCONTINUED | OUTPATIENT
Start: 2025-02-19 | End: 2025-02-24 | Stop reason: HOSPADM

## 2025-02-19 RX ORDER — VANCOMYCIN HYDROCHLORIDE
1250 ONCE
Status: DISCONTINUED | OUTPATIENT
Start: 2025-02-19 | End: 2025-02-19

## 2025-02-19 RX ORDER — LEVOTHYROXINE SODIUM 112 UG/1
112 TABLET ORAL
Status: DISCONTINUED | OUTPATIENT
Start: 2025-02-20 | End: 2025-02-24 | Stop reason: HOSPADM

## 2025-02-19 RX ORDER — MECLIZINE HCL 12.5 MG 12.5 MG/1
12.5 TABLET ORAL NIGHTLY
COMMUNITY

## 2025-02-19 RX ORDER — IPRATROPIUM BROMIDE AND ALBUTEROL SULFATE 2.5; .5 MG/3ML; MG/3ML
3 SOLUTION RESPIRATORY (INHALATION)
Status: DISCONTINUED | OUTPATIENT
Start: 2025-02-20 | End: 2025-02-24 | Stop reason: HOSPADM

## 2025-02-19 RX ORDER — MECLIZINE HCL 12.5 MG 12.5 MG/1
12.5 TABLET ORAL NIGHTLY
Status: DISCONTINUED | OUTPATIENT
Start: 2025-02-19 | End: 2025-02-24 | Stop reason: HOSPADM

## 2025-02-19 RX ORDER — IPRATROPIUM BROMIDE AND ALBUTEROL SULFATE 2.5; .5 MG/3ML; MG/3ML
3 SOLUTION RESPIRATORY (INHALATION) EVERY 2 HOUR PRN
Status: DISCONTINUED | OUTPATIENT
Start: 2025-02-19 | End: 2025-02-24 | Stop reason: HOSPADM

## 2025-02-19 RX ORDER — VANCOMYCIN HYDROCHLORIDE
1250 EVERY 24 HOURS
Status: DISCONTINUED | OUTPATIENT
Start: 2025-02-20 | End: 2025-02-20

## 2025-02-19 RX ORDER — SODIUM CHLORIDE 9 MG/ML
10 INJECTION, SOLUTION INTRAMUSCULAR; INTRAVENOUS; SUBCUTANEOUS 2 TIMES DAILY
COMMUNITY

## 2025-02-19 RX ORDER — ACETAMINOPHEN 325 MG/1
650 TABLET ORAL EVERY 6 HOURS PRN
COMMUNITY

## 2025-02-19 RX ORDER — VANCOMYCIN HYDROCHLORIDE 1 G/20ML
INJECTION, POWDER, LYOPHILIZED, FOR SOLUTION INTRAVENOUS DAILY PRN
Status: DISCONTINUED | OUTPATIENT
Start: 2025-02-19 | End: 2025-02-20

## 2025-02-19 RX ORDER — VIT C/E/ZN/COPPR/LUTEIN/ZEAXAN 250MG-90MG
5000 CAPSULE ORAL DAILY
Status: DISCONTINUED | OUTPATIENT
Start: 2025-02-20 | End: 2025-02-24 | Stop reason: HOSPADM

## 2025-02-19 RX ORDER — PROCHLORPERAZINE MALEATE 5 MG
5 TABLET ORAL 4 TIMES DAILY
COMMUNITY

## 2025-02-19 RX ORDER — AZITHROMYCIN 500 MG/1
500 TABLET, FILM COATED ORAL
Status: DISCONTINUED | OUTPATIENT
Start: 2025-02-20 | End: 2025-02-20

## 2025-02-19 RX ORDER — DOCUSATE SODIUM 100 MG/1
100 CAPSULE, LIQUID FILLED ORAL NIGHTLY
COMMUNITY

## 2025-02-19 RX ORDER — LISINOPRIL 10 MG/1
10 TABLET ORAL DAILY
Status: DISCONTINUED | OUTPATIENT
Start: 2025-02-20 | End: 2025-02-24 | Stop reason: HOSPADM

## 2025-02-19 RX ORDER — LATANOPROST 50 UG/ML
1 SOLUTION/ DROPS OPHTHALMIC NIGHTLY
Status: DISCONTINUED | OUTPATIENT
Start: 2025-02-19 | End: 2025-02-24 | Stop reason: HOSPADM

## 2025-02-19 RX ORDER — DOCUSATE SODIUM 100 MG/1
100 CAPSULE, LIQUID FILLED ORAL NIGHTLY
Status: DISCONTINUED | OUTPATIENT
Start: 2025-02-19 | End: 2025-02-24 | Stop reason: HOSPADM

## 2025-02-19 RX ORDER — MECLIZINE HCL 12.5 MG 12.5 MG/1
12.5 TABLET ORAL EVERY 6 HOURS PRN
COMMUNITY

## 2025-02-19 RX ORDER — IPRATROPIUM BROMIDE AND ALBUTEROL SULFATE 2.5; .5 MG/3ML; MG/3ML
3 SOLUTION RESPIRATORY (INHALATION) 4 TIMES DAILY
COMMUNITY
Start: 2025-02-17 | End: 2025-03-03

## 2025-02-19 RX ORDER — LEVOTHYROXINE SODIUM 112 UG/1
112 TABLET ORAL
COMMUNITY

## 2025-02-19 RX ORDER — SODIUM CHLORIDE 9 MG/ML
10 INJECTION, SOLUTION INTRAMUSCULAR; INTRAVENOUS; SUBCUTANEOUS 2 TIMES DAILY
Status: DISCONTINUED | OUTPATIENT
Start: 2025-02-19 | End: 2025-02-24 | Stop reason: HOSPADM

## 2025-02-19 RX ADMIN — LATANOPROST 1 DROP: 50 SOLUTION OPHTHALMIC at 23:23

## 2025-02-19 RX ADMIN — PIPERACILLIN SODIUM AND TAZOBACTAM SODIUM 4.5 G: 4; .5 INJECTION, SOLUTION INTRAVENOUS at 17:46

## 2025-02-19 RX ADMIN — PIPERACILLIN SODIUM AND TAZOBACTAM SODIUM 4.5 G: 4; .5 INJECTION, SOLUTION INTRAVENOUS at 23:27

## 2025-02-19 RX ADMIN — IOHEXOL 49 ML: 350 INJECTION, SOLUTION INTRAVENOUS at 15:48

## 2025-02-19 RX ADMIN — AZITHROMYCIN MONOHYDRATE 500 MG: 500 INJECTION, POWDER, LYOPHILIZED, FOR SOLUTION INTRAVENOUS at 18:32

## 2025-02-19 RX ADMIN — VANCOMYCIN HYDROCHLORIDE 1250 MG: 1.25 INJECTION, POWDER, LYOPHILIZED, FOR SOLUTION INTRAVENOUS at 19:27

## 2025-02-19 RX ADMIN — SODIUM CHLORIDE 10 ML: 9 INJECTION, SOLUTION INTRAMUSCULAR; INTRAVENOUS; SUBCUTANEOUS at 22:58

## 2025-02-19 RX ADMIN — APIXABAN 2.5 MG: 2.5 TABLET, FILM COATED ORAL at 22:56

## 2025-02-19 RX ADMIN — DOCUSATE SODIUM 100 MG: 100 CAPSULE, LIQUID FILLED ORAL at 22:56

## 2025-02-19 RX ADMIN — MECLIZINE 12.5 MG: 12.5 TABLET ORAL at 22:56

## 2025-02-19 SDOH — SOCIAL STABILITY: SOCIAL INSECURITY: DOES ANYONE TRY TO KEEP YOU FROM HAVING/CONTACTING OTHER FRIENDS OR DOING THINGS OUTSIDE YOUR HOME?: NO

## 2025-02-19 SDOH — ECONOMIC STABILITY: INCOME INSECURITY: IN THE PAST 12 MONTHS HAS THE ELECTRIC, GAS, OIL, OR WATER COMPANY THREATENED TO SHUT OFF SERVICES IN YOUR HOME?: NO

## 2025-02-19 SDOH — ECONOMIC STABILITY: FOOD INSECURITY: WITHIN THE PAST 12 MONTHS, YOU WORRIED THAT YOUR FOOD WOULD RUN OUT BEFORE YOU GOT THE MONEY TO BUY MORE.: NEVER TRUE

## 2025-02-19 SDOH — ECONOMIC STABILITY: FOOD INSECURITY: WITHIN THE PAST 12 MONTHS, THE FOOD YOU BOUGHT JUST DIDN'T LAST AND YOU DIDN'T HAVE MONEY TO GET MORE.: NEVER TRUE

## 2025-02-19 SDOH — SOCIAL STABILITY: SOCIAL INSECURITY: ABUSE: ADULT

## 2025-02-19 SDOH — SOCIAL STABILITY: SOCIAL INSECURITY: HAVE YOU HAD ANY THOUGHTS OF HARMING ANYONE ELSE?: NO

## 2025-02-19 SDOH — SOCIAL STABILITY: SOCIAL INSECURITY: WITHIN THE LAST YEAR, HAVE YOU BEEN AFRAID OF YOUR PARTNER OR EX-PARTNER?: NO

## 2025-02-19 SDOH — SOCIAL STABILITY: SOCIAL INSECURITY: DO YOU FEEL ANYONE HAS EXPLOITED OR TAKEN ADVANTAGE OF YOU FINANCIALLY OR OF YOUR PERSONAL PROPERTY?: NO

## 2025-02-19 SDOH — SOCIAL STABILITY: SOCIAL INSECURITY: HAS ANYONE EVER THREATENED TO HURT YOUR FAMILY OR YOUR PETS?: NO

## 2025-02-19 SDOH — SOCIAL STABILITY: SOCIAL INSECURITY: WITHIN THE LAST YEAR, HAVE YOU BEEN HUMILIATED OR EMOTIONALLY ABUSED IN OTHER WAYS BY YOUR PARTNER OR EX-PARTNER?: NO

## 2025-02-19 SDOH — SOCIAL STABILITY: SOCIAL INSECURITY: ARE YOU OR HAVE YOU BEEN THREATENED OR ABUSED PHYSICALLY, EMOTIONALLY, OR SEXUALLY BY ANYONE?: NO

## 2025-02-19 SDOH — SOCIAL STABILITY: SOCIAL INSECURITY: DO YOU FEEL UNSAFE GOING BACK TO THE PLACE WHERE YOU ARE LIVING?: NO

## 2025-02-19 SDOH — SOCIAL STABILITY: SOCIAL INSECURITY: WERE YOU ABLE TO COMPLETE ALL THE BEHAVIORAL HEALTH SCREENINGS?: YES

## 2025-02-19 SDOH — SOCIAL STABILITY: SOCIAL INSECURITY: HAVE YOU HAD THOUGHTS OF HARMING ANYONE ELSE?: NO

## 2025-02-19 SDOH — SOCIAL STABILITY: SOCIAL INSECURITY: ARE THERE ANY APPARENT SIGNS OF INJURIES/BEHAVIORS THAT COULD BE RELATED TO ABUSE/NEGLECT?: NO

## 2025-02-19 ASSESSMENT — LIFESTYLE VARIABLES
EVER FELT BAD OR GUILTY ABOUT YOUR DRINKING: NO
HAVE YOU EVER FELT YOU SHOULD CUT DOWN ON YOUR DRINKING: NO
HOW OFTEN DO YOU HAVE A DRINK CONTAINING ALCOHOL: NEVER
AUDIT-C TOTAL SCORE: 0
HOW OFTEN DO YOU HAVE 6 OR MORE DRINKS ON ONE OCCASION: NEVER
TOTAL SCORE: 0
AUDIT-C TOTAL SCORE: 0
EVER HAD A DRINK FIRST THING IN THE MORNING TO STEADY YOUR NERVES TO GET RID OF A HANGOVER: NO
SKIP TO QUESTIONS 9-10: 1
HAVE PEOPLE ANNOYED YOU BY CRITICIZING YOUR DRINKING: NO
HOW MANY STANDARD DRINKS CONTAINING ALCOHOL DO YOU HAVE ON A TYPICAL DAY: PATIENT DOES NOT DRINK

## 2025-02-19 ASSESSMENT — ACTIVITIES OF DAILY LIVING (ADL)
BATHING: DEPENDENT
GROOMING: NEEDS ASSISTANCE
WALKS IN HOME: DEPENDENT
JUDGMENT_ADEQUATE_SAFELY_COMPLETE_DAILY_ACTIVITIES: YES
HEARING - LEFT EAR: DIFFICULTY WITH NOISE
ADEQUATE_TO_COMPLETE_ADL: YES
FEEDING YOURSELF: INDEPENDENT
TOILETING: DEPENDENT
HEARING - RIGHT EAR: DIFFICULTY WITH NOISE
PATIENT'S MEMORY ADEQUATE TO SAFELY COMPLETE DAILY ACTIVITIES?: YES
DRESSING YOURSELF: DEPENDENT
LACK_OF_TRANSPORTATION: NO

## 2025-02-19 ASSESSMENT — COGNITIVE AND FUNCTIONAL STATUS - GENERAL: PATIENT BASELINE BEDBOUND: YES

## 2025-02-19 ASSESSMENT — PATIENT HEALTH QUESTIONNAIRE - PHQ9
2. FEELING DOWN, DEPRESSED OR HOPELESS: NOT AT ALL
1. LITTLE INTEREST OR PLEASURE IN DOING THINGS: NOT AT ALL
SUM OF ALL RESPONSES TO PHQ9 QUESTIONS 1 & 2: 0

## 2025-02-19 ASSESSMENT — PAIN - FUNCTIONAL ASSESSMENT: PAIN_FUNCTIONAL_ASSESSMENT: 0-10

## 2025-02-19 ASSESSMENT — PAIN SCALES - GENERAL: PAINLEVEL_OUTOF10: 0 - NO PAIN

## 2025-02-19 NOTE — HOSPITAL COURSE
"ED COURSE:   VS: Temperature 36.4, /89, heart rate 78, respiration 24, O2 sat 97% RA     Labs  - CBC: WBC 8.9, hemoglobin 8, hematocrit 25, platelets 420  - BMP: Sodium 139, potassium 3.9, chloride 101, bicarb 30, BUN 11, creatinine 0.99, glucose 96  - LFTs: Alk phos 58, ALT 20, AST 25, T. bili 0.4  - Phos: 3.2  - Lactate:   - BNP: 246  - Troponin: 9, 9  - UA positive for:   - Blood cultures: Pending  - COVID/Flu/RSV PCR: Negative   - MRSA swab: Pending     Imaging:  CT Angio Chest for PE:  1. No pulmonary embolism.  2. Findings compatible with multifocal pneumonia involving all lobes, right greater than left, progressive compared to 2/9/2024  3. Small bilateral pleural effusions.  4. Severe coronary artery calcifications.  5. Underlying emphysema.  6. Dilated main pulmonary artery suggestive of pulmonary arterial hypertension.    Interventions:   -Vancomycin  -Zosyn  -Azithromycin      #Hx PE - Continue Eliquis 2.5 mg BID  #HTN - Lisinopril 10 mg daily  #Hypothyroidism - Synthroid 75 mcg daily       Hospital course:   - During hospitalization, pt was treated with Zosyn for PNA due to gram negative bacteria, ID was consulted. Per patient's HPI and HPI provided by family, she reported occasional swallowing difficulties. SLP was consulted:  \"Recommended regular diet with nectar thick liquids. Upon discharge, SLP recommends a solid consistency, regular (IDDSI level 7) diet. Liquid consistency: Mildly thick (IDDSI 2) / [place]-thick. Medication administration: Whole in liquid vs puree, as best tolerated. Compensatory swallow strategies include: being seated upright - as close to 90 degrees as possible, remaining upright for 20-30 minutes after meals, full supervision during meals, 1:1 assistance with meals, alternate solids and liquids, single sips, small bites/sips, Eat/feed slowly, Effortful swallow, and Stringent oral care routine - 4-6x/day (before/after meals).\" Overnight on 2/20 patient reported significant " diarrhea, Cdiff PCR obtained and returned negative. Diarrhea resolved. ID recommended continuing zosyn and oral Augmentin on discharge.  Pt's BNP was elevated upon admission with concerns for possible pulmonary edema, TTE obtained showing LVEF 63% with mild AVR.  PT/OT was placed on consult for physical deconditioning, and recommended SNF placement. Wound care was consulted for Left lateral bridge of nose, Right lateral calf & Buttock cleft small erosion. Recommended offload, protect with Aquaphor, Zinc, q 2 hour turns, sacral Mepilex. Pt is HDS for discharge to SNF on oral Augmentin x 5 days.

## 2025-02-19 NOTE — ED PROVIDER NOTES
HPI   No chief complaint on file.      History of present illness:  89-year-old female presents emergency room for complaints of shortness of breath.  The patient was seen here in the emergency room on February 8, 2025 ( 11 days ago).  She was diagnosed with influenza and pneumonia at that time.  She was sent home on oral antibiotics and eventually transition to a PICC line with IV antibiotics after her blood cultures came back positive for Staphylococcus aureus.  She has been receiving IV treatment since then but had repeat chest x-rays done today which showed concerns for bilateral opacities versus scarring.  The staff at the facility reviewed the x-ray results and requested the patient be brought to the emergency room for further testing and possible admission at this time.  The nurse at the facility mentioned to the nurse at the emergency room that the patient has been intermittently on 2 to 3 L of oxygen as well since having pneumonia and influenza.  The patient denies any symptoms at this time and states that she just feels the same when she leaves feels.  She denies any chest pain or shortness of breath or fever at this time.    Social history: Negative for alcohol and drug use.    Review of systems:   Gen.: No weight loss,  anorexia, insomnia, fever.   Eyes: No vision loss, double vision, drainage, eye pain.   ENT: No pharyngitis, neck pain,headache   Cardiac: No chest pain, palpitations, syncope, near syncope.   Pulmonary: No shortness of breath, hemoptysis.   Heme/lymph: No swollen glands, fever, bleeding.   GI: No abdominal pain, change in bowel habits, melena, hematemesis, hematochezia, nausea, vomiting, diarrhea.   : No discharge, dysuria, frequency, urgency, hematuria.   Musculoskeletal: No limb pain, joint pain, joint swelling.   Skin: No rashes.   Review of systems is otherwise negative unless stated above or in history of present illness.      Physical exam:  General: Vitals noted, no distress.  Afebrile.   EENT: No lymphadenopathy appreciated   Cardiac: Regular, rate, rhythm, no murmur.   Pulmonary: Lungs clear bilaterally with good aeration. No adventitious breath sounds.   Abdomen: Soft, nonsurgical. Nontender. No peritoneal signs. Normoactive bowel sounds.   Extremities: No peripheral edema.   Skin: No rash.   Neuro: No focal neurologic deficits      Medical decision making:   Testing: CBC CMP troponin BNP CT scan of chest for PE study: RSV flu COVID testing negative troponin x 2 negative CMP negative CBC shows anemia which is showing a trend now patient's hemoglobin initially was 10.81-month ago and is down to 8 at this time, MVC is normal, CT scan of chest for PE study shows multifocal pneumonia which shows progression compared to previous readings  Treatment: IV antibiotics started vancomycin and Zosyn and azithromycin to cover for healthcare associated pneumonia.    EKG taken on February 19, 2025 at 1435 shows sinus rhythm at 83 with occasional PVC, no STEMI no T wave inversion normal axis      Plan: 89-year-old female presents emergency room for complaints of shortness of breath.  The patient was seen here in the emergency room on February 8, 2025 ( 11 days ago).  She was diagnosed with influenza and pneumonia at that time.  She was sent home on oral antibiotics and eventually transition to a PICC line with IV antibiotics after her blood cultures came back positive for Staphylococcus aureus.  She has been receiving IV treatment since then but had repeat chest x-rays done today which showed concerns for bilateral opacities versus scarring.  The staff at the facility reviewed the x-ray results and requested the patient be brought to the emergency room for further testing and possible admission at this time.  The nurse at the facility mentioned to the nurse at the emergency room that the patient has been intermittently on 2 to 3 L of oxygen as well since having pneumonia and influenza.  The patient  denies any symptoms at this time and states that she just feels the same when she leaves feels.  She denies any chest pain or shortness of breath or fever at this time. Cardiac: Regular, rate, rhythm, no murmur.   Pulmonary: Lungs clear bilaterally with good aeration. No adventitious breath sounds.   Abdomen: Soft, nonsurgical. Nontender. No peritoneal signs. Normoactive bowel sounds.  I explained to the patient the test results at this time as well as to her family at bedside.  The patient will be admitted to the floor at this time for further care and treatment of her pneumonia.  Rectal exam was performed on the patient this time which showed brown stool at this time and I did not have any concerns for GI bleed at this time.  The patient will need close follow-up for further evaluation of the anemia.  Impression:   1.  Anemia  2.  Multifocal pneumonia          History provided by:  Patient, relative, medical records and nursing home  History limited by:  Age   used: No            Patient History   Past Medical History:   Diagnosis Date    Arthritis     Disease of thyroid gland     Glaucoma     Hypertension     Other specified postprocedural states     History of colonoscopy    Personal history of other diseases of the circulatory system     History of rheumatic fever    Personal history of other diseases of the circulatory system     History of rheumatic fever    Personal history of other medical treatment     History of mammogram     Past Surgical History:   Procedure Laterality Date    APPENDECTOMY  08/20/2018    Appendectomy    COLON SURGERY      CT ANGIO NECK  09/24/2013    CT NECK ANGIO W AND WO IV CONTRAST 9/24/2013 [facility]    CT HEAD ANGIO W AND WO IV CONTRAST  09/24/2013    CT HEAD ANGIO W AND WO IV CONTRAST 9/24/2013 [facility]    HYSTERECTOMY  08/20/2018    Hysterectomy    OTHER SURGICAL HISTORY  08/20/2018    Enterectomy     Family History   Problem Relation  Name Age of Onset    Heart disease Mother Leanna     Hypertension Mother Leanna     Transient ischemic attack Mother Leanna     Heart disease Father Richard     Cancer Maternal Grandmother Maternal grandmother     Colon cancer Maternal Grandmother Maternal grandmother      Social History     Tobacco Use    Smoking status: Former     Types: Cigarettes     Start date: 1/1/1954    Smokeless tobacco: Never   Vaping Use    Vaping status: Never Used   Substance Use Topics    Alcohol use: Not Currently    Drug use: Not Currently       Physical Exam   ED Triage Vitals [02/19/25 1436]   Temperature Heart Rate Respirations BP   36.4 °C (97.5 °F) 78 (!) 24 179/89      Pulse Ox Temp src Heart Rate Source Patient Position   97 % -- -- --      BP Location FiO2 (%)     -- --       Physical Exam      ED Course & MDM   Diagnoses as of 02/19/25 1849   Multifocal pneumonia   Failure of outpatient treatment                 No data recorded     Sally Coma Scale Score: 15 (02/19/25 1438 : Sanya Lopez, BOBBY)                           Medical Decision Making      Procedure  Procedures     Gen Martinez PA-C  02/19/25 1851       Gen Martinez PA-C  02/19/25 [id number]

## 2025-02-19 NOTE — ED TRIAGE NOTES
Patient from [facility] recently dx with pneumonia on IV abx at nursing home and on 2L NC. Nursing home sent patient due to chest xray taken at facility showing her pneumonia hasn't improved. Patient denies feeling short of breath but does have a cough and feels nauseous. Hx of dementia, is A&Ox3 currently

## 2025-02-19 NOTE — ED PROVIDER NOTES
The patient was seen by the midlevel/resident.  I have personally saw the patient and made/approved the management plan and take responsibility for the patient management.  I reviewed the EKG's (when done) and agree with the interpretation.  I have seen and examined the patient; agree with the workup, evaluation, MDM, and diagnosis.  The care plan has been discussed with the midlevel/resident; I have reviewed the note and agree with the documented findings.     Patient was sent in due to concerns about her respiratory status.  She was seen in the Emergency Department approximately 10 days ago at that time diagnosed with influenza and was started on antibiotics.  She has been on IV antibiotics per family request and 2 to 3 L oxygen nasal cannula.  She had a chest x-ray today which showed scarring versus bilateral infiltrates.  Family is concerned and wants to have her hospitalized.  We are awaiting CT results spoke to patient and family. CT showed multifocal pneumonia. Failure of outpatient abx will hospitalize.   Diagnoses as of 02/19/25 1858   Multifocal pneumonia   Failure of outpatient treatment     MD Terry Holt MD  02/19/25 1858

## 2025-02-19 NOTE — H&P
History Of Present Illness  Nanette Flynn is a 89 y.o. female with PMH hypothyroidism, HTN, PE (on Eliquis), chronic B/L wounds and left hand carpal tunnel presented from [facility] with pneumonia.     Patient was seen bedside with family present, patient is a poor historian and history was provided by family.  Patient's family reports on 2/7 at [facility] patient began to feel unwell and was short of breath, coughing, and had general malaise.  Family reports that the patient was brought to the ED on 2/8.  Diagnosed with influenza and pneumonia at that time, and was discharged with p.o. ABX with eventual transition to PICC line with IV ABX after cultures came back positive for Staph.  Repeat chest x-rays showing concern for bilateral opacities VS scarring.  Patient was then brought to the ED today after staff faculty reviewed results.  Patient denies chest pain, palpitations.  Endorses shortness of breath, cough, fatigue.    12 point Review of Systems negative unless stated above    ED COURSE:   VS: Temperature 36.4, /89, heart rate 78, respiration 24, O2 sat 97% RA     Labs  - CBC: WBC 8.9, hemoglobin 8, hematocrit 25, platelets 420  - BMP: Sodium 139, potassium 3.9, chloride 101, bicarb 30, BUN 11, creatinine 0.99, glucose 96  - LFTs: Alk phos 58, ALT 20, AST 25, T. bili 0.4  - Phos: 3.2  - Lactate:   - BNP: 246  - Troponin: 9, 9  - UA positive for:   - Blood cultures: Pending  - COVID/Flu/RSV PCR: Negative   - MRSA swab: Pending     Imaging:  CT Angio Chest for PE:  1. No pulmonary embolism.  2. Findings compatible with multifocal pneumonia involving all lobes, right greater than left, progressive compared to 2/9/2024  3. Small bilateral pleural effusions.  4. Severe coronary artery calcifications.  5. Underlying emphysema.  6. Dilated main pulmonary artery suggestive of pulmonary arterial hypertension.    Interventions:   -Vancomycin  -Zosyn  -Azithromycin     Past Medical  History  She has a past medical history of Arthritis, Disease of thyroid gland, Glaucoma, Hypertension, Other specified postprocedural states, Personal history of other diseases of the circulatory system, Personal history of other diseases of the circulatory system, and Personal history of other medical treatment.    Surgical History  She has a past surgical history that includes Hysterectomy (08/20/2018); Appendectomy (08/20/2018); Other surgical history (08/20/2018); CT angio head w and wo IV contrast (09/24/2013); CT angio neck (09/24/2013); and Colon surgery.     Social History  She reports that she has quit smoking. Her smoking use included cigarettes. She started smoking about 71 years ago. She has never used smokeless tobacco. She reports that she does not currently use alcohol. She reports that she does not currently use drugs.    Family History  Family History   Problem Relation Name Age of Onset    Heart disease Mother Leanna     Hypertension Mother Leanna     Transient ischemic attack Mother Leanna     Heart disease Father Richard     Cancer Maternal Grandmother Maternal grandmother     Colon cancer Maternal Grandmother Maternal grandmother         Allergies  Gabapentin     Physical Exam  Constitutional:       General: She is not in acute distress.  HENT:      Head: Normocephalic.      Mouth/Throat:      Mouth: Mucous membranes are dry.   Cardiovascular:      Rate and Rhythm: Normal rate.      Heart sounds:      No friction rub. No gallop.   Pulmonary:      Breath sounds: Wheezing present.      Comments: 1L NC, coarse breath sounds b/l  Musculoskeletal:         General: Tenderness present.      Right lower leg: No edema.      Left lower leg: No edema.      Comments: Tenderness bilateral distal legs   Neurological:      Mental Status: She is alert.      Comments: Alert & oriented X3.  Questionable thought process and mentation.  Delayed responses.          Last Recorded Vitals  /81   Pulse 91   Temp 36.4  °C (97.5 °F)   Resp (!) 24   Wt 77 kg (169 lb 12.1 oz)   SpO2 95%     Relevant Results  CT angio chest for pulmonary embolism    Result Date: 2/19/2025  STUDY: CT Angiogram of the Chest; 02/19/2025 3:51 pm INDICATION: Shortness of breath.  Recent diagnosis of pneumonia-not improving on antibiotics. COMPARISON: XR Chest 02/07/2025, 01/06/2025;  CTA Chest 02/09/2024. ACCESSION NUMBER(S): [id number] ORDERING CLINICIAN: NATHEN MILLER TECHNIQUE:  CTA of the chest was performed with intravenous contrast. Images are reviewed and processed at a workstation according to the CT angiogram protocol with 3-D and/or MIP post processing imaging generated.  Omnipaque 350, 49 mL was administered intravenously.  Automated mA/kV exposure control was utilized and patient examination was performed in strict accordance with principles of ALARA. FINDINGS: Pulmonary arteries are adequately opacified without acute or chronic filling defects.  The thoracic aorta is normal in course and caliber without dissection or aneurysm.  Main pulmonary artery is dilated measuring 3.2 cm. The heart is normal in size without pericardial effusion.  Severe coronary artery calcifications.  Mildly enlarged thoracic nodes presumably reactive. .  Small bilateral pleural effusions.  The airways are patent. Patchy areas airspace consolidation bilaterally involving all lobes, right greater than left.  Underlying emphysematous changes. Upper abdomen demonstrates no acute pathology. There are no acute fractures.  No suspicious bony lesions.    1. No pulmonary embolism. 2. Findings compatible with multifocal pneumonia involving all lobes, right greater than left, progressive compared to 2/9/2024 3. Small bilateral pleural effusions. 4. Severe coronary artery calcifications. 5. Underlying emphysema. 6. Dilated main pulmonary artery suggestive of pulmonary arterial hypertension. Signed by Quan Rashid MD    ECG 12 lead    Result Date: 2/19/2025  Sinus rhythm  with occasional Premature ventricular complexes Poor R-wave progression ; consider anterior infarct, lead placement, or normal variant Abnormal ECG When compared with ECG of 06-JAN-2025 09:01, Premature ventricular complexes are now Present Non-specific change in ST segment in Inferior leads Nonspecific T wave abnormality now evident in Inferior leads Nonspecific T wave abnormality, improved in Lateral leads      Assessment/Plan   Assessment & Plan      Nanette Flynn is a 89 y.o. female with PMH hypothyroidism, HTN, PE (on Eliquis), chronic B/L wounds and left hand carpal tunnel presented from [facility] with pneumonia.     ACUTE ISSUES:  #PNA due to gram negative bacteria  # Pneumonia due to gram positive bacteria   -Patient satting 100% on 1L NC, questionable if actually hypoxic.   -Does not meet sepsis criteria  -Flu negative, RSV negative, trops negative, no leucocytosis  -Has recently received IV ABX after being diagnosed with influenza and pneumonia.  PLAN:  -Continue vanc/zosyn/azithro   -Pro-Molina, Legionella, strep, pending  -PT/OT  -DuoNebs, guaifenesin, IS  -Tylenol for pain  -Wean/stop oxygen  - ID consulted    # LALITA  - Appears at recent low 0.90 so now has a >0.30 rise in creatinine from that  - Unclear etiology.   - Check serum and urine osmolality and urine lytes  - Could potentially be cardiorenal if fluid overload suspected    CHRONIC ISSUES:  #Hx PE - Continue Eliquis 2.5 mg BID  #HTN - Lisinopril 10 mg daily  #Hypothyroidism - Synthroid 75 mcg daily     Fluid: Replete PRN  Electrolytes: Replete PRN  Nutrition: Regular  GI PPx:  -  DVT/PE PPx: Eliquis 2.5  Abx: Vancomycin/Zosyn/azithromycin (2/19/25-)  IV Lines: PIV  O2: 1L NC    Disposition: Admitted inpatient for treatment of HAP and questionable AHRF, receiving IV ABX & pending pneumonia workup.  eLOS >48h  Code Status: DNR/DNI no ICU    Ivory Arana DO, PGY-1  Internal Medicine   2/19/25 at 5:56 PM.     Disclaimer:  Documentation completed with the information available at the time of input. The times in the chart may not be reflective of actual patient care times, interventions, or procedures. Documentation occurs after the physical care of the patient.

## 2025-02-19 NOTE — PROGRESS NOTES
Pharmacy Medication History Review    Nanette Flynn is a 89 y.o. female admitted for No Principal Problem: There is no principal problem currently on the Problem List. Please update the Problem List and refresh.. Pharmacy reviewed the patient's [id number]-wl-lowlvhgfe medications and allergies for accuracy.    The list below reflectives the updated PTA list. Please review each medication in order reconciliation for additional clarification and justification.  Prior to Admission Medications   Prescriptions Last Dose Informant Patient Reported? Taking?   0.9 % sodium chloride (sodium chloride, PF, 0.9%) injection Unknown Other Yes Yes   Sig: Infuse 10 mL into a venous catheter 2 times a day. For PICC line flush before and after med admin and/or every shift   Ca carb-Ca gluc-Mg ox-Mg gluco 500 mg calcium -250 mg tablet Unknown Other Yes Yes   Sig: Take 1 tablet by mouth 2 times a day.   Lactobacillus acidophilus (PROBIOTIC ORAL) Unknown Other Yes Yes   Sig: Take 1 Dose by mouth once daily in the morning.   Synthroid 75 mcg tablet Not Taking Other No No   Sig: Take 1 tablet by mouth once daily   Patient not taking: Reported on 2/19/2025   acetaminophen (Tylenol) 325 mg tablet Unknown Other Yes Yes   Sig: Take 2 tablets (650 mg) by mouth every 6 hours if needed for mild pain (1 - 3).   apixaban (Eliquis) 2.5 mg tablet Unknown Other Yes Yes   Sig: Take 1 tablet (2.5 mg) by mouth 2 times a day.   ascorbic acid/multivit-min (EMERGEN-C ORAL) Unknown Other Yes Yes   Sig: Take 1 packet by mouth 2 times a day.   ascorbic acid/multivit-min (EMERGEN-C ORAL) Unknown Other Yes Yes   Sig: Take 1 packet by mouth if needed.   cholecalciferol (Vitamin D-3) 5,000 Units tablet Unknown Other Yes Yes   Sig: Take 1 tablet (5,000 Units) by mouth once daily.   cyanocobalamin, vitamin B-12, (VITAMIN B-12 ORAL) Unknown Other Yes Yes   Sig: Take 1 tablet by mouth 2 times a day.   docusate sodium (Colace) 100 mg capsule Unknown Other Yes Yes    Sig: Take 1 capsule (100 mg) by mouth once daily at bedtime.   guaiFENesin 200 mg/5 mL liquid Unknown Other Yes Yes   Sig: Take 10 mL by mouth every 4 hours if needed.   ipratropium-albuteroL (Duo-Neb) 0.5-2.5 mg/3 mL nebulizer solution Unknown Other Yes Yes   Sig: Take 3 mL by nebulization every 4 hours if needed for wheezing.   ipratropium-albuteroL (Duo-Neb) 0.5-2.5 mg/3 mL nebulizer solution Unknown Other Yes Yes   Sig: Take 3 mL by nebulization 4 times a day.   levothyroxine (Synthroid) 112 mcg tablet Unknown Other Yes Yes   Sig: Take 1 tablet (112 mcg) by mouth 6 times a week. NOT ON SUNDAYS.  Take on an empty stomach at the same time each day, either 30 to 60 minutes prior to breakfast   lisinopril 10 mg tablet Unknown Other Yes Yes   Sig: Take 1 tablet (10 mg) by mouth once daily.   meclizine (Antivert) 12.5 mg tablet Unknown Other Yes Yes   Sig: Take 1 tablet (12.5 mg) by mouth every 6 hours if needed for dizziness.   meclizine (Antivert) 12.5 mg tablet Unknown Other Yes Yes   Sig: Take 1 tablet (12.5 mg) by mouth once daily at bedtime.   prochlorperazine (Compazine) 5 mg tablet Unknown Other Yes Yes   Sig: Take 1 tablet (5 mg) by mouth 4 times a day.   travoprost (Travatan Z) 0.004 % drops ophthalmic solution Unknown Other Yes Yes   Sig: Administer 1 drop into both eyes once daily at bedtime.      Facility-Administered Medications: None           The list below reflectives the updated allergy list. Please review each documented allergy for additional clarification and justification.  Allergies  Reviewed by Gen Martinez PA-C on 2/19/2025        Severity Reactions Comments    Gabapentin Medium Confusion FELL ASLEEP WITH ONE DOSE AND NO RECALL OF THAT DAY             Below are additional concerns with the patient's PTA list.  Patient from facility, print scripts    Lizy Raines

## 2025-02-20 ENCOUNTER — APPOINTMENT (OUTPATIENT)
Dept: CARDIOLOGY | Facility: HOSPITAL | Age: 89
DRG: 178 | End: 2025-02-20
Payer: MEDICARE

## 2025-02-20 PROBLEM — R06.02 SOB (SHORTNESS OF BREATH): Status: ACTIVE | Noted: 2025-02-20

## 2025-02-20 LAB
ALBUMIN SERPL BCP-MCNC: 2.9 G/DL (ref 3.4–5)
ANION GAP SERPL CALC-SCNC: 12 MMOL/L (ref 10–20)
AORTIC VALVE MEAN GRADIENT: 7 MMHG
AORTIC VALVE PEAK VELOCITY: 1.8 M/S
AV PEAK GRADIENT: 13 MMHG
AVA (PEAK VEL): 2.18 CM2
AVA (VTI): 1.74 CM2
BUN SERPL-MCNC: 10 MG/DL (ref 6–23)
CALCIUM SERPL-MCNC: 9 MG/DL (ref 8.6–10.3)
CHLORIDE SERPL-SCNC: 99 MMOL/L (ref 98–107)
CO2 SERPL-SCNC: 30 MMOL/L (ref 21–32)
CREAT SERPL-MCNC: 1.22 MG/DL (ref 0.5–1.05)
EGFRCR SERPLBLD CKD-EPI 2021: 43 ML/MIN/1.73M*2
EJECTION FRACTION APICAL 4 CHAMBER: 64.5
EJECTION FRACTION: 63 %
ERYTHROCYTE [DISTWIDTH] IN BLOOD BY AUTOMATED COUNT: 12.9 % (ref 11.5–14.5)
GLUCOSE SERPL-MCNC: 98 MG/DL (ref 74–99)
HCT VFR BLD AUTO: 25.1 % (ref 36–46)
HGB BLD-MCNC: 7.8 G/DL (ref 12–16)
LEFT VENTRICLE INTERNAL DIMENSION DIASTOLE: 3.88 CM (ref 3.5–6)
LEFT VENTRICULAR OUTFLOW TRACT DIAMETER: 2.09 CM
LEGIONELLA AG UR QL: NEGATIVE
MAGNESIUM SERPL-MCNC: 2.05 MG/DL (ref 1.6–2.4)
MCH RBC QN AUTO: 30.4 PG (ref 26–34)
MCHC RBC AUTO-ENTMCNC: 31.1 G/DL (ref 32–36)
MCV RBC AUTO: 98 FL (ref 80–100)
MITRAL VALVE E/A RATIO: 0.82
NRBC BLD-RTO: 0 /100 WBCS (ref 0–0)
PHOSPHATE SERPL-MCNC: 3.3 MG/DL (ref 2.5–4.9)
PLATELET # BLD AUTO: 425 X10*3/UL (ref 150–450)
POTASSIUM SERPL-SCNC: 4.2 MMOL/L (ref 3.5–5.3)
PROCALCITONIN SERPL-MCNC: 0.07 NG/ML
RBC # BLD AUTO: 2.57 X10*6/UL (ref 4–5.2)
RIGHT VENTRICLE FREE WALL PEAK S': 1.3 CM/S
RIGHT VENTRICLE PEAK SYSTOLIC PRESSURE: 34.2 MMHG
S PNEUM AG UR QL: NEGATIVE
SODIUM SERPL-SCNC: 137 MMOL/L (ref 136–145)
WBC # BLD AUTO: 9.8 X10*3/UL (ref 4.4–11.3)

## 2025-02-20 PROCEDURE — 94664 DEMO&/EVAL PT USE INHALER: CPT

## 2025-02-20 PROCEDURE — 2500000002 HC RX 250 W HCPCS SELF ADMINISTERED DRUGS (ALT 637 FOR MEDICARE OP, ALT 636 FOR OP/ED): Performed by: INTERNAL MEDICINE

## 2025-02-20 PROCEDURE — 2500000004 HC RX 250 GENERAL PHARMACY W/ HCPCS (ALT 636 FOR OP/ED)

## 2025-02-20 PROCEDURE — 97161 PT EVAL LOW COMPLEX 20 MIN: CPT | Mod: GP

## 2025-02-20 PROCEDURE — 87493 C DIFF AMPLIFIED PROBE: CPT | Mod: GEALAB

## 2025-02-20 PROCEDURE — C8929 TTE W OR WO FOL WCON,DOPPLER: HCPCS

## 2025-02-20 PROCEDURE — 1200000002 HC GENERAL ROOM WITH TELEMETRY DAILY

## 2025-02-20 PROCEDURE — 97530 THERAPEUTIC ACTIVITIES: CPT | Mod: GO

## 2025-02-20 PROCEDURE — 99223 1ST HOSP IP/OBS HIGH 75: CPT

## 2025-02-20 PROCEDURE — 2500000002 HC RX 250 W HCPCS SELF ADMINISTERED DRUGS (ALT 637 FOR MEDICARE OP, ALT 636 FOR OP/ED)

## 2025-02-20 PROCEDURE — 97530 THERAPEUTIC ACTIVITIES: CPT | Mod: GP

## 2025-02-20 PROCEDURE — 94640 AIRWAY INHALATION TREATMENT: CPT

## 2025-02-20 PROCEDURE — 93306 TTE W/DOPPLER COMPLETE: CPT | Performed by: INTERNAL MEDICINE

## 2025-02-20 PROCEDURE — 36415 COLL VENOUS BLD VENIPUNCTURE: CPT

## 2025-02-20 PROCEDURE — 92610 EVALUATE SWALLOWING FUNCTION: CPT | Mod: GN | Performed by: PHARMACIST

## 2025-02-20 PROCEDURE — 97165 OT EVAL LOW COMPLEX 30 MIN: CPT | Mod: GO

## 2025-02-20 PROCEDURE — 84100 ASSAY OF PHOSPHORUS: CPT

## 2025-02-20 PROCEDURE — 83735 ASSAY OF MAGNESIUM: CPT

## 2025-02-20 PROCEDURE — 2500000001 HC RX 250 WO HCPCS SELF ADMINISTERED DRUGS (ALT 637 FOR MEDICARE OP)

## 2025-02-20 PROCEDURE — 85027 COMPLETE CBC AUTOMATED: CPT

## 2025-02-20 PROCEDURE — 2500000005 HC RX 250 GENERAL PHARMACY W/O HCPCS

## 2025-02-20 PROCEDURE — 94760 N-INVAS EAR/PLS OXIMETRY 1: CPT

## 2025-02-20 PROCEDURE — 9420000001 HC RT PATIENT EDUCATION 5 MIN

## 2025-02-20 PROCEDURE — 2500000005 HC RX 250 GENERAL PHARMACY W/O HCPCS: Performed by: INTERNAL MEDICINE

## 2025-02-20 RX ORDER — EAR PLUGS
1 EACH OTIC (EAR) 3 TIMES DAILY
Status: DISCONTINUED | OUTPATIENT
Start: 2025-02-20 | End: 2025-02-24 | Stop reason: HOSPADM

## 2025-02-20 RX ORDER — PETROLATUM 420 MG/G
OINTMENT TOPICAL
Status: DISCONTINUED | OUTPATIENT
Start: 2025-02-20 | End: 2025-02-24 | Stop reason: HOSPADM

## 2025-02-20 RX ADMIN — PERFLUTREN 1 ML OF DILUTION: 6.52 INJECTION, SUSPENSION INTRAVENOUS at 14:11

## 2025-02-20 RX ADMIN — PIPERACILLIN SODIUM AND TAZOBACTAM SODIUM 4.5 G: 4; .5 INJECTION, SOLUTION INTRAVENOUS at 14:36

## 2025-02-20 RX ADMIN — Medication 125 MCG: at 09:40

## 2025-02-20 RX ADMIN — APIXABAN 2.5 MG: 2.5 TABLET, FILM COATED ORAL at 20:25

## 2025-02-20 RX ADMIN — PIPERACILLIN SODIUM AND TAZOBACTAM SODIUM 4.5 G: 4; .5 INJECTION, SOLUTION INTRAVENOUS at 18:00

## 2025-02-20 RX ADMIN — Medication 1 CAPSULE: at 09:40

## 2025-02-20 RX ADMIN — IPRATROPIUM BROMIDE AND ALBUTEROL SULFATE 3 ML: 2.5; .5 SOLUTION RESPIRATORY (INHALATION) at 14:15

## 2025-02-20 RX ADMIN — Medication 2 L/MIN: at 08:09

## 2025-02-20 RX ADMIN — LEVOTHYROXINE SODIUM 112 MCG: 112 TABLET ORAL at 05:47

## 2025-02-20 RX ADMIN — MECLIZINE 12.5 MG: 12.5 TABLET ORAL at 20:26

## 2025-02-20 RX ADMIN — IPRATROPIUM BROMIDE AND ALBUTEROL SULFATE 3 ML: 2.5; .5 SOLUTION RESPIRATORY (INHALATION) at 19:45

## 2025-02-20 RX ADMIN — Medication 1 APPLICATION: at 18:00

## 2025-02-20 RX ADMIN — PIPERACILLIN SODIUM AND TAZOBACTAM SODIUM 4.5 G: 4; .5 INJECTION, SOLUTION INTRAVENOUS at 05:47

## 2025-02-20 RX ADMIN — APIXABAN 2.5 MG: 2.5 TABLET, FILM COATED ORAL at 09:40

## 2025-02-20 RX ADMIN — SODIUM CHLORIDE 10 ML: 9 INJECTION, SOLUTION INTRAMUSCULAR; INTRAVENOUS; SUBCUTANEOUS at 09:41

## 2025-02-20 RX ADMIN — AZITHROMYCIN 500 MG: 500 TABLET, FILM COATED ORAL at 09:40

## 2025-02-20 RX ADMIN — IPRATROPIUM BROMIDE AND ALBUTEROL SULFATE 3 ML: 2.5; .5 SOLUTION RESPIRATORY (INHALATION) at 08:09

## 2025-02-20 RX ADMIN — LISINOPRIL 10 MG: 10 TABLET ORAL at 09:40

## 2025-02-20 RX ADMIN — LATANOPROST 1 DROP: 50 SOLUTION OPHTHALMIC at 20:29

## 2025-02-20 RX ADMIN — Medication 1 APPLICATION: at 20:27

## 2025-02-20 ASSESSMENT — COGNITIVE AND FUNCTIONAL STATUS - GENERAL
PERSONAL GROOMING: A LITTLE
WALKING IN HOSPITAL ROOM: TOTAL
TOILETING: TOTAL
CLIMB 3 TO 5 STEPS WITH RAILING: TOTAL
TOILETING: A LOT
STANDING UP FROM CHAIR USING ARMS: TOTAL
MOVING TO AND FROM BED TO CHAIR: A LITTLE
TURNING FROM BACK TO SIDE WHILE IN FLAT BAD: A LOT
DAILY ACTIVITIY SCORE: 16
TURNING FROM BACK TO SIDE WHILE IN FLAT BAD: A LOT
MOVING TO AND FROM BED TO CHAIR: A LOT
EATING MEALS: A LITTLE
DRESSING REGULAR UPPER BODY CLOTHING: A LITTLE
STANDING UP FROM CHAIR USING ARMS: TOTAL
DRESSING REGULAR UPPER BODY CLOTHING: A LITTLE
DAILY ACTIVITIY SCORE: 14
DRESSING REGULAR UPPER BODY CLOTHING: A LITTLE
MOBILITY SCORE: 10
HELP NEEDED FOR BATHING: A LOT
CLIMB 3 TO 5 STEPS WITH RAILING: TOTAL
DAILY ACTIVITIY SCORE: 17
WALKING IN HOSPITAL ROOM: TOTAL
MOBILITY SCORE: 14
TURNING FROM BACK TO SIDE WHILE IN FLAT BAD: A LOT
MOVING FROM LYING ON BACK TO SITTING ON SIDE OF FLAT BED WITH BEDRAILS: A LITTLE
TOILETING: A LOT
MOVING TO AND FROM BED TO CHAIR: A LOT
DRESSING REGULAR LOWER BODY CLOTHING: A LOT
HELP NEEDED FOR BATHING: A LOT
DRESSING REGULAR LOWER BODY CLOTHING: TOTAL
CLIMB 3 TO 5 STEPS WITH RAILING: TOTAL
MOVING FROM LYING ON BACK TO SITTING ON SIDE OF FLAT BED WITH BEDRAILS: A LITTLE
MOVING FROM LYING ON BACK TO SITTING ON SIDE OF FLAT BED WITH BEDRAILS: A LOT
WALKING IN HOSPITAL ROOM: A LOT
HELP NEEDED FOR BATHING: A LOT
MOBILITY SCORE: 9
DRESSING REGULAR LOWER BODY CLOTHING: A LOT
STANDING UP FROM CHAIR USING ARMS: A LITTLE

## 2025-02-20 ASSESSMENT — PAIN SCALES - GENERAL
PAINLEVEL_OUTOF10: 0 - NO PAIN

## 2025-02-20 ASSESSMENT — PAIN - FUNCTIONAL ASSESSMENT
PAIN_FUNCTIONAL_ASSESSMENT: 0-10

## 2025-02-20 ASSESSMENT — ACTIVITIES OF DAILY LIVING (ADL)
BATHING_ASSISTANCE: MODERATE
ADL_ASSISTANCE: INDEPENDENT

## 2025-02-20 NOTE — PROGRESS NOTES
02/20/25 1123   Discharge Planning   Expected Discharge Disposition   (Referral sent to [facility] per family request.  Family prefers pt transfer to a different facility.)

## 2025-02-20 NOTE — PROGRESS NOTES
Physical Therapy    Physical Therapy Evaluation & Treatment    Patient Name: Nanette Flynn  MRN: [id number]  Department: [address]  Room: [address] [place] [address]  Today's Date: 2/20/2025   Time Calculation  Start Time: 1009  Stop Time: 1040  Time Calculation (min): 31 min    Assessment/Plan   PT Assessment  PT Assessment Results: Decreased strength, Decreased endurance, Impaired balance, Decreased mobility, Pain  Rehab Prognosis: Good  Barriers to Discharge Home: Physical needs  Physical Needs: 24hr mobility assistance needed, High falls risk due to function or environment  Evaluation/Treatment Tolerance: Patient limited by fatigue  Medical Staff Made Aware: Yes  Strengths: Ability to acquire knowledge, Support of extended family/friends, Support of Caregivers, Rehab experience  Barriers to Participation: Comorbidities  End of Session Communication: Bedside nurse  Assessment Comment: Pt is an 89 yo female who presents from [facility] to the [facility] with pneumonia. Prior to hospital and rehab admissions, pt was ambulating with rollator at home and recieving ADL assist as needed. Pt presents with generalized weakness, decreased endurance, impaired standing balance, and limited mobility. Pt may benefit from PT services at this time for strengthening, mobility training, and balance training to increase his activity tolerance and reduce the risk of falls.  End of Session Patient Position: Bed, 3 rail up, Alarm on   IP OR SWING BED PT PLAN  Inpatient or Swing Bed: Inpatient  PT Plan  Treatment/Interventions: Bed mobility, Transfer training, Gait training, Balance training, Strengthening, Endurance training, Therapeutic exercise, Therapeutic activity, Home exercise program  PT Plan: Ongoing PT  PT Frequency: 3 times per week  PT Discharge Recommendations: Moderate intensity level of continued care  Equipment Recommended upon Discharge: Wheeled walker  PT Recommended Transfer Status: Assist x2  PT - OK to Discharge:  "Yes      Subjective     General Visit Information:  General  Reason for Referral: Pt admitted with pneumonia  Referred By: Austin Adair MD  Past Medical History Relevant to Rehab: hypothyroidism, HTN, PE (on Eliquis), chronic B/L wounds and left hand carpal tunnel  Family/Caregiver Present: Yes  Caregiver Feedback: Son \"León\" present to corroborate PLOF and current function at facility information  Co-Treatment: OT  Co-Treatment Reason: To maximize pt mobility safely  Prior to Session Communication: Bedside nurse, Care Coordinator  Patient Position Received: Bed, 3 rail up, Alarm on  General Comment: Pt pleasant and agreeable to therapy. Has history of vertigo with nausea with positional changes. Prefers wearing her shoes with mobility.  Home Living:  Home Living  Type of Home:  (Currently at [facility] for rehab, family looking into different locations)  Prior Level of Function:  Prior Function Per Pt/Caregiver Report  Prior Function Comments: Prior to rehab facility and hospital admissions, pt was independent with mobility with rollator use, ADL's assistance as needed from family. Per son, she has not been very mobile at rehab facility secondary to multiple medical obstacles/ability to participate in therapy.  Precautions:  Precautions  Medical Precautions: Fall precautions  Precautions Comment: Pt confirms fear of falling      Objective   Pain:  Pain Assessment  Pain Assessment: 0-10  0-10 (Numeric) Pain Score: 0 - No pain  Response to Interventions:  (Pt reported L knee pain with standing)  Cognition:  Cognition  Overall Cognitive Status: Within Functional Limits    General Assessments:  General Observation  General Observation: Purewick catheter, IV, continuous puls ox and heart rate monitoring      Activity Tolerance  Endurance: Tolerates 10 - 20 min exercise with multiple rests    Sensation  Sensation Comment: Pt reports numbness and tinling in LUE, carpal tunnel brace present      Postural " Control  Postural Control: Within Functional Limits    Static Sitting Balance  Static Sitting-Balance Support: Feet supported, Bilateral upper extremity supported  Static Sitting-Level of Assistance: Close supervision  Dynamic Sitting Balance  Dynamic Sitting-Balance Support: Bilateral upper extremity supported, Feet unsupported  Dynamic Sitting-Level of Assistance: Contact guard  Dynamic Sitting-Comments: with MMT of BLE's    Static Standing Balance  Static Standing-Balance Support: Bilateral upper extremity supported (FWW)  Static Standing-Level of Assistance: Minimum assistance (x2)  Functional Assessments:  Bed Mobility  Bed Mobility: Yes  Bed Mobility 1  Bed Mobility 1: Supine to sitting  Level of Assistance 1: Minimum assistance, +2  Bed Mobility Comments 1: HOB elevated  Bed Mobility 2  Bed Mobility  2: Sitting to supine  Level of Assistance 2: Minimum assistance, +2  Bed Mobility 3  Bed Mobility 3: Scooting (Sitting EOB, foward, laterally right)  Level of Assistance 3: Close supervision    Transfers  Transfer: Yes  Transfer 1  Transfer From 1: Bed to, Sit to  Transfer to 1: Stand  Technique 1: Sit to stand  Transfer Device 1: Walker  Transfer Level of Assistance 1: Minimum assistance, +2  Trials/Comments 1: 2 trials completed, reto lean initially  upon standing but then able to correct with verbal cueing  Transfers 2  Transfer From 2: Stand to  Transfer to 2: Sit, Bed  Technique 2: Stand to sit  Transfer Device 2: Walker  Transfer Level of Assistance 2: Minimum assistance, +2    Ambulation/Gait Training  Ambulation/Gait Training Performed: No    Treatments:     Bed Mobility  Bed Mobility: Yes  Bed Mobility 1  Bed Mobility 1: Supine to sitting  Level of Assistance 1: Minimum assistance, +2  Bed Mobility Comments 1: HOB elevated  Bed Mobility 2  Bed Mobility  2: Sitting to supine  Level of Assistance 2: Minimum assistance, +2  Bed Mobility 3  Bed Mobility 3: Scooting (Sitting EOB, foward, laterally  right)  Level of Assistance 3: Close supervision    Ambulation/Gait Training  Ambulation/Gait Training Performed: No  Transfers  Transfer: Yes  Transfer 1  Transfer From 1: Bed to, Sit to  Transfer to 1: Stand  Technique 1: Sit to stand  Transfer Device 1: Walker  Transfer Level of Assistance 1: Minimum assistance, +2  Trials/Comments 1: 2 trials completed, reto lean initially  upon standing but then able to correct with verbal cueing  Transfers 2  Transfer From 2: Stand to  Transfer to 2: Sit, Bed  Technique 2: Stand to sit  Transfer Device 2: Walker  Transfer Level of Assistance 2: Minimum assistance, +2  Outcome Measures:  [facility] Basic Mobility  Turning from your back to your side while in a flat bed without using bedrails: A little  Moving from lying on your back to sitting on the side of a flat bed without using bedrails: A lot  Moving to and from bed to chair (including a wheelchair): A little  Standing up from a chair using your arms (e.g. wheelchair or bedside chair): A little  To walk in hospital room: A lot  Climbing 3-5 steps with railing: Total  Basic Mobility - Total Score: 14    Encounter Problems       Encounter Problems (Active)       Balance       STG - Maintains static standing balance with upper extremity support using FWW, close supervision, ~2 mins       Start:  02/20/25    Expected End:  03/06/25               Mobility       STG - Patient will ambulate at least 15' with FWW, CGA       Start:  02/20/25    Expected End:  03/06/25               PT Transfers       STG - Patient to transfer to and from sit to supine distant supervision       Start:  02/20/25    Expected End:  03/06/25            STG - Patient will transfer sit to and from stand with FWW, CGA       Start:  02/20/25    Expected End:  03/06/25               Pain - Adult              Education Documentation  Body Mechanics, taught by Hortensia Venegas, PT at 2/20/2025 11:14 AM.  Learner: Family, Patient  Readiness: Acceptance  Method:  Explanation, Demonstration  Response: Verbalizes Understanding, Demonstrated Understanding    Mobility Training, taught by Hortensia Venegas PT at 2/20/2025 11:14 AM.  Learner: Family, Patient  Readiness: Acceptance  Method: Explanation, Demonstration  Response: Verbalizes Understanding, Demonstrated Understanding    Education Comments  No comments found.

## 2025-02-20 NOTE — PROGRESS NOTES
Vancomycin Dosing by Pharmacy- INITIAL    Nanette Flynn is a 89 y.o. year old female who Pharmacy has been consulted for vancomycin dosing for pneumonia. Based on the patient's indication and renal status this patient will be dosed based on a goal AUC of 400-600.     Renal function is currently stable.    Visit Vitals  BP (!) 147/92 (BP Location: Left arm, Patient Position: Lying)   Pulse 76   Temp 36.7 °C (98.1 °F) (Temporal)   Resp 18        Lab Results   Component Value Date    CREATININE 0.99 2025    CREATININE 1.07 (H) 2025    CREATININE 0.90 2025    CREATININE 1.04 2025        Patient weight is as follows:   Vitals:    25   Weight: 77.2 kg (170 lb 4.8 oz)       Cultures:  No results found for the encounter in last 14 days.        I/O last 3 completed shifts:  In: 100 (1.3 mL/kg) [IV Piggyback:100]  Out: - (0 mL/kg)   Weight: 77 kg   I/O during current shift:  No intake/output data recorded.    Temp (24hrs), Av.6 °C (97.8 °F), Min:36.4 °C (97.5 °F), Max:36.7 °C (98.1 °F)         Assessment/Plan     Patient will not be given a loading dose.  Will initiate vancomycin maintenance,  1250 mg every 24 hours.    This dosing regimen is predicted by [employer] to result in the following pharmacokinetic parameters:    Regimen: 1250 mg IV every 24 hours.  Start time: 19:27 on 2025  Exposure target: AUC24 (range)400-600 mg/L.hr   [id number]: 473 mg/L.hr  AUC24,ss: 560 mg/L.hr  Probability of AUC24 > 400: 84 %  Ctrough,ss: 17.5 mg/L  Probability of Ctrough,ss > 20: 37 %    Follow-up level will be ordered on  at 5a unless clinically indicated sooner.  Will continue to monitor renal function daily while on vancomycin and order serum creatinine at least every 48 hours if not already ordered.  Follow for continued vancomycin needs, clinical response, and signs/symptoms of toxicity.       Aaron Conroy, PharmD

## 2025-02-20 NOTE — PROGRESS NOTES
"Occupational Therapy    Evaluation    Patient Name: Nanette Flynn  MRN: [id number]  Department: [address]  Room: [address] [place] [address]  Today's Date: 2/20/2025  Time Calculation  Start Time: 1010  Stop Time: 1041  Time Calculation (min): 31 min    Assessment  IP OT Assessment  OT Assessment: Pt presents with decreased ADL performance and impaired mobility. Will benefit from skilled OT services to increase functional outcomes  Prognosis: Good  Barriers to Discharge Home: Caregiver assistance, Physical needs  Caregiver Assistance: Patient lives alone and/or does not have reliable caregiver assistance  Physical Needs: 24hr mobility assistance needed, 24hr ADL assistance needed  Evaluation/Treatment Tolerance: Patient tolerated treatment well  Medical Staff Made Aware: Yes  End of Session Communication: Bedside nurse  End of Session Patient Position: Bed, 3 rail up, Alarm on  Plan:  Treatment Interventions: ADL retraining, Functional transfer training, Endurance training, Compensatory technique education  OT Frequency: 3 times per week  OT Discharge Recommendations: Moderate intensity level of continued care  Equipment Recommended upon Discharge: Wheeled walker  OT - OK to Discharge: Yes    Subjective       General:  General  Reason for Referral: Pneumonia; referred to OT for impaired ADL  Referred By: Austin Adair MD  Past Medical History Relevant to Rehab: hypothyroidism, HTN, PE (on Eliquis), chronic B/L wounds and left hand carpal tunnel  Family/Caregiver Present: Yes  Caregiver Feedback: SOn \"breonna\" present and assists with providing recetn pt history and PLOF  Co-Treatment: PT  Co-Treatment Reason: To maximize pt outcomes  Prior to Session Communication: Bedside nurse, Care Coordinator  Patient Position Received: Bed, 3 rail up, Alarm on  General Comment: Pleasant and cooperative, reports anxiety associated with mobility but is receptive to education and feels reassurnace when donning her own shoes prior to " mobility  Precautions:  Medical Precautions: Fall precautions (ink, on RA, reports h/o vertigo and nausea occ with position changes. None reported this session)           Pain:  Pain Assessment  Pain Assessment: 0-10  0-10 (Numeric) Pain Score: 0 - No pain  Pain Onset: Sudden (unrated L knee pain with standing)  Pain Interventions: Repositioned, Emotional support  Response to Interventions: Relief, Provider notified (Relief once returned to sitting)    Objective   Cognition:  Overall Cognitive Status: Within Functional Limits           Home Living:  Type of Home: House  Lives With: Alone (supportive family nearby)  Home Layout: One level  Home Living Comments: Pt admitted from [facility], has been ther efor about 1 month prior to this admission   Prior Function:  Receives Help From: Family  ADL Assistance: Independent  Homemaking Assistance: Needs assistance  Ambulatory Assistance: Independent (rollator)  Prior Function Comments: Since at [facility], pt has had limited opportunity to be OOB. Family has assisted pt on occ x2 assist OOB to chair    ADL:  Eating Assistance: Independent  Eating Deficit: Setup  Grooming Assistance: Minimal  Bathing Assistance: Moderate  UE Dressing Assistance: Minimal  LE Dressing Assistance: Total  LE Dressing Deficit: Don/doff R shoe, Don/doff L shoe  Toileting Assistance with Device: Total  Activity Tolerance:  Endurance: Tolerates 10 - 20 min exercise with multiple rests  Bed Mobility/Transfers: Bed Mobility  Bed Mobility: Yes  Bed Mobility 1  Bed Mobility 1: Supine to sitting, Sitting to supine  Level of Assistance 1: Minimum assistance (x2)  Bed Mobility Comments 1: HOB elevated, cues provided for hand placement and body mechanics to achive EOB sitting position  Bed Mobility 2  Bed Mobility  2: Scooting  Level of Assistance 2: Minimum assistance  Bed Mobility Comments 2: scoots forward and lateral with 1 assist    Transfers  Transfer: Yes  Transfer 1  Technique 1:  Sit to stand, Stand to sit  Transfer Device 1: Walker  Transfer Level of Assistance 1: Minimum assistance (x2)  Trials/Comments 1: 1st trial, pt impulsively returns to sitting immediately after achieving stand. Retropulsive and is able to correct with cues  Transfers 2  Technique 2: Sit to stand, Stand to sit  Transfer Device 2: Walker  Transfer Level of Assistance 2: Minimum assistance (x2)  Trials/Comments 2: Pt receptive to cues for hand placement and standing posture with 2nd attempt. Tolerates standing for about 2 mins      Functional Mobility:  Functional Mobility  Functional Mobility Performed: No (unable to demonstrate weight shift for forward or lateral ambulation, requesting to return sitting)    Sensation:  Sensation Comment: LUE numbness and tingling due to carpal tunnel, wrist brace in place    Outcome Measures: [facility] Daily Activity  Putting on and taking off regular lower body clothing: Total  Bathing (including washing, rinsing, drying): A lot  Putting on and taking off regular upper body clothing: A little  Toileting, which includes using toilet, bedpan or urinal: Total  Taking care of personal grooming such as brushing teeth: A little  Eating Meals: None  Daily Activity - Total Score: 14      Education Documentation  Body Mechanics, taught by Anette Rich, OT at 2/20/2025  1:25 PM.  Learner: Patient  Readiness: Acceptance  Method: Explanation  Response: Needs Reinforcement  Comment: cues during bed mobiity and transfers    Goals:   Encounter Problems       Encounter Problems (Active)       OT Goals       Pt will demo bed mobility activities with CGA (Progressing)       Start:  02/20/25    Expected End:  03/06/25            Pt will demo functional transfers to/ from EOB, chair and commode with CGA and LRD (Progressing)       Start:  02/20/25    Expected End:  03/06/25            .Pt will demo functional mobility necessary to complete ADL routine with LRD and Kaylene (Progressing)       Start:   02/20/25    Expected End:  03/06/25            Pt will demo ADL routine and meaningful daily activities with Kaylene using modifications as needed  (Progressing)       Start:  02/20/25    Expected End:  03/06/25            Pt will complete BUE exercises, 10-15 reps 1-2 sets in all functional planes, to demo improved functional strength for increased participation in ADL and mobility  (Progressing)       Start:  02/20/25    Expected End:  03/06/25

## 2025-02-20 NOTE — PROGRESS NOTES
Speech-Language Pathology Clinical Swallow Evaluation    Patient Name: Nanette Flynn  MRN: [id number]  : 1935  Today's Date: 25  Start Time: 1010  Stop Time: 1041  Time Calculation (min): 31 min      ASSESSMENT  Impressions:  WFL oral phase and suspected pharyngeal phase dysphagia based on clinical swallow evaluation. MBS recommended.    Pt would benefit from skilled ST to minimize aspiration risk and ensure ongoing safety with the least restrictive diet.  Prognosis: Good    PLAN    RECOMMENDATIONS:  Is MBSS recommended? Yes; MBSS is recommended in order to objectively assess for aspiration risk, safest/least restrictive diet, and any effective compensatory strategies.  Solid consistency: Regular until MBS  Liquid consistency: SINGLE sips only Thin until MBS  Medication administration: Whole in liquid vs puree, as best tolerated    Compensatory swallow strategies:  - Upright positioning for all PO intake  - Slow rate of intake  - Small bites  - Small/SINGLE sips    Recommended frequency/duration:  Skilled SLP services recommended: Yes  Frequency: 2x/week  Duration: 2 weeks  Discharge recommendation: Unable to determine at this time; please see follow-up notes for DC recommendation.  Treatment/Interventions: Diet recommendations, Complete MBSS, Oral motor exercises, Pharyngeal exercises, Patient/family education (pending results of MBS)  Strengths: Motivation, Family/caregiver support, and Low severity of deficits  Barriers to participation in tx: Cognition and Age    Goals (start date 2025):  - Pt will participate in MBSS to assess for safest/least restrictive diet and any effective compensatory strategies.   Status: Goal initiated this date   Progress this date: Tentatively scheduled for 25 AM      SUBJECTIVE    PMHx relevant to rehab: hypothyroidism, HTN, PE (on Eliquis), chronic B/L wounds and left hand carpal tunnel     Chief complaint: Pt was admitted on 25 due to   Chief  Complaint   Patient presents with    Shortness of Breath   . Pt had recently been treated for influenza and PNA. She was found to have Multifocal pneumonia concerning for possible aspiration.    Relevant imaging results:  CT angio chest for PE 2/20/25: IMPRESSION:  1. No pulmonary embolism.  2. Findings compatible with multifocal pneumonia involving all lobes,  right greater than left, progressive compared to 2/9/2024  3. Small bilateral pleural effusions.  4. Severe coronary artery calcifications.  5. Underlying emphysema.  6. Dilated main pulmonary artery suggestive of pulmonary arterial  hypertension.    General Visit Information:  SLP Received On: 02/20/25  Patient Class: Inpatient  Living Environment: Nursing home (skilled/long-term)  Ordering Physician: Dr. Adair  Reason for Referral: Swallow evaluatiom, suspected aspiation  Prior to Session Communication: Bedside nurse    RN cleared pt to participate in session.     Pt reported no difficulty swallowing, but did state that she feels she coughs when she is finished eating or drinking.    Date of Onset: 02/19/25  Date of Order: 02/20/25  BaseLine Diet: Regular/thin  Current Diet : Regular/thin    Status at time of evaluation:  Pain Assessment  Pain Assessment: 0-10  0-10 (Numeric) Pain Score: 0 - No pain    Pt was alert, pleasant, and cooperative for session.  Orientation: Oriented to self and Partially oriented to situation  Ability to follow functional commands: Follows one-step commands appropriately  Nutritional status: Appears well-nourished/no concerns    Respiratory status: Room air  Baseline Vocal Quality: Dysphonic  Volitional Cough: Strong  Volitional Swallow: Within Functional Limits  Patient positioning: Upright in bed      OBJECTIVE  Clinical swallow evaluation completed and consisted of interview (family assisted), oral motor assessment, and PO trials (thin liquids x3-4oz via straw, fresh fruit (grape, honeydew) x3 bites).    ORAL PHASE: Upper  and lower dentures present. Oral mucosa were pink and moist. Mild yellowish coating seen on lingual surface, possibly from recently consumed lunch meal. Lingual/labial strength and ROM were adequate bilaterally. Mastication of regular solids was WFL. A/P transit and oral clearance were prompt and complete.    PHARYNGEAL PHASE: Laryngeal elevation was visualized or palpated with all trials, however adequacy of hyolaryngeal elevation/excursion cannot be determined at bedside. No immediate or delayed s/sx aspiration/penetration were observed with any consistencies.    Was 3oz challenge administered: Yes; pt drank 3oz of thin liquid in one attempt, without breaking, which resulted in delayed cough.      Treatment/Education:  Results and recommendations were relayed to: Patient, Family, Bedside nurse, and Physician  Education provided: Yes   Learner: Patient and Family   Barriers to learning: None, Acuteness of illness barrier, and Cognitive limitations barrier   Method of teaching: Verbal   Topic: role of ST, results of assessment, risk for aspiration, recommendation for MBSS, and recommended safe swallow strategies   Outcome of teaching: Caregiver demonstrated good understanding and Pt verbalized understanding  Treatment provided: No  Next Treatment Priority: MBS

## 2025-02-20 NOTE — PROGRESS NOTES
Nanette Flynn is a 89 y.o. female on day 1 of admission presenting with Multifocal pneumonia.      Subjective   Patient was seen and examined at bedside. No acute events overnight. Patient denies new or worsening symptoms.    Objective     Last Recorded Vitals  /87 (BP Location: Left arm, Patient Position: Lying)   Pulse 82   Temp 36.7 °C (98.1 °F) (Temporal)   Resp 18   Wt 79.3 kg (174 lb 13.2 oz)   SpO2 97%   Intake/Output last 3 Shifts:    Intake/Output Summary (Last 24 hours) at 2/20/2025 0789  Last data filed at 2/20/2025 6408  Gross per 24 hour   Intake 100 ml   Output 375 ml   Net -275 ml       Admission Weight  Weight: 77 kg (169 lb 12.1 oz) (02/19/25 1436)    Daily Weight  02/20/25 : 79.3 kg (174 lb 13.2 oz)    Image Results  CT angio chest for pulmonary embolism  Narrative: STUDY:  CT Angiogram of the Chest; 02/19/2025 3:51 pm  INDICATION:  Shortness of breath.  Recent diagnosis of pneumonia-not improving on  antibiotics.  COMPARISON:  XR Chest 02/07/2025, 01/06/2025;  CTA Chest 02/09/2024.  ACCESSION NUMBER(S):  [id number]  ORDERING CLINICIAN:  NATHEN MILLER  TECHNIQUE:  CTA of the chest was performed with intravenous contrast.   Images are reviewed and processed at a workstation according to the CT  angiogram protocol with 3-D and/or MIP post processing imaging  generated.  Omnipaque 350, 49 mL was administered intravenously.    Automated mA/kV exposure control was utilized and patient examination  was performed in strict accordance with principles of ALARA.  FINDINGS:  Pulmonary arteries are adequately opacified without acute or chronic  filling defects.  The thoracic aorta is normal in course and caliber  without dissection or aneurysm.  Main pulmonary artery is dilated  measuring 3.2 cm.  The heart is normal in size without pericardial effusion.  Severe  coronary artery calcifications.  Mildly enlarged thoracic nodes  presumably reactive.  .  Small bilateral pleural effusions.  The  airways are patent.  Patchy areas airspace consolidation bilaterally involving all lobes,  right greater than left.  Underlying emphysematous changes.  Upper abdomen demonstrates no acute pathology.  There are no acute fractures.  No suspicious bony lesions.  Impression: 1. No pulmonary embolism.  2. Findings compatible with multifocal pneumonia involving all lobes,  right greater than left, progressive compared to 2/9/2024  3. Small bilateral pleural effusions.  4. Severe coronary artery calcifications.  5. Underlying emphysema.  6. Dilated main pulmonary artery suggestive of pulmonary arterial  hypertension.  Signed by Quan Rashid MD  ECG 12 lead  Sinus rhythm with occasional Premature ventricular complexes  Poor R-wave progression ; consider anterior infarct, lead placement, or normal variant  Abnormal ECG  When compared with ECG of 06-JAN-2025 09:01,  Premature ventricular complexes are now Present  Non-specific change in ST segment in Inferior leads  Nonspecific T wave abnormality now evident in Inferior leads  Nonspecific T wave abnormality, improved in Lateral leads      Physical Exam  Constitutional:       General: She is not in acute distress.  HENT:      Head: Normocephalic.   Eyes:      General: No scleral icterus.        Right eye: No discharge.         Left eye: No discharge.   Cardiovascular:      Rate and Rhythm: Normal rate.      Heart sounds:      No friction rub. No gallop.   Pulmonary:      Effort: No respiratory distress.      Breath sounds: No stridor. Wheezing present.   Abdominal:      Palpations: Abdomen is soft.   Skin:     General: Skin is dry.   Neurological:      Mental Status: She is alert and oriented to person, place, and time.      Comments: Cognition appears to have interval improvement       Relevant Results  CT angio chest for pulmonary embolism    Result Date: 2/19/2025  STUDY: CT Angiogram of the Chest; 02/19/2025 3:51 pm INDICATION: Shortness of breath.  Recent diagnosis of  pneumonia-not improving on antibiotics. COMPARISON: XR Chest 02/07/2025, 01/06/2025;  CTA Chest 02/09/2024. ACCESSION NUMBER(S): [id number] ORDERING CLINICIAN: NATHEN MILLER TECHNIQUE:  CTA of the chest was performed with intravenous contrast. Images are reviewed and processed at a workstation according to the CT angiogram protocol with 3-D and/or MIP post processing imaging generated.  Omnipaque 350, 49 mL was administered intravenously.  Automated mA/kV exposure control was utilized and patient examination was performed in strict accordance with principles of ALARA. FINDINGS: Pulmonary arteries are adequately opacified without acute or chronic filling defects.  The thoracic aorta is normal in course and caliber without dissection or aneurysm.  Main pulmonary artery is dilated measuring 3.2 cm. The heart is normal in size without pericardial effusion.  Severe coronary artery calcifications.  Mildly enlarged thoracic nodes presumably reactive. .  Small bilateral pleural effusions.  The airways are patent. Patchy areas airspace consolidation bilaterally involving all lobes, right greater than left.  Underlying emphysematous changes. Upper abdomen demonstrates no acute pathology. There are no acute fractures.  No suspicious bony lesions.    1. No pulmonary embolism. 2. Findings compatible with multifocal pneumonia involving all lobes, right greater than left, progressive compared to 2/9/2024 3. Small bilateral pleural effusions. 4. Severe coronary artery calcifications. 5. Underlying emphysema. 6. Dilated main pulmonary artery suggestive of pulmonary arterial hypertension. Signed by Quan Rashid MD    ECG 12 lead    Result Date: 2/19/2025  Sinus rhythm with occasional Premature ventricular complexes Poor R-wave progression ; consider anterior infarct, lead placement, or normal variant Abnormal ECG When compared with ECG of 06-JAN-2025 09:01, Premature ventricular complexes are now Present Non-specific change in  "ST segment in Inferior leads Nonspecific T wave abnormality now evident in Inferior leads Nonspecific T wave abnormality, improved in Lateral leads      Assessment/Plan   Assessment & Plan  Multifocal pneumonia  Nanette Flynn is a 89 y.o. female with PMH hypothyroidism, HTN, PE (on Eliquis), chronic B/L wounds and left hand carpal tunnel presented from [facility] with pneumonia.      ACUTE ISSUES:  #PNA due to Gram -/Gram + bacteria  -Does not meet sepsis criteria  -Flu negative, RSV negative, trops negative, no leucocytosis  -Has recently received IV ABX after being diagnosed with influenza and pneumonia.  PLAN:  -Continue zosyn/azithro. Stop Vancomycin.  -Obtain TTE to rule out cardiac etiology  -ID consulted due to recurrent infections  -PT/OT  -shaun Covarrubias, IS  -Tylenol for pain  -Wean/stop oxygen    #Concern for Dysphagia/Aspiration  -Patient reports occasional swallowing difficulties particularly with medication  -Reports \"feeling like the pill is stuck in my throat\"  PLAN:  -SLP consult    #Physical debility  PLAN:  -PT OT    CHRONIC ISSUES:  #Hx PE - Continue Eliquis 2.5 mg BID  #HTN - Lisinopril 10 mg daily  #Hypothyroidism - Synthroid 75 mcg daily      Fluid: Replete PRN  Electrolytes: Replete PRN  Nutrition: Regular  GI PPx:  -  DVT/PE PPx: Eliquis 2.5  Abx: Zosyn/azithromycin (2/19/25-)  IV Lines: PIV  O2: 1L NC     Disposition: Admitted inpatient for treatment of HAP and questionable AHRF, receiving IV ABX & pending pneumonia workup.  eLOS >48h  Code Status: DNR/DNI no ICU    Ivory Arana DO, PGY-1  Internal Medicine   2/20/25 at 7:29 AM.       "

## 2025-02-20 NOTE — PROGRESS NOTES
02/20/25 1400   Legal   Legal Comments Per primary POA (Sanna); please call [facility] for any needs.  POA paperwork rec'd and placed in chart.

## 2025-02-20 NOTE — PROGRESS NOTES
02/20/25 1122   Discharge Planning   Living Arrangements Other (Comment)  (patient admitted from [facility])   Support Systems Children   Type of Residence Nursing home/residential care   Who is requesting discharge planning? Provider   Home or Post Acute Services Post acute facilities (Rehab/SNF/etc)   Type of Post Acute Facility Services Skilled nursing  (PT/OT evaluated and recommending MOD intensity)   Expected Discharge Disposition SNF  (per daughter Milton- plan was to be moving patient to new LTC facility-- requested i discuss further with her sister Sherine)   Does the patient need discharge transport arranged? Yes   RoundTrip coordination needed? Yes   Has discharge transport been arranged? No

## 2025-02-20 NOTE — PROGRESS NOTES
02/20/25 1000   Advance Directives (For Healthcare)   Have you reviewed your Advance Directive and is it valid for this stay? Yes   Advance Directive Patient has advance directive, copy in chart   Type of Healthcare Directive Durable power of  for health care     SW confirmed advanced directives are in the chart and info is accurate.  No other needs at this time.

## 2025-02-20 NOTE — PROGRESS NOTES
Vancomycin Dosing by Pharmacy- Cessation of Therapy    Consult to pharmacy for vancomycin dosing has been discontinued by the prescriber, pharmacy will sign off at this time.    Please call pharmacy if there are further questions or re-enter a consult if vancomycin is resumed.     Anette Negro, KeithD

## 2025-02-20 NOTE — CONSULTS
"Reason For Consult  wound    History Of Present Illness  Nanette Flynn is a 89 y.o. female presenting with pneumonia.     Past Medical History  She has a past medical history of Arthritis, Disease of thyroid gland, Glaucoma, Hypertension, Other specified postprocedural states, Personal history of other diseases of the circulatory system, Personal history of other diseases of the circulatory system, and Personal history of other medical treatment.    Surgical History  She has a past surgical history that includes Hysterectomy (08/20/2018); Appendectomy (08/20/2018); Other surgical history (08/20/2018); CT angio head w and wo IV contrast (09/24/2013); CT angio neck (09/24/2013); and Colon surgery.     Social History  She reports that she has quit smoking. Her smoking use included cigarettes. She started smoking about 71 years ago. She has never used smokeless tobacco. She reports that she does not currently use alcohol. She reports that she does not currently use drugs.    Family History  Family History   Problem Relation Name Age of Onset    Heart disease Mother Leanna     Hypertension Mother Leanna     Transient ischemic attack Mother Leanna     Heart disease Father Richard     Cancer Maternal Grandmother Maternal grandmother     Colon cancer Maternal Grandmother Maternal grandmother         Allergies  Gabapentin       Last Recorded Vitals  Blood pressure 152/84, pulse 81, temperature 36.4 °C (97.5 °F), temperature source Temporal, resp. rate 18, height 1.6 m (5' 3\"), weight 79.3 kg (174 lb 13.2 oz), SpO2 98%.      Assessment/Plan   Patient seen in bed, son Vic at bedside, SN taking VS.  Patient skin assessed:   -- Left lateral bridge of nose 0.5 cm abrasion where glasses sit.  -- Right lateral calf 0.3 x 0.2 cm arterial healing wound  -- Buttock cleft small erosion noted x 2- 0.2 cm probable cause moisture.    Goal to offload, protect with Aquaphor, Zinc, q 2 hour turns, sacral Mepilex. Order obtained, care provided. "   Jodie ROSALES updated with POC.  Please message with any questions or concerns.        Jaci Mcmanus RN, [facility]

## 2025-02-20 NOTE — CARE PLAN
The patient's goals for the shift include      The clinical goals for the shift include pt will maintain SPO2 above 90% on RA.

## 2025-02-20 NOTE — CONSULTS
Consults  Referred by NIKO Martinez MD: Salud Walker MD    Reason For Consult  Recurrent pneumonia    History Of Present Illness  Nanette Flynn is a 89 y.o. female, hx of obesity, hx of HTN, hx of PE, she was in the ER 2/7, she was dx with pneumonia / influenza A, she was sent back to the NH, BC with CNS / bacillus species, she has been receiving azithromycin and Rocephin at the NH, she was sent in for worsening pneumonia, WBC were N, the viral screen is negative, the ct with bilateral infiltrates / small effusions, the hx is limited, she feels she has fever, has cough mostly dry, no chest pain, has sob on O2, no emesis, no diarrhea, no rash     Past Medical History  She has a past medical history of Arthritis, Disease of thyroid gland, Glaucoma, Hypertension, Other specified postprocedural states, Personal history of other diseases of the circulatory system, Personal history of other diseases of the circulatory system, and Personal history of other medical treatment.    Surgical History  She has a past surgical history that includes Hysterectomy (08/20/2018); Appendectomy (08/20/2018); Other surgical history (08/20/2018); CT angio head w and wo IV contrast (09/24/2013); CT angio neck (09/24/2013); and Colon surgery.     Social History     Occupational History    Not on file   Tobacco Use    Smoking status: Former     Types: Cigarettes     Start date: 1/1/1953    Smokeless tobacco: Never   Vaping Use    Vaping status: Never Used   Substance and Sexual Activity    Alcohol use: Not Currently    Drug use: Not Currently    Sexual activity: Not Currently     Partners: Male     Birth control/protection: None     Travel History   Travel since 01/20/25    No documented travel since 01/20/25          Family History  Family History   Problem Relation Name Age of Onset    Heart disease Mother Leanna     Hypertension Mother Leanna     Transient ischemic attack Mother Leanna     Heart disease Father Richard      Cancer Maternal Grandmother Maternal grandmother     Colon cancer Maternal Grandmother Maternal grandmother      Allergies  Gabapentin       There is no immunization history on file for this patient.  Pneumonia and influenza vaccines are planned  Yeager fall risk 60, preventive protocol was implemented  Depression screen is negative    Medications  Home medications:  Medications Prior to Admission   Medication Sig Dispense Refill Last Dose/Taking    0.9 % sodium chloride (sodium chloride, PF, 0.9%) injection Infuse 10 mL into a venous catheter 2 times a day. For PICC line flush before and after med admin and/or every shift   Unknown    acetaminophen (Tylenol) 325 mg tablet Take 2 tablets (650 mg) by mouth every 6 hours if needed for mild pain (1 - 3).   Unknown    apixaban (Eliquis) 2.5 mg tablet Take 1 tablet (2.5 mg) by mouth 2 times a day. 60 tablet 11 Unknown    ascorbic acid/multivit-min (EMERGEN-C ORAL) Take 1 packet by mouth 2 times a day.   Unknown    ascorbic acid/multivit-min (EMERGEN-C ORAL) Take 1 packet by mouth if needed.   Unknown    Ca carb-Ca gluc-Mg ox-Mg gluco 500 mg calcium -250 mg tablet Take 1 tablet by mouth 2 times a day.   Unknown    cholecalciferol (Vitamin D-3) 5,000 Units tablet Take 1 tablet (5,000 Units) by mouth once daily.   Unknown    cyanocobalamin, vitamin B-12, (VITAMIN B-12 ORAL) Take 1 tablet by mouth 2 times a day.   Unknown    docusate sodium (Colace) 100 mg capsule Take 1 capsule (100 mg) by mouth once daily at bedtime.   Unknown    guaiFENesin 200 mg/5 mL liquid Take 10 mL by mouth every 4 hours if needed.   Unknown    ipratropium-albuteroL (Duo-Neb) 0.5-2.5 mg/3 mL nebulizer solution Take 3 mL by nebulization every 4 hours if needed for wheezing.   Unknown    ipratropium-albuteroL (Duo-Neb) 0.5-2.5 mg/3 mL nebulizer solution Take 3 mL by nebulization 4 times a day.   Unknown    Lactobacillus acidophilus (PROBIOTIC ORAL) Take 1 Dose by mouth once daily in the morning.    "Unknown    levothyroxine (Synthroid) 112 mcg tablet Take 1 tablet (112 mcg) by mouth 6 times a week. NOT ON SUNDAYS.  Take on an empty stomach at the same time each day, either 30 to 60 minutes prior to breakfast   Unknown    lisinopril 10 mg tablet Take 1 tablet (10 mg) by mouth once daily.   Unknown    meclizine (Antivert) 12.5 mg tablet Take 1 tablet (12.5 mg) by mouth every 6 hours if needed for dizziness.   Unknown    meclizine (Antivert) 12.5 mg tablet Take 1 tablet (12.5 mg) by mouth once daily at bedtime.   Unknown    prochlorperazine (Compazine) 5 mg tablet Take 1 tablet (5 mg) by mouth 4 times a day.   Unknown    Synthroid 75 mcg tablet Take 1 tablet by mouth once daily (Patient not taking: Reported on 2/19/2025) 90 tablet 1 Not Taking    travoprost (Travatan Z) 0.004 % drops ophthalmic solution Administer 1 drop into both eyes once daily at bedtime.   Unknown     Current medications:  Scheduled medications  apixaban, 2.5 mg, oral, BID  azithromycin, 500 mg, oral, q24h ANDREWS  cholecalciferol, 5,000 Units, oral, Daily  docusate sodium, 100 mg, oral, Nightly  ipratropium-albuteroL, 3 mL, nebulization, TID  lactobacillus acidophilus, 1 capsule, oral, q AM  latanoprost, 1 drop, Both Eyes, Nightly  levothyroxine, 112 mcg, oral, Once per day on Monday Tuesday Wednesday Thursday Friday Saturday  lisinopril, 10 mg, oral, Daily  meclizine, 12.5 mg, oral, Nightly  piperacillin-tazobactam, 4.5 g, intravenous, q6h  sodium chloride (PF) 0.9%, 10 mL, intravenous, BID      Continuous medications     PRN medications  PRN medications: acetaminophen, guaiFENesin, ipratropium-albuteroL, meclizine, oxygen, prochlorperazine    Review of Systems   All other systems reviewed and are negative.       Objective  Range of Vitals (last 24 hours)  Heart Rate:  [63-91]   Temp:  [36.4 °C (97.5 °F)-36.7 °C (98.1 °F)]   Resp:  [18-25]   BP: (124-179)/(69-92)   Height:  [160 cm (5' 3\")-167.6 cm (5' 6\")]   Weight:  [77 kg (169 lb 12.1 " oz)-79.3 kg (174 lb 13.2 oz)]   SpO2:  [95 %-99 %]   Daily Weight  02/20/25 : 79.3 kg (174 lb 13.2 oz)    Body mass index is 30.97 kg/m².   Nutritional consult    Physical Exam  Constitutional:       Appearance: Normal appearance.   HENT:      Head: Normocephalic and atraumatic.      Mouth/Throat:      Mouth: Mucous membranes are moist.      Pharynx: Oropharynx is clear.   Eyes:      Pupils: Pupils are equal, round, and reactive to light.   Cardiovascular:      Rate and Rhythm: Normal rate and regular rhythm.      Heart sounds: Normal heart sounds.   Pulmonary:      Effort: Pulmonary effort is normal.      Breath sounds: Rales present.   Abdominal:      General: Abdomen is flat. Bowel sounds are normal.      Palpations: Abdomen is soft.   Musculoskeletal:         General: Swelling present.      Cervical back: Normal range of motion.   Neurological:      Mental Status: She is alert.          Relevant Results  Outside Hospital Results  reviewed  Labs  Results from last 72 hours   Lab Units 02/20/25  0730 02/19/25  1455   WBC AUTO x10*3/uL 9.8 8.9   HEMOGLOBIN g/dL 7.8* 8.0*   HEMATOCRIT % 25.1* 25.0*   PLATELETS AUTO x10*3/uL 425 420   NEUTROS PCT AUTO %  --  58.3   LYMPHS PCT AUTO %  --  27.4   MONOS PCT AUTO %  --  10.0   EOS PCT AUTO %  --  2.9     Results from last 72 hours   Lab Units 02/20/25  0730 02/19/25  1455   SODIUM mmol/L 137 139   POTASSIUM mmol/L 4.2 3.9   CHLORIDE mmol/L 99 101   CO2 mmol/L 30 30   BUN mg/dL 10 11   CREATININE mg/dL 1.22* 0.99   GLUCOSE mg/dL 98 96   CALCIUM mg/dL 9.0 9.2   ANION GAP mmol/L 12 12   EGFR mL/min/1.73m*2 43* 55*   PHOSPHORUS mg/dL 3.3  --      Results from last 72 hours   Lab Units 02/20/25  0730 02/19/25  1455   ALK PHOS U/L  --  58   BILIRUBIN TOTAL mg/dL  --  0.4   PROTEIN TOTAL g/dL  --  6.7   ALT U/L  --  20   AST U/L  --  25   ALBUMIN g/dL 2.9* 3.2*     Estimated Creatinine Clearance: 31.8 mL/min (A) (by C-G formula based on SCr of 1.22 mg/dL (H)).  C-Reactive  "Protein   Date Value Ref Range Status   01/08/2025 2.37 (H) <1.00 mg/dL Final   01/07/2025 1.66 (H) <1.00 mg/dL Final   01/06/2025 1.52 (H) <1.00 mg/dL Final     No results found for: \"HIV1X2\", \"HIVCONF\", \"HYXVPJ5VV\"  No results found for: \"HEPCABINIT\", \"HEPCAB\", \"HCVPCRQUANT\"  Microbiology  Susceptibility data from last 90 days.  Collected Specimen Info Organism   02/07/25 Blood culture from Peripheral Venipuncture Staphylococcus epidermidis   02/07/25 Blood culture from Peripheral Venipuncture Bacillus species, not Bacillus anthracis   Imaging  Reviewed      Assessment/Plan   Respiratory failure / pneumonia / suspected aspiration  Bacteremia 2/7, likely a contaminant  Post influenza A infection    Recommendations :  Continue Zosyn  Cultures  Chest PT / incentive spirometry  Inflammatory markers  Aspiration precautions  Swallowing evaluation  Echo     I spent minutes in the professional and overall care of this patient.      Poonam Sandoval MD  "

## 2025-02-20 NOTE — ASSESSMENT & PLAN NOTE
"Nanette Flynn is a 89 y.o. female with PMH hypothyroidism, HTN, PE (on Eliquis), chronic B/L wounds and left hand carpal tunnel presented from [facility] with pneumonia.      ACUTE ISSUES:  #PNA due to Gram -/Gram + bacteria  -Does not meet sepsis criteria  -Flu negative, RSV negative, trops negative, no leucocytosis  -Has recently received IV ABX after being diagnosed with influenza and pneumonia.  PLAN:  -Continue zosyn/azithro. Stop Vancomycin.  -Obtain TTE to rule out cardiac etiology  -ID consulted due to recurrent infections  -PT/OT  -DuoNebs, guaifenesin, IS  -Tylenol for pain  -Wean/stop oxygen    #Concern for Dysphagia/Aspiration  -Patient reports occasional swallowing difficulties particularly with medication  -Reports \"feeling like the pill is stuck in my throat\"  PLAN:  -SLP consult    #Physical debility  PLAN:  -PT OT    CHRONIC ISSUES:  #Hx PE - Continue Eliquis 2.5 mg BID  #HTN - Lisinopril 10 mg daily  #Hypothyroidism - Synthroid 75 mcg daily      Fluid: Replete PRN  Electrolytes: Replete PRN  Nutrition: Regular  GI PPx:  -  DVT/PE PPx: Eliquis 2.5  Abx: Zosyn/azithromycin (2/19/25-)  IV Lines: PIV  O2: 1L NC     Disposition: Admitted inpatient for treatment of HAP and questionable AHRF, receiving IV ABX & pending pneumonia workup.  eLOS >48h  Code Status: DNR/DNI no ICU  "

## 2025-02-21 ENCOUNTER — APPOINTMENT (OUTPATIENT)
Dept: RADIOLOGY | Facility: HOSPITAL | Age: 89
DRG: 178 | End: 2025-02-21
Payer: MEDICARE

## 2025-02-21 LAB
ALBUMIN SERPL BCP-MCNC: 2.7 G/DL (ref 3.4–5)
ANION GAP SERPL CALC-SCNC: 10 MMOL/L (ref 10–20)
BUN SERPL-MCNC: 11 MG/DL (ref 6–23)
C DIF TOX TCDA+TCDB STL QL NAA+PROBE: NOT DETECTED
CALCIUM SERPL-MCNC: 8.5 MG/DL (ref 8.6–10.3)
CHLORIDE SERPL-SCNC: 101 MMOL/L (ref 98–107)
CHLORIDE UR-SCNC: 18 MMOL/L
CHLORIDE/CREATININE (MMOL/G) IN URINE: 52 MMOL/G CREAT (ref 38–318)
CO2 SERPL-SCNC: 31 MMOL/L (ref 21–32)
CREAT SERPL-MCNC: 1.46 MG/DL (ref 0.5–1.05)
CREAT UR-MCNC: 34.9 MG/DL (ref 20–320)
EGFRCR SERPLBLD CKD-EPI 2021: 34 ML/MIN/1.73M*2
ERYTHROCYTE [DISTWIDTH] IN BLOOD BY AUTOMATED COUNT: 13.2 % (ref 11.5–14.5)
GLUCOSE SERPL-MCNC: 98 MG/DL (ref 74–99)
HCT VFR BLD AUTO: 22.9 % (ref 36–46)
HGB BLD-MCNC: 7.5 G/DL (ref 12–16)
MAGNESIUM SERPL-MCNC: 2.1 MG/DL (ref 1.6–2.4)
MCH RBC QN AUTO: 31.6 PG (ref 26–34)
MCHC RBC AUTO-ENTMCNC: 32.8 G/DL (ref 32–36)
MCV RBC AUTO: 97 FL (ref 80–100)
NRBC BLD-RTO: 0.2 /100 WBCS (ref 0–0)
OSMOLALITY UR: 158 MOSM/KG (ref 200–1200)
PHOSPHATE SERPL-MCNC: 3.3 MG/DL (ref 2.5–4.9)
PLATELET # BLD AUTO: 375 X10*3/UL (ref 150–450)
POTASSIUM SERPL-SCNC: 3.6 MMOL/L (ref 3.5–5.3)
POTASSIUM UR-SCNC: 14 MMOL/L
POTASSIUM/CREAT UR-RTO: 40 MMOL/G CREAT
RBC # BLD AUTO: 2.37 X10*6/UL (ref 4–5.2)
SODIUM SERPL-SCNC: 138 MMOL/L (ref 136–145)
SODIUM UR-SCNC: 43 MMOL/L
SODIUM/CREAT UR-RTO: 123 MMOL/G CREAT
WBC # BLD AUTO: 8.3 X10*3/UL (ref 4.4–11.3)

## 2025-02-21 PROCEDURE — 94640 AIRWAY INHALATION TREATMENT: CPT

## 2025-02-21 PROCEDURE — 85027 COMPLETE CBC AUTOMATED: CPT

## 2025-02-21 PROCEDURE — 94664 DEMO&/EVAL PT USE INHALER: CPT

## 2025-02-21 PROCEDURE — 99233 SBSQ HOSP IP/OBS HIGH 50: CPT

## 2025-02-21 PROCEDURE — 84133 ASSAY OF URINE POTASSIUM: CPT | Mod: GEALAB

## 2025-02-21 PROCEDURE — 2500000002 HC RX 250 W HCPCS SELF ADMINISTERED DRUGS (ALT 637 FOR MEDICARE OP, ALT 636 FOR OP/ED): Performed by: INTERNAL MEDICINE

## 2025-02-21 PROCEDURE — 2500000005 HC RX 250 GENERAL PHARMACY W/O HCPCS

## 2025-02-21 PROCEDURE — 83935 ASSAY OF URINE OSMOLALITY: CPT | Mod: GEALAB

## 2025-02-21 PROCEDURE — 2500000005 HC RX 250 GENERAL PHARMACY W/O HCPCS: Performed by: INTERNAL MEDICINE

## 2025-02-21 PROCEDURE — 92611 MOTION FLUOROSCOPY/SWALLOW: CPT | Mod: GN

## 2025-02-21 PROCEDURE — 2500000004 HC RX 250 GENERAL PHARMACY W/ HCPCS (ALT 636 FOR OP/ED)

## 2025-02-21 PROCEDURE — 94760 N-INVAS EAR/PLS OXIMETRY 1: CPT

## 2025-02-21 PROCEDURE — 74230 X-RAY XM SWLNG FUNCJ C+: CPT | Performed by: RADIOLOGY

## 2025-02-21 PROCEDURE — 74230 X-RAY XM SWLNG FUNCJ C+: CPT

## 2025-02-21 PROCEDURE — 1200000002 HC GENERAL ROOM WITH TELEMETRY DAILY

## 2025-02-21 PROCEDURE — 36415 COLL VENOUS BLD VENIPUNCTURE: CPT

## 2025-02-21 PROCEDURE — 80069 RENAL FUNCTION PANEL: CPT

## 2025-02-21 PROCEDURE — 2500000002 HC RX 250 W HCPCS SELF ADMINISTERED DRUGS (ALT 637 FOR MEDICARE OP, ALT 636 FOR OP/ED)

## 2025-02-21 PROCEDURE — 83735 ASSAY OF MAGNESIUM: CPT

## 2025-02-21 PROCEDURE — 2500000001 HC RX 250 WO HCPCS SELF ADMINISTERED DRUGS (ALT 637 FOR MEDICARE OP)

## 2025-02-21 RX ADMIN — SODIUM CHLORIDE 10 ML: 9 INJECTION, SOLUTION INTRAMUSCULAR; INTRAVENOUS; SUBCUTANEOUS at 21:17

## 2025-02-21 RX ADMIN — PIPERACILLIN SODIUM AND TAZOBACTAM SODIUM 4.5 G: 4; .5 INJECTION, SOLUTION INTRAVENOUS at 21:19

## 2025-02-21 RX ADMIN — PIPERACILLIN SODIUM AND TAZOBACTAM SODIUM 4.5 G: 4; .5 INJECTION, SOLUTION INTRAVENOUS at 06:10

## 2025-02-21 RX ADMIN — PIPERACILLIN SODIUM AND TAZOBACTAM SODIUM 4.5 G: 4; .5 INJECTION, SOLUTION INTRAVENOUS at 00:28

## 2025-02-21 RX ADMIN — APIXABAN 2.5 MG: 2.5 TABLET, FILM COATED ORAL at 08:57

## 2025-02-21 RX ADMIN — BARIUM SULFATE 15 ML: 400 PASTE ORAL at 14:32

## 2025-02-21 RX ADMIN — APIXABAN 2.5 MG: 2.5 TABLET, FILM COATED ORAL at 21:16

## 2025-02-21 RX ADMIN — IPRATROPIUM BROMIDE AND ALBUTEROL SULFATE 3 ML: 2.5; .5 SOLUTION RESPIRATORY (INHALATION) at 19:37

## 2025-02-21 RX ADMIN — Medication 1 APPLICATION: at 21:15

## 2025-02-21 RX ADMIN — LEVOTHYROXINE SODIUM 112 MCG: 112 TABLET ORAL at 06:10

## 2025-02-21 RX ADMIN — IPRATROPIUM BROMIDE AND ALBUTEROL SULFATE 3 ML: 2.5; .5 SOLUTION RESPIRATORY (INHALATION) at 07:55

## 2025-02-21 RX ADMIN — PIPERACILLIN SODIUM AND TAZOBACTAM SODIUM 4.5 G: 4; .5 INJECTION, SOLUTION INTRAVENOUS at 13:52

## 2025-02-21 RX ADMIN — BARIUM SULFATE 90 ML: 0.81 POWDER, FOR SUSPENSION ORAL at 14:31

## 2025-02-21 RX ADMIN — BARIUM SULFATE 700 MG: 700 TABLET ORAL at 14:30

## 2025-02-21 RX ADMIN — BARIUM SULFATE 5 ML: 400 SUSPENSION ORAL at 14:31

## 2025-02-21 RX ADMIN — BARIUM SULFATE 70 ML: 400 SUSPENSION ORAL at 14:32

## 2025-02-21 RX ADMIN — Medication 125 MCG: at 08:57

## 2025-02-21 RX ADMIN — LATANOPROST 1 DROP: 50 SOLUTION OPHTHALMIC at 21:15

## 2025-02-21 RX ADMIN — MECLIZINE 12.5 MG: 12.5 TABLET ORAL at 21:16

## 2025-02-21 ASSESSMENT — PAIN SCALES - GENERAL
PAINLEVEL_OUTOF10: 0 - NO PAIN
PAINLEVEL_OUTOF10: 0 - NO PAIN

## 2025-02-21 ASSESSMENT — COGNITIVE AND FUNCTIONAL STATUS - GENERAL
EATING MEALS: A LITTLE
MOVING TO AND FROM BED TO CHAIR: A LOT
MOBILITY SCORE: 9
MOBILITY SCORE: 9
DAILY ACTIVITIY SCORE: 14
EATING MEALS: A LITTLE
HELP NEEDED FOR BATHING: A LOT
DRESSING REGULAR UPPER BODY CLOTHING: A LOT
WALKING IN HOSPITAL ROOM: TOTAL
TURNING FROM BACK TO SIDE WHILE IN FLAT BAD: A LOT
TOILETING: A LOT
STANDING UP FROM CHAIR USING ARMS: TOTAL
PERSONAL GROOMING: A LITTLE
CLIMB 3 TO 5 STEPS WITH RAILING: TOTAL
MOVING TO AND FROM BED TO CHAIR: A LOT
CLIMB 3 TO 5 STEPS WITH RAILING: TOTAL
DRESSING REGULAR UPPER BODY CLOTHING: A LITTLE
HELP NEEDED FOR BATHING: A LOT
DRESSING REGULAR LOWER BODY CLOTHING: A LOT
TURNING FROM BACK TO SIDE WHILE IN FLAT BAD: A LOT
MOVING FROM LYING ON BACK TO SITTING ON SIDE OF FLAT BED WITH BEDRAILS: A LOT
TOILETING: A LOT
MOVING FROM LYING ON BACK TO SITTING ON SIDE OF FLAT BED WITH BEDRAILS: A LOT
STANDING UP FROM CHAIR USING ARMS: TOTAL
DAILY ACTIVITIY SCORE: 16
DRESSING REGULAR LOWER BODY CLOTHING: A LOT
WALKING IN HOSPITAL ROOM: TOTAL

## 2025-02-21 ASSESSMENT — PAIN - FUNCTIONAL ASSESSMENT: PAIN_FUNCTIONAL_ASSESSMENT: 0-10

## 2025-02-21 NOTE — PROGRESS NOTES
Nanette Flynn is a 89 y.o. female on day 2 of admission presenting with Multifocal pneumonia.    Subjective   Interval History: no fever, no new complaints        Review of Systems    Objective   Range of Vitals (last 24 hours)  Heart Rate:  [79-94]   Temp:  [36.5 °C (97.7 °F)-37.1 °C (98.8 °F)]   Resp:  [16-20]   BP: (110-178)/(56-87)   SpO2:  [92 %-93 %]   Daily Weight  02/20/25 : 79.3 kg (174 lb 13.2 oz)    Body mass index is 30.97 kg/m².    Physical Exam  Constitutional:       Appearance: Normal appearance.   HENT:      Head: Normocephalic and atraumatic.      Mouth/Throat:      Mouth: Mucous membranes are moist.      Pharynx: Oropharynx is clear.   Eyes:      Pupils: Pupils are equal, round, and reactive to light.   Cardiovascular:      Rate and Rhythm: Normal rate and regular rhythm.      Heart sounds: Normal heart sounds.   Pulmonary:      Effort: Pulmonary effort is normal.      Breath sounds: Rales present.   Abdominal:      General: Abdomen is flat. Bowel sounds are normal.      Palpations: Abdomen is soft.   Musculoskeletal:      Cervical back: Normal range of motion.   Neurological:      Mental Status: She is alert.         Antibiotics  piperacillin-tazobactam - 4.5 gram/100 mL    Relevant Results  Labs  Results from last 72 hours   Lab Units 02/21/25  0700 02/20/25  0730 02/19/25  1455   WBC AUTO x10*3/uL 8.3 9.8 8.9   HEMOGLOBIN g/dL 7.5* 7.8* 8.0*   HEMATOCRIT % 22.9* 25.1* 25.0*   PLATELETS AUTO x10*3/uL 375 425 420   NEUTROS PCT AUTO %  --   --  58.3   LYMPHS PCT AUTO %  --   --  27.4   MONOS PCT AUTO %  --   --  10.0   EOS PCT AUTO %  --   --  2.9     Results from last 72 hours   Lab Units 02/21/25  0700 02/20/25  0730 02/19/25  1455   SODIUM mmol/L 138 137 139   POTASSIUM mmol/L 3.6 4.2 3.9   CHLORIDE mmol/L 101 99 101   CO2 mmol/L 31 30 30   BUN mg/dL 11 10 11   CREATININE mg/dL 1.46* 1.22* 0.99   GLUCOSE mg/dL 98 98 96   CALCIUM mg/dL 8.5* 9.0 9.2   ANION GAP mmol/L 10 12 12   EGFR  mL/min/1.73m*2 34* 43* 55*   PHOSPHORUS mg/dL 3.3 3.3  --      Results from last 72 hours   Lab Units 02/21/25  0700 02/20/25  0730 02/19/25  1455   ALK PHOS U/L  --   --  58   BILIRUBIN TOTAL mg/dL  --   --  0.4   PROTEIN TOTAL g/dL  --   --  6.7   ALT U/L  --   --  20   AST U/L  --   --  25   ALBUMIN g/dL 2.7* 2.9* 3.2*     Estimated Creatinine Clearance: 26.6 mL/min (A) (by C-G formula based on SCr of 1.46 mg/dL (H)).  C-Reactive Protein   Date Value Ref Range Status   01/08/2025 2.37 (H) <1.00 mg/dL Final   01/07/2025 1.66 (H) <1.00 mg/dL Final   01/06/2025 1.52 (H) <1.00 mg/dL Final     Microbiology  Susceptibility data from last 14 days.  Collected Specimen Info Organism   02/07/25 Blood culture from Peripheral Venipuncture Staphylococcus epidermidis   02/07/25 Blood culture from Peripheral Venipuncture Bacillus species, not Bacillus anthracis   Imaging  Reviewed        Assessment/Plan   Respiratory failure / pneumonia / suspected aspiration  Bacteremia 2/7, likely a contaminant  Post influenza A infection     Recommendations :  Continue Zosyn  Discussed with the medical team     I spent minutes in the professional and overall care of this patient.      Poonam Sandoval MD

## 2025-02-21 NOTE — PROGRESS NOTES
02/21/25 1006   Discharge Planning   Living Arrangements Other (Comment)  (patient admitted from [facility])   Support Systems Children   Assistance Needed patient A&Ox3, dependent for ADL's, reported she was on bedrest at facility   Type of Residence Nursing home/residential care   Who is requesting discharge planning? Provider   Home or Post Acute Services Post acute facilities (Rehab/SNF/etc)   Type of Post Acute Facility Services Skilled nursing  (PT/OT evaluated and recommending MOD intensity)   Expected Discharge Disposition SNF  ([facility] SNF vs LTC)   Does the patient need discharge transport arranged? Yes   RoundTrip coordination needed? Yes   Has discharge transport been arranged? No

## 2025-02-21 NOTE — ASSESSMENT & PLAN NOTE
"Nanette Flynn is a 89 y.o. female with PMH hypothyroidism, HTN, PE (on Eliquis), chronic B/L wounds and left hand carpal tunnel presented from [facility] with pneumonia.      ACUTE ISSUES:  #PNA due to Gram - bacteria  -Does not meet sepsis criteria  -Has recently received IV ABX after being diagnosed with influenza and pneumonia.  -TTE: 63%  PLAN:  -On RA  -ID consulted due to recurrent infections: Continue zosyn  -PT/OT  -DuoNekarin, guaifenesin, IS  -Tylenol for pain    #Concern for Dysphagia/Aspiration  -Patient reports occasional swallowing difficulties particularly with medication  -Reports \"feeling like the pill is stuck in my throat\"  PLAN:  -SLP consult: Planning modified barium today    #Physical debility  PLAN:  -PT OT: Moderate intensity    #Diarrhea  -With history of recent antibiotic use  Plan:  -Obtain C. difficile PCR, precautions until rule out    CHRONIC ISSUES:  #Hx PE - Continue Eliquis 2.5 mg BID  #HTN - Lisinopril 10 mg daily  #Hypothyroidism - Synthroid 75 mcg daily   #Chronic B/L wounds - Wound care     Fluid: Replete PRN  Electrolytes: Replete PRN  Nutrition: Regular  GI PPx:  -  DVT/PE PPx: Eliquis 2.5  Abx: Zosyn(2/19/25-)  IV Lines: PIV  O2: RA     Disposition: Admitted inpatient for treatment of PNA, receiving IV ABX.  PT/OT recommends moderate intensity eLOS >48h  Code Status: DNR/DNI no ICU"

## 2025-02-21 NOTE — PROGRESS NOTES
Medication Adjustment    The following medication(s) was/were adjusted for Nanette Flynn per protocol/policy due to altered renal function.    Medication(s) adjusted:   Piperacillin-tazobactam IV 4.5g q6h to piperacillin-tazobactam IV 4.5g q8h    Anette Negro, PharmD

## 2025-02-21 NOTE — CARE PLAN
The patient's goals for the shift include      The clinical goals for the shift include pt will ambulate with staff assistance

## 2025-02-21 NOTE — PROGRESS NOTES
Nanette Dawkins is a 89 y.o. female on day 2 of admission presenting with Multifocal pneumonia.      Subjective   Patient was seen and examined at bedside.  Endorses significant diarrhea yesterday & overnight; PCR ordered.  Endorses continuing complaints of fatigue.  No other complaints.  Denies CP, SOB, palp.    Objective     Last Recorded Vitals  /74 (BP Location: Left arm, Patient Position: Lying)   Pulse 79   Temp 36.6 °C (97.9 °F) (Temporal)   Resp 18   Wt 79.3 kg (174 lb 13.2 oz)   SpO2 93%   Intake/Output last 3 Shifts:    Intake/Output Summary (Last 24 hours) at 2/21/2025 0716  Last data filed at 2/21/2025 0610  Gross per 24 hour   Intake 360 ml   Output 350 ml   Net 10 ml       Admission Weight  Weight: 77 kg (169 lb 12.1 oz) (02/19/25 1436)    Daily Weight  02/20/25 : 79.3 kg (174 lb 13.2 oz)    Image Results  Transthoracic Echo (TTE) [facility], [address] [place] [address]                Tel [phone] and Fax [phone]    TRANSTHORACIC ECHOCARDIOGRAM REPORT       Patient Name:       NANETTE DAWKINS  Reading Physician:    [id number] Steve Gordon MD  Study Date:         2/20/2025           Ordering Provider:    [id number] FARRUKH GONZALEZ  MRN/PID:            [id number]            Fellow:  Accession#:         [id number]        Nurse:                Catherine Rich RN  Date of Birth/Age:  1935 / 89      Sonographer:          Linda mccormack                                     [facility]  Gender assigned at  F                   Additional Staff:  Birth:  Height:             160.02 cm           Admit Date:           2/19/2025  Weight:             78.93 kg            Admission Status:     Inpatient -                                                                Routine  BSA / BMI:          1.82 m2 / 30.82      Encounter#:           [id number]                      kg/m2  Blood Pressure:     152/84 mmHg         Department Location:  [facility] Non                                                                Invasive    Study Type:    TRANSTHORACIC ECHO (TTE) COMPLETE  Diagnosis/ICD: Shortness of breath-R06.02  Indication:    SOB  CPT Code:      Echo Complete w Full [id number]    Patient History:  Pertinent History: Former smoker, Flu 2/8, Dyspnea, HTN and PE.    Study Detail: The following Echo studies were performed: 2D, M-Mode, Doppler and                color flow. Technically challenging study due to patient lying in                supine position. Definity used as a contrast agent for endocardial                border definition. Total contrast used for this procedure was 1.0                mL via IV push. The patient was awake.       PHYSICIAN INTERPRETATION:  Left Ventricle: The left ventricular systolic function is normal, with a Sarah's biplane calculated ejection fraction of 63%. There are no regional left ventricular wall motion abnormalities. The left ventricular cavity size is normal. There is mildly increased septal and normal posterior left ventricular wall thickness. There is left ventricular concentric remodeling. Left ventricular diastolic filling was not assessed.  Left Atrium: The left atrial size was not well visualized.  Right Ventricle: The right ventricle is normal in size. There is normal right ventricular global systolic function.  Right Atrium: The right atrial size was not well visualized.  Aortic Valve: The aortic valve is trileaflet. The aortic valve dimensionless index is 0.51. There is mild aortic valve regurgitation. The peak instantaneous gradient of the aortic valve is 13 mmHg. The mean gradient of the aortic valve is 7 mmHg.  Mitral Valve: The mitral valve is normal in structure. There is trace mitral valve regurgitation.  Tricuspid Valve: The tricuspid valve is structurally  normal. There is trace tricuspid regurgitation.  Pulmonic Valve: The pulmonic valve is structurally normal. There is no indication of pulmonic valve regurgitation.  Pericardium: There is no pericardial effusion noted.  Aorta: The aortic root is normal. There is no dilatation of the ascending aorta. There is no dilatation of the aortic root.  Systemic Veins: The inferior vena cava appears normal in size, with IVC inspiratory collapse greater than 50%.       CONCLUSIONS:   1. The left ventricular systolic function is normal, with a Sarah's biplane calculated ejection fraction of 63%.   2. There is normal right ventricular global systolic function.   3. Mild aortic valve regurgitation.    QUANTITATIVE DATA SUMMARY:     2D MEASUREMENTS:          Normal Ranges:  IVSd:            0.97 cm  (0.6-1.1cm)  LVPWd:           0.89 cm  (0.6-1.1cm)  LVIDd:           3.88 cm  (3.9-5.9cm)  LVIDs:           2.32 cm  LV Mass Index:   60 g/m2  LVEDV Index:     22 ml/m2  LV % FS          40.2 %       AORTA MEASUREMENTS:         Normal Ranges:  Ao Sinus, d:        3.10 cm (2.1-3.5cm)  Asc Ao, d:          3.50 cm (2.1-3.4cm)       LV SYSTOLIC FUNCTION:                       Normal Ranges:  EF-A4C View:    65 % (>=55%)  EF-A2C View:    59 %  EF-Biplane:     63 %  LV EF Reported: 63 %       LV DIASTOLIC FUNCTION:             Normal Ranges:  MV Peak E:             0.95 m/s    (0.7-1.2 m/s)  MV Peak A:             1.17 m/s    (0.42-0.7 m/s)  E/A Ratio:             0.82        (1.0-2.2)  MV A Dur:              111.32 msec       MITRAL VALVE:          Normal Ranges:  MV DT:        254 msec (150-240msec)       AORTIC VALVE:                      Normal Ranges:  AoV Vmax:                1.80 m/s  (<=1.7m/s)  AoV Peak P.0 mmHg (<20mmHg)  AoV Mean P.5 mmHg  (1.7-11.5mmHg)  LVOT Max Brooks:            1.15 m/s  (<=1.1m/s)  AoV VTI:                 36.15 cm  (18-25cm)  LVOT VTI:                18.36 cm  LVOT Diameter:            2.09 cm   (1.8-2.4cm)  AoV Area, VTI:           1.74 cm2  (2.5-5.5cm2)  AoV Area,Vmax:           2.18 cm2  (2.5-4.5cm2)  AoV Dimensionless Index: 0.51       AORTIC INSUFFICIENCY:  AI Vmax:       4.13 m/s  AI Half-time:  527 msec  AI Decel Time: 1816 msec  AI Decel Rate: 227.11 cm/s2       RIGHT VENTRICLE:  RV s' 0.01 m/s       TRICUSPID VALVE/RVSP:          Normal Ranges:  Peak TR Velocity:     2.79 m/s  RV Syst Pressure:     34 mmHg  (< 30mmHg)  IVC Diam:             1.10 cm       PULMONIC VALVE:          Normal Ranges:  PV Max Brooks:     0.8 m/s  (0.6-0.9m/s)  PV Max P.3 mmHg       AORTA:  Asc Ao Diam 3.50 cm       [id number] Steve Gordon MD  Electronically signed on 2025 at 3:25:37 PM       ** Final **      Physical Exam  Constitutional:       General: She is not in acute distress.  Eyes:      General: No scleral icterus.  Cardiovascular:      Rate and Rhythm: Normal rate.      Heart sounds:      No friction rub. No gallop.   Pulmonary:      Effort: Pulmonary effort is normal.      Breath sounds: No stridor. Wheezing present.   Abdominal:      Palpations: Abdomen is soft.   Skin:     General: Skin is dry.   Neurological:      Mental Status: She is alert. Mental status is at baseline.         Relevant Results  Transthoracic Echo (TTE) Complete    Result Date: 2025   [facility], [address] [place] [address]               Tel [phone] and Fax [phone] TRANSTHORACIC ECHOCARDIOGRAM REPORT  Patient Name:       KELLY DAWKINS  [place] Physician:    [id number] Steve Gordon MD Study Date:         2025           Ordering Provider:    [id number] FARRUKH GONZALEZ MRN/PID:            [id number]            Fellow: Accession#:         [id number]        Nurse:                Catherine Rich RN Date of Birth/Age:  1935 / 89      Sonographer:           Linda Deadwyler                     years                                     [facility] Gender assigned at  F                   Additional Staff: Birth: Height:             160.02 cm           Admit Date:           2/19/2025 Weight:             78.93 kg            Admission Status:     Inpatient -                                                               Routine BSA / BMI:          1.82 m2 / 30.82     Encounter#:           [id number]                     kg/m2 Blood Pressure:     152/84 mmHg         Department Location:  [facility] Non                                                               Invasive Study Type:    TRANSTHORACIC ECHO (TTE) COMPLETE Diagnosis/ICD: Shortness of breath-R06.02 Indication:    SOB CPT Code:      Echo Complete w Full Doppler-[id number] Patient History: Pertinent History: Former smoker, Flu 2/8, Dyspnea, HTN and PE. Study Detail: The following Echo studies were performed: 2D, M-Mode, Doppler and               color flow. Technically challenging study due to patient lying in               supine position. Definity used as a contrast agent for endocardial               border definition. Total contrast used for this procedure was 1.0               mL via IV push. The patient was awake.  PHYSICIAN INTERPRETATION: Left Ventricle: The left ventricular systolic function is normal, with a Sarah's biplane calculated ejection fraction of 63%. There are no regional left ventricular wall motion abnormalities. The left ventricular cavity size is normal. There is mildly increased septal and normal posterior left ventricular wall thickness. There is left ventricular concentric remodeling. Left ventricular diastolic filling was not assessed. Left Atrium: The left atrial size was not well visualized. Right Ventricle: The right ventricle is normal in size. There is normal right ventricular global systolic function. Right Atrium: The right atrial size was not well visualized. Aortic Valve: The aortic valve  is trileaflet. The aortic valve dimensionless index is 0.51. There is mild aortic valve regurgitation. The peak instantaneous gradient of the aortic valve is 13 mmHg. The mean gradient of the aortic valve is 7 mmHg. Mitral Valve: The mitral valve is normal in structure. There is trace mitral valve regurgitation. Tricuspid Valve: The tricuspid valve is structurally normal. There is trace tricuspid regurgitation. Pulmonic Valve: The pulmonic valve is structurally normal. There is no indication of pulmonic valve regurgitation. Pericardium: There is no pericardial effusion noted. Aorta: The aortic root is normal. There is no dilatation of the ascending aorta. There is no dilatation of the aortic root. Systemic Veins: The inferior vena cava appears normal in size, with IVC inspiratory collapse greater than 50%.  CONCLUSIONS:  1. The left ventricular systolic function is normal, with a Sarah's biplane calculated ejection fraction of 63%.  2. There is normal right ventricular global systolic function.  3. Mild aortic valve regurgitation. QUANTITATIVE DATA SUMMARY:  2D MEASUREMENTS:          Normal Ranges: IVSd:            0.97 cm  (0.6-1.1cm) LVPWd:           0.89 cm  (0.6-1.1cm) LVIDd:           3.88 cm  (3.9-5.9cm) LVIDs:           2.32 cm LV Mass Index:   60 g/m2 LVEDV Index:     22 ml/m2 LV % FS          40.2 %  AORTA MEASUREMENTS:         Normal Ranges: Ao Sinus, d:        3.10 cm (2.1-3.5cm) Asc Ao, d:          3.50 cm (2.1-3.4cm)  LV SYSTOLIC FUNCTION:                      Normal Ranges: EF-A4C View:    65 % (>=55%) EF-A2C View:    59 % EF-Biplane:     63 % LV EF Reported: 63 %  LV DIASTOLIC FUNCTION:             Normal Ranges: MV Peak E:             0.95 m/s    (0.7-1.2 m/s) MV Peak A:             1.17 m/s    (0.42-0.7 m/s) E/A Ratio:             0.82        (1.0-2.2) MV A Dur:              111.32 msec  MITRAL VALVE:          Normal Ranges: MV DT:        254 msec (150-240msec)  AORTIC VALVE:                       Normal Ranges: AoV Vmax:                1.80 m/s  (<=1.7m/s) AoV Peak P.0 mmHg (<20mmHg) AoV Mean P.5 mmHg  (1.7-11.5mmHg) LVOT Max Brooks:            1.15 m/s  (<=1.1m/s) AoV VTI:                 36.15 cm  (18-25cm) LVOT VTI:                18.36 cm LVOT Diameter:           2.09 cm   (1.8-2.4cm) AoV Area, VTI:           1.74 cm2  (2.5-5.5cm2) AoV Area,Vmax:           2.18 cm2  (2.5-4.5cm2) AoV Dimensionless Index: 0.51  AORTIC INSUFFICIENCY: AI Vmax:       4.13 m/s AI Half-time:  527 msec AI Decel Time: 1816 msec AI Decel Rate: 227.11 cm/s2  RIGHT VENTRICLE: RV s' 0.01 m/s  TRICUSPID VALVE/RVSP:          Normal Ranges: Peak TR Velocity:     2.79 m/s RV Syst Pressure:     34 mmHg  (< 30mmHg) IVC Diam:             1.10 cm  PULMONIC VALVE:          Normal Ranges: PV Max Brooks:     0.8 m/s  (0.6-0.9m/s) PV Max P.3 mmHg  AORTA: Asc Ao Diam 3.50 cm  [id number] Steve Gordon MD Electronically signed on 2025 at 3:25:37 PM  ** Final **     CT angio chest for pulmonary embolism    Result Date: 2025  STUDY: CT Angiogram of the Chest; 2025 3:51 pm INDICATION: Shortness of breath.  Recent diagnosis of pneumonia-not improving on antibiotics. COMPARISON: XR Chest 2025, 2025;  CTA Chest 2024. ACCESSION NUMBER(S): [id number] ORDERING CLINICIAN: NATHEN MILLER TECHNIQUE:  CTA of the chest was performed with intravenous contrast. Images are reviewed and processed at a workstation according to the CT angiogram protocol with 3-D and/or MIP post processing imaging generated.  Omnipaque 350, 49 mL was administered intravenously.  Automated mA/kV exposure control was utilized and patient examination was performed in strict accordance with principles of ALARA. FINDINGS: Pulmonary arteries are adequately opacified without acute or chronic filling defects.  The thoracic aorta is normal in course and caliber without dissection or aneurysm.  Main pulmonary artery is dilated  measuring 3.2 cm. The heart is normal in size without pericardial effusion.  Severe coronary artery calcifications.  Mildly enlarged thoracic nodes presumably reactive. .  Small bilateral pleural effusions.  The airways are patent. Patchy areas airspace consolidation bilaterally involving all lobes, right greater than left.  Underlying emphysematous changes. Upper abdomen demonstrates no acute pathology. There are no acute fractures.  No suspicious bony lesions.    1. No pulmonary embolism. 2. Findings compatible with multifocal pneumonia involving all lobes, right greater than left, progressive compared to 2/9/2024 3. Small bilateral pleural effusions. 4. Severe coronary artery calcifications. 5. Underlying emphysema. 6. Dilated main pulmonary artery suggestive of pulmonary arterial hypertension. Signed by Quan Rashid MD    ECG 12 lead    Result Date: 2/19/2025  Sinus rhythm with occasional Premature ventricular complexes Poor R-wave progression ; consider anterior infarct, lead placement, or normal variant Abnormal ECG When compared with ECG of 06-JAN-2025 09:01, Premature ventricular complexes are now Present Non-specific change in ST segment in Inferior leads Nonspecific T wave abnormality now evident in Inferior leads Nonspecific T wave abnormality, improved in Lateral leads     Assessment/Plan    Assessment & Plan  Multifocal pneumonia  Nanette Flynn is a 89 y.o. female with PMH hypothyroidism, HTN, PE (on Eliquis), chronic B/L wounds and left hand carpal tunnel presented from [facility] with pneumonia.      ACUTE ISSUES:  #PNA due to Gram - bacteria  -Does not meet sepsis criteria  -Has recently received IV ABX after being diagnosed with influenza and pneumonia.  -TTE: 63%  PLAN:  -On RA  -ID consulted due to recurrent infections: Continue zosyn  -PT/OT  -DuoNebs, guaifenesin, IS  -Tylenol for pain    #Concern for Dysphagia/Aspiration  -Patient reports occasional swallowing difficulties  "particularly with medication  -Reports \"feeling like the pill is stuck in my throat\"  PLAN:  -SLP consult: Planning modified barium today    #Physical debility  PLAN:  -PT OT: Moderate intensity    #Diarrhea  -With history of recent antibiotic use  Plan:  -Obtain C. difficile PCR, precautions until rule out    CHRONIC ISSUES:  #Hx PE - Continue Eliquis 2.5 mg BID  #HTN - Lisinopril 10 mg daily  #Hypothyroidism - Synthroid 75 mcg daily   #Chronic B/L wounds - Wound care     Fluid: Replete PRN  Electrolytes: Replete PRN  Nutrition: Regular  GI PPx:  -  DVT/PE PPx: Eliquis 2.5  Abx: Zosyn(2/19/25-)  IV Lines: PIV  O2: RA     Disposition: Admitted inpatient for treatment of PNA, receiving IV ABX.  PT/OT recommends moderate intensity eLOS >48h  Code Status: DNR/DNI no ICU      Ivory Arana DO, PGY-1  Internal Medicine   2/21/25 at 7:17 AM.       "

## 2025-02-21 NOTE — PROCEDURES
Speech-Language Pathology    Inpatient Modified Barium Swallow Study    Patient Name: Nanette Flynn  MRN: [id number]  : 1935  Today's Date: 25  Time Calculation  Start Time: 1405  Stop Time: 1422  Time Calculation (min): 17 min          Modified Barium Swallow Study completed. Informed verbal consent obtained prior to completion of exam. The study was completed per protocol with various liquid barium consistencies, pudding, solids and a 13mm barium tablet.  A 1.9 cm or .75 inch (outer diameter) ring was placed on the chin in the lateral view. AP assessment was deferred at the SLP's discretion given pt's mobility limitations and current fall risk. The anatomic structures and function of the oropharynx, larynx, hypopharynx and cervical esophagus were evaluated.    SLP: Nikki Grace SLP   Contact info: [facility] chat; phone: [phone] ([place] location); [phone] ([place] location)      Reason for Referral:  Pt had recently been treated for influenza and PNA. She was found to have Multifocal pneumonia concerning for possible aspiration. CSE determined need for instrumental assessment to definitively r/o aspiration.  Patient Hx: hypothyroidism, HTN, PE (on Eliquis), chronic B/L wounds and left hand carpal tunnel   Respiratory Status: Room air  Current diet: Regular solids and thin liquids pending MBSS results    Pain:  Pain Scale: 0-10  Ratin     DIET RECOMMENDATIONS:   - Regular (IDDSI Level 7)  - Mildly/nectar thick liquids (IDDSI Level 2)  Yoo Free Water Protocol: Pt may have sips of thin water under the following conditions only: no food present, within 2 hours after oral care, when fully alert and upright. All liquids besides water must be thickened.    STRATEGIES:  - Small bites  - Small, single sips  - Alternate consistencies  - Effortful swallow  - Upright for all PO intake  - Medications whole in puree or as best tolerated  - Complete oral care frequently throughout the  day  - Begin daily oropharyngeal exercises regiment      SLP PLAN:  Skilled SLP Services: Skilled SLP intervention for dysphagia is warranted.  SLP Frequency: 3x per week  Duration:  Treatment/Interventions:   - Oropharyngeal exercises  - Bolus trials  - Compensatory strategy training  - Diet tolerance/advancement  - Patient/caregiver education    Discussed POC: Patient  Discussed Risks/Benefits: Yes  Patient/Caregiver Agreeable: Yes    Short term goals established 02/21/25:   - Patient will tolerate current diet without noted pulmonary compromise or evidence of pulmonary sequela as noted in patient chart and/or reported by patient/family.  - Patient will independently implement safe swallowing strategies to reduce risk of aspiration with use of compensatory strategies during 90% of therapeutic trials.  - Pt will complete effortful swallows 20 reps, 3 x a day for 7 days a week; to strengthen pharyngeal muscles for improve bolus clearance through the pharynx.    - Pt will complete EMST/IMST 5 reps with 15-30 seconds in between rep, and 60 seconds between each set for 5x a day @ SLP recommended cmH2o, with minimal cueing, as observed during sessions and at home per patient report to strengthen movement of the hyolaryngeal complex necessary for airway protection during the swallow.      Long term goals 02/21/25:   Patient will tolerate the least restrictive diet without overt difficulty or further pulmonary compromise by time of discharge.      Education Provided: Results and recommendations per MBSS, with video review; recommendations and POC at this time. Verbal understanding and agreement given on all accounts.     Additional Medical Consults Suggested:   - No new disciplines indicated    Repeat Study: Per MD or treating SLP       Mechanics of the Swallow Summary:  ORAL PHASE:  Lip Closure - No labial escape/anterior loss of bolus   Tongue Control During Bolus Hold - Escape to lateral buccal cavity and/or floor of  mouth   Bolus prep/mastication - Disorganized mastication with solid pieces of bolus unchewed   Bolus transport/lingual motion - Repetitive/disorganized tongue motion (tongue pumping)   Oral residue - Residue collection on oral structure     PHARYNGEAL PHASE:  Initiation of pharyngeal swallow - Bolus head at pit of pyriforms   Soft palate elevation - No bolus between soft palate/pharyngeal wall   Laryngeal elevation - Partial superior movement of thyroid cartilage and/or partial approximation of arytenoids to epiglottic petiole   Anterior hyoid excursion - Partial anterior movement   Epiglottic movement - Complete inversion    Laryngeal vestibule closure - Incomplete - narrow column of air/contrast in laryngeal vestibule   Pharyngeal stripping wave - Present, however, diminished   Pharyngeal contraction (A/P view) - Not tested       Pharyngoesophageal segment opening - Complete distension and complete duration/no obstruction of flow of bolus   Tongue base retraction - Narrow column of contrast or air between tongue base and pharyngeal wall   Pharyngeal residue - Collection of residue within or on the pharyngeal structures     ESOPHAGEAL PHASE:  Esophageal clearance - Not evaluated       SLP Impressions with Severity Rating:   Pt presents with moderate oropharyngeal dysphagia upon completion of modified barium swallow study this date. Swallowing physiology is detailed above. Impairments most impacting swallowing safety and efficiency include decreased hyolaryngeal elevation/excursion and incomplete laryngeal vestibular closure. Patient demonstrated overt aspiration with approximately 20 ml straw sips of thin liquids during the swallow. Cough was minimally effecting at ejecting aspiration material from airway. Noted transient penetration with 5ml (tsp) and 20 ml (straw sip) nectar thick liquids. No additional penetration or aspiration was observed across other consistencies tested.    Strategies attempted- Effortful  swallow resulted in improved airway protection with straw sips of thin liquids, making pt a good candidate for Kwasi Free Water Protocol.       OUTCOME MEASURES:  Functional Oral Intake Scale  Functional Oral Intake Scale: Level 5        total oral diet with multiple consistencies, but requires special preparations and compensations       Rosenbek's Penetration Aspiration Scale  Thin Liquids: 7. OVERT ASPIRATION, material is not cleared - contrast passes glottis, visible residue, W/pt response  During the Swallow  Nectar Thick Liquids: 2. PENETRATION that CLEARS - contrast enter airway, above vocal cords, no residue  During the Swallow  Honey Thick Liquids: 1. NO ASPIRATION & NO PENETRATION - no aspiration, contrast does not enter airway  Puree: 1. NO ASPIRATION & NO PENETRATION - no aspiration, contrast does not enter airway  Solids: 1. NO ASPIRATION & NO PENETRATION - no aspiration, contrast does not enter airway

## 2025-02-22 LAB
ALBUMIN SERPL BCP-MCNC: 2.8 G/DL (ref 3.4–5)
ANION GAP SERPL CALC-SCNC: 12 MMOL/L (ref 10–20)
BUN SERPL-MCNC: 10 MG/DL (ref 6–23)
CALCIUM SERPL-MCNC: 8.4 MG/DL (ref 8.6–10.3)
CHLORIDE SERPL-SCNC: 104 MMOL/L (ref 98–107)
CO2 SERPL-SCNC: 27 MMOL/L (ref 21–32)
CREAT SERPL-MCNC: 1.44 MG/DL (ref 0.5–1.05)
EGFRCR SERPLBLD CKD-EPI 2021: 35 ML/MIN/1.73M*2
ERYTHROCYTE [DISTWIDTH] IN BLOOD BY AUTOMATED COUNT: 13.4 % (ref 11.5–14.5)
GLUCOSE SERPL-MCNC: 104 MG/DL (ref 74–99)
HCT VFR BLD AUTO: 21.8 % (ref 36–46)
HGB BLD-MCNC: 7.2 G/DL (ref 12–16)
MAGNESIUM SERPL-MCNC: 2.15 MG/DL (ref 1.6–2.4)
MCH RBC QN AUTO: 31.6 PG (ref 26–34)
MCHC RBC AUTO-ENTMCNC: 33 G/DL (ref 32–36)
MCV RBC AUTO: 96 FL (ref 80–100)
NRBC BLD-RTO: 0 /100 WBCS (ref 0–0)
PHOSPHATE SERPL-MCNC: 2.9 MG/DL (ref 2.5–4.9)
PLATELET # BLD AUTO: 345 X10*3/UL (ref 150–450)
POTASSIUM SERPL-SCNC: 3.4 MMOL/L (ref 3.5–5.3)
RBC # BLD AUTO: 2.28 X10*6/UL (ref 4–5.2)
SODIUM SERPL-SCNC: 140 MMOL/L (ref 136–145)
WBC # BLD AUTO: 8.2 X10*3/UL (ref 4.4–11.3)

## 2025-02-22 PROCEDURE — 94640 AIRWAY INHALATION TREATMENT: CPT

## 2025-02-22 PROCEDURE — 92526 ORAL FUNCTION THERAPY: CPT | Mod: GN

## 2025-02-22 PROCEDURE — 2500000002 HC RX 250 W HCPCS SELF ADMINISTERED DRUGS (ALT 637 FOR MEDICARE OP, ALT 636 FOR OP/ED)

## 2025-02-22 PROCEDURE — 85027 COMPLETE CBC AUTOMATED: CPT

## 2025-02-22 PROCEDURE — 2500000001 HC RX 250 WO HCPCS SELF ADMINISTERED DRUGS (ALT 637 FOR MEDICARE OP)

## 2025-02-22 PROCEDURE — 94760 N-INVAS EAR/PLS OXIMETRY 1: CPT

## 2025-02-22 PROCEDURE — 2700000082 HC SLP EMST 75 EXP MUSCLE STRGTH TRAINER

## 2025-02-22 PROCEDURE — 2500000005 HC RX 250 GENERAL PHARMACY W/O HCPCS

## 2025-02-22 PROCEDURE — 97110 THERAPEUTIC EXERCISES: CPT | Mod: GP,CQ

## 2025-02-22 PROCEDURE — 2500000002 HC RX 250 W HCPCS SELF ADMINISTERED DRUGS (ALT 637 FOR MEDICARE OP, ALT 636 FOR OP/ED): Performed by: INTERNAL MEDICINE

## 2025-02-22 PROCEDURE — 2500000004 HC RX 250 GENERAL PHARMACY W/ HCPCS (ALT 636 FOR OP/ED)

## 2025-02-22 PROCEDURE — 80069 RENAL FUNCTION PANEL: CPT

## 2025-02-22 PROCEDURE — 83735 ASSAY OF MAGNESIUM: CPT

## 2025-02-22 PROCEDURE — 99233 SBSQ HOSP IP/OBS HIGH 50: CPT

## 2025-02-22 PROCEDURE — 1200000002 HC GENERAL ROOM WITH TELEMETRY DAILY

## 2025-02-22 PROCEDURE — 97530 THERAPEUTIC ACTIVITIES: CPT | Mod: GO,CO

## 2025-02-22 RX ORDER — AMOXICILLIN AND CLAVULANATE POTASSIUM 875; 125 MG/1; MG/1
1 TABLET, FILM COATED ORAL 2 TIMES DAILY
Status: DISCONTINUED | OUTPATIENT
Start: 2025-02-22 | End: 2025-02-24 | Stop reason: HOSPADM

## 2025-02-22 RX ORDER — POTASSIUM CHLORIDE 20 MEQ/1
40 TABLET, EXTENDED RELEASE ORAL ONCE
Status: COMPLETED | OUTPATIENT
Start: 2025-02-22 | End: 2025-02-22

## 2025-02-22 RX ADMIN — Medication 1 APPLICATION: at 08:31

## 2025-02-22 RX ADMIN — PIPERACILLIN SODIUM AND TAZOBACTAM SODIUM 4.5 G: 4; .5 INJECTION, SOLUTION INTRAVENOUS at 05:23

## 2025-02-22 RX ADMIN — LEVOTHYROXINE SODIUM 112 MCG: 112 TABLET ORAL at 05:23

## 2025-02-22 RX ADMIN — IPRATROPIUM BROMIDE AND ALBUTEROL SULFATE 3 ML: 2.5; .5 SOLUTION RESPIRATORY (INHALATION) at 15:08

## 2025-02-22 RX ADMIN — Medication 1 APPLICATION: at 14:08

## 2025-02-22 RX ADMIN — Medication 125 MCG: at 08:30

## 2025-02-22 RX ADMIN — SODIUM CHLORIDE 10 ML: 9 INJECTION, SOLUTION INTRAMUSCULAR; INTRAVENOUS; SUBCUTANEOUS at 08:31

## 2025-02-22 RX ADMIN — APIXABAN 2.5 MG: 2.5 TABLET, FILM COATED ORAL at 21:06

## 2025-02-22 RX ADMIN — SODIUM CHLORIDE 10 ML: 9 INJECTION, SOLUTION INTRAMUSCULAR; INTRAVENOUS; SUBCUTANEOUS at 21:08

## 2025-02-22 RX ADMIN — SODIUM CHLORIDE, POTASSIUM CHLORIDE, SODIUM LACTATE AND CALCIUM CHLORIDE 500 ML: 600; 310; 30; 20 INJECTION, SOLUTION INTRAVENOUS at 09:28

## 2025-02-22 RX ADMIN — POTASSIUM CHLORIDE 40 MEQ: 1500 TABLET, EXTENDED RELEASE ORAL at 08:30

## 2025-02-22 RX ADMIN — IPRATROPIUM BROMIDE AND ALBUTEROL SULFATE 3 ML: 2.5; .5 SOLUTION RESPIRATORY (INHALATION) at 08:10

## 2025-02-22 RX ADMIN — AMOXICILLIN AND CLAVULANATE POTASSIUM 1 TABLET: 875; 125 TABLET, FILM COATED ORAL at 21:06

## 2025-02-22 RX ADMIN — LATANOPROST 1 DROP: 50 SOLUTION OPHTHALMIC at 21:07

## 2025-02-22 RX ADMIN — MECLIZINE 12.5 MG: 12.5 TABLET ORAL at 21:06

## 2025-02-22 RX ADMIN — Medication 1 APPLICATION: at 21:07

## 2025-02-22 RX ADMIN — APIXABAN 2.5 MG: 2.5 TABLET, FILM COATED ORAL at 08:30

## 2025-02-22 RX ADMIN — AMOXICILLIN AND CLAVULANATE POTASSIUM 1 TABLET: 875; 125 TABLET, FILM COATED ORAL at 09:28

## 2025-02-22 RX ADMIN — IPRATROPIUM BROMIDE AND ALBUTEROL SULFATE 3 ML: 2.5; .5 SOLUTION RESPIRATORY (INHALATION) at 19:21

## 2025-02-22 ASSESSMENT — PAIN SCALES - GENERAL
PAINLEVEL_OUTOF10: 0 - NO PAIN

## 2025-02-22 ASSESSMENT — COGNITIVE AND FUNCTIONAL STATUS - GENERAL
CLIMB 3 TO 5 STEPS WITH RAILING: TOTAL
DRESSING REGULAR LOWER BODY CLOTHING: A LOT
MOVING TO AND FROM BED TO CHAIR: A LOT
CLIMB 3 TO 5 STEPS WITH RAILING: TOTAL
CLIMB 3 TO 5 STEPS WITH RAILING: TOTAL
TURNING FROM BACK TO SIDE WHILE IN FLAT BAD: A LOT
PERSONAL GROOMING: A LITTLE
TOILETING: A LOT
MOBILITY SCORE: 9
WALKING IN HOSPITAL ROOM: TOTAL
EATING MEALS: A LITTLE
HELP NEEDED FOR BATHING: A LOT
TOILETING: TOTAL
DAILY ACTIVITIY SCORE: 14
DRESSING REGULAR LOWER BODY CLOTHING: A LOT
MOVING TO AND FROM BED TO CHAIR: A LOT
DRESSING REGULAR UPPER BODY CLOTHING: A LITTLE
TURNING FROM BACK TO SIDE WHILE IN FLAT BAD: A LOT
DRESSING REGULAR UPPER BODY CLOTHING: A LOT
MOBILITY SCORE: 14
MOBILITY SCORE: 9
TOILETING: A LOT
STANDING UP FROM CHAIR USING ARMS: TOTAL
MOVING TO AND FROM BED TO CHAIR: A LITTLE
MOVING FROM LYING ON BACK TO SITTING ON SIDE OF FLAT BED WITH BEDRAILS: A LOT
EATING MEALS: A LITTLE
PERSONAL GROOMING: A LITTLE
STANDING UP FROM CHAIR USING ARMS: TOTAL
MOVING FROM LYING ON BACK TO SITTING ON SIDE OF FLAT BED WITH BEDRAILS: A LITTLE
DRESSING REGULAR LOWER BODY CLOTHING: TOTAL
MOVING FROM LYING ON BACK TO SITTING ON SIDE OF FLAT BED WITH BEDRAILS: A LOT
DRESSING REGULAR UPPER BODY CLOTHING: A LOT
DAILY ACTIVITIY SCORE: 14
WALKING IN HOSPITAL ROOM: TOTAL
HELP NEEDED FOR BATHING: A LOT
TURNING FROM BACK TO SIDE WHILE IN FLAT BAD: A LOT
WALKING IN HOSPITAL ROOM: A LOT
STANDING UP FROM CHAIR USING ARMS: A LITTLE
HELP NEEDED FOR BATHING: A LOT
DAILY ACTIVITIY SCORE: 14
PERSONAL GROOMING: A LITTLE

## 2025-02-22 ASSESSMENT — PAIN - FUNCTIONAL ASSESSMENT
PAIN_FUNCTIONAL_ASSESSMENT: 0-10

## 2025-02-22 NOTE — PROGRESS NOTES
Occupational Therapy    Occupational Therapy Treatment    Name: Nanette Flynn  MRN: [id number]  Department: [address]  Room: [address] [place] [address]  Date: 02/22/25  Time Calculation  Start Time: 1159  Stop Time: 1214  Time Calculation (min): 15 min    Assessment:  OT Assessment: Pt continues to present with decreased ADL performance and impaired mobility. Will benefit from skilled OT services to increase functional outcomes  Prognosis: Good  Barriers to Discharge Home: Caregiver assistance, Physical needs  Caregiver Assistance: Patient lives alone and/or does not have reliable caregiver assistance  Physical Needs: 24hr mobility assistance needed, 24hr ADL assistance needed  Evaluation/Treatment Tolerance: Patient tolerated treatment well  Medical Staff Made Aware: Yes  End of Session Communication: Bedside nurse  End of Session Patient Position: Bed, 3 rail up, Alarm off, not on at start of session (per nurse alarm not needed, call light provided)  Plan:  Treatment Interventions: ADL retraining, Endurance training, Functional transfer training, Compensatory technique education  OT Frequency: 3 times per week  OT Discharge Recommendations: Moderate intensity level of continued care  Equipment Recommended upon Discharge: Wheeled walker  OT - OK to Discharge: Yes (per ot poc)    Subjective   Previous Visit Info:  OT Last Visit  OT Received On: 02/22/25  General:  General  Reason for Referral: Pneumonia; referred to OT for impaired ADL  Referred By: Austin Adair MD  Past Medical History Relevant to Rehab: hypothyroidism, HTN, PE (on Eliquis), chronic B/L wounds and left hand carpal tunnel  Family/Caregiver Present: No  Prior to Session Communication: Bedside nurse (nik not in start of session d/t increased bowel movements this date per RN)  Patient Position Received: Bed, 3 rail up, Alarm off, not on at start of session  General Comment: Pt pleasant and cooperative, pt reports increased fatigue this date and requiring  "increased time during transitional movements.  Precautions:  Medical Precautions: Fall precautions           Pain Assessment:  Pain Assessment  Pain Assessment: 0-10  0-10 (Numeric) Pain Score: 0 - No pain     Objective   Cognition:  Overall Cognitive Status: Within Functional Limits     Bed Mobility/Transfers: Bed Mobility  Bed Mobility: Yes  Bed Mobility 1  Bed Mobility 1: Supine to sitting, Sitting to supine  Level of Assistance 1: Moderate assistance  Bed Mobility Comments 1: HOB elevated and verbal/tactile cues on hand placement, 1st att pt stating \"i need to lay back down, I feel dizzy\" with increased time/rest breaks between task, pt demo log rolling L/R with Min-Mod A during perihygiene with Max A  Bed Mobility 2  Bed Mobility  2: Scooting  Level of Assistance 2: Moderate assistance  Bed Mobility Comments 2: scoots forward with Mod a  Bed Mobility 3  Bed Mobility 3: Sitting to supine  Level of Assistance 3: Contact guard  Bed Mobility Comments 3: Pt with increased fatigue requesting to lay back down in bed, pt encouraged to sit up in chair however, pt declined    Functional Mobility:  Functional Mobility  Functional Mobility Performed: No  Sitting Balance:  Static Sitting Balance  Static Sitting-Balance Support: Feet supported  Static Sitting-Level of Assistance: Maximum assistance  Static Sitting-Comment/Number of Minutes: Pt reqirued max a to postion trunk in neutral demo ~20 sec sit tolerance before requesting to lay back in bed d/t fatigue    Outcome Measures:  [facility] Daily Activity  Putting on and taking off regular lower body clothing: Total  Bathing (including washing, rinsing, drying): A lot  Putting on and taking off regular upper body clothing: A little  Toileting, which includes using toilet, bedpan or urinal: Total  Taking care of personal grooming such as brushing teeth: A little  Eating Meals: None  Daily Activity - Total Score: 14      Education Documentation  Body Mechanics, taught by Era " IVANIA Fuentes at 2/22/2025  1:08 PM.  Learner: Patient  Readiness: Acceptance  Method: Explanation  Response: Needs Reinforcement    Precautions, taught by IVANIA Ramirez at 2/22/2025  1:08 PM.  Learner: Patient  Readiness: Acceptance  Method: Explanation  Response: Needs Reinforcement    ADL Training, taught by IVANIA Ramirez at 2/22/2025  1:08 PM.  Learner: Patient  Readiness: Acceptance  Method: Explanation  Response: Needs Reinforcement      Goals:  Encounter Problems       Encounter Problems (Active)       OT Goals       Pt will demo bed mobility activities with CGA (Progressing)       Start:  02/20/25    Expected End:  03/06/25            Pt will demo functional transfers to/ from EOB, chair and commode with CGA and LRD (Progressing)       Start:  02/20/25    Expected End:  03/06/25            .Pt will demo functional mobility necessary to complete ADL routine with LRD and Kaylene (Progressing)       Start:  02/20/25    Expected End:  03/06/25            Pt will demo ADL routine and meaningful daily activities with Kaylene using modifications as needed  (Progressing)       Start:  02/20/25    Expected End:  03/06/25            Pt will complete BUE exercises, 10-15 reps 1-2 sets in all functional planes, to demo improved functional strength for increased participation in ADL and mobility  (Progressing)       Start:  02/20/25    Expected End:  03/06/25

## 2025-02-22 NOTE — PROGRESS NOTES
02/22/25 1033   Discharge Planning   Expected Discharge Disposition SNF  ([facility] started auth 2/22, auth obtained 2/22 for [facility]. Plan for dc 2/23)

## 2025-02-22 NOTE — CARE PLAN
The patient's goals for the shift include  patient will remain safe and comfortable throughout shift.    The clinical goals for the shift include Patient will utilize the call light when needing assistance to maintain safety      Problem: Pain - Adult  Goal: Verbalizes/displays adequate comfort level or baseline comfort level  Outcome: Progressing     Problem: Safety - Adult  Goal: Free from fall injury  Outcome: Progressing     Problem: Discharge Planning  Goal: Discharge to home or other facility with appropriate resources  Outcome: Progressing     Problem: Chronic Conditions and Co-morbidities  Goal: Patient's chronic conditions and co-morbidity symptoms are monitored and maintained or improved  Outcome: Progressing     Problem: Nutrition  Goal: Nutrient intake appropriate for maintaining nutritional needs  Outcome: Progressing     Problem: Skin  Goal: Decreased wound size/increased tissue granulation at next dressing change  Outcome: Progressing  Goal: Participates in plan/prevention/treatment measures  Outcome: Progressing  Goal: Prevent/manage excess moisture  Outcome: Progressing  Goal: Prevent/minimize sheer/friction injuries  Outcome: Progressing  Goal: Promote/optimize nutrition  Outcome: Progressing  Goal: Promote skin healing  Outcome: Progressing

## 2025-02-22 NOTE — PROGRESS NOTES
Speech-Language Pathology    SLP Adult Inpatient  Speech-Language Pathology Treatment     Patient Name: Nanette Fylnn  MRN: [id number]  Today's Date: 2/22/2025  Time Calculation  Start Time: 1140  Stop Time: 1200  Time Calculation (min): 20 min         Current Problem:   1. Multifocal pneumonia        2. Generalized weakness        3. Failure of outpatient treatment        4. SOB (shortness of breath)  Transthoracic Echo (TTE) Complete    Transthoracic Echo (TTE) Complete                                                                                                      Recommendations:  Solid consistency: Regular (IDDSI level 7)  Liquid consistency: Mildly thick (IDDSI 2) / Nectar-thick  Medication administration: Whole in liquid vs puree, as best tolerated  Compensatory swallow strategies: Seated upright - as close to 90 degrees as possible, Remain upright for 20-30 minutes after meals, Full supervision during meals, 1:1 assistance with meals, Alternate solids and liquids, Single sips, Small bites/sips, Eat/feed slowly, Effortful swallow, and Stringent oral care routine - 4-6x/day (before/after meals)  MBSS: Completed on 2/21/2025  Discharge recommendation: Recommend MODERATE intensity ST upon DC in order to ensure safety with least restrictive diet.  ST OK to Discharge: Yes    Plan:   Skilled speech therapy for dysphagia treatment continues to be warranted to provide training and instruction regarding the use of compensatory swallow strategies, to complete oropharyngeal strengthening exercises, for pt/caregiver education in order to reduce risk of aspiration, dehydration and malnutrition. , to assess tolerance of diet , to determine ability to upgrade diet after PO trials with SLP  Inpatient/Swing Bed or Outpatient: Inpatient  SLP TX Plan: Continue Plan of Care  SLP Plan: Skilled SLP  SLP Frequency: 3x per week  Duration: 2 weeks  Discussed POC: Patient, Nursing  Discussed Risks/Benefits: Yes, Patient,  Nursing  Patient/Caregiver Agreeable: Yes            Goals:   Short term goals established 02/21/25:   - Patient will tolerate current diet without noted pulmonary compromise or evidence of pulmonary sequela as noted in patient chart and/or reported by patient/family.   GOAL START: 2/21/2025   TIMEFRAME: 2 weeks  GOAL END: 3/7/3035   Status: Progressing   Progress this date: Nursing reporting pt tolerated breakfast with no overt s/s of aspiration     - Patient will independently implement safe swallowing strategies to reduce risk of aspiration with use of compensatory strategies during 90% of therapeutic trials.   GOAL START: 2/22/2025  TIMEFRAME: 2 weeks  GOAL END: 3/7/2025   Status: not addressed this date    Progress this date: n/a    - Pt will complete effortful swallows 20 reps, 3 x a day for 7 days a week; to strengthen pharyngeal muscles for improve bolus clearance through the pharynx.     GOAL START: 2/22/2025  TIMEFRAME: 2 weeks  GOAL END: 3/7/2025   Status: not addressed this date    Progress this date: n/a    - Pt will complete EMST75 5 reps with 15-30 seconds in between rep, and 60 seconds between each set for 5x a day 15 cmH20 SLP recommended cmH2o, with minimal cueing, as observed during sessions and at home per patient report to strengthen movement of the hyolaryngeal complex necessary for airway protection during the swallow.   GOAL START: 2/21/2025   TIMEFRAME: 2 weeks  GOAL END: 3/7/2025   Status: Progressing   Progress this date: Provided initial education/training in EMST75. Pt completed 5 sets of 5 at cmh20 15 (MEP [id number])           Long term goals 02/21/25:   Patient will tolerate the least restrictive diet without overt difficulty or further pulmonary compromise by time of discharge.      Treatment  Treatment provided: Yes  Current Diet: Regular (IDDSI level 7); Mildly thick (IDDSI 2) / Nectar-thick    Provided training and education on Expiratory Muscle Strength Training - EMST75 for  "improving airway protection, cough strength, pharyngeal stripping wave, and vocal intensity/quality. Pt education on \"rule of 5\", i.e. 5 sets of 5 repetitions 5 days of week. Provided education on protocol for rest breaks, i.e. 15-30 second between reps and 1 minute in between sets. Provided training and education on appriate form for exhale into device. Pt completed 1 sets of 5 at 15 cmH20; increased (MEP= 30 cmH20).      Treatment Outcome:  SLP TX Intervention Outcome: Making Progress Towards Goals    Treatment Tolerance: Patient tolerated treatment well   Prognosis: Excellent   Barriers: None   Medical Staff Made Aware: Yes     General Information:  SLP Received On: 02/22/25  Patient Class: Inpatient   Living Environment: Nursing home (skilled/long-term)  Ordering Physician: Dr. Adair  Reason for Referral: Swallow evaluatiom, suspected aspiation  Referred By: Austin Adair MD  Past Medical History Relevant to Rehab: hypothyroidism, HTN, PE (on Eliquis), chronic B/L wounds and left hand carpal tunnel  Prior to Session Communication: Bedside nurse  Date of Onset: 02/19/25  Date of Order: 02/20/25  [employer] Diet: Regular/thin  Current Diet : Regular/thin    Subjective:  Pt. Seen at bedside for skilled dysphagia treatment.   Prior to Session Communication: Bedside nurse    Pain:   Pain Assessment: 0-10  0-10 (Numeric) Pain Score: 0 - No pain           Education:  Learner Patient   Barriers to Learning None   Method Verbal and Written - \"Swallowing Guidelines\" posted at patient's bedside; EMST75 protocol   Education - Topic ST provided patient education regarding EMST75 protocol and form. Patient verbalized questionable full comprehension, consistent with cognitive status. Education will be reinforced.      Outcome    Verbalized understanding, Verbalized agreement, Demonstrated understanding, Education Needs: , Continued instruction, Review/reinforcement, Education Goals: , and Partially meets                "

## 2025-02-22 NOTE — PROGRESS NOTES
Physical Therapy    Physical Therapy Treatment    Patient Name: Nanette Flynn  MRN: [id number]  Department: [address]  Room: [address] [place] [address]  Today's Date: 2/22/2025  Time Calculation  Start Time: 1437  Stop Time: 1450  Time Calculation (min): 13 min         Assessment/Plan   PT Assessment  PT Assessment Results: Decreased strength, Decreased endurance, Impaired balance, Decreased mobility, Pain  Rehab Prognosis: Good  Barriers to Discharge Home: Physical needs  Physical Needs: 24hr mobility assistance needed, High falls risk due to function or environment  End of Session Communication: Bedside nurse  Assessment Comment: Pt lacks endurance decrease UE/LE strength and ROM. Reports getting to the EOB earlier today took all she had. Continue to recommend MOD intensity skilled PT a DC.  End of Session Patient Position: Bed, 3 rail up, Alarm on     PT Plan  Treatment/Interventions: Bed mobility, Transfer training, Gait training, Balance training, Strengthening, Endurance training, Therapeutic exercise, Therapeutic activity, Home exercise program  PT Plan: Ongoing PT  PT Frequency: 3 times per week  PT Discharge Recommendations: Moderate intensity level of continued care  Equipment Recommended upon Discharge: Wheeled walker  PT Recommended Transfer Status: Assist x2  PT - OK to Discharge: Yes      General Visit Information:   PT  Visit  PT Received On: 02/22/25  General  Reason for Referral: Pt admitted with pneumonia  Referred By: Austin Adair MD  Past Medical History Relevant to Rehab: hypothyroidism, HTN, PE (on Eliquis), chronic B/L wounds and left hand carpal tunnel  Family/Caregiver Present: Yes  Caregiver Feedback: youngest daughter  Prior to Session Communication: Bedside nurse  Patient Position Received: Bed, 3 rail up, Alarm on  General Comment: Pt pleasant and agreeable to tx pt in bed with daughter at bedside.    Subjective   Precautions:  Precautions  Medical Precautions: Fall precautions  Precautions Comment:  Pt confirms fear of falling          Objective   Pain:  Pain Assessment  Pain Assessment: 0-10  0-10 (Numeric) Pain Score: 0 - No pain  Cognition:  Cognition  Overall Cognitive Status: Within Functional Limits  Coordination:     Postural Control:  Postural Control  Postural Control: Within Functional Limits  Static Standing Balance  Static Standing-Balance Support:  (FWW)  Static Standing-Level of Assistance:  (x2)  Activity Tolerance:  Activity Tolerance  Endurance: Tolerates 10 - 20 min exercise with multiple rests  Treatments:  Therapeutic Exercise  Therapeutic Exercise Performed: Yes  Therapeutic Exercise Activity 1: Pt performed BLE  AP, heelslides, SLR and hip abducion/add with pillow. (Min verbal cues needed for eccentric control.)                             Bed Mobility 3  Bed Mobility 3:  (Sitting EOB, foward, laterally right)                                  Outcome Measures:  [facility] Basic Mobility  Turning from your back to your side while in a flat bed without using bedrails: A little  Moving from lying on your back to sitting on the side of a flat bed without using bedrails: A lot  Moving to and from bed to chair (including a wheelchair): A little  Standing up from a chair using your arms (e.g. wheelchair or bedside chair): A little  To walk in hospital room: A lot  Climbing 3-5 steps with railing: Total  Basic Mobility - Total Score: 14    Education Documentation  Handouts, taught by James Victoria PTA at 2/22/2025  3:16 PM.  Learner: Family, Patient  Readiness: Acceptance  Method: Explanation  Response: Verbalizes Understanding    Precautions, taught by James Victoria PTA at 2/22/2025  3:16 PM.  Learner: Family, Patient  Readiness: Acceptance  Method: Explanation  Response: Verbalizes Understanding    Home Exercise Program, taught by James Victoria PTA at 2/22/2025  3:16 PM.  Learner: Family, Patient  Readiness: Acceptance  Method: Explanation  Response: Verbalizes Understanding    Body Mechanics,  taught by James Victoria PTA at 2/22/2025  3:16 PM.  Learner: Family, Patient  Readiness: Acceptance  Method: Explanation  Response: Verbalizes Understanding    Mobility Training, taught by James Victoria PTA at 2/22/2025  3:16 PM.  Learner: Family, Patient  Readiness: Acceptance  Method: Explanation  Response: Verbalizes Understanding    Education Comments  No comments found.        OP EDUCATION:       Encounter Problems       Encounter Problems (Active)       Balance       STG - Maintains static standing balance with upper extremity support using FWW, close supervision, ~2 mins (Progressing)       Start:  02/20/25    Expected End:  03/06/25               Mobility       STG - Patient will ambulate at least 15' with FWW, CGA (Progressing)       Start:  02/20/25    Expected End:  03/06/25               PT Transfers       STG - Patient to transfer to and from sit to supine distant supervision (Progressing)       Start:  02/20/25    Expected End:  03/06/25            STG - Patient will transfer sit to and from stand with FWW, CGA (Progressing)       Start:  02/20/25    Expected End:  03/06/25               Pain - Adult

## 2025-02-22 NOTE — PROGRESS NOTES
Patient: Nanette Flynn Age: 89 y.o.   Gender: female Room/bed: [address] [place], [place] [address]     Attending: Yuri Martinez DO  Code Status:  DNR and No Intubation and No ICU    Overnight Events  NAEO  Subjective  Patient seen at bedside. She denies any acute distress. Remain on RA.     Objective:  Physical Exam  HENT:      Head: Normocephalic and atraumatic.   Cardiovascular:      Rate and Rhythm: Normal rate.   Pulmonary:      Effort: Pulmonary effort is normal.   Abdominal:      Palpations: Abdomen is soft.   Musculoskeletal:         General: No swelling.   Skin:     General: Skin is warm.   Neurological:      Mental Status: She is alert.      Comments: Alert and oriented to place and self   Psychiatric:         Behavior: Behavior normal.          Temp:  [36.8 °C (98.2 °F)-37.2 °C (99 °F)] 36.9 °C (98.4 °F)  Heart Rate:  [77-96] 78  Resp:  [16-18] 18  BP: (137-160)/(79-82) 143/79      Intake/Output Summary (Last 24 hours) at 2/22/2025 0855  Last data filed at 2/21/2025 2221  Gross per 24 hour   Intake 340 ml   Output 900 ml   Net -560 ml       Vitals:    02/20/25 0457   Weight: 79.3 kg (174 lb 13.2 oz)             I/Os    Intake/Output Summary (Last 24 hours) at 2/22/2025 0855  Last data filed at 2/21/2025 2221  Gross per 24 hour   Intake 340 ml   Output 900 ml   Net -560 ml       Labs:   CBC:  Recent Labs     02/22/25  0605 02/21/25  0700 02/20/25  0730   WBC 8.2 8.3 9.8   HGB 7.2* 7.5* 7.8*   HCT 21.8* 22.9* 25.1*    375 425   MCV 96 97 98     CMP:  Recent Labs     02/22/25  0605 02/21/25  0700 02/20/25  0730    138 137   K 3.4* 3.6 4.2    101 99   CO2 27 31 30   ANIONGAP 12 10 12   BUN 10 11 10   CREATININE 1.44* 1.46* 1.22*   EGFR 35* 34* 43*   GLUCOSE 104* 98 98     Recent Labs     02/22/25  0605 02/21/25  0700 02/20/25  0730 02/19/25  1455 02/07/25  2247 01/07/25  0617 01/06/25  0855 11/08/24  1159 02/22/24  1027 02/09/24  1013   ALBUMIN 2.8* 2.7* 2.9* 3.2* 3.3*   < > 4.0 4.1   < > 4.0   ALKPHOS  --    "--   --  58 56  --  56 49   < > 54   ALT  --   --   --  20 24  --  13 12   < > 13   AST  --   --   --  25 37  --  18 19   < > 23   BILITOT  --   --   --  0.4 1.0  --  0.6 0.5   < > 1.4*   LIPASE  --   --   --   --   --   --   --  44  --  35    < > = values in this interval not displayed.     Calcium/Phos:   Lab Results   Component Value Date    CALCIUM 8.4 (L) 02/22/2025    PHOS 2.9 02/22/2025      COAG:   Recent Labs     02/25/24  0632 02/24/24  0822 02/23/24  0733   INR 1.4* 1.4* 1.3*     CRP:   Lab Results   Component Value Date    CRP 2.37 (H) 01/08/2025      [unfilled]   ENDO:  Recent Labs     01/06/25  0934 08/01/24  1549 02/23/24  0733 02/12/24  0556   TSH 6.66* 2.87 2.62 4.54*      CARDIAC:   Recent Labs     02/19/25  1555 02/19/25  1455 02/07/25  2351 02/07/25  2247 11/08/24  1425   TROPHS 9 9 15* 16*  --    BNP  --  246*  --  111* 172*     Recent Labs     07/25/23  1600   CHOL 172   LDLCALC 92   HDL 50*   TRIG 152*     No data recorded    Micro/ID:   Susceptibility data from last 90 days.  Collected Specimen Info Organism   02/07/25 Blood culture from Peripheral Venipuncture Staphylococcus epidermidis   02/07/25 Blood culture from Peripheral Venipuncture Bacillus species, not Bacillus anthracis                    No lab exists for component: \"AGALPCRNB\"   .ID  Lab Results   Component Value Date    URINECULTURE No significant growth 05/29/2024    BLOODCULT No growth at 2 days 02/19/2025    BLOODCULT No growth at 2 days 02/19/2025       Images:  FL modified barium swallow study  Narrative: Interpreted By:  Ximena Sánchez and Vinci Sarah   STUDY:  FL MODIFIED BARIUM SWALLOW STUDY;; 2/21/2025 2:28 pm      INDICATION:  Signs/Symptoms:Concern for possible pharyngeal dysphagia based on  clinical swallow evaluation.          COMPARISON:  None.      ACCESSION NUMBER(S):  [id number]      ORDERING CLINICIAN:  FARRUKH GONZALEZ      TECHNIQUE:  Modified Barium Swallow Study completed. Informed verbal " consent  obtained prior to completion of exam. The study was completed per  protocol with various liquid barium consistencies, pudding, solids  and a 13mm barium tablet.  A 1.9 cm or .75 inch (outer diameter) ring  was placed on the chin in the lateral view. AP assessment was  deferred at the SLP's discretion given pt's mobility limitations and  current fall risk. The anatomic structures and function of the  oropharynx, larynx, hypopharynx and cervical esophagus were evaluated.      Fluoroscopy time :  3 minutes and 6 seconds.      SLP: QUE Villareal  Contact info: [employer] secure chat; phone: [phone] ([place]  location); [phone] ([place] location)      SPEECH FINDINGS:  Reason for Referral:  Pt had recently been treated for influenza and  PNA. She was found to have Multifocal pneumonia concerning for  possible aspiration. CSE determined need for instrumental assessment  to definitively r/o aspiration. Patient Hx: hypothyroidism, HTN, PE  (on Eliquis), chronic B/L wounds and left hand carpal tunnel  Respiratory Status: Room air Current diet: Regular solids and thin  liquids pending MBSS results      Pain:  Pain Scale: 0-10  Ratin      DIET RECOMMENDATIONS:  - Regular (IDDSI Level 7)  - Mildly/nectar thick liquids (IDDSI Level 2)  [place]oo Free Water Protocol: Pt may have sips of thin water under the  following conditions only: no food present, within 2 hours after oral  care, when fully alert and upright. All liquids besides water must be  thickened.      STRATEGIES:  - Small bites  - Small, single sips  - Alternate consistencies  - Effortful swallow  - Upright for all PO intake  - Medications whole in puree or as best tolerated  - Complete oral care frequently throughout the day  - Begin daily oropharyngeal exercises regiment          SLP PLAN:  Skilled SLP Services: Skilled SLP intervention for dysphagia is  warranted. SLP Frequency: 3x per week  Duration:  Treatment/Interventions:  - Oropharyngeal  exercises  - Bolus trials  - Compensatory strategy training  - Diet tolerance/advancement  - Patient/caregiver education      Discussed POC: Patient  Discussed Risks/Benefits: Yes  Patient/Caregiver Agreeable: Yes      Short term goals established 02/21/25:  - Patient will tolerate current diet without noted pulmonary  compromise or evidence of pulmonary sequela as noted in patient chart  and/or reported by patient/family. - Patient will independently  implement safe swallowing strategies to reduce risk of aspiration  with use of compensatory strategies during 90% of therapeutic trials.  - Pt will complete effortful swallows 20 reps, 3 x a day for 7 days a  week; to strengthen pharyngeal muscles for improve bolus clearance  through the pharynx. - Pt will complete EMST/IMST 5 reps with 15-30  seconds in between rep, and 60 seconds between each set for 5x a day  @ SLP recommended cmH2o, with minimal cueing, as observed during  sessions and at home per patient report to strengthen movement of the  hyolaryngeal complex necessary for airway protection during the  swallow.          Long term goals 02/21/25:  Patient will tolerate the least restrictive diet without overt  difficulty or further pulmonary compromise by time of discharge.          Education Provided: Results and recommendations per MBSS, with video  review; recommendations and POC at this time. Verbal understanding  and agreement given on all accounts.      Additional Medical Consults Suggested:  - No new disciplines indicated      Repeat Study: Per MD or treating SLP      Mechanics of the swallow summary:  ORAL PHASE:  Lip Closure - No labial escape/anterior loss of bolus  Tongue Control During Bolus Hold - Escape to lateral buccal cavity  and/or floor of mouth Bolus prep/mastication - Disorganized  mastication with solid pieces of bolus unchewed Bolus  transport/lingual motion - Repetitive/disorganized tongue motion  (tongue pumping) Oral residue - Residue  collection on oral structure      PHARYNGEAL PHASE:  Initiation of pharyngeal swallow - Bolus head at pit of pyriforms  Soft palate elevation - No bolus between soft palate/pharyngeal wall  Laryngeal elevation - Partial superior movement of thyroid cartilage  and/or partial approximation of arytenoids to epiglottic petiole  Anterior hyoid excursion - Partial anterior movement Epiglottic  movement - Complete inversion Laryngeal vestibule closure -  Incomplete - narrow column of air/contrast in laryngeal vestibule  Pharyngeal stripping wave - Present, however, diminished Pharyngeal  contraction (A/P view) - Not tested Pharyngoesophageal segment  opening - Complete distension and complete duration/no obstruction of  flow of bolus Tongue base retraction - Narrow column of contrast or  air between tongue base and pharyngeal wall Pharyngeal residue -  Collection of residue within or on the pharyngeal structures      ESOPHAGEAL PHASE:  Esophageal clearance - Not evaluated          SLP Impressions with Severity Rating:  Pt presents with moderate oropharyngeal dysphagia upon completion of  modified barium swallow study this date. Swallowing physiology is  detailed above. Impairments most impacting swallowing safety and  efficiency include decreased hyolaryngeal elevation/excursion and  incomplete laryngeal vestibular closure. Patient demonstrated overt  aspiration with approximately 20 ml straw sips of thin liquids during  the swallow. Cough was minimally effecting at ejecting aspiration  material from airway. Noted transient penetration with 5ml (tsp) and  20 ml (straw sip) nectar thick liquids. No additional penetration or  aspiration was observed across other consistencies test      Strategies attempted- Effortful swallow resulted in improved airway  protection with straw sips of thin liquids, making pt a good  candidate for [place] Free Water Protocol.          OUTCOME MEASURES:  Functional Oral Intake Scale  Functional  Oral Intake Scale: Level 5        total oral diet with  multiple consistencies, but requires special preparations and  compensations          Rosenbek's Penetration Aspiration Scale  Thin Liquids: 7. OVERT ASPIRATION, material is not cleared - contrast  passes glottis, visible residue, W/pt response During the Swallow  Nectar Thick Liquids: 2. PENETRATION that CLEARS - contrast enter  airway, above vocal cords, no residue During the Swallow  Honey Thick Liquids: 1. NO ASPIRATION & NO PENETRATION - no  aspiration, contrast does not enter airway Puree: 1. NO ASPIRATION  & NO PENETRATION - no aspiration, contrast does not enter airway  Solids: 1. NO ASPIRATION & NO PENETRATION - no aspiration, contrast  does not enter airway      Speech Therapy section of this report signed by Nikki Grace MA.,  CCC-SLP on 2/21/2025 at 3:15 pm.      RADIOLOGY FINDINGS:  There was aspiration with thin liquids. Additional findings and  recommendations were provided by the speech pathologist.      Radiology section of this report signed by Xiemna Sánchez MD.      Impression: Abnormal exam with aspiration.      MACRO:  None      Signed by: Ximena Sánchez 2/21/2025 3:54 PM  Dictation workstation:   [id number]       Medications:  Scheduled medications  apixaban, 2.5 mg, oral, BID  cholecalciferol, 5,000 Units, oral, Daily  [Held by provider] docusate sodium, 100 mg, oral, Nightly  ipratropium-albuteroL, 3 mL, nebulization, TID  lactated Ringer's, 500 mL, intravenous, Once  [Held by provider] lactobacillus acidophilus, 1 capsule, oral, q AM  latanoprost, 1 drop, Both Eyes, Nightly  levothyroxine, 112 mcg, oral, Once per day on Monday Tuesday Wednesday Thursday Friday Saturday  [Held by provider] lisinopril, 10 mg, oral, Daily  meclizine, 12.5 mg, oral, Nightly  piperacillin-tazobactam, 4.5 g, intravenous, q8h  sodium chloride (PF) 0.9%, 10 mL, intravenous, BID  zinc oxide, 1 Application, Topical, TID      Continuous medications     PRN  "medications  PRN medications: acetaminophen, guaiFENesin, ipratropium-albuteroL, meclizine, oxygen, prochlorperazine, white petrolatum     Assessment and Plan:  Nanette Flynn is a 89 y.o. female with PMH hypothyroidism, HTN, PE (on Eliquis), chronic B/L wounds and left hand carpal tunnel presented from [facility] with pneumonia.       2/22/25 UPDATE:  Kidney function stable relatively   Order LR bolus and repleted K   Stopped Zosyn and started PO Augmentin  -Hgb 7.2; monitor; added Type and screen AM lab  DIET RECOMMENDATIONS:   - Regular (IDDSI Level 7)  - Mildly/nectar thick liquids (IDDSI Level 2)  Yoo Free Water Protocol: Pt may have sips of thin water under the following conditions only: no food present, within 2 hours after oral care, when fully alert and upright. All liquids besides water must be thickened.         ACUTE ISSUES:  #PNA due to Gram - bacteria  -Does not meet sepsis criteria  -Has recently received IV ABX after being diagnosed with influenza and pneumonia.  -TTE: 63%  PLAN:  -On RA  -ID consulted due to recurrent infections: Continue zosyn  -PT/OT  -DuoNebs, guaifenesin, IS  -Tylenol for pain     #Dysphagia/Aspiration  -Patient reports occasional swallowing difficulties particularly with medication  -Reports \"feeling like the pill is stuck in my throat\"  PLAN:  -SLP consult: Planning modified barium today     #Physical debility  PLAN:  -PT OT: Moderate intensity     #Diarrhea  -With history of recent antibiotic use  Plan:  -Obtain C. difficile PCR, precautions until rule out     CHRONIC ISSUES:  #Hx PE - Continue Eliquis 2.5 mg BID  #HTN - Lisinopril 10 mg daily  #Hypothyroidism - Synthroid 75 mcg daily   #Chronic B/L wounds - Wound care     Fluid: Replete PRN  Electrolytes: Replete PRN  Nutrition: Regular  GI PPx:  -  DVT/PE PPx: Eliquis 2.5  Abx: Zosyn(2/19/25-)  IV Lines: PIV  O2: RA     Disposition: Admitted inpatient for treatment of PNA, receiving IV ABX.  PT/OT " recommends moderate intensity eLOS >48h  Code Status: DNR/DNI no ICU

## 2025-02-22 NOTE — PROGRESS NOTES
Nanette Flynn is a 89 y.o. female on day 3 of admission presenting with Multifocal pneumonia.    Subjective   Interval History: no fever, no new complaints        Review of Systems    Objective   Range of Vitals (last 24 hours)  Heart Rate:  [77-96]   Temp:  [36.8 °C (98.2 °F)-37.2 °C (99 °F)]   Resp:  [16-18]   BP: (137-160)/(79-82)   SpO2:  [90 %-95 %]   Daily Weight  02/20/25 : 79.3 kg (174 lb 13.2 oz)    Body mass index is 30.97 kg/m².    Physical Exam  Constitutional:       Appearance: Normal appearance.   HENT:      Head: Normocephalic and atraumatic.      Mouth/Throat:      Mouth: Mucous membranes are moist.      Pharynx: Oropharynx is clear.   Eyes:      Pupils: Pupils are equal, round, and reactive to light.   Cardiovascular:      Rate and Rhythm: Normal rate and regular rhythm.      Heart sounds: Normal heart sounds.   Pulmonary:      Effort: Pulmonary effort is normal.      Breath sounds: Rales present.   Abdominal:      General: Abdomen is flat. Bowel sounds are normal.      Palpations: Abdomen is soft.   Musculoskeletal:      Cervical back: Normal range of motion.   Neurological:      Mental Status: She is alert.         Antibiotics  amoxicillin-pot clavulanate - 875-125 mg    Relevant Results  Labs  Results from last 72 hours   Lab Units 02/22/25  0605 02/21/25  0700 02/20/25  0730 02/19/25  1455   WBC AUTO x10*3/uL 8.2 8.3 9.8 8.9   HEMOGLOBIN g/dL 7.2* 7.5* 7.8* 8.0*   HEMATOCRIT % 21.8* 22.9* 25.1* 25.0*   PLATELETS AUTO x10*3/uL 345 375 425 420   NEUTROS PCT AUTO %  --   --   --  58.3   LYMPHS PCT AUTO %  --   --   --  27.4   MONOS PCT AUTO %  --   --   --  10.0   EOS PCT AUTO %  --   --   --  2.9     Results from last 72 hours   Lab Units 02/22/25  0605 02/21/25  0700 02/20/25  0730   SODIUM mmol/L 140 138 137   POTASSIUM mmol/L 3.4* 3.6 4.2   CHLORIDE mmol/L 104 101 99   CO2 mmol/L 27 31 30   BUN mg/dL 10 11 10   CREATININE mg/dL 1.44* 1.46* 1.22*   GLUCOSE mg/dL 104* 98 98   CALCIUM mg/dL  8.4* 8.5* 9.0   ANION GAP mmol/L 12 10 12   EGFR mL/min/1.73m*2 35* 34* 43*   PHOSPHORUS mg/dL 2.9 3.3 3.3     Results from last 72 hours   Lab Units 02/22/25  0605 02/21/25  0700 02/20/25  0730 02/19/25  1455   ALK PHOS U/L  --   --   --  58   BILIRUBIN TOTAL mg/dL  --   --   --  0.4   PROTEIN TOTAL g/dL  --   --   --  6.7   ALT U/L  --   --   --  20   AST U/L  --   --   --  25   ALBUMIN g/dL 2.8* 2.7* 2.9* 3.2*     Estimated Creatinine Clearance: 26.9 mL/min (A) (by C-G formula based on SCr of 1.44 mg/dL (H)).  C-Reactive Protein   Date Value Ref Range Status   01/08/2025 2.37 (H) <1.00 mg/dL Final   01/07/2025 1.66 (H) <1.00 mg/dL Final   01/06/2025 1.52 (H) <1.00 mg/dL Final     Microbiology  No results found for the last 14 days.    Imaging  Reviewed        Assessment/Plan   Respiratory failure / pneumonia / suspected aspiration  Bacteremia 2/7, likely a contaminant  Post influenza A infection     Recommendations :  Plan on oral Augmentin with discharge  Discussed with the medical team     I spent minutes in the professional and overall care of this patient.      Poonam Sandoval MD

## 2025-02-23 VITALS
SYSTOLIC BLOOD PRESSURE: 127 MMHG | HEIGHT: 63 IN | WEIGHT: 174.82 LBS | OXYGEN SATURATION: 92 % | TEMPERATURE: 97.9 F | DIASTOLIC BLOOD PRESSURE: 74 MMHG | BODY MASS INDEX: 30.98 KG/M2 | RESPIRATION RATE: 16 BRPM | HEART RATE: 100 BPM

## 2025-02-23 PROBLEM — R06.02 SOB (SHORTNESS OF BREATH): Status: RESOLVED | Noted: 2025-02-20 | Resolved: 2025-02-23

## 2025-02-23 LAB
ABO GROUP (TYPE) IN BLOOD: NORMAL
ALBUMIN SERPL BCP-MCNC: 3 G/DL (ref 3.4–5)
ANION GAP SERPL CALC-SCNC: 13 MMOL/L (ref 10–20)
ANTIBODY SCREEN: NORMAL
BACTERIA BLD CULT: NORMAL
BACTERIA BLD CULT: NORMAL
BUN SERPL-MCNC: 9 MG/DL (ref 6–23)
CALCIUM SERPL-MCNC: 8.7 MG/DL (ref 8.6–10.3)
CHLORIDE SERPL-SCNC: 104 MMOL/L (ref 98–107)
CO2 SERPL-SCNC: 25 MMOL/L (ref 21–32)
CREAT SERPL-MCNC: 1.34 MG/DL (ref 0.5–1.05)
EGFRCR SERPLBLD CKD-EPI 2021: 38 ML/MIN/1.73M*2
ERYTHROCYTE [DISTWIDTH] IN BLOOD BY AUTOMATED COUNT: 13.6 % (ref 11.5–14.5)
GLUCOSE SERPL-MCNC: 95 MG/DL (ref 74–99)
HCT VFR BLD AUTO: 23 % (ref 36–46)
HGB BLD-MCNC: 7.3 G/DL (ref 12–16)
MAGNESIUM SERPL-MCNC: 2.11 MG/DL (ref 1.6–2.4)
MCH RBC QN AUTO: 31.2 PG (ref 26–34)
MCHC RBC AUTO-ENTMCNC: 31.7 G/DL (ref 32–36)
MCV RBC AUTO: 98 FL (ref 80–100)
NRBC BLD-RTO: 0 /100 WBCS (ref 0–0)
PHOSPHATE SERPL-MCNC: 2.7 MG/DL (ref 2.5–4.9)
PLATELET # BLD AUTO: 354 X10*3/UL (ref 150–450)
POTASSIUM SERPL-SCNC: 4 MMOL/L (ref 3.5–5.3)
RBC # BLD AUTO: 2.34 X10*6/UL (ref 4–5.2)
RH FACTOR (ANTIGEN D): NORMAL
SODIUM SERPL-SCNC: 138 MMOL/L (ref 136–145)
WBC # BLD AUTO: 8.7 X10*3/UL (ref 4.4–11.3)

## 2025-02-23 PROCEDURE — 2500000005 HC RX 250 GENERAL PHARMACY W/O HCPCS

## 2025-02-23 PROCEDURE — 80069 RENAL FUNCTION PANEL: CPT

## 2025-02-23 PROCEDURE — 94760 N-INVAS EAR/PLS OXIMETRY 1: CPT

## 2025-02-23 PROCEDURE — 99232 SBSQ HOSP IP/OBS MODERATE 35: CPT

## 2025-02-23 PROCEDURE — 94640 AIRWAY INHALATION TREATMENT: CPT

## 2025-02-23 PROCEDURE — 2500000001 HC RX 250 WO HCPCS SELF ADMINISTERED DRUGS (ALT 637 FOR MEDICARE OP)

## 2025-02-23 PROCEDURE — 1100000001 HC PRIVATE ROOM DAILY

## 2025-02-23 PROCEDURE — 85027 COMPLETE CBC AUTOMATED: CPT

## 2025-02-23 PROCEDURE — 86901 BLOOD TYPING SEROLOGIC RH(D): CPT

## 2025-02-23 PROCEDURE — 2500000002 HC RX 250 W HCPCS SELF ADMINISTERED DRUGS (ALT 637 FOR MEDICARE OP, ALT 636 FOR OP/ED)

## 2025-02-23 PROCEDURE — 2500000002 HC RX 250 W HCPCS SELF ADMINISTERED DRUGS (ALT 637 FOR MEDICARE OP, ALT 636 FOR OP/ED): Performed by: INTERNAL MEDICINE

## 2025-02-23 PROCEDURE — 94669 MECHANICAL CHEST WALL OSCILL: CPT

## 2025-02-23 PROCEDURE — 83735 ASSAY OF MAGNESIUM: CPT

## 2025-02-23 RX ORDER — AMOXICILLIN AND CLAVULANATE POTASSIUM 875; 125 MG/1; MG/1
1 TABLET, FILM COATED ORAL 2 TIMES DAILY
Qty: 10 TABLET | Refills: 0 | Status: SHIPPED | OUTPATIENT
Start: 2025-02-23 | End: 2025-02-28

## 2025-02-23 RX ADMIN — IPRATROPIUM BROMIDE AND ALBUTEROL SULFATE 3 ML: 2.5; .5 SOLUTION RESPIRATORY (INHALATION) at 08:52

## 2025-02-23 RX ADMIN — APIXABAN 2.5 MG: 2.5 TABLET, FILM COATED ORAL at 08:17

## 2025-02-23 RX ADMIN — SODIUM CHLORIDE 10 ML: 9 INJECTION, SOLUTION INTRAMUSCULAR; INTRAVENOUS; SUBCUTANEOUS at 08:17

## 2025-02-23 RX ADMIN — LATANOPROST 1 DROP: 50 SOLUTION OPHTHALMIC at 20:17

## 2025-02-23 RX ADMIN — AMOXICILLIN AND CLAVULANATE POTASSIUM 1 TABLET: 875; 125 TABLET, FILM COATED ORAL at 20:10

## 2025-02-23 RX ADMIN — Medication 1 APPLICATION: at 08:17

## 2025-02-23 RX ADMIN — APIXABAN 2.5 MG: 2.5 TABLET, FILM COATED ORAL at 20:10

## 2025-02-23 RX ADMIN — IPRATROPIUM BROMIDE AND ALBUTEROL SULFATE 3 ML: 2.5; .5 SOLUTION RESPIRATORY (INHALATION) at 19:23

## 2025-02-23 RX ADMIN — Medication 1 APPLICATION: at 14:00

## 2025-02-23 RX ADMIN — IPRATROPIUM BROMIDE AND ALBUTEROL SULFATE 3 ML: 2.5; .5 SOLUTION RESPIRATORY (INHALATION) at 14:09

## 2025-02-23 RX ADMIN — AMOXICILLIN AND CLAVULANATE POTASSIUM 1 TABLET: 875; 125 TABLET, FILM COATED ORAL at 08:17

## 2025-02-23 RX ADMIN — Medication 1 APPLICATION: at 20:10

## 2025-02-23 RX ADMIN — MECLIZINE 12.5 MG: 12.5 TABLET ORAL at 20:10

## 2025-02-23 RX ADMIN — SODIUM CHLORIDE 10 ML: 9 INJECTION, SOLUTION INTRAMUSCULAR; INTRAVENOUS; SUBCUTANEOUS at 22:57

## 2025-02-23 RX ADMIN — Medication 125 MCG: at 08:17

## 2025-02-23 ASSESSMENT — COGNITIVE AND FUNCTIONAL STATUS - GENERAL
MOVING TO AND FROM BED TO CHAIR: A LOT
CLIMB 3 TO 5 STEPS WITH RAILING: TOTAL
HELP NEEDED FOR BATHING: A LOT
DAILY ACTIVITIY SCORE: 14
DAILY ACTIVITIY SCORE: 14
WALKING IN HOSPITAL ROOM: TOTAL
DRESSING REGULAR LOWER BODY CLOTHING: A LOT
WALKING IN HOSPITAL ROOM: TOTAL
STANDING UP FROM CHAIR USING ARMS: TOTAL
DRESSING REGULAR UPPER BODY CLOTHING: A LOT
EATING MEALS: A LITTLE
MOVING TO AND FROM BED TO CHAIR: A LOT
PERSONAL GROOMING: A LITTLE
CLIMB 3 TO 5 STEPS WITH RAILING: TOTAL
HELP NEEDED FOR BATHING: A LOT
TOILETING: A LOT
DRESSING REGULAR LOWER BODY CLOTHING: A LOT
EATING MEALS: A LITTLE
MOBILITY SCORE: 9
STANDING UP FROM CHAIR USING ARMS: TOTAL
TURNING FROM BACK TO SIDE WHILE IN FLAT BAD: A LOT
DRESSING REGULAR UPPER BODY CLOTHING: A LOT
MOVING FROM LYING ON BACK TO SITTING ON SIDE OF FLAT BED WITH BEDRAILS: A LOT
MOBILITY SCORE: 9
PERSONAL GROOMING: A LITTLE
TOILETING: A LOT
MOVING FROM LYING ON BACK TO SITTING ON SIDE OF FLAT BED WITH BEDRAILS: A LOT
TURNING FROM BACK TO SIDE WHILE IN FLAT BAD: A LOT

## 2025-02-23 ASSESSMENT — PAIN SCALES - GENERAL
PAINLEVEL_OUTOF10: 0 - NO PAIN
PAINLEVEL_OUTOF10: 0 - NO PAIN

## 2025-02-23 NOTE — PROGRESS NOTES
02/23/25 1300   Discharge Planning   Assistance Needed The S Vehicle you requested for Nanette PORTER in unit/room 102A on 02/23/2025 is scheduled to arrive at 10:00pm EST! [facility] is handling this ride. Transport too late in the evning for patient to DC to new facility. Will be rescheduled for the AM on 2/24.

## 2025-02-23 NOTE — PROGRESS NOTES
Nanette Flynn is a 89 y.o. female on day 4 of admission presenting with Multifocal pneumonia.    Subjective   Interval History: no fever, no new complaints        Review of Systems    Objective   Range of Vitals (last 24 hours)  Heart Rate:  []   Temp:  [36.6 °C (97.9 °F)-36.9 °C (98.4 °F)]   Resp:  [16-18]   BP: (141-166)/(75-78)   SpO2:  [91 %-96 %]   Daily Weight  02/20/25 : 79.3 kg (174 lb 13.2 oz)    Body mass index is 30.97 kg/m².    Physical Exam  Constitutional:       Appearance: Normal appearance.   HENT:      Head: Normocephalic and atraumatic.      Mouth/Throat:      Mouth: Mucous membranes are moist.      Pharynx: Oropharynx is clear.   Eyes:      Pupils: Pupils are equal, round, and reactive to light.   Cardiovascular:      Rate and Rhythm: Normal rate and regular rhythm.      Heart sounds: Normal heart sounds.   Pulmonary:      Effort: Pulmonary effort is normal.      Breath sounds: Rales present.   Abdominal:      General: Abdomen is flat. Bowel sounds are normal.      Palpations: Abdomen is soft.   Musculoskeletal:      Cervical back: Normal range of motion.   Neurological:      Mental Status: She is alert.         Antibiotics  amoxicillin-pot clavulanate - 875-125 mg    Relevant Results  Labs  Results from last 72 hours   Lab Units 02/23/25  0526 02/22/25  0605 02/21/25  0700   WBC AUTO x10*3/uL 8.7 8.2 8.3   HEMOGLOBIN g/dL 7.3* 7.2* 7.5*   HEMATOCRIT % 23.0* 21.8* 22.9*   PLATELETS AUTO x10*3/uL 354 345 375     Results from last 72 hours   Lab Units 02/23/25  0526 02/22/25  0605 02/21/25  0700   SODIUM mmol/L 138 140 138   POTASSIUM mmol/L 4.0 3.4* 3.6   CHLORIDE mmol/L 104 104 101   CO2 mmol/L 25 27 31   BUN mg/dL 9 10 11   CREATININE mg/dL 1.34* 1.44* 1.46*   GLUCOSE mg/dL 95 104* 98   CALCIUM mg/dL 8.7 8.4* 8.5*   ANION GAP mmol/L 13 12 10   EGFR mL/min/1.73m*2 38* 35* 34*   PHOSPHORUS mg/dL 2.7 2.9 3.3     Results from last 72 hours   Lab Units 02/23/25  0526 02/22/25  0605  02/21/25  0700   ALBUMIN g/dL 3.0* 2.8* 2.7*     Estimated Creatinine Clearance: 29 mL/min (A) (by C-G formula based on SCr of 1.34 mg/dL (H)).  C-Reactive Protein   Date Value Ref Range Status   01/08/2025 2.37 (H) <1.00 mg/dL Final   01/07/2025 1.66 (H) <1.00 mg/dL Final   01/06/2025 1.52 (H) <1.00 mg/dL Final     Microbiology  No results found for the last 14 days.    Imaging  Reviewed        Assessment/Plan   Respiratory failure / pneumonia / suspected aspiration  Bacteremia 2/7, likely a contaminant  Post influenza A infection     Recommendations :  Plan on oral Augmentin with discharge  Increase the activity  Discussed with the medical team     I spent minutes in the professional and overall care of this patient.      Poonam Sandoval MD

## 2025-02-23 NOTE — CARE PLAN
Problem: Skin  Goal: Participates in plan/prevention/treatment measures  Flowsheets (Taken 2/22/2025 2194)  Participates in plan/prevention/treatment measures: Increase activity/out of bed for meals      The clinical goals for the shift include Patient will utilize the call light when needing assistance to maintain safety

## 2025-02-23 NOTE — DISCHARGE INSTRUCTIONS
"- The following recommendations are made for you at time of hospital discharge:   - Please remember to take oral Augmentin twice a day for 5 days for complete treatment of your PNA   - Upon discharge, SLP recommends a solid consistency, regular (IDDSI level 7) diet. Liquid consistency: Mildly thick (IDDSI 2) / [place]-thick. Medication administration: Whole in liquid vs puree, as best tolerated. Compensatory swallow strategies include: being seated upright - as close to 90 degrees as possible, remaining upright for 20-30 minutes after meals, full supervision during meals, 1:1 assistance with meals, alternate solids and liquids, single sips, small bites/sips, Eat/feed slowly, Effortful swallow, and Stringent oral care routine - 4-6x/day (before/after meals).\"  - Please take your home medications as instructed prior to hospitalization.   - No changes to your home medications have been made during your hospital stay.   - Please follow-up with your primary care provider within 5-7 days from time of discharge. Please call your PCP's office to schedule an appointment. Likely will need to bring photo ID and insurance card to your appointment.   - If you experience any worsening symptoms or any new acute concerns arise, please contact your primary care provider to discuss and possibly arrange an appointment. If you cannot get in touch with provider or severe symptoms are present, please return to nearest emergency room/urgent care for evaluation and treatment.    "

## 2025-02-23 NOTE — PROGRESS NOTES
Nanette Flynn is a 89 y.o. female on day 4 of admission presenting with Multifocal pneumonia.      Subjective   No acute events overnight. Patient reports no complaints and would like to sleep.    Objective     Last Recorded Vitals  /78 (BP Location: Left arm, Patient Position: Lying)   Pulse 87   Temp 36.9 °C (98.4 °F) (Temporal)   Resp 16   Wt 79.3 kg (174 lb 13.2 oz)   SpO2 93%   Intake/Output last 3 Shifts:    Intake/Output Summary (Last 24 hours) at 2/23/2025 1320  Last data filed at 2/23/2025 0508  Gross per 24 hour   Intake --   Output 650 ml   Net -650 ml       Admission Weight  Weight: 77 kg (169 lb 12.1 oz) (02/19/25 1436)    Daily Weight  02/20/25 : 79.3 kg (174 lb 13.2 oz)    Image Results  FL modified barium swallow study  Narrative: Interpreted By:  Ximena Sánchez and Vinci Sarah   STUDY:  FL MODIFIED BARIUM SWALLOW STUDY;; 2/21/2025 2:28 pm      INDICATION:  Signs/Symptoms:Concern for possible pharyngeal dysphagia based on  clinical swallow evaluation.          COMPARISON:  None.      ACCESSION NUMBER(S):  [id number]      ORDERING CLINICIAN:  FARRUKH GONZALEZ      TECHNIQUE:  Modified Barium Swallow Study completed. Informed verbal consent  obtained prior to completion of exam. The study was completed per  protocol with various liquid barium consistencies, pudding, solids  and a 13mm barium tablet.  A 1.9 cm or .75 inch (outer diameter) ring  was placed on the chin in the lateral view. AP assessment was  deferred at the SLP's discretion given pt's mobility limitations and  current fall risk. The anatomic structures and function of the  oropharynx, larynx, hypopharynx and cervical esophagus were evaluated.      Fluoroscopy time :  3 minutes and 6 seconds.      SLP: QUE Villareal  Contact info: [employer] secure chat; phone: [phone] ([place]  location); [phone] ([place] location)      SPEECH FINDINGS:  Reason for Referral:  Pt had recently been treated for influenza and  PNA.  She was found to have Multifocal pneumonia concerning for  possible aspiration. CSE determined need for instrumental assessment  to definitively r/o aspiration. Patient Hx: hypothyroidism, HTN, PE  (on Eliquis), chronic B/L wounds and left hand carpal tunnel  Respiratory Status: Room air Current diet: Regular solids and thin  liquids pending MBSS results      Pain:  Pain Scale: 0-10  Ratin      DIET RECOMMENDATIONS:  - Regular (IDDSI Level 7)  - Mildly/nectar thick liquids (IDDSI Level 2)  [place] Free Water Protocol: Pt may have sips of thin water under the  following conditions only: no food present, within 2 hours after oral  care, when fully alert and upright. All liquids besides water must be  thickened.      STRATEGIES:  - Small bites  - Small, single sips  - Alternate consistencies  - Effortful swallow  - Upright for all PO intake  - Medications whole in puree or as best tolerated  - Complete oral care frequently throughout the day  - Begin daily oropharyngeal exercises regiment          SLP PLAN:  Skilled SLP Services: Skilled SLP intervention for dysphagia is  warranted. SLP Frequency: 3x per week  Duration:  Treatment/Interventions:  - Oropharyngeal exercises  - Bolus trials  - Compensatory strategy training  - Diet tolerance/advancement  - Patient/caregiver education      Discussed POC: Patient  Discussed Risks/Benefits: Yes  Patient/Caregiver Agreeable: Yes      Short term goals established 25:  - Patient will tolerate current diet without noted pulmonary  compromise or evidence of pulmonary sequela as noted in patient chart  and/or reported by patient/family. - Patient will independently  implement safe swallowing strategies to reduce risk of aspiration  with use of compensatory strategies during 90% of therapeutic trials.  - Pt will complete effortful swallows 20 reps, 3 x a day for 7 days a  week; to strengthen pharyngeal muscles for improve bolus clearance  through the pharynx. - Pt will  complete EMST/IMST 5 reps with 15-30  seconds in between rep, and 60 seconds between each set for 5x a day  @ SLP recommended cmH2o, with minimal cueing, as observed during  sessions and at home per patient report to strengthen movement of the  hyolaryngeal complex necessary for airway protection during the  swallow.          Long term goals 02/21/25:  Patient will tolerate the least restrictive diet without overt  difficulty or further pulmonary compromise by time of discharge.          Education Provided: Results and recommendations per MBSS, with video  review; recommendations and POC at this time. Verbal understanding  and agreement given on all accounts.      Additional Medical Consults Suggested:  - No new disciplines indicated      Repeat Study: Per MD or treating SLP      Mechanics of the swallow summary:  ORAL PHASE:  Lip Closure - No labial escape/anterior loss of bolus  Tongue Control During Bolus Hold - Escape to lateral buccal cavity  and/or floor of mouth Bolus prep/mastication - Disorganized  mastication with solid pieces of bolus unchewed Bolus  transport/lingual motion - Repetitive/disorganized tongue motion  (tongue pumping) Oral residue - Residue collection on oral structure      PHARYNGEAL PHASE:  Initiation of pharyngeal swallow - Bolus head at pit of pyriforms  Soft palate elevation - No bolus between soft palate/pharyngeal wall  Laryngeal elevation - Partial superior movement of thyroid cartilage  and/or partial approximation of arytenoids to epiglottic petiole  Anterior hyoid excursion - Partial anterior movement Epiglottic  movement - Complete inversion Laryngeal vestibule closure -  Incomplete - narrow column of air/contrast in laryngeal vestibule  Pharyngeal stripping wave - Present, however, diminished Pharyngeal  contraction (A/P view) - Not tested Pharyngoesophageal segment  opening - Complete distension and complete duration/no obstruction of  flow of bolus Tongue base retraction -  Narrow column of contrast or  air between tongue base and pharyngeal wall Pharyngeal residue -  Collection of residue within or on the pharyngeal structures      ESOPHAGEAL PHASE:  Esophageal clearance - Not evaluated          SLP Impressions with Severity Rating:  Pt presents with moderate oropharyngeal dysphagia upon completion of  modified barium swallow study this date. Swallowing physiology is  detailed above. Impairments most impacting swallowing safety and  efficiency include decreased hyolaryngeal elevation/excursion and  incomplete laryngeal vestibular closure. Patient demonstrated overt  aspiration with approximately 20 ml straw sips of thin liquids during  the swallow. Cough was minimally effecting at ejecting aspiration  material from airway. Noted transient penetration with 5ml (tsp) and  20 ml (straw sip) nectar thick liquids. No additional penetration or  aspiration was observed across other consistencies test      Strategies attempted- Effortful swallow resulted in improved airway  protection with straw sips of thin liquids, making pt a good  candidate for [place] Free Water Protocol.          OUTCOME MEASURES:  Functional Oral Intake Scale  Functional Oral Intake Scale: Level 5        total oral diet with  multiple consistencies, but requires special preparations and  compensations          Rosenbek's Penetration Aspiration Scale  Thin Liquids: 7. OVERT ASPIRATION, material is not cleared - contrast  passes glottis, visible residue, W/pt response During the Swallow  Nectar Thick Liquids: 2. PENETRATION that CLEARS - contrast enter  airway, above vocal cords, no residue During the Swallow  Honey Thick Liquids: 1. NO ASPIRATION & NO PENETRATION - no  aspiration, contrast does not enter airway Puree: 1. NO ASPIRATION  & NO PENETRATION - no aspiration, contrast does not enter airway  Solids: 1. NO ASPIRATION & NO PENETRATION - no aspiration, contrast  does not enter airway      Speech Therapy section of  this report signed by Nikki Grace MA.,  CCC-SLP on 2/21/2025 at 3:15 pm.      RADIOLOGY FINDINGS:  There was aspiration with thin liquids. Additional findings and  recommendations were provided by the speech pathologist.      Radiology section of this report signed by Ximena Sánchez MD.      Impression: Abnormal exam with aspiration.      MACRO:  None      Signed by: Ximena Sánchez 2/21/2025 3:54 PM  Dictation workstation:   [id number]      Physical Exam  Vitals reviewed.   Constitutional:       General: She is not in acute distress.  Cardiovascular:      Rate and Rhythm: Normal rate and regular rhythm.      Heart sounds: Normal heart sounds. No murmur heard.  Pulmonary:      Effort: No respiratory distress.      Breath sounds: Normal breath sounds. No wheezing.   Abdominal:      General: There is no distension.      Palpations: Abdomen is soft.      Tenderness: There is no abdominal tenderness.   Musculoskeletal:      Right lower leg: No edema.      Left lower leg: No edema.   Neurological:      General: No focal deficit present.      Mental Status: She is alert. Mental status is at baseline.         Relevant Results  Scheduled medications  amoxicillin-pot clavulanate, 1 tablet, oral, BID  apixaban, 2.5 mg, oral, BID  cholecalciferol, 5,000 Units, oral, Daily  [Held by provider] docusate sodium, 100 mg, oral, Nightly  ipratropium-albuteroL, 3 mL, nebulization, TID  [Held by provider] lactobacillus acidophilus, 1 capsule, oral, q AM  latanoprost, 1 drop, Both Eyes, Nightly  levothyroxine, 112 mcg, oral, Once per day on Monday Tuesday Wednesday Thursday Friday Saturday  [Held by provider] lisinopril, 10 mg, oral, Daily  meclizine, 12.5 mg, oral, Nightly  sodium chloride (PF) 0.9%, 10 mL, intravenous, BID  zinc oxide, 1 Application, Topical, TID      Continuous medications     PRN medications  PRN medications: acetaminophen, guaiFENesin, ipratropium-albuteroL, meclizine, oxygen, prochlorperazine, white  "petrolatum    Results for orders placed or performed during the hospital encounter of 02/19/25 (from the past 24 hours)   Renal function panel   Result Value Ref Range    Glucose 95 74 - 99 mg/dL    Sodium 138 136 - 145 mmol/L    Potassium 4.0 3.5 - 5.3 mmol/L    Chloride 104 98 - 107 mmol/L    Bicarbonate 25 21 - 32 mmol/L    Anion Gap 13 10 - 20 mmol/L    Urea Nitrogen 9 6 - 23 mg/dL    Creatinine 1.34 (H) 0.50 - 1.05 mg/dL    eGFR 38 (L) >60 mL/min/1.73m*2    Calcium 8.7 8.6 - 10.3 mg/dL    Phosphorus 2.7 2.5 - 4.9 mg/dL    Albumin 3.0 (L) 3.4 - 5.0 g/dL   CBC   Result Value Ref Range    WBC 8.7 4.4 - 11.3 x10*3/uL    nRBC 0.0 0.0 - 0.0 /100 WBCs    RBC 2.34 (L) 4.00 - 5.20 x10*6/uL    Hemoglobin 7.3 (L) 12.0 - 16.0 g/dL    Hematocrit 23.0 (L) 36.0 - 46.0 %    MCV 98 80 - 100 fL    MCH 31.2 26.0 - 34.0 pg    MCHC 31.7 (L) 32.0 - 36.0 g/dL    RDW 13.6 11.5 - 14.5 %    Platelets 354 150 - 450 x10*3/uL   Magnesium   Result Value Ref Range    Magnesium 2.11 1.60 - 2.40 mg/dL   Type And Screen   Result Value Ref Range    ABO TYPE A     Rh TYPE POS     ANTIBODY SCREEN NEG          Assessment/Plan   Nanette Flynn is a 89 y.o. female with PMH hypothyroidism, HTN, PE (on Eliquis), chronic B/L wounds and left hand carpal tunnel presented from [facility] with pneumonia.     ACUTE ISSUES:  #PNA due to Gram - bacteria and aspiration  -Does not meet sepsis criteria  -Has recently received IV ABX after being diagnosed with influenza and pneumonia.  -TTE: 63%  PLAN:  -On RA  -ID consulted due to recurrent infections: Continue Augmentin, discharge patient on Augmentin  -PT/OT  -DuoNebs, guaifenesin, IS  -Tylenol for pain     #Dysphagia/Aspiration  -Patient reports occasional swallowing difficulties particularly with medication  -Reports \"feeling like the pill is stuck in my throat\"  PLAN:  -SLP consult: Underwent MBS yesterday, diet changed accordingly per SLP recs     #Physical debility  PLAN:  -PT OT: " Moderate intensity     #Diarrhea (resolved)  - C. Diff negative, will monitor  - Holding bowel regimen, restart at facility as tolerated.     CHRONIC ISSUES:  #Hx PE - Continue Eliquis 2.5 mg BID  #HTN - Lisinopril 10 mg daily  #Hypothyroidism - Synthroid 75 mcg daily   #Chronic B/L wounds - Wound care     Fluid: Replete PRN  Electrolytes: Replete PRN  Nutrition: Regular  GI PPx:  -  DVT/PE PPx: Eliquis 2.5  Abx: Zosyn(2/19 - 2/22) Augmentin (2/22 - )  IV Lines: PIV  O2: RA     Disposition: Discharge to SNF tomorrow, initially planned for today however transport was unable to  until 10 PM  Code Status: DNR/DNI no ICU    Austin Adair MD

## 2025-02-23 NOTE — PROGRESS NOTES
02/23/25 1139   Discharge Planning   Living Arrangements Other (Comment)  (Patient was LTC at [facility] but family does not wish for her to return there.)   Support Systems Children   Assistance Needed Spoke with daughter and clarified patient was LTC at [facility] and her preference is for her mother to DC to [facility]. Auth was obtained through patient's [employer] insurance on 2/22 and patient is medically ready for DC. Daughter agrees with patient discharging to [facility] [place] today 2/22.   Type of Residence Nursing home/residential care   Home or Post Acute Services Post acute facilities (Rehab/SNF/etc)   Type of Post Acute Facility Services Skilled nursing  ([facility]. Will not return to [facility] LT as family did not feel as though she received the care she needed there.)   Expected Discharge Disposition SNF   Does the patient need discharge transport arranged? Yes   RoundTrip coordination needed? Yes   Has discharge transport been arranged? No

## 2025-02-23 NOTE — CARE PLAN
The clinical goals for the shift include patient will remain safe and comfortable throughout shift.

## 2025-02-23 NOTE — DISCHARGE SUMMARY
"Discharge Diagnosis  Multifocal pneumonia  Dysphagia/aspiration  Acute Kidney Injury  Diarrhea, resolved   Dehydration    Issues Requiring Follow-Up  - The following recommendations are made for you at time of hospital discharge:   - Please remember to take oral Augmentin twice a day for 5 days for complete treatment of your PNA   - Upon discharge, SLP recommends a solid consistency, regular (IDDSI level 7) diet. Liquid consistency: Mildly thick (IDDSI 2) / [place]-thick. Medication administration: Whole in liquid vs puree, as best tolerated. Compensatory swallow strategies include: being seated upright - as close to 90 degrees as possible, remaining upright for 20-30 minutes after meals, full supervision during meals, 1:1 assistance with meals, alternate solids and liquids, single sips, small bites/sips, Eat/feed slowly, Effortful swallow, and Stringent oral care routine - 4-6x/day (before/after meals).\"  - Please take your home medications as instructed prior to hospitalization.   - No changes to your home medications have been made during your hospital stay.   - Please follow-up with your primary care provider within 5-7 days from time of discharge. Please call your PCP's office to schedule an appointment. Likely will need to bring photo ID and insurance card to your appointment.   - If you experience any worsening symptoms or any new acute concerns arise, please contact your primary care provider to discuss and possibly arrange an appointment. If you cannot get in touch with provider or severe symptoms are present, please return to nearest emergency room/urgent care for evaluation and treatment.    Discharge Meds     Medication List      ASK your doctor about these medications     acetaminophen 325 mg tablet; Commonly known as: Tylenol   apixaban 2.5 mg tablet; Commonly known as: Eliquis; Take 1 tablet (2.5   mg) by mouth 2 times a day.   calcium carb,gluc-mag gluc,ox 500 mg calcium- 250 mg tablet   " cholecalciferol 5,000 Units tablet; Commonly known as: Vitamin D-3   docusate sodium 100 mg capsule; Commonly known as: Colace   * EMERGEN-C ORAL   * EMERGEN-C ORAL   guaiFENesin 200 mg/5 mL liquid   * ipratropium-albuteroL 0.5-2.5 mg/3 mL nebulizer solution; Commonly   known as: Duo-Neb   * ipratropium-albuteroL 0.5-2.5 mg/3 mL nebulizer solution; Commonly   known as: Duo-Neb   lisinopril 10 mg tablet; Take 1 tablet (10 mg) by mouth once daily.   * meclizine 12.5 mg tablet; Commonly known as: Antivert   * meclizine 12.5 mg tablet; Commonly known as: Antivert   PROBIOTIC ORAL   prochlorperazine 5 mg tablet; Commonly known as: Compazine   sodium chloride (PF) 0.9% injection   Synthroid 112 mcg tablet; Generic drug: levothyroxine; Ask about: Which   instructions should I use?   Travatan Z 0.004 % drops ophthalmic solution; Generic drug: travoprost   VITAMIN B-12 ORAL; Ask about: Which instructions should I use?  * This list has 6 medication(s) that are the same as other medications   prescribed for you. Read the directions carefully, and ask your doctor or   other care provider to review them with you.       Test Results Pending At Discharge  Pending Labs       Order Current Status    Blood Culture Preliminary result    Blood Culture Preliminary result            Hospital Course  ED COURSE:   VS: Temperature 36.4, /89, heart rate 78, respiration 24, O2 sat 97% RA     Labs  - CBC: WBC 8.9, hemoglobin 8, hematocrit 25, platelets 420  - BMP: Sodium 139, potassium 3.9, chloride 101, bicarb 30, BUN 11, creatinine 0.99, glucose 96  - LFTs: Alk phos 58, ALT 20, AST 25, T. bili 0.4  - Phos: 3.2  - Lactate:   - BNP: 246  - Troponin: 9, 9  - UA positive for:   - Blood cultures: Pending  - COVID/Flu/RSV PCR: Negative   - MRSA swab: Pending     Imaging:  CT Angio Chest for PE:  1. No pulmonary embolism.  2. Findings compatible with multifocal pneumonia involving all lobes, right greater than left, progressive compared to  "2/9/2024  3. Small bilateral pleural effusions.  4. Severe coronary artery calcifications.  5. Underlying emphysema.  6. Dilated main pulmonary artery suggestive of pulmonary arterial hypertension.    Interventions:   -Vancomycin  -Zosyn  -Azithromycin      #Hx PE - Continue Eliquis 2.5 mg BID  #HTN - Lisinopril 10 mg daily  #Hypothyroidism - Synthroid 75 mcg daily       Hospital course:   - During hospitalization, pt was treated with Zosyn for PNA due to gram negative bacteria, ID was consulted. Per patient's HPI and HPI provided by family, she reported occasional swallowing difficulties. SLP was consulted:  \"Recommended regular diet with nectar thick liquids. Upon discharge, SLP recommends a solid consistency, regular (IDDSI level 7) diet. Liquid consistency: Mildly thick (IDDSI 2) / [place]-thick. Medication administration: Whole in liquid vs puree, as best tolerated. Compensatory swallow strategies include: being seated upright - as close to 90 degrees as possible, remaining upright for 20-30 minutes after meals, full supervision during meals, 1:1 assistance with meals, alternate solids and liquids, single sips, small bites/sips, Eat/feed slowly, Effortful swallow, and Stringent oral care routine - 4-6x/day (before/after meals).\" Overnight on 2/20 patient reported significant diarrhea, Cdiff PCR obtained and returned negative. Diarrhea resolved. ID recommended continuing zosyn and oral Augmentin on discharge.  Pt's BNP was elevated upon admission with concerns for possible pulmonary edema, TTE obtained showing LVEF 63% with mild AVR.  PT/OT was placed on consult for physical deconditioning, and recommended SNF placement. Wound care was consulted for Left lateral bridge of nose, Right lateral calf & Buttock cleft small erosion. Recommended offload, protect with Aquaphor, Zinc, q 2 hour turns, sacral Mepilex. Pt is HDS for discharge to SNF on oral Augmentin x 5 days.       Pertinent Physical Exam At Time of " Discharge  Physical Exam  HEENT:  EOMI, clear sclera, moist mucous membranes  CV: RRR, No M/R/G  PULM: CTAB, no coughing or wheezing  ABDOMEN: Soft  SKIN: Normal Color, Warm, Dry  EXTREMITIES: Non-Tender, Full ROM, No Pedal Edema  NEURO: oriented    Outpatient Follow-Up  Future Appointments   Date Time Provider Department Center   3/5/2025  1:00 PM Blanca Quiroz MD, MS [id number] Harley Heredia DO  Internal Medicine, PGY-1

## 2025-02-23 NOTE — CARE PLAN
The patient's goals for the shift include  patient will remain safe and comfortable throughout shift.    The clinical goals for the shift include Patient will utilize the call light when needing assistance to maintain safety      Problem: Pain - Adult  Goal: Verbalizes/displays adequate comfort level or baseline comfort level  Outcome: Progressing     Problem: Safety - Adult  Goal: Free from fall injury  Outcome: Progressing     Problem: Discharge Planning  Goal: Discharge to home or other facility with appropriate resources  Outcome: Progressing     Problem: Chronic Conditions and Co-morbidities  Goal: Patient's chronic conditions and co-morbidity symptoms are monitored and maintained or improved  Outcome: Progressing     Problem: Nutrition  Goal: Nutrient intake appropriate for maintaining nutritional needs  Outcome: Progressing     Problem: Skin  Goal: Decreased wound size/increased tissue granulation at next dressing change  Outcome: Progressing  Goal: Participates in plan/prevention/treatment measures  Reactivated  Goal: Prevent/manage excess moisture  Outcome: Progressing  Goal: Prevent/minimize sheer/friction injuries  Outcome: Progressing  Goal: Promote/optimize nutrition  Outcome: Progressing  Goal: Promote skin healing  Outcome: Progressing

## 2025-02-23 NOTE — PROGRESS NOTES
02/23/25 1241   Discharge Planning   Assistance Needed Patient will [facility] [place] [facility] today. Per physician patient is medically ready for DC. Patient's family was called and updated on DC plan. Bedside nurse was provided with number for nurse to nurse report. Discharge  to send needed paperwork to facility via [employer] and arrange transport for the patient.

## 2025-02-24 ENCOUNTER — NURSING HOME VISIT (OUTPATIENT)
Dept: POST ACUTE CARE | Facility: EXTERNAL LOCATION | Age: 89
End: 2025-02-24
Payer: MEDICARE

## 2025-02-24 VITALS
HEART RATE: 109 BPM | WEIGHT: 174.82 LBS | TEMPERATURE: 98.6 F | DIASTOLIC BLOOD PRESSURE: 86 MMHG | HEIGHT: 63 IN | OXYGEN SATURATION: 91 % | SYSTOLIC BLOOD PRESSURE: 157 MMHG | RESPIRATION RATE: 16 BRPM | BODY MASS INDEX: 30.98 KG/M2

## 2025-02-24 DIAGNOSIS — J69.0 ASPIRATION PNEUMONIA OF BOTH LUNGS, UNSPECIFIED ASPIRATION PNEUMONIA TYPE, UNSPECIFIED PART OF LUNG (MULTI): Primary | ICD-10-CM

## 2025-02-24 DIAGNOSIS — I10 ESSENTIAL HYPERTENSION: ICD-10-CM

## 2025-02-24 DIAGNOSIS — R13.12 OROPHARYNGEAL DYSPHAGIA: ICD-10-CM

## 2025-02-24 LAB
ALBUMIN SERPL BCP-MCNC: 3 G/DL (ref 3.4–5)
ANION GAP SERPL CALC-SCNC: 10 MMOL/L (ref 10–20)
BACTERIA BLD CULT: NORMAL
BACTERIA BLD CULT: NORMAL
BUN SERPL-MCNC: 12 MG/DL (ref 6–23)
CALCIUM SERPL-MCNC: 8.6 MG/DL (ref 8.6–10.3)
CHLORIDE SERPL-SCNC: 106 MMOL/L (ref 98–107)
CO2 SERPL-SCNC: 25 MMOL/L (ref 21–32)
CREAT SERPL-MCNC: 1.29 MG/DL (ref 0.5–1.05)
EGFRCR SERPLBLD CKD-EPI 2021: 40 ML/MIN/1.73M*2
ERYTHROCYTE [DISTWIDTH] IN BLOOD BY AUTOMATED COUNT: 13.5 % (ref 11.5–14.5)
GLUCOSE SERPL-MCNC: 104 MG/DL (ref 74–99)
HCT VFR BLD AUTO: 24.2 % (ref 36–46)
HGB BLD-MCNC: 7.7 G/DL (ref 12–16)
MAGNESIUM SERPL-MCNC: 2 MG/DL (ref 1.6–2.4)
MCH RBC QN AUTO: 30.9 PG (ref 26–34)
MCHC RBC AUTO-ENTMCNC: 31.8 G/DL (ref 32–36)
MCV RBC AUTO: 97 FL (ref 80–100)
NRBC BLD-RTO: 0 /100 WBCS (ref 0–0)
PHOSPHATE SERPL-MCNC: 2.8 MG/DL (ref 2.5–4.9)
PLATELET # BLD AUTO: 337 X10*3/UL (ref 150–450)
POTASSIUM SERPL-SCNC: 3.8 MMOL/L (ref 3.5–5.3)
RBC # BLD AUTO: 2.49 X10*6/UL (ref 4–5.2)
SODIUM SERPL-SCNC: 137 MMOL/L (ref 136–145)
WBC # BLD AUTO: 9.4 X10*3/UL (ref 4.4–11.3)

## 2025-02-24 PROCEDURE — 85027 COMPLETE CBC AUTOMATED: CPT

## 2025-02-24 PROCEDURE — 2500000001 HC RX 250 WO HCPCS SELF ADMINISTERED DRUGS (ALT 637 FOR MEDICARE OP)

## 2025-02-24 PROCEDURE — 83735 ASSAY OF MAGNESIUM: CPT

## 2025-02-24 PROCEDURE — 99305 1ST NF CARE MODERATE MDM 35: CPT | Performed by: FAMILY MEDICINE

## 2025-02-24 PROCEDURE — 94760 N-INVAS EAR/PLS OXIMETRY 1: CPT

## 2025-02-24 PROCEDURE — 80069 RENAL FUNCTION PANEL: CPT

## 2025-02-24 PROCEDURE — 2500000002 HC RX 250 W HCPCS SELF ADMINISTERED DRUGS (ALT 637 FOR MEDICARE OP, ALT 636 FOR OP/ED)

## 2025-02-24 PROCEDURE — 94640 AIRWAY INHALATION TREATMENT: CPT

## 2025-02-24 PROCEDURE — 99239 HOSP IP/OBS DSCHRG MGMT >30: CPT

## 2025-02-24 PROCEDURE — 2500000002 HC RX 250 W HCPCS SELF ADMINISTERED DRUGS (ALT 637 FOR MEDICARE OP, ALT 636 FOR OP/ED): Performed by: INTERNAL MEDICINE

## 2025-02-24 RX ADMIN — LEVOTHYROXINE SODIUM 112 MCG: 112 TABLET ORAL at 05:03

## 2025-02-24 RX ADMIN — APIXABAN 2.5 MG: 2.5 TABLET, FILM COATED ORAL at 08:06

## 2025-02-24 RX ADMIN — IPRATROPIUM BROMIDE AND ALBUTEROL SULFATE 3 ML: 2.5; .5 SOLUTION RESPIRATORY (INHALATION) at 07:26

## 2025-02-24 RX ADMIN — Medication 125 MCG: at 08:07

## 2025-02-24 RX ADMIN — AMOXICILLIN AND CLAVULANATE POTASSIUM 1 TABLET: 875; 125 TABLET, FILM COATED ORAL at 08:06

## 2025-02-24 RX ADMIN — Medication 1 APPLICATION: at 08:07

## 2025-02-24 ASSESSMENT — COGNITIVE AND FUNCTIONAL STATUS - GENERAL
DRESSING REGULAR LOWER BODY CLOTHING: A LOT
MOBILITY SCORE: 9
CLIMB 3 TO 5 STEPS WITH RAILING: TOTAL
DRESSING REGULAR UPPER BODY CLOTHING: A LOT
DAILY ACTIVITIY SCORE: 14
PERSONAL GROOMING: A LITTLE
STANDING UP FROM CHAIR USING ARMS: TOTAL
TURNING FROM BACK TO SIDE WHILE IN FLAT BAD: A LOT
TOILETING: A LOT
MOVING FROM LYING ON BACK TO SITTING ON SIDE OF FLAT BED WITH BEDRAILS: A LOT
EATING MEALS: A LITTLE
HELP NEEDED FOR BATHING: A LOT
WALKING IN HOSPITAL ROOM: TOTAL
MOVING TO AND FROM BED TO CHAIR: A LOT

## 2025-02-24 NOTE — PROGRESS NOTES
Nanette Flynn is a 89 y.o. female on day 5 of admission presenting with Multifocal pneumonia.    Subjective   Interval History: no fever, no new complaints        Review of Systems    Objective   Range of Vitals (last 24 hours)  Heart Rate:  []   Temp:  [36.6 °C (97.9 °F)-37 °C (98.6 °F)]   Resp:  [16]   BP: (127-157)/(74-96)   SpO2:  [91 %-96 %]   Daily Weight  02/20/25 : 79.3 kg (174 lb 13.2 oz)    Body mass index is 30.97 kg/m².    Physical Exam  Constitutional:       Appearance: Normal appearance.   HENT:      Head: Normocephalic and atraumatic.      Mouth/Throat:      Mouth: Mucous membranes are moist.      Pharynx: Oropharynx is clear.   Eyes:      Pupils: Pupils are equal, round, and reactive to light.   Cardiovascular:      Rate and Rhythm: Normal rate and regular rhythm.      Heart sounds: Normal heart sounds.   Pulmonary:      Effort: Pulmonary effort is normal.      Breath sounds: Rales present.   Abdominal:      General: Abdomen is flat. Bowel sounds are normal.      Palpations: Abdomen is soft.   Musculoskeletal:      Cervical back: Normal range of motion.   Neurological:      Mental Status: She is alert.         Antibiotics  amoxicillin-pot clavulanate - 875-125 mg, 875-125 mg    Relevant Results  Labs  Results from last 72 hours   Lab Units 02/24/25  0544 02/23/25  0526 02/22/25  0605   WBC AUTO x10*3/uL 9.4 8.7 8.2   HEMOGLOBIN g/dL 7.7* 7.3* 7.2*   HEMATOCRIT % 24.2* 23.0* 21.8*   PLATELETS AUTO x10*3/uL 337 354 345     Results from last 72 hours   Lab Units 02/24/25  0544 02/23/25  0526 02/22/25  0605   SODIUM mmol/L 137 138 140   POTASSIUM mmol/L 3.8 4.0 3.4*   CHLORIDE mmol/L 106 104 104   CO2 mmol/L 25 25 27   BUN mg/dL 12 9 10   CREATININE mg/dL 1.29* 1.34* 1.44*   GLUCOSE mg/dL 104* 95 104*   CALCIUM mg/dL 8.6 8.7 8.4*   ANION GAP mmol/L 10 13 12   EGFR mL/min/1.73m*2 40* 38* 35*   PHOSPHORUS mg/dL 2.8 2.7 2.9     Results from last 72 hours   Lab Units 02/24/25  0544 02/23/25  0526  02/22/25  0605   ALBUMIN g/dL 3.0* 3.0* 2.8*     Estimated Creatinine Clearance: 30.1 mL/min (A) (by C-G formula based on SCr of 1.29 mg/dL (H)).  C-Reactive Protein   Date Value Ref Range Status   01/08/2025 2.37 (H) <1.00 mg/dL Final   01/07/2025 1.66 (H) <1.00 mg/dL Final   01/06/2025 1.52 (H) <1.00 mg/dL Final     Microbiology  No results found for the last 14 days.    Imaging  Reviewed        Assessment/Plan   Respiratory failure / pneumonia / suspected aspiration  Bacteremia 2/7, likely a contaminant  Post influenza A infection     Recommendations :  Plan on oral Augmentin x 3 days with discharge  Discussed with the medical team     I spent minutes in the professional and overall care of this patient.      Poonam Sandoval MD

## 2025-02-24 NOTE — PROGRESS NOTES
02/24/25 0945   Discharge Planning   Living Arrangements Other (Comment)  (Patient was LTC at [facility] but family does not wish for her to return there.)   Support Systems Children   Assistance Needed Spoke with daughter and clarified patient was LTC at [facility] and her preference is for her mother to DC to [facility] Living at [facility]. Auth was obtained through patient's [employer] insurance on 2/22 and patient is medically ready for DC.   Type of Residence Nursing home/residential care   Home or Post Acute Services Post acute facilities (Rehab/SNF/etc)   Type of Post Acute Facility Services Skilled nursing  ([facility] Living at [facility]. Will not return to [facility] LT as family did not feel as though she received the care she needed there.)   Expected Discharge Disposition SNF   Does the patient need discharge transport arranged? Yes   RoundTrip coordination needed? Yes   Has discharge transport been arranged? Yes   What day is the transport expected? 02/24/25   What time is the transport expected? 1100     Daughter Sherine made aware of new discharge time.

## 2025-02-24 NOTE — NURSING NOTE
Assumed care of patient.     Patient bladder scanned.  No need for straight cath.   N.O to remove PICC line. RN notified.   1020- CCA called, report given.   4257- CCA here to   [facility]

## 2025-02-24 NOTE — LETTER
Patient: Nanette Flynn  : 1935    Encounter Date: 2025    Nanette Flynn is a 89 y.o. 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  No CP/SOB. Med list & problem list reviewed.  Daughter present at bedside during history and examination.     O: VSS AFEB Wt Pending. Awake, alert, pleasantly confused.  NAD. Chest cta. Heart rrr. Ext no c/c/e.     A/P:  # Weakness: SNF PT/OT.   # HTN: stable on lisinopril 10  # Aspiration PNA; complete augmentin, duoneb  # Dysphagia: nectar thick liquids. HOB elevated 30 deg. ST to work on FFW.  # Hypothyroidism: 112 mcg 6 days a week.   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      Past Surgical History:   Procedure Laterality Date   • APPENDECTOMY  2018    Appendectomy   • COLON SURGERY     • CT ANGIO NECK  2013    CT NECK ANGIO W AND WO IV CONTRAST 2013 [facility]   • CT HEAD ANGIO W AND WO IV CONTRAST  2013    CT HEAD ANGIO W AND WO IV CONTRAST 2013 [facility]   • HYSTERECTOMY  2018    Hysterectomy   • OTHER SURGICAL HISTORY  2018    Enterectomy      Social History     Socioeconomic History   • Marital status:      Spouse name: Not on file   • Number of children: Not on file   • Years of education: Not on file   • Highest education level: Not on file   Occupational History   • Not on file   Tobacco Use   • Smoking status: Former     Types: Cigarettes     Start date: 1954   • Smokeless tobacco: Never   Vaping Use   • Vaping status: Never Used    Substance and Sexual Activity   • Alcohol use: Not Currently   • Drug use: Not Currently   • Sexual activity: Not Currently     Partners: Male     Birth control/protection: None   Other Topics Concern   • Not on file   Social History Narrative   • Not on file     Social Drivers of Health     Financial Resource Strain: Low Risk  (2/19/2025)    Overall Financial Resource Strain (CARDIA)    • Difficulty of Paying Living Expenses: Not very hard   Food Insecurity: No Food Insecurity (2/19/2025)    Hunger Vital Sign    • Worried About Running Out of Food in the Last Year: Never true    • Ran Out of Food in the Last Year: Never true   Transportation Needs: No Transportation Needs (2/19/2025)    PRAPARE - Transportation    • Lack of Transportation (Medical): No    • Lack of Transportation (Non-Medical): No   Physical Activity: Not on file   Stress: No Stress Concern Present (11/8/2024)    [employer] - Occupational Stress Questionnaire    • Feeling of Stress : Not at all   Social Connections: Unknown (11/8/2024)    Social Connection and Isolation Panel [NHANES]    • Frequency of Communication with Friends and Family: Never    • Frequency of Social Gatherings with Friends and Family: Not on file    • Attends [employer] Services: Not on file    • Active Member of Clubs or Organizations: Not on file    • Attends Club or Organization Meetings: Not on file    • Marital Status: Not on file   Intimate Partner Violence: Not At Risk (2/19/2025)    Humiliation, Afraid, Rape, and Kick questionnaire    • Fear of Current or Ex-Partner: No    • Emotionally Abused: No    • Physically Abused: No    • Sexually Abused: No   Housing Stability: Low Risk  (2/19/2025)    Housing Stability Vital Sign    • Unable to Pay for Housing in the Last Year: No    • Number of Times Moved in the Last Year: 1    • Homeless in the Last Year: No     Past Medical History:   Diagnosis Date   • Arthritis    • Disease of thyroid gland     • Glaucoma    • Hypertension    • Other specified postprocedural states     History of colonoscopy   • Personal history of other diseases of the circulatory system     History of rheumatic fever   • Personal history of other diseases of the circulatory system     History of rheumatic fever   • Personal history of other medical treatment     History of mammogram      Family History   Problem Relation Name Age of Onset   • Heart disease Mother Leanna    • Hypertension Mother Leanna    • Transient ischemic attack Mother Leanna    • Heart disease Father Richard    • Cancer Maternal Grandmother Maternal grandmother    • Colon cancer Maternal Grandmother Maternal grandmother         Review of Systems   Constitutional:  Negative for chills, fatigue and fever.   HENT:  Negative for rhinorrhea and sore throat.    Eyes:  Negative for pain and redness.   Respiratory:  Negative for cough and shortness of breath.    Cardiovascular:  Negative for chest pain and palpitations.   Gastrointestinal:  Negative for abdominal pain and blood in stool.   Endocrine: Negative for polydipsia and polyuria.   Genitourinary:  Negative for dysuria and hematuria.   Musculoskeletal:  Negative for back pain and neck stiffness.   Skin:  Negative for rash and wound.   Allergic/Immunologic: Negative for environmental allergies and food allergies.   Neurological:  Positive for weakness. Negative for headaches.   Hematological:  Negative for adenopathy. Does not bruise/bleed easily.   Psychiatric/Behavioral:  Negative for hallucinations and suicidal ideas.       There were no vitals taken for this visit.  Physical Exam  Vitals reviewed.   Constitutional:       General: She is not in acute distress.     Appearance: She is not ill-appearing.   HENT:      Head: Normocephalic and atraumatic.      Right Ear: Tympanic membrane normal.      Left Ear: Tympanic membrane normal.      Nose: No congestion or rhinorrhea.      Mouth/Throat:      Pharynx: No oropharyngeal  "exudate or posterior oropharyngeal erythema.   Eyes:      Extraocular Movements: Extraocular movements intact.      Conjunctiva/sclera: Conjunctivae normal.      Pupils: Pupils are equal, round, and reactive to light.   Cardiovascular:      Rate and Rhythm: Normal rate and regular rhythm.      Heart sounds: No murmur heard.     No friction rub. No gallop.   Pulmonary:      Effort: Pulmonary effort is normal.      Breath sounds: Normal breath sounds. No wheezing, rhonchi or rales.   Abdominal:      General: There is no distension.      Palpations: Abdomen is soft.      Tenderness: There is no abdominal tenderness. There is no guarding or rebound.   Musculoskeletal:         General: No swelling or deformity.      Cervical back: Normal range of motion and neck supple.      Right lower leg: No edema.      Left lower leg: No edema.   Skin:     Capillary Refill: Capillary refill takes less than 2 seconds.      Coloration: Skin is not jaundiced.      Findings: No rash.   Neurological:      General: No focal deficit present.      Mental Status: She is alert.      Motor: Weakness present.   Psychiatric:         Mood and Affect: Mood normal.         Behavior: Behavior normal.       Lab Results   Component Value Date    WBC 9.4 02/24/2025    HGB 7.7 (L) 02/24/2025    HCT 24.2 (L) 02/24/2025    MCV 97 02/24/2025     02/24/2025     Lab Results   Component Value Date    CHOL 172 07/25/2023     Lab Results   Component Value Date    HDL 50 (L) 07/25/2023     Lab Results   Component Value Date    LDLCALC 92 07/25/2023     Lab Results   Component Value Date    TRIG 152 (H) 07/25/2023     No components found for: \"CHOLHDL\"  No results found for: \"HGBA1C\"    Assessment/Plan  Problem List Items Addressed This Visit    None  Visit Diagnoses       Aspiration pneumonia of both lungs, unspecified aspiration pneumonia type, unspecified part of lung (Multi)    -  Primary    Essential hypertension        Oropharyngeal dysphagia          "       Electronically Signed By: Dwight House MD   2/27/25  1:06 PM

## 2025-02-25 ENCOUNTER — NURSING HOME VISIT (OUTPATIENT)
Dept: POST ACUTE CARE | Facility: EXTERNAL LOCATION | Age: 89
End: 2025-02-25
Payer: MEDICARE

## 2025-02-25 DIAGNOSIS — M13.0 POLYARTHRITIS: ICD-10-CM

## 2025-02-25 DIAGNOSIS — Z83.2 FAMILY HISTORY OF FACTOR V DEFICIENCY: ICD-10-CM

## 2025-02-25 DIAGNOSIS — I26.94 MULTIPLE SUBSEGMENTAL PULMONARY EMBOLI WITHOUT ACUTE COR PULMONALE: ICD-10-CM

## 2025-02-25 DIAGNOSIS — I10 ESSENTIAL HYPERTENSION: ICD-10-CM

## 2025-02-25 DIAGNOSIS — K59.00 CONSTIPATION, UNSPECIFIED CONSTIPATION TYPE: ICD-10-CM

## 2025-02-25 DIAGNOSIS — M62.81 MUSCLE WEAKNESS (GENERALIZED): Primary | ICD-10-CM

## 2025-02-25 DIAGNOSIS — R13.12 OROPHARYNGEAL DYSPHAGIA: ICD-10-CM

## 2025-02-25 DIAGNOSIS — J69.0 ASPIRATION PNEUMONIA OF BOTH LUNGS, UNSPECIFIED ASPIRATION PNEUMONIA TYPE, UNSPECIFIED PART OF LUNG (MULTI): ICD-10-CM

## 2025-02-25 DIAGNOSIS — R11.0 NAUSEA: ICD-10-CM

## 2025-02-25 DIAGNOSIS — K21.9 GASTROESOPHAGEAL REFLUX DISEASE, UNSPECIFIED WHETHER ESOPHAGITIS PRESENT: ICD-10-CM

## 2025-02-25 DIAGNOSIS — E89.0 POSTPROCEDURAL HYPOTHYROIDISM: ICD-10-CM

## 2025-02-25 PROCEDURE — 99310 SBSQ NF CARE HIGH MDM 45: CPT | Performed by: NURSE PRACTITIONER

## 2025-02-26 NOTE — DOCUMENTATION CLARIFICATION NOTE
PATIENT:               KELLY DAWKINS  ACCT #:                  [id number]  MRN:                       [id number]  :                       1935  ADMIT DATE:       2025 2:27 PM  DISCH DATE:        2025 11:09 AM  RESPONDING PROVIDER #:        [id number]          PROVIDER RESPONSE TEXT:    Acute Respiratory Failure ruled out after further workup    CDI QUERY TEXT:    Clarification      Instruction:    Based on your assessment of the patient and the clinical information, please provide the requested documentation by clicking on the appropriate radio button and enter any additional information if prompted.      Question: Please clarify diagnosis of respiratory failure as      When answering this query, please exercise your independent professional judgment. The fact that a question is being asked, does not imply that any particular answer is desired or expected.    The patient's clinical indicators include:  Clinical Information:  88 y/o female presents to [facility] c/o shortness of breath. DX Gram negative PNA, LALITA.      Documented Diagnosis:  25 and 25 ID notes per EMMA Sandoval MD: ?Respiratory failure?      Clinical Indicators and Documentation:  Vital Signs trend  - : T 36.4 - 37.1, HR 63 - 94, RR 16 - 25, SpO2 92 - 99,  - 179  ABG results - None drawn  Physical Exam Findings:   H&P states  ?She is not in acute distress.? / ?Breath sounds: Wheezing present. Comments: 1L NC, coarse breath sounds b/l?   PN states ?Pulmonary: Effort: Pulmonary effort is normal. Breath sounds: No stridor. Wheezing present.?  No Cyanosis, labored breathing, or tripod positioning noted  Oxygen Therapy: 2L O2 NC  per MAR  No Pulmonary Consult      Treatment: Duoneb  - , Supplemental O2 as above  Risk Factors: PMH: HTN, Arthritis, Glaucoma  Options provided:  -- Acute Respiratory Failure ruled out after further workup  -- Acute Respiratory Failure ruled in, as evidenced by,  Please specify additional information below  -- Other - I will add my own diagnosis  -- Refer to Clinical Documentation Reviewer    Query created by: Olga Oliva on 2/21/2025 1:11 PM      Electronically signed by:  ANGELLA CHIN DO 2/26/2025 9:00 AM

## 2025-02-27 ASSESSMENT — ENCOUNTER SYMPTOMS
WOUND: 0
COUGH: 0
HEADACHES: 0
SORE THROAT: 0
FATIGUE: 0
BLOOD IN STOOL: 0
EYE PAIN: 0
SHORTNESS OF BREATH: 0
FEVER: 0
CHILLS: 0
BRUISES/BLEEDS EASILY: 0
HEMATURIA: 0
ADENOPATHY: 0
EYE REDNESS: 0
ABDOMINAL PAIN: 0
WEAKNESS: 1
NECK STIFFNESS: 0
POLYDIPSIA: 0
HALLUCINATIONS: 0
BACK PAIN: 0
PALPITATIONS: 0
DYSURIA: 0
RHINORRHEA: 0

## 2025-02-27 NOTE — PROGRESS NOTES
Nanette Flynn is a 89 y.o. 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  No CP/SOB. Med list & problem list reviewed.  Daughter present at bedside during history and examination.     O: VSS AFEB Wt Pending. Awake, alert, pleasantly confused.  NAD. Chest cta. Heart rrr. Ext no c/c/e.     A/P:  # Weakness: SNF PT/OT.   # HTN: stable on lisinopril 10  # Aspiration PNA; complete augmentin, duoneb  # Dysphagia: nectar thick liquids. HOB elevated 30 deg. ST to work on FFW.  # Hypothyroidism: 112 mcg 6 days a week.   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      Past Surgical History:   Procedure Laterality Date    APPENDECTOMY  2018    Appendectomy    COLON SURGERY      CT ANGIO NECK  2013    CT NECK ANGIO W AND WO IV CONTRAST 2013 [facility]    CT HEAD ANGIO W AND WO IV CONTRAST  2013    CT HEAD ANGIO W AND WO IV CONTRAST 2013 [facility]    HYSTERECTOMY  2018    Hysterectomy    OTHER SURGICAL HISTORY  2018    Enterectomy      Social History     Socioeconomic History    Marital status:      Spouse name: Not on file    Number of children: Not on file    Years of education: Not on file    Highest education level: Not on file   Occupational History    Not on file   Tobacco Use    Smoking status: Former     Types: Cigarettes     Start date: 1954    Smokeless tobacco: Never   Vaping Use    Vaping status: Never Used   Substance and Sexual Activity    Alcohol use: Not Currently    Drug use: Not Currently     Sexual activity: Not Currently     Partners: Male     Birth control/protection: None   Other Topics Concern    Not on file   Social History Narrative    Not on file     Social Drivers of Health     Financial Resource Strain: Low Risk  (2/19/2025)    Overall Financial Resource Strain (CARDIA)     Difficulty of Paying Living Expenses: Not very hard   Food Insecurity: No Food Insecurity (2/19/2025)    Hunger Vital Sign     Worried About Running Out of Food in the Last Year: Never true     Ran Out of Food in the Last Year: Never true   Transportation Needs: No Transportation Needs (2/19/2025)    PRAPARE - Transportation     Lack of Transportation (Medical): No     Lack of Transportation (Non-Medical): No   Physical Activity: Not on file   Stress: No Stress Concern Present (11/8/2024)    [employer] - Occupational Stress Questionnaire     Feeling of Stress : Not at all   Social Connections: Unknown (11/8/2024)    Social Connection and Isolation Panel [NHANES]     Frequency of Communication with Friends and Family: Never     Frequency of Social Gatherings with Friends and Family: Not on file     Attends Zoroastrianism Services: Not on file     Active Member of Clubs or Organizations: Not on file     Attends Club or Organization Meetings: Not on file     Marital Status: Not on file   Intimate Partner Violence: Not At Risk (2/19/2025)    Humiliation, Afraid, Rape, and Kick questionnaire     Fear of Current or Ex-Partner: No     Emotionally Abused: No     Physically Abused: No     Sexually Abused: No   Housing Stability: Low Risk  (2/19/2025)    Housing Stability Vital Sign     Unable to Pay for Housing in the Last Year: No     Number of Times Moved in the Last Year: 1     Homeless in the Last Year: No     Past Medical History:   Diagnosis Date    Arthritis     Disease of thyroid gland     Glaucoma     Hypertension     Other specified postprocedural states     History of colonoscopy    Personal  history of other diseases of the circulatory system     History of rheumatic fever    Personal history of other diseases of the circulatory system     History of rheumatic fever    Personal history of other medical treatment     History of mammogram      Family History   Problem Relation Name Age of Onset    Heart disease Mother Leanna     Hypertension Mother Leanna     Transient ischemic attack Mother Leanna     Heart disease Father Richard     Cancer Maternal Grandmother Maternal grandmother     Colon cancer Maternal Grandmother Maternal grandmother         Review of Systems   Constitutional:  Negative for chills, fatigue and fever.   HENT:  Negative for rhinorrhea and sore throat.    Eyes:  Negative for pain and redness.   Respiratory:  Negative for cough and shortness of breath.    Cardiovascular:  Negative for chest pain and palpitations.   Gastrointestinal:  Negative for abdominal pain and blood in stool.   Endocrine: Negative for polydipsia and polyuria.   Genitourinary:  Negative for dysuria and hematuria.   Musculoskeletal:  Negative for back pain and neck stiffness.   Skin:  Negative for rash and wound.   Allergic/Immunologic: Negative for environmental allergies and food allergies.   Neurological:  Positive for weakness. Negative for headaches.   Hematological:  Negative for adenopathy. Does not bruise/bleed easily.   Psychiatric/Behavioral:  Negative for hallucinations and suicidal ideas.       There were no vitals taken for this visit.  Physical Exam  Vitals reviewed.   Constitutional:       General: She is not in acute distress.     Appearance: She is not ill-appearing.   HENT:      Head: Normocephalic and atraumatic.      Right Ear: Tympanic membrane normal.      Left Ear: Tympanic membrane normal.      Nose: No congestion or rhinorrhea.      Mouth/Throat:      Pharynx: No oropharyngeal exudate or posterior oropharyngeal erythema.   Eyes:      Extraocular Movements: Extraocular movements intact.       "Conjunctiva/sclera: Conjunctivae normal.      Pupils: Pupils are equal, round, and reactive to light.   Cardiovascular:      Rate and Rhythm: Normal rate and regular rhythm.      Heart sounds: No murmur heard.     No friction rub. No gallop.   Pulmonary:      Effort: Pulmonary effort is normal.      Breath sounds: Normal breath sounds. No wheezing, rhonchi or rales.   Abdominal:      General: There is no distension.      Palpations: Abdomen is soft.      Tenderness: There is no abdominal tenderness. There is no guarding or rebound.   Musculoskeletal:         General: No swelling or deformity.      Cervical back: Normal range of motion and neck supple.      Right lower leg: No edema.      Left lower leg: No edema.   Skin:     Capillary Refill: Capillary refill takes less than 2 seconds.      Coloration: Skin is not jaundiced.      Findings: No rash.   Neurological:      General: No focal deficit present.      Mental Status: She is alert.      Motor: Weakness present.   Psychiatric:         Mood and Affect: Mood normal.         Behavior: Behavior normal.       Lab Results   Component Value Date    WBC 9.4 02/24/2025    HGB 7.7 (L) 02/24/2025    HCT 24.2 (L) 02/24/2025    MCV 97 02/24/2025     02/24/2025     Lab Results   Component Value Date    CHOL 172 07/25/2023     Lab Results   Component Value Date    HDL 50 (L) 07/25/2023     Lab Results   Component Value Date    LDLCALC 92 07/25/2023     Lab Results   Component Value Date    TRIG 152 (H) 07/25/2023     No components found for: \"CHOLHDL\"  No results found for: \"HGBA1C\"    Assessment/Plan   Problem List Items Addressed This Visit    None  Visit Diagnoses       Aspiration pneumonia of both lungs, unspecified aspiration pneumonia type, unspecified part of lung (Multi)    -  Primary    Essential hypertension        Oropharyngeal dysphagia                "

## 2025-02-28 LAB
ATRIAL RATE: 83 BPM
P AXIS: 65 DEGREES
P OFFSET: 159 MS
P ONSET: 126 MS
PR INTERVAL: 182 MS
Q ONSET: 217 MS
QRS COUNT: 14 BEATS
QRS DURATION: 100 MS
QT INTERVAL: 376 MS
QTC CALCULATION(BAZETT): 441 MS
QTC FREDERICIA: 419 MS
R AXIS: 43 DEGREES
T AXIS: 37 DEGREES
T OFFSET: 405 MS
VENTRICULAR RATE: 83 BPM

## 2025-03-03 ENCOUNTER — NURSING HOME VISIT (OUTPATIENT)
Dept: POST ACUTE CARE | Facility: EXTERNAL LOCATION | Age: 89
End: 2025-03-03
Payer: MEDICARE

## 2025-03-03 DIAGNOSIS — R13.12 OROPHARYNGEAL DYSPHAGIA: ICD-10-CM

## 2025-03-03 DIAGNOSIS — I10 ESSENTIAL HYPERTENSION: Primary | ICD-10-CM

## 2025-03-03 PROCEDURE — 99308 SBSQ NF CARE LOW MDM 20: CPT | Performed by: FAMILY MEDICINE

## 2025-03-03 NOTE — LETTER
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 No CP/SOB. Med list & problem list reviewed.     O: VSS AFEB Wt Pending. Awake, alert, pleasantly confused. NAD. Chest cta. Heart rrr. Ext no c/c/e.    A/P:  # Weakness: SNF PT/OT.  # HTN: stable on lisinopril 10  # Aspiration PNA; complete augmentin, duoneb  # Dysphagia: nectar thick liquids. HOB elevated 30 deg. ST to work on FFW.  # Hypothyroidism: 112 mcg 6 days a week.  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      Electronically Signed By: Dwight House MD   3/3/25  8:40 PM

## 2025-03-03 NOTE — PROGRESS NOTES
*Provider Impression*    Patient is an 89 year old female who is seen today for management of multiple medical problems       #Weakness / OA / Dysphagia - PT/OT/ST, acetaminophen 650mg q6h PRN, meclizine 12.5mg daily  #PNA - Augmentin 875/125mg BID until 2/28, guaifenesin 400mg q4h PRN, duoneb q4h PRN  #PE / FHx factor V Leiden - eliquis 2.5mg BID, f/u w/ vascular Dr. Quiroz, f/u w/ sleep study  #HTN - lisinopril 10mg daily  #Hypothyroidism - levothyroxine 112mcg daily  #GERD / Nausea / Constipation - promethazine 25mg q6h PRN, colace 100mg daily  #Glaucoma - mgmt per ophthalmology - travatan,   #Vitamin deficiency - vitamin D 5000units daily, vitamin C BID, vitamin B12 BID  #ACP - Full Code  Follow up as needed      *Chief Complaint*  PNA    *History of Present Illness*    Patient is an 89 y/o female w/ PMH as below who had initially presented to the ED on 2/8 from [facility] for malaise and SOB, she was diagnosed with influenza and PNA at the time and d/c back to the facility w/ po antibiotics, transitioned to IV antibiotics after blood cultures were positive. Repeat CXR at the facility were concerning for progressive bilateral pneumonia, confirmed in the ED. PE ruled out. She was given vanc, zosyn, azithromycin and admitted to the ED. ID and SLP were consulted. PT recommended nectar thick liquids.  Overnight on 2/20 patient reported significant diarrhea, Cdiff PCR obtained and returned negative. Diarrhea resolved. ID recommended continuing zosyn and oral Augmentin on discharge. Her BNP was elevated upon admission with concerns for possible pulmonary edema, TTE obtained showing LVEF 63% with mild AVR.  PT/OT was placed on consult for physical deconditioning, and recommended SNF placement. Wound care was consulted for Left lateral bridge of nose, Right lateral calf & Buttock cleft small erosion w/ recommendations for offloading, protect with Aquaphor, Zinc, q 2 hour turns, sacral Mepilex. She was  determined to be stable for discharge to [facility] on 2/24.    She is seen sitting up in her room today and denies any f/c, sweats, CP, SOB, cough, n/v, constipation, diarrhea, LUTS, edema, or any other new c/o presently.     Alleriges - gabapentin  PMH - PE, hypothyroidism, vitamin deficiency, POAG, shingles, OA, rheumatic fever  PSH - appendectomy, hysterectomy, enterectomy  FH - Mother had heart disease, HTN, TIA; MGM had colon cancer  SocHx - Former smoker, No EtOH    *Review of Systems*  All other systems reviewed are negative except as noted in the HPI     *Vital Signs*   Date: 2/25/25 - T: 98.8  P: 90  R: 18  BP: 112/64  SpO2: 95% on RA    *Results / Data*  CBC - Date: 2/24/25  WBC: 9.4  HGB: 7.7  HCT: 24.2  PLT: 337  ;   BMP - Date: 2/24/25  Na: 137  K: 3.8  Cl: 106  CO2: 25  BUN: 12  Cr: 1.29  Glu: 104  Ca: 8.6  ;   LFT - Date: 2/19/25  AST: 25  ALT: 20  ALP: 58  Tbili: 0.4  ;   Coags - Date:   INR:   PT:    *Physical Exam*  Gen: (+) NAD, (+) well-appearing  HEENT: (+) normocephalic, (+) MMM  Neck: (+) supple  Lungs: (+) CTAB, (-) wheezes, (-) rales, (-) rhonchi  Heart: (+) RRR, (+) S1 S2, (-) murmurs  Pulses: (+) palpable  Abd: (+) soft, (+) NT, (+) ND, (+) BS+  Ext: (-) edema, (-) deformity  MSK: (-) joint swelling  Skin: (+) warm, (+) dry, (-) rash  Neuro: (+) follows commands, (-) tremor, (+) alert

## 2025-03-04 NOTE — PROGRESS NOTES
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 No CP/SOB. Med list & problem list reviewed.     O: VSS AFEB Wt Pending. Awake, alert, pleasantly confused. NAD. Chest cta. Heart rrr. Ext no c/c/e.    A/P:  # Weakness: SNF PT/OT.  # HTN: stable on lisinopril 10  # Aspiration PNA; complete augmentin, duoneb  # Dysphagia: nectar thick liquids. HOB elevated 30 deg. ST to work on FFW.  # Hypothyroidism: 112 mcg 6 days a week.  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

## 2025-03-05 ENCOUNTER — APPOINTMENT (OUTPATIENT)
Dept: CARDIOLOGY | Facility: HOSPITAL | Age: 89
End: 2025-03-05
Payer: MEDICARE

## 2025-03-05 ENCOUNTER — NURSING HOME VISIT (OUTPATIENT)
Dept: POST ACUTE CARE | Facility: EXTERNAL LOCATION | Age: 89
End: 2025-03-05
Payer: MEDICARE

## 2025-03-05 DIAGNOSIS — R11.0 NAUSEA: ICD-10-CM

## 2025-03-05 DIAGNOSIS — E89.0 POSTPROCEDURAL HYPOTHYROIDISM: ICD-10-CM

## 2025-03-05 DIAGNOSIS — I10 ESSENTIAL HYPERTENSION: ICD-10-CM

## 2025-03-05 DIAGNOSIS — K21.9 GASTROESOPHAGEAL REFLUX DISEASE, UNSPECIFIED WHETHER ESOPHAGITIS PRESENT: ICD-10-CM

## 2025-03-05 DIAGNOSIS — I26.94 MULTIPLE SUBSEGMENTAL PULMONARY EMBOLI WITHOUT ACUTE COR PULMONALE: ICD-10-CM

## 2025-03-05 DIAGNOSIS — J69.0 ASPIRATION PNEUMONIA OF BOTH LUNGS, UNSPECIFIED ASPIRATION PNEUMONIA TYPE, UNSPECIFIED PART OF LUNG (MULTI): ICD-10-CM

## 2025-03-05 DIAGNOSIS — M62.81 MUSCLE WEAKNESS (GENERALIZED): Primary | ICD-10-CM

## 2025-03-05 DIAGNOSIS — M13.0 POLYARTHRITIS: ICD-10-CM

## 2025-03-05 DIAGNOSIS — Z83.2 FAMILY HISTORY OF FACTOR V DEFICIENCY: ICD-10-CM

## 2025-03-05 DIAGNOSIS — K59.00 CONSTIPATION, UNSPECIFIED CONSTIPATION TYPE: ICD-10-CM

## 2025-03-05 PROCEDURE — 99309 SBSQ NF CARE MODERATE MDM 30: CPT | Performed by: NURSE PRACTITIONER

## 2025-03-05 NOTE — LETTER
Patient: Nanette Flynn  : 1935    Encounter Date: 2025    *Provider Impression*    Patient is an 89 year old female who is seen today for management of multiple medical problems       #Weakness / OA / Dysphagia - PT/OT/ST, acetaminophen 650mg q6h PRN, meclizine 12.5mg daily  #PNA - completed Augmentin, guaifenesin 400mg q4h PRN, duoneb q4h PRN  #PE / FHx factor V Leiden - eliquis 2.5mg BID, f/u w/ vascular Dr. Quiroz, f/u w/ sleep study  #HTN - lisinopril 10mg daily  #Hypothyroidism - levothyroxine 112mcg daily  #GERD / Nausea / Constipation - promethazine 25mg q6h PRN, colace 100mg daily  #Glaucoma - mgmt per ophthalmology - travatan,   #Vitamin deficiency - vitamin D 5000units daily, vitamin C BID, vitamin B12 BID  #ACP - DNRCC  Follow up as needed      *Chief Complaint*  PNA    *History of Present Illness*    Patient is an 88 y/o female w/ PMH as below who had initially presented to the ED on  from [facility] for malaise and SOB, she was diagnosed with influenza and PNA at the time and d/c back to the facility w/ po antibiotics, transitioned to IV antibiotics after blood cultures were positive. Repeat CXR at the facility were concerning for progressive bilateral pneumonia, confirmed in the ED. PE ruled out. She was given vanc, zosyn, azithromycin and admitted to the ED. ID and SLP were consulted. PT recommended nectar thick liquids.  Overnight on  patient reported significant diarrhea, Cdiff PCR obtained and returned negative. Diarrhea resolved. ID recommended continuing zosyn and oral Augmentin on discharge. Her BNP was elevated upon admission with concerns for possible pulmonary edema, TTE obtained showing LVEF 63% with mild AVR.  PT/OT was placed on consult for physical deconditioning, and recommended SNF placement. Wound care was consulted for Left lateral bridge of nose, Right lateral calf & Buttock cleft small erosion w/ recommendations for offloading, protect with  Aquaphor, Zinc, q 2 hour turns, sacral Mepilex. She was determined to be stable for discharge to [facility] on 2/24.    Code status changed.    She is seen sitting up in her room today and reports making progress w/ therapy, no f/c, sweats, CP, SOB, no new cough, n/v, constipation, diarrhea, LUTS, no edema, or any other new c/o presently.     Alleriges - gabapentin  PMH - PE, hypothyroidism, vitamin deficiency, POAG, shingles, OA, rheumatic fever  PSH - appendectomy, hysterectomy, enterectomy  FH - Mother had heart disease, HTN, TIA; MGM had colon cancer  SocHx - Former smoker, No EtOH    *Review of Systems*  All other systems reviewed are negative except as noted in the HPI     *Vital Signs*   Date: 3/2/25 - T: 98.0  P: 72  R: 18  BP: 128/77  SpO2: 95% on RA    *Results / Data*  CBC - Date: 2/24/25  WBC: 9.4  HGB: 7.7  HCT: 24.2  PLT: 337  ;   BMP - Date: 2/24/25  Na: 137  K: 3.8  Cl: 106  CO2: 25  BUN: 12  Cr: 1.29  Glu: 104  Ca: 8.6  ;   LFT - Date: 2/19/25  AST: 25  ALT: 20  ALP: 58  Tbili: 0.4  ;   Coags - Date:   INR:   PT:    *Physical Exam*  Gen: (+) NAD, (+) well-appearing  HEENT: (+) normocephalic, (+) MMM  Neck: (+) supple  Lungs: (+) CTAB, (-) wheezes, (-) rales, (-) rhonchi  Heart: (+) RRR, (+) S1 S2, (-) murmurs  Pulses: (+) palpable  Abd: (+) soft, (+) NT, (+) ND, (+) BS+  Ext: (-) edema, (-) deformity  MSK: (-) joint swelling  Skin: (+) warm, (+) dry, (-) rash  Neuro: (+) follows commands, (-) tremor, (+) alert      Electronically Signed By: Thom Munoz, APRN-CNP   3/8/25  4:59 PM

## 2025-03-08 NOTE — PROGRESS NOTES
*Provider Impression*    Patient is an 89 year old female who is seen today for management of multiple medical problems       #Weakness / OA / Dysphagia - PT/OT/ST, acetaminophen 650mg q6h PRN, meclizine 12.5mg daily  #PNA - completed Augmentin, guaifenesin 400mg q4h PRN, duoneb q4h PRN  #PE / FHx factor V Leiden - eliquis 2.5mg BID, f/u w/ vascular Dr. Quiroz, f/u w/ sleep study  #HTN - lisinopril 10mg daily  #Hypothyroidism - levothyroxine 112mcg daily  #GERD / Nausea / Constipation - promethazine 25mg q6h PRN, colace 100mg daily  #Glaucoma - mgmt per ophthalmology - travatan,   #Vitamin deficiency - vitamin D 5000units daily, vitamin C BID, vitamin B12 BID  #ACP - DNRCC  Follow up as needed      *Chief Complaint*  PNA    *History of Present Illness*    Patient is an 88 y/o female w/ PMH as below who had initially presented to the ED on 2/8 from [facility] for malaise and SOB, she was diagnosed with influenza and PNA at the time and d/c back to the facility w/ po antibiotics, transitioned to IV antibiotics after blood cultures were positive. Repeat CXR at the facility were concerning for progressive bilateral pneumonia, confirmed in the ED. PE ruled out. She was given vanc, zosyn, azithromycin and admitted to the ED. ID and SLP were consulted. PT recommended nectar thick liquids.  Overnight on 2/20 patient reported significant diarrhea, Cdiff PCR obtained and returned negative. Diarrhea resolved. ID recommended continuing zosyn and oral Augmentin on discharge. Her BNP was elevated upon admission with concerns for possible pulmonary edema, TTE obtained showing LVEF 63% with mild AVR.  PT/OT was placed on consult for physical deconditioning, and recommended SNF placement. Wound care was consulted for Left lateral bridge of nose, Right lateral calf & Buttock cleft small erosion w/ recommendations for offloading, protect with Aquaphor, Zinc, q 2 hour turns, sacral Mepilex. She was determined to be stable  for discharge to [facility] on 2/24.    Code status changed.    She is seen sitting up in her room today and reports making progress w/ therapy, no f/c, sweats, CP, SOB, no new cough, n/v, constipation, diarrhea, LUTS, no edema, or any other new c/o presently.     Alleriges - gabapentin  PMH - PE, hypothyroidism, vitamin deficiency, POAG, shingles, OA, rheumatic fever  PSH - appendectomy, hysterectomy, enterectomy  FH - Mother had heart disease, HTN, TIA; MGM had colon cancer  SocHx - Former smoker, No EtOH    *Review of Systems*  All other systems reviewed are negative except as noted in the HPI     *Vital Signs*   Date: 3/2/25 - T: 98.0  P: 72  R: 18  BP: 128/77  SpO2: 95% on RA    *Results / Data*  CBC - Date: 2/24/25  WBC: 9.4  HGB: 7.7  HCT: 24.2  PLT: 337  ;   BMP - Date: 2/24/25  Na: 137  K: 3.8  Cl: 106  CO2: 25  BUN: 12  Cr: 1.29  Glu: 104  Ca: 8.6  ;   LFT - Date: 2/19/25  AST: 25  ALT: 20  ALP: 58  Tbili: 0.4  ;   Coags - Date:   INR:   PT:    *Physical Exam*  Gen: (+) NAD, (+) well-appearing  HEENT: (+) normocephalic, (+) MMM  Neck: (+) supple  Lungs: (+) CTAB, (-) wheezes, (-) rales, (-) rhonchi  Heart: (+) RRR, (+) S1 S2, (-) murmurs  Pulses: (+) palpable  Abd: (+) soft, (+) NT, (+) ND, (+) BS+  Ext: (-) edema, (-) deformity  MSK: (-) joint swelling  Skin: (+) warm, (+) dry, (-) rash  Neuro: (+) follows commands, (-) tremor, (+) alert

## 2025-03-10 ENCOUNTER — NURSING HOME VISIT (OUTPATIENT)
Dept: POST ACUTE CARE | Facility: EXTERNAL LOCATION | Age: 89
End: 2025-03-10
Payer: MEDICARE

## 2025-03-10 DIAGNOSIS — I10 ESSENTIAL HYPERTENSION: Primary | ICD-10-CM

## 2025-03-10 DIAGNOSIS — R13.12 OROPHARYNGEAL DYSPHAGIA: ICD-10-CM

## 2025-03-10 PROCEDURE — 99308 SBSQ NF CARE LOW MDM 20: CPT | Performed by: FAMILY MEDICINE

## 2025-03-10 NOTE — LETTER
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 No CP/SOB. Med list & problem list reviewed.    O: VSS AFEB 178# Awake, alert, pleasantly confused. NAD. Chest cta. Heart rrr. Ext no c/c/e.    A/P:  # Weakness: SNF PT/OT.  # HTN: stable on lisinopril 10  # Hx Aspiration PNA; completed augmentin, continue duonebs  # Dysphagia: nectar thick liquids. HOB elevated 30 deg. ST to work on FFW.  # Hypothyroidism: 112 mcg 6 days a week.  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      Electronically Signed By: Dwight House MD   3/12/25  8:12 PM

## 2025-03-13 NOTE — PROGRESS NOTES
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 No CP/SOB. Med list & problem list reviewed.    O: VSS AFEB 178# Awake, alert, pleasantly confused. NAD. Chest cta. Heart rrr. Ext no c/c/e.    A/P:  # Weakness: SNF PT/OT.  # HTN: stable on lisinopril 10  # Hx Aspiration PNA; completed augmentin, continue duonebs  # Dysphagia: nectar thick liquids. HOB elevated 30 deg. ST to work on FFW.  # Hypothyroidism: 112 mcg 6 days a week.  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

## 2025-03-17 NOTE — PROGRESS NOTES
*Provider Impression*    Patient is an 89 year old female who is seen today for management of multiple medical problems       #Weakness / OA / Dysphagia - PT/OT/ST, acetaminophen 650mg q6h PRN, meclizine 12.5mg daily  #PNA - completed Augmentin, guaifenesin 400mg q4h PRN, duoneb q4h PRN  #PE / FHx factor V Leiden - eliquis 2.5mg BID, f/u w/ vascular Dr. Quiroz, f/u w/ sleep study  #HTN - lisinopril 10mg daily  #Hypothyroidism - levothyroxine 112mcg daily  #GERD / Nausea / Constipation - promethazine 25mg q6h PRN, colace 100mg daily  #Glaucoma - mgmt per ophthalmology - travatan,   #Vitamin deficiency - vitamin D 5000units daily, vitamin C BID, vitamin B12 BID  #ACP - DNRCC  Follow up as needed      *Chief Complaint*  weakness    *History of Present Illness*    Patient is an 88 y/o female w/ PMH as below who had initially presented to the ED on 2/8 from [facility] for malaise and SOB, she was diagnosed with influenza and PNA at the time and d/c back to the facility w/ po antibiotics, transitioned to IV antibiotics after blood cultures were positive. Repeat CXR at the facility were concerning for progressive bilateral pneumonia, confirmed in the ED. PE ruled out. She was given vanc, zosyn, azithromycin and admitted to the ED. ID and SLP were consulted. PT recommended nectar thick liquids.  Overnight on 2/20 patient reported significant diarrhea, Cdiff PCR obtained and returned negative. Diarrhea resolved. ID recommended continuing zosyn and oral Augmentin on discharge. Her BNP was elevated upon admission with concerns for possible pulmonary edema, TTE obtained showing LVEF 63% with mild AVR.  PT/OT was placed on consult for physical deconditioning, and recommended SNF placement. Wound care was consulted for Left lateral bridge of nose, Right lateral calf & Buttock cleft small erosion w/ recommendations for offloading, protect with Aquaphor, Zinc, q 2 hour turns, sacral Mepilex. She was determined to be  stable for discharge to [facility] on 2/24.    She is seen today and reports making progress w/ therapy, no f/c, sweats, CP, SOB, cough, n/v, constipation, diarrhea, LUTS, no edema, or any other new c/o presently.     Alleriges - gabapentin  PMH - PE, hypothyroidism, vitamin deficiency, POAG, shingles, OA, rheumatic fever  PSH - appendectomy, hysterectomy, enterectomy  FH - Mother had heart disease, HTN, TIA; MGM had colon cancer  SocHx - Former smoker, No EtOH    *Review of Systems*  All other systems reviewed are negative except as noted in the HPI     *Vital Signs*   Date: 3/12/25 - T: 98.0  P: 77  R: 16  BP: 120/77  SpO2: 92% on RA    *Results / Data*  CBC - Date: 2/24/25  WBC: 9.4  HGB: 7.7  HCT: 24.2  PLT: 337  ;   BMP - Date: 2/24/25  Na: 137  K: 3.8  Cl: 106  CO2: 25  BUN: 12  Cr: 1.29  Glu: 104  Ca: 8.6  ;   LFT - Date: 2/19/25  AST: 25  ALT: 20  ALP: 58  Tbili: 0.4  ;   Coags - Date:   INR:   PT:    *Physical Exam*  Gen: (+) NAD, (+) well-appearing  HEENT: (+) normocephalic, (+) MMM  Neck: (+) supple  Lungs: (+) CTAB, (-) wheezes, (-) rales, (-) rhonchi  Heart: (+) RRR, (+) S1 S2, (-) murmurs  Pulses: (+) palpable  Abd: (+) soft, (+) NT, (+) ND, (+) BS+  Ext: (-) edema, (-) deformity  MSK: (-) joint swelling  Skin: (+) warm, (+) dry, (-) rash  Neuro: (+) follows commands, (-) tremor, (+) alert

## 2025-03-17 NOTE — LETTER
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 No CP/SOB. C/O decreased po intake, upset stomach/GERD. Med list & problem list reviewed.    O: VSS AFEB 173# (down 5#) Awake, alert, pleasantly confused. NAD. Chest cta. Heart rrr. Ext no c/c/e.    A/P:  # Weakness: SNF PT/OT.  # GERD: add omeprazole 40 mg daily.   # HTN: stable on lisinopril 10  # Hx Aspiration PNA; completed augmentin, continue duonebs  # Dysphagia: nectar thick liquids. HOB elevated 30 deg. ST to work on FFW.  # Hypothyroidism: 112 mcg 6 days a week.  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      Electronically Signed By: Dwight House MD   3/23/25 10:43 PM

## 2025-03-18 PROBLEM — R65.20: Status: ACTIVE | Noted: 2025-01-01

## 2025-03-18 PROBLEM — J96.01: Status: ACTIVE | Noted: 2025-01-01

## 2025-03-18 PROBLEM — A41.9: Status: ACTIVE | Noted: 2025-01-01

## 2025-03-18 NOTE — HOSPITAL COURSE
In the ED, patient arrived on CPAP, received Solu-Medrol and DuoNebs in ambulance without improvement.  Chest x-ray showed worsening multifocal pneumonia.  Patient met SIRS criteria for sepsis, lactate elevation (3.5), worsened LALITA and received vancomycin, cefepime, azithromycin.  Only 500 cc fluid bolus given concern for volume overload.  Troponin elevated at 311, repeat stable.  BNP elevated at 817.  Anemia with hemoglobin 6.1, however patient declined blood transfusion.

## 2025-03-18 NOTE — ED TRIAGE NOTES
Pt arrived with [facility] from nursing home. Pt has known pneumonia and has been treated for it. Pt arrived on CPAP and placed on Bipap on arrival. Pt is alert and oriented. Pt is DNR-ml but wants everything until intubation to be done.

## 2025-03-18 NOTE — ED PROVIDER NOTES
History of Present Illness     History provided by: Patient and EMS  Limitations to History: Respiratory Distress  External Records Reviewed with Brief Summary:  Discharge summary from 2/24/2025 patient was admitted for multifocal pneumonia, treated with Zosyn to gram-negative bacteria, followed by ID, discharged with Augmentin for presumed aspiration multifocal pneumonia.  Recent influenza A infection preceding this admission.    HPI:  Nanette Flynn is a 89-year-old female history of hypothyroidism, hypertension, prior PE on Eliquis, recurrent pneumonia presenting to the ED for respiratory distress and hypoxia.  Patient currently residing in SNF, reportedly has known pneumonia and has been treated, had worsening shortness of breath today with reports of a chest x-ray obtained yesterday showing pneumonia, EMS notes patient was hypoxic into the 60s on their arrival, does not wear baseline oxygen, arrived on CPAP.  Patient is alert and oriented, endorses shortness of breath but denies any chest pain, lightheadedness dizziness.  Endorses nonproductive cough.  Some swelling to the legs but denies history of heart failure.  Patient is DNR but confirms she is okay for positive pressure ventilation, does not want intubation.    Physical Exam   Triage vitals:  T 35.8 °C (96.4 °F)    /81  RR 20  O2 100 % Supplemental oxygen    General: Awake, alert, in acute respiratory distress  Eyes: Gaze conjugate.  No scleral icterus or injection  HENT: Normo-cephalic, atraumatic. No stridor.   CV: Regular rate, regular rhythm. No MRG.  Radial/DP pulses 1+ bilaterally  Resp: Breathing labored and tachypneic, on BiPAP, diminished bases bilaterally  GI: Soft, non-distended, non-tender. No rebound or guarding.  MSK/Extremities: No gross bony deformities. Moving all extremities, mild bilateral pitting edema  Skin: Warm. Appropriate color  Neuro: Awake and Alert. Face symmetric. Appropriate tone. Moving all extremities  equally.    Medical Decision Making & ED Course   Medical Decision Makin-year-old female history of hypothyroidism, hypertension, prior PE on Eliquis, recurrent pneumonia with 2 hospitalizations in the last 2 months presenting to ED from nursing facility in acute hypoxic respiratory distress.  Patient reportedly had chest x-ray performed this a.m. at facility noting concern for multifocal pneumonia, she was started on levofloxacin however had increasing shortness of breath and hypoxia into the 60s prompting ED presentation.  Patient is DNR DNI but was agreeable to noninvasive ventilation, she arrived on CPAP, tachypneic speaking short sentences in acute respiratory distress with diminished breath sounds bilaterally.  Prior to arrival had received 125 mg Solu-Medrol and DuoNeb x 1, no history of COPD and noted minimal improvement, less likely source of presentation today.  Patient does have some lower extremity edema and BNP elevation concerning for potential component of volume overload contributing, stable with positive pressure, patient arrived with concern for sepsis likely secondary to multifocal pneumonia therefore blood cultures obtained, covered with Vanco cefepime and azithromycin, given 500 cc IV fluid bolus component of shock with lactate elevation, cautious and judicious fluids due to volume overload status.  Doubt PE, patient has been compliant on Eliquis and has no lower extremity symptoms to suggest DVT, deferred CT angio imaging.  Patient does have a troponin elevation likely demand ischemia but will treat with aspirin and trend levels.  Patient was anemic without acute signs of bleeding likely reactive to infectious process, offered transfusion with plan for 1 unit however patient declined.  Is agreeable to continued IV antibiotics and positive pressure but confirmed that patient remains DNR and DNI with no plan for intubation.  Will attempt to transition patient to high flow nasal cannula for  comfort.  Plan to admit patient for management of severe sepsis presumed secondary to multifocal pneumonia requiring noninvasive ventilation.  ----        Social Determinants of Health which Significantly Impact Care: None identified     EKG Independent Interpretation: See ED course for my independent interpretation if ECG was obtained.    Independent Result Review and Interpretation: Please see MDM and ED course for my independent interpretation of the results    Chronic conditions affecting the patient's care: Please see H&P and MDM    The patient was discussed with the following consultants/services: Hospitalist/Admitting Provider who accepted the patient for admission    Care Considerations: As document above in MDM    ED Course:  ED Course as of 03/18/25 1827   Tue Mar 18, 2025   1637 ECG 12 lead  Sinus rhythm rate 95, normal axis.  Upsloping ST depressions in 1, aVL, no ST elevations.  , QRS 90, QTc 485.  When compared to previous ECG overall morphology appears unchanged. [KR]   1714 POCT Lactate, Venous(!!): 4.0  Suspect response to patient's work of breathing, patient is currently on BiPAP, has a respiratory alkalosis, will transition to CPAP of 8, FiO2 50%  [KR]   1715 HEMOGLOBIN(!!): 6.1  Will consent and plan to transfuse 1 unit RBCs [KR]   1715 WBC(!): 17.4 [KR]   1716 XR chest 1 view  X-ray showing worsening bilateral multifocal airspace disease concerning for worsening pneumonia.  Has had multiple repeat occurrences of pneumonia, has had IV antibiotics within the last 90 days, will plan to cover patient with vancomycin, cefepime and azithromycin per policy. [KR]   1716 Creatinine(!): 1.91  Patient has worsening renal function, baseline creatinine 1.2-1.4. [KR]   1721 Spoke bedside with patient regarding hemoglobin of 6.1, she declines any blood administration at this time, she is aware of the risks of this and states she would prefer to withhold.  She is agreeable to IV antibiotics and  noninvasive ventilation measures.  She understands that her hypoxia may be contributed by anemia.   [KR]   1730 Patient presentation concerning for septic shock has a lactate elevation, LALITA, currently being treated with Vanco, cefepime and azithromycin for presumed pneumonia as etiology of infection.  Patient will be given 500 cc IV fluid bolus, has some peripheral edema and BNP elevation so cautious in aggressive fluid resuscitation. [KR]   1814 Troponin I, High Sensitivity(!!): 311  Troponin elevated, will send repeat, will give aspirin, suspect secondary to demand ischemia in setting of patient's acute illness. [KR]      ED Course User Index  [KR] Mariam Bradshaw DO         Diagnoses as of 03/18/25 1827   Respiratory distress   Pneumonia of both lungs due to infectious organism, unspecified part of lung   Hypokalemia   Anemia, unspecified type   LALITA (acute kidney injury) (CMS-[facility])     Disposition   As a result of their workup, the patient will require admission to the hospital.  The patient was informed of her diagnosis.  The patient was given the opportunity to ask questions and I answered them. The patient agreed to be admitted to the hospital.    Procedures   Procedures    Patient seen and discussed with attending physician    Mariam Bradshaw DO  Emergency Medicine     Mariam Bradshaw DO  Resident  03/18/25 3975

## 2025-03-18 NOTE — PROGRESS NOTES
Pharmacy Medication History Review    Nanette Flynn is a 89 y.o. female admitted for No Principal Problem: There is no principal problem currently on the Problem List. Please update the Problem List and refresh.. Pharmacy reviewed the patient's [id number] medications and allergies for accuracy.    The list below reflectives the updated PTA list. Please review each medication in order reconciliation for additional clarification and justification.  Prior to Admission Medications   Prescriptions Last Dose Informant Patient Reported? Taking?   Lactobacillus acidophilus (PROBIOTIC ORAL) Unknown Other Yes Yes   Sig: Take 1 Dose by mouth once daily.   acetaminophen (Tylenol) 325 mg tablet Unknown Other Yes Yes   Sig: Take 2 tablets (650 mg) by mouth every 6 hours if needed for mild pain (1 - 3).   apixaban (Eliquis) 2.5 mg tablet Unknown Other Yes Yes   Sig: Take 1 tablet (2.5 mg) by mouth 2 times a day.   ascorbic acid/multivit-min (EMERGEN-C ORAL) Unknown Other Yes Yes   Sig: Take 1 packet by mouth 2 times a day.   cholecalciferol (Vitamin D-3) 5,000 Units tablet Unknown Other Yes Yes   Sig: Take 1 tablet (5,000 Units) by mouth once daily.   cyanocobalamin, vitamin B-12, (VITAMIN B-12 ORAL) Unknown Other Yes Yes   Sig: Take 1 tablet by mouth 2 times a day.   dextromethorphan-guaifenesin (Mucinex DM)  mg 12 hr tablet Unknown Other Yes Yes   Sig: Take 1 tablet by mouth every 12 hours. Do not crush, chew, or split.   docusate sodium (Colace) 100 mg capsule Unknown Other Yes Yes   Sig: Take 1 capsule (100 mg) by mouth once daily.   guaiFENesin 200 mg/5 mL liquid Unknown Other Yes Yes   Sig: Take 10 mL by mouth every 4 hours if needed.   ipratropium-albuteroL (Duo-Neb) 0.5-2.5 mg/3 mL nebulizer solution Unknown Other Yes Yes   Sig: Take 3 mL by nebulization every 4 hours if needed for wheezing.   ipratropium-albuteroL (Duo-Neb) 0.5-2.5 mg/3 mL nebulizer solution Unknown Other Yes Yes   Sig: Take 3 mL by  nebulization 4 times a day.   levoFLOXacin (Levaquin) 500 mg tablet Unknown Other Yes Yes   Sig: Take 1 tablet (500 mg) by mouth once daily.   levothyroxine (Synthroid) 112 mcg tablet Unknown Other Yes Yes   Sig: Take 1 tablet (112 mcg) by mouth 6 times a week. NOT ON SUNDAYS.  Take on an empty stomach at the same time each day, either 30 to 60 minutes prior to breakfast   lisinopril 10 mg tablet Unknown Other Yes Yes   Sig: Take 1 tablet (10 mg) by mouth once daily.   meclizine (Antivert) 12.5 mg tablet Unknown Other Yes Yes   Sig: Take 1 tablet (12.5 mg) by mouth every 6 hours if needed for dizziness.   meclizine (Antivert) 12.5 mg tablet Unknown Other Yes Yes   Sig: Take 1 tablet (12.5 mg) by mouth once daily at bedtime.   methylPREDNISolone sod succinate, PF, (SOLU-Medrol, PF,) 125 mg/2 mL injection Unknown Other Yes Yes   Sig: Infuse 2 mL (125 mg) into a venous catheter once daily.   omeprazole (PriLOSEC) 40 mg DR capsule Unknown Other Yes Yes   Sig: Take 1 capsule (40 mg) by mouth once daily. Do not crush or chew.   promethazine (Phenergan) 25 mg suppository Unknown Other Yes Yes   Sig: Insert 1 suppository (25 mg) into the rectum every 6 hours if needed for nausea or vomiting.   travoprost (Travatan Z) 0.004 % drops ophthalmic solution Unknown Other Yes Yes   Sig: Administer 1 drop into both eyes once daily.      Facility-Administered Medications: None           The list below reflectives the updated allergy list. Please review each documented allergy for additional clarification and justification.  Allergies  Reviewed by Aida Cartwright RN on 3/18/2025        Severity Reactions Comments    Gabapentin Medium Confusion FELL ASLEEP WITH ONE DOSE AND NO RECALL OF THAT DAY             Below are additional concerns with the patient's PTA list.      Lizy Raines

## 2025-03-19 NOTE — PROGRESS NOTES
03/19/25 1349   Discharge Planning   Living Arrangements Other (Comment)   Support Systems Children   Assistance Needed Pt admitted from [facility].  Now pt/family considering hospice.  Into see pt/family who prefer [facility] as they have worked with them before.  [facility]  [facility] scheduled meeting with pt/family Thursday 3/20 [id number].  Family prefers pt to return to [facility] with hospice and realize this will be private pay.  (Simultaneous filing. User may not have seen previous data.)   Type of Residence Nursing home/residential care  (Simultaneous filing. User may not have seen previous data.)   Do you have animals or pets at home? No   Who is requesting discharge planning? Provider  (Simultaneous filing. User may not have seen previous data.)   Home or Post Acute Services Post acute facilities (Rehab/SNF/etc)  (Simultaneous filing. User may not have seen previous data.)   Type of Post Acute Facility Services Long term care  (Simultaneous filing. User may not have seen previous data.)   Expected Discharge Disposition [facility]  (Simultaneous filing. User may not have seen previous data.)   Does the patient need discharge transport arranged? Yes  (Simultaneous filing. User may not have seen previous data.)   RoundTrip coordination needed? Yes   Has discharge transport been arranged? No   Financial Resource Strain   How hard is it for you to pay for the very basics like food, housing, medical care, and heating? Not hard   Housing Stability   In the last 12 months, was there a time when you were not able to pay the mortgage or rent on time? N   In the past 12 months, how many times have you moved where you were living? 0   At any time in the past 12 months, were you homeless or living in a shelter (including now)? N   Transportation Needs   In the past 12 months, has lack of transportation kept you from medical appointments or from getting  medications? no   In the past 12 months, has lack of transportation kept you from meetings, work, or from getting things needed for daily living? No   Patient Choice   Provider Choice list and CMS website (https://medicare.gov/care-compare#search) for post-acute Quality and Resource Measure Data were provided and reviewed with: Patient   Patient / Family choosing to utilize agency / facility established prior to hospitalization Yes   Stroke Family Assessment   Stroke Family Assessment Needed No   Intensity of Service   Intensity of Service 0-30 min

## 2025-03-19 NOTE — CONSULTS
Vancomycin Dosing by Pharmacy- INITIAL    Nanette Flynn is a 89 y.o. year old female who Pharmacy has been consulted for vancomycin dosing for pneumonia. Based on the patient's indication and renal status this patient will be dosed based on a goal AUC of 400-600.     Renal function is currently declining, SCR 1.91, CRCL 20.2.    Visit Vitals  /70   Pulse 92   Temp 36.6 °C (97.9 °F)   Resp 20        Lab Results   Component Value Date    CREATININE 1.91 (H) 2025    CREATININE 1.29 (H) 2025    CREATININE 1.34 (H) 2025    CREATININE 1.44 (H) 2025        Patient weight is as follows:   Vitals:    25 1950   Weight: 78.5 kg (173 lb 1 oz)       Cultures:  No results found for the encounter in last 14 days.        No intake/output data recorded.  I/O during current shift:  No intake/output data recorded.    Temp (24hrs), Av.2 °C (97.2 °F), Min:35.8 °C (96.4 °F), Max:36.6 °C (97.9 °F)         Assessment/Plan     Patient has already been given a loading dose of 1250 mg once in ED on 3/18 at about 1900hrs.  Will initiate vancomycin maintenance, after level drawn in AM on 3/19.  Possibly 750mg q 24hrs (AUC of 559).    This dosing regimen is predicted by [employer] to result in the following pharmacokinetic parameters:    Not utilized at this point due to renal function.    Follow-up level will be ordered on 3/19 at 0500hrs unless clinically indicated sooner.  Will continue to monitor renal function daily while on vancomycin and order serum creatinine at least every 48 hours if not already ordered.  Follow for continued vancomycin needs, clinical response, and signs/symptoms of toxicity.       Braulio Benjamin, KeithD

## 2025-03-19 NOTE — PROGRESS NOTES
"  Subjective    No acute events overnight. Pt was seen and examined by bedside this morning. She was on AIRVO with 40L/64% settings. Pt denied any CP, palpitations, SOB. Discussion with pt and her son was had about PEG/NPO, status qo or hospice. Pt would like to speak to hospice.   Objective    Vitals  Visit Vitals  /75   Pulse 93   Temp 36.7 °C (98.1 °F)   Resp 20   Ht 1.6 m (5' 2.99\")   Wt 78.5 kg (173 lb 1 oz)   SpO2 96%   BMI 30.66 kg/m²   Smoking Status Former   BSA 1.87 m²       Physical Exam   Constitutional: ill-appearing   HEENT: EOMI, clear sclera, moist mucous membranes  CV: RRR, No M/R/G  PULM: CTAB, no coughing, SOB  ABDOMEN: Soft, NT/ND. No TTP. + BSx4.  SKIN: Normal Color, Warm, Dry, No Rashes   EXTREMITIES: Non-Tender, Full ROM, 1+ Pitting edema   NEURO: A&O x 3  PSYCH: Normal Mood & Behavior       IOs    Intake/Output Summary (Last 24 hours) at 3/19/2025 1230  Last data filed at 3/19/2025 0306  Gross per 24 hour   Intake 500 ml   Output --   Net 500 ml       Labs:   Results from last 72 hours   Lab Units 03/19/25  0548 03/18/25  1640   SODIUM mmol/L 141 142   POTASSIUM mmol/L 3.8 3.2*   CHLORIDE mmol/L 105 105   CO2 mmol/L 25 23   BUN mg/dL 37* 31*   CREATININE mg/dL 1.88* 1.91*   GLUCOSE mg/dL 142* 205*   CALCIUM mg/dL 8.4* 8.8   ANION GAP mmol/L 15 17   EGFR mL/min/1.73m*2 25* 25*   PHOSPHORUS mg/dL 3.3  --       Results from last 72 hours   Lab Units 03/19/25  1029 03/19/25  0548 03/18/25  1640   WBC AUTO x10*3/uL  --  19.6* 17.4*   HEMOGLOBIN g/dL 5.7* 5.2* 6.1*   HEMATOCRIT % 16.5* 16.6* 18.9*   PLATELETS AUTO x10*3/uL  --  311 333   NEUTROS PCT AUTO %  --  85.9 88.5   LYMPHS PCT AUTO %  --  8.1 7.4   MONOS PCT AUTO %  --  4.2 2.2   EOS PCT AUTO %  --  0.1 0.0      Lab Results   Component Value Date    CALCIUM 8.4 (L) 03/19/2025    PHOS 3.3 03/19/2025      Lab Results   Component Value Date    CRP 2.37 (H) 01/08/2025     Micro/ID:   Susceptibility data from last 90 days.  Collected " Specimen Info Organism   02/07/25 Blood culture from Peripheral Venipuncture Staphylococcus epidermidis   02/07/25 Blood culture from Peripheral Venipuncture Bacillus species, not Bacillus anthracis     Lab Results   Component Value Date    URINECULTURE No significant growth 05/29/2024    BLOODCULT Loaded on Instrument - Culture in progress 03/18/2025    BLOODCULT Loaded on Instrument - Culture in progress 03/18/2025       Images  XR chest 1 view    Result Date: 3/18/2025  1. Worsening bilateral multifocal airspace disc suggestive of worsening pneumonia.       MACRO: None   Signed by: Ad Darby 3/18/2025 5:03 PM Dictation workstation:   [id number]    FL modified barium swallow study    Result Date: 2/21/2025  Abnormal exam with aspiration.   MACRO: None   Signed by: Ximena Sánchez 2/21/2025 3:54 PM Dictation workstation:   [id number]    CT angio chest for pulmonary embolism    Result Date: 2/19/2025  1. No pulmonary embolism. 2. Findings compatible with multifocal pneumonia involving all lobes, right greater than left, progressive compared to 2/9/2024 3. Small bilateral pleural effusions. 4. Severe coronary artery calcifications. 5. Underlying emphysema. 6. Dilated main pulmonary artery suggestive of pulmonary arterial hypertension. Signed by Quan Rashid MD      Meds  Scheduled medications  [Held by provider] apixaban, 2.5 mg, oral, BID  azithromycin, 500 mg, oral, q24h ANDRESW  cyanocobalamin, 1,000 mcg, oral, BID  docusate sodium, 100 mg, oral, Daily  ipratropium-albuteroL, 3 mL, nebulization, TID  latanoprost, 1 drop, Both Eyes, Nightly  levothyroxine, 112 mcg, oral, Once per day on Monday Tuesday Wednesday Thursday Friday Saturday  [Held by provider] lisinopril, 10 mg, oral, Daily  meclizine, 12.5 mg, oral, Nightly  pantoprazole, 40 mg, oral, Daily before breakfast  piperacillin-tazobactam, 3.375 g, intravenous, q6h      Continuous medications     PRN medications  PRN medications: acetaminophen,  dextromethorphan-guaifenesin, guaiFENesin, ipratropium-albuteroL, meclizine, oxygen, oxygen, promethazine     Problem List    Problem list:   Patient Active Problem List   Diagnosis    Abnormal antinuclear antibody titer    Idiopathic neuropathy    Nonexudative age-related macular degeneration    Numbness    Arthritis    Osteoarthritis    Postprocedural hypothyroidism    Thyroid disease    Vitamin D deficiency    Chronic fatigue    Herpes zoster without complication    Primary open angle glaucoma (POAG) of both eyes    Multiple subsegmental pulmonary emboli without acute cor pulmonale    Cellulitis    Generalized weakness    Multifocal pneumonia    Sepsis with acute hypoxic respiratory failure, due to unspecified organism, unspecified whether septic shock present (Multi)        Assessment and Plan    Nanette Flynn is a 89 y.o. female with a PMH of hypothyroidism, HTN, PE (on Eliquis), glaucoma, chronic B/L wounds and left hand carpal tunnel admitted for sepsis and AHRF 2/2 HAP.    Summary 03/19/25  - Pt would like to speak to hospice      Acute Medical Issues:  #Acute hypoxic respiratory failure  #Hospital acquired pneumonia  #?CHF exacerbation  #Sepsis  #Acute myocardial injury  #Elevated lactate  #Leukocytosis  :: Recent admission in last 30 days for pneumonia with abx, initially improved but now worsening  - Baseline RA  - CXR worsening bilateral multifocal airspace disease, worsening pneumonia  Plan:  - blood cultures ordered and pending  - sputum culture, urine legionella and strep, procal  - continue zosyn, azithromycin for HAP pending cultures  - Duonebs scheduled and prn, robitussin, mucinex DM  - wean oxygen as tolerated  - Pt interested in Hospice      #LALITA  ISO sepsis and poor oral intake  :: Baseline Cr ~1, was 1.29 at last discharge,   - Cr today: 1.91 on admission  Plan:  - S/p 500 mL bolus of fluids in ER  - ctm  - Avoid nephrotoxic meds  - Renally dose medications as able  - Strict I/O  -  Anticipate improvement of kidney function with treatment of pneumonia     #Acute on chronic anemia  - No signs/sx of active bleeding, likely worse ISO sepsis  - Hgb 6.1 on admission  Plan:  - patient declines blood transfusion at this time  - hold home eliquis  - ctm     #Dysphagia/Aspiration  -Evaluated on prior admission, mild thick liquids  - Pt and son stated she had worsening dysphagia over the weekend  PLAN:  -SLP consult     #Hypokalemia  Plan:  - potassium repleted,  monitor and replete as needed     Chronic Medical Issues:   #Hx PE - holding Eliquis 2.5 mg BID  #HTN - holding  Lisinopril 10 mg daily  #Hypothyroidism - Synthroid 112 mcg daily  #Vertigo- cont meclizine scheduled and prn  #Nausea- home promethazine suppository  #Vit B12 deficiency- cont home b12 BID  #Glaucoma- latanaprost eye drops  #GERD- protonix  #Constipation- cont home colace     Fluids: replete prn  Electrolytes: replete as needed  Nutrition: Regular diet, mild thick and easy to chew  GI Prophylaxis: on protonix  DVT Prophylaxis: holding eliquis given anemia     Access: pIV  Antibiotics: vanc, zosyn, azithromycin   Oxygenation: airvo     Dispo: To be admitted for sepsis and AHRF 2/2 HAP and questionable CHF. Estimated length of stay >48 hours.

## 2025-03-19 NOTE — H&P
Patient: Nanette Flynn   Age: 89 y.o.   Gender: female   Room/Bed: 252/252-B     Attending: Perez Willson MD  Code Status:  DNR and No Intubation and No ICU    Chief Complaint:  Respiratory Distress        History of Presenting Illness  Nanette Flynn is a 89 y.o. female with a PMH of hypothyroidism, HTN, PE (on Eliquis), glaucoma, chronic B/L wounds and left hand carpal tunnel who presented from facility for worsening shortness of breath.    Patient was recently admitted for multifocal pneumonia from 2/19-2/24 and discharged to SNF.    She completed course of Augmentin outpatient but started to develop worsening oxygenation and cough 3 days ago. She ultimately was up to 8L on nasal cannula prior to being brought to the emergency room.    She denies fevers or chills but stated that she was shivering on Sunday but didn't feel cold. Patient and family do not believe she has any worsening peripheral edema. Endorsed abdominal pain with resolved prior to presenting to ED. Has had poor appetite over the past day. Denies chest pain.    In the ED, patient arrived on CPAP, received Solu-Medrol and DuoNebs in ambulance without improvement.  Chest x-ray showed worsening multifocal pneumonia.  Patient met SIRS criteria for sepsis, lactate elevation (3.5), worsened LALITA and received vancomycin, cefepime, azithromycin.  Only 500 cc fluid bolus given concern for volume overload.  Troponin elevated at 311, repeat stable.  BNP elevated at 817.  Anemia with hemoglobin 6.1, however patient declined blood transfusion.    Past Medical History   Past Medical History:   Diagnosis Date    Arthritis     Disease of thyroid gland     Glaucoma     Hypertension     Other specified postprocedural states     History of colonoscopy    Personal history of other diseases of the circulatory system     History of rheumatic fever    Personal history of other diseases of the circulatory system     History of rheumatic fever    Personal history  of other medical treatment     History of mammogram      Past Surgical History:   Past Surgical History:   Procedure Laterality Date    APPENDECTOMY  08/20/2018    Appendectomy    COLON SURGERY      CT ANGIO NECK  09/24/2013    CT NECK ANGIO W AND WO IV CONTRAST 9/24/2013 [facility]    CT HEAD ANGIO W AND WO IV CONTRAST  09/24/2013    CT HEAD ANGIO W AND WO IV CONTRAST 9/24/2013 [facility]    HYSTERECTOMY  08/20/2018    Hysterectomy    OTHER SURGICAL HISTORY  08/20/2018    Enterectomy     Family History:   Family History   Problem Relation Name Age of Onset    Heart disease Mother Leanna     Hypertension Mother Leanna     Transient ischemic attack Mother Leanna     Heart disease Father Richard     Cancer Maternal Grandmother Maternal grandmother     Colon cancer Maternal Grandmother Maternal grandmother      Social History:   Tobacco Use: Medium Risk (3/18/2025)    Patient History     Smoking Tobacco Use: Former     Smokeless Tobacco Use: Never     Passive Exposure: Not on file     Social History     Substance and Sexual Activity   Alcohol Use Not Currently        ED Course   Vitals - /70   Pulse 92   Temp 36.6 °C (97.9 °F)   Resp 20   Wt 78.5 kg (173 lb 1 oz)   SpO2 95%     Labs:   CBC:  Recent Labs     03/18/25 1640 02/24/25  0544 02/23/25  0526   WBC 17.4* 9.4 8.7   HGB 6.1* 7.7* 7.3*   HCT 18.9* 24.2* 23.0*    337 354   MCV 96 97 98     CMP:  Recent Labs     03/18/25 1640 02/24/25  0544 02/23/25  0526    137 138   K 3.2* 3.8 4.0    106 104   CO2 23 25 25   ANIONGAP 17 10 13   BUN 31* 12 9   CREATININE 1.91* 1.29* 1.34*   EGFR 25* 40* 38*   GLUCOSE 205* 104* 95     Recent Labs     03/18/25  1640 02/24/25  0544 02/23/25  0526 02/20/25  0730 02/19/25  1455 02/07/25  2247 01/06/25  0855 11/08/24  1159 02/22/24  1027 02/09/24  1013   ALBUMIN 3.1* 3.0* 3.0*   < > 3.2* 3.3*   < > 4.1   < > 4.0   ALKPHOS 89  --   --   --  58 56   < > 49   < > 54   ALT 12  --   --   --  20 24    "< > 12   < > 13   AST 27  --   --   --  25 37   < > 19   < > 23   BILITOT 0.7  --   --   --  0.4 1.0   < > 0.5   < > 1.4*   LIPASE  --   --   --   --   --   --   --  44  --  35    < > = values in this interval not displayed.     Calcium/Phos:   Lab Results   Component Value Date    CALCIUM 8.8 03/18/2025    PHOS 2.8 02/24/2025      COAG:   Recent Labs     02/25/24  0632 02/24/24  0822 02/23/24  0733   INR 1.4* 1.4* 1.3*     CRP:   Lab Results   Component Value Date    CRP 2.37 (H) 01/08/2025      [unfilled]   ENDO:  Recent Labs     01/06/25  0934 08/01/24  1549 02/23/24  0733 02/12/24  0556   TSH 6.66* 2.87 2.62 4.54*      CARDIAC:   Recent Labs     03/18/25  1753 03/18/25  1640 02/19/25  1555 02/19/25  1455 02/07/25  2351 02/07/25  2247   TROPHS 311* 311* 9 9   < > 16*   BNP  --  816*  --  246*  --  111*    < > = values in this interval not displayed.     Recent Labs     07/25/23  1600   CHOL 172   LDLCALC 92   HDL 50*   TRIG 152*     No data recorded    Micro/ID:   Susceptibility data from last 90 days.  Collected Specimen Info Organism   02/07/25 Blood culture from Peripheral Venipuncture Staphylococcus epidermidis   02/07/25 Blood culture from Peripheral Venipuncture Bacillus species, not Bacillus anthracis                    No lab exists for component: \"AGALPCRNB\"   .ID  Lab Results   Component Value Date    URINECULTURE No significant growth 05/29/2024    BLOODCULT No growth at 4 days -  FINAL REPORT 02/19/2025    BLOODCULT No growth at 4 days -  FINAL REPORT 02/19/2025       Imaging:   Encounter Date: 02/19/25   ECG 12 lead   Result Value    Ventricular Rate 83    Atrial Rate 83    ID Interval 182    QRS Duration 100    QT Interval 376    QTC Calculation(Bazett) 441    P Axis 65    R Axis 43    T Axis 37    QRS Count 14    Q Onset 217    P Onset 126    P Offset 159    T Offset 405    QTC Fredericia 419    Narrative    Sinus rhythm with occasional Premature ventricular complexes  Poor R-wave progression " ; consider anterior infarct, lead placement, or normal variant  Abnormal ECG  When compared with ECG of 06-JAN-2025 09:01,  Premature ventricular complexes are now Present  Non-specific change in ST segment in Inferior leads  Nonspecific T wave abnormality now evident in Inferior leads  Nonspecific T wave abnormality, improved in Lateral leads  See ED provider note for full interpretation and clinical correlation  Confirmed by Barby Lundberg ([id number]) on 2/28/2025 11:46:41 AM     XR chest 1 view    Result Date: 3/18/2025  Interpreted By:  Ad Darby, STUDY: XR CHEST 1 VIEW;  3/18/2025 4:42 pm   INDICATION: Signs/Symptoms:sob, hypoxia.     COMPARISON: 02/07/2025   ACCESSION NUMBER(S): [id number]   ORDERING CLINICIAN: CUCO DOLL   FINDINGS:         CARDIOMEDIASTINAL SILHOUETTE: Cardiomediastinal silhouette is moderately enlarged but unchanged.   LUNGS: Extensive bilateral multifocal airspace disease worse at the bases. This is progressing from the prior especially on the left. No effusion seen   ABDOMEN: No remarkable upper abdominal findings.   BONES: No acute osseous changes.       1. Worsening bilateral multifocal airspace disc suggestive of worsening pneumonia.       MACRO: None   Signed by: Ad Darby 3/18/2025 5:03 PM Dictation workstation:   [id number]     Interventions:  Medications   oxygen (O2) therapy (50 percent inhalation Start 3/18/25 1713)   oxygen (O2) therapy (40 L/min inhalation Rate Verify Medical Gas 3/18/25 1950)   acetaminophen (Tylenol) tablet 650 mg (has no administration in time range)   apixaban (Eliquis) tablet 2.5 mg ( oral Dose Auto Held 3/22/25 2100)   cyanocobalamin (Vitamin B-12) tablet 1,000 mcg (has no administration in time range)   dextromethorphan-guaifenesin (Mucinex DM)  mg per 12 hr tablet 1 tablet (has no administration in time range)   docusate sodium (Colace) capsule 100 mg (has no administration in time range)   guaiFENesin (Robitussin) 100 mg/5 mL  syrup 200 mg (has no administration in time range)   ipratropium-albuteroL (Duo-Neb) 0.5-2.5 mg/3 mL nebulizer solution 3 mL (has no administration in time range)   ipratropium-albuteroL (Duo-Neb) 0.5-2.5 mg/3 mL nebulizer solution 3 mL (has no administration in time range)   levothyroxine (Synthroid, Levoxyl) tablet 112 mcg (has no administration in time range)   lisinopril tablet 10 mg ( oral Dose Auto Held 3/22/25 0900)   meclizine (Antivert) tablet 12.5 mg (has no administration in time range)   meclizine (Antivert) tablet 12.5 mg (has no administration in time range)   pantoprazole (ProtoNix) EC tablet 40 mg (has no administration in time range)   promethazine (Phenergan) suppository 25 mg (has no administration in time range)   latanoprost (Xalatan) 0.005 % ophthalmic solution 1 drop (has no administration in time range)   vancomycin (Vancocin) pharmacy to dose - pharmacy monitoring (has no administration in time range)   azithromycin (Zithromax) 500 mg in dextrose 5%  mL (has no administration in time range)   piperacillin-tazobactam (Zosyn) 3.375 g in dextrose (iso) IV 50 mL (has no administration in time range)   potassium chloride 20 mEq in sterile water for injection 100 mL (has no administration in time range)   azithromycin (Zithromax) 500 mg in dextrose 5%  mL (0 mg intravenous Stopped 3/18/25 1830)   cefepime (Maxipime) 1 g in dextrose 5% IV 50 mL (0 g intravenous Stopped 3/18/25 1900)   vancomycin (Vancocin) 1,250 mg in dextrose 5%  mL (1,250 mg intravenous New Bag 3/18/25 1914)   lactated Ringer's bolus 500 mL (0 mL intravenous Stopped 3/18/25 1915)   aspirin tablet 325 mg (325 mg oral Given 3/18/25 1920)       Images:  XR chest 1 view  Narrative: Interpreted By:  Ad Darby,   STUDY:  XR CHEST 1 VIEW;  3/18/2025 4:42 pm      INDICATION:  Signs/Symptoms:sob, hypoxia.          COMPARISON:  02/07/2025      ACCESSION NUMBER(S):  [id number]      ORDERING CLINICIAN:  CUCO  LAVON      FINDINGS:                  CARDIOMEDIASTINAL SILHOUETTE:  Cardiomediastinal silhouette is moderately enlarged but unchanged.      LUNGS:  Extensive bilateral multifocal airspace disease worse at the bases.  This is progressing from the prior especially on the left. No  effusion seen      ABDOMEN:  No remarkable upper abdominal findings.      BONES:  No acute osseous changes.      Impression: 1. Worsening bilateral multifocal airspace disc suggestive of  worsening pneumonia.              MACRO:  None      Signed by: Ad Darby 3/18/2025 5:03 PM  Dictation workstation:   [id number]       Medications:    Current Facility-Administered Medications:     acetaminophen (Tylenol) tablet 650 mg, 650 mg, oral, q6h PRN, Hayden Colvin MD    [Held by provider] apixaban (Eliquis) tablet 2.5 mg, 2.5 mg, oral, BID, Hayden Colvin MD    [START ON 3/19/2025] azithromycin (Zithromax) 500 mg in dextrose 5%  mL, 500 mg, intravenous, q24h, Hayden Colvin MD    cyanocobalamin (Vitamin B-12) tablet 1,000 mcg, 1,000 mcg, oral, BID, Hayden Colvin MD    dextromethorphan-guaifenesin (Mucinex DM)  mg per 12 hr tablet 1 tablet, 1 tablet, oral, BID PRN, Hayden Colvin MD    [START ON 3/19/2025] docusate sodium (Colace) capsule 100 mg, 100 mg, oral, Daily, Hayden Colvin MD    guaiFENesin (Robitussin) 100 mg/5 mL syrup 200 mg, 200 mg, oral, q6h PRN, Hayden Colvin MD    ipratropium-albuteroL (Duo-Neb) 0.5-2.5 mg/3 mL nebulizer solution 3 mL, 3 mL, nebulization, q6h, Hayden Colvin MD    ipratropium-albuteroL (Duo-Neb) 0.5-2.5 mg/3 mL nebulizer solution 3 mL, 3 mL, nebulization, q2h PRN, Hayden Colvin MD    latanoprost (Xalatan) 0.005 % ophthalmic solution 1 drop, 1 drop, Both Eyes, Nightly, Hayden Colvin MD    [START ON 3/19/2025] levothyroxine (Synthroid, Levoxyl) tablet 112 mcg, 112 mcg, oral, Daily, Hayden Colvin MD    [Held by provider] lisinopril tablet 10 mg, 10 mg, oral, Daily, Hayden Colvin,  MD    meclizine (Antivert) tablet 12.5 mg, 12.5 mg, oral, q6h PRN, Hayden Colvin MD    meclizine (Antivert) tablet 12.5 mg, 12.5 mg, oral, Nightly, Hayden Colvin MD    oxygen (O2) therapy, , inhalation, Continuous PRN - O2/gases, Hayden Colvin MD, 50 percent at 25 1713    oxygen (O2) therapy, , inhalation, Continuous PRN - O2/gases, Mariam Bradshaw, DO, Last Rate: 2,400,000 mL/hr at 25 1950, 40 L/min at 25    [START ON 3/19/2025] pantoprazole (ProtoNix) EC tablet 40 mg, 40 mg, oral, Daily before breakfast, Hayden Colvin MD    piperacillin-tazobactam (Zosyn) 3.375 g in dextrose (iso) IV 50 mL, 3.375 g, intravenous, q6h, Hayden Colvin MD    potassium chloride 20 mEq in sterile water for injection 100 mL, 20 mEq, intravenous, q2h, Hayden Colvin MD    promethazine (Phenergan) suppository 25 mg, 25 mg, rectal, q6h PRN, Hayden Colvin MD    vancomycin (Vancocin) pharmacy to dose - pharmacy monitoring, , miscellaneous, Daily PRN, Hayden Colvin MD    Objective Data  Physical Exam  Constitutional:       Appearance: She is ill-appearing.   HENT:      Head: Normocephalic and atraumatic.   Eyes:      Extraocular Movements: Extraocular movements intact.   Cardiovascular:      Rate and Rhythm: Normal rate and regular rhythm.      Pulses: Normal pulses.      Heart sounds: No murmur heard.     No friction rub.   Pulmonary:      Effort: Pulmonary effort is normal. No respiratory distress.      Breath sounds: Examination of the right-lower field reveals decreased breath sounds. Examination of the left-lower field reveals decreased breath sounds. Decreased breath sounds, rhonchi and rales present.   Abdominal:      General: There is distension.      Tenderness: There is no abdominal tenderness. There is no guarding or rebound.   Musculoskeletal:      Right lower le+ Pitting Edema present.      Left lower le+ Pitting Edema present.   Skin:     Capillary Refill: Capillary refill takes less  than 2 seconds.   Neurological:      General: No focal deficit present.      Mental Status: She is alert and oriented to person, place, and time.          Assessment and Plan   Nanette Flynn is a 89 y.o. female with a PMH of hypothyroidism, HTN, PE (on Eliquis), glaucoma, chronic B/L wounds and left hand carpal tunnel admitted for sepsis and AHRF 2/2 HAP.    Acute Medical Issues:  #Acute hypoxic respiratory failure  #Hospital acquired pneumonia  #?CHF exacerbation  #Sepsis  #Acute myocardial injury  #Elevated lactate  #Leukocytosis  :: Recent admission in last 30 days for pneumonia with abx, initially improved but now worsening  - Baseline RA, now on airvo 40L and 73% FiO2  - CXR worsening bilateral multifocal airspace disease, worsening pneumonia  - Lactate 4 --> 3.5  - WBC 17.4  - Troponin 311 -> 311   - pt not overtly volume overloaded on exam, recent echo w/ EF 63%. When speaking with family at bedside, they state that legs don't look more swollen than at baseline  - Vbg pH 7.45, pCO2 34, pO2 24  - s/p Vanc, cefepime, azithromycin in ED  - mild subjective improvement in breathing in ED w/ duonebs  - only 500cc fluids given in ED for concerns about mild volume overload  Plan:  - trend lactate and troponin   - may give additional fluids pending results, did not receive sepsis fluids given concerns for being volume up  - blood cultures ordered and pending  - sputum culture, urine legionella and strep, procal  - continue vancomycin, zosyn, azithromycin for HAP pending cultures  - Duonebs scheduled and prn, robitussin, mucinex DM  - wean oxygen as tolerated  - Day team can consider diuresis if they feel pt is in CHF exacerbation  - Pt may be interested in meeting with palliative care team    #LALITA  ISO sepsis and poor oral intake  :: Baseline Cr ~1, was 1.29 at last discharge,   - Cr today: 1.91 on admission  Plan:  - S/p 500 mL bolus of fluids in ER  - ctm  - Avoid nephrotoxic meds  - Renally dose  medications as able  - Strict I/O  - Anticipate improvement of kidney function with treatment of pneumonia    #Acute on chronic anemia  - No signs/sx of active bleeding, likely worse ISO sepsis  - Hgb 6.1 on admission  Plan:  - patient declines blood transfusion at this time  - hold home eliquis  - ctm    #Dysphagia/Aspiration  -Evaluated on prior admission, mild thick liquids  - Pt and son stated she had worsening dysphagia over the weekend  PLAN:  -SLP consult    #Hypokalemia  Plan:  - potassium repleted,  monitor and replete as needed    Chronic Medical Issues:   #Hx PE - holding Eliquis 2.5 mg BID  #HTN - holding  Lisinopril 10 mg daily  #Hypothyroidism - Synthroid 112 mcg daily  #Vertigo- cont meclizine scheduled and prn  #Nausea- home promethazine suppository  #Vit B12 deficiency- cont home b12 BID  #Glaucoma- latanaprost eye drops  #GERD- protonix  #Constipation- cont home colace    Fluids: replete prn  Electrolytes: replete as needed  Nutrition: Regular diet, mild thick and easy to chew  GI Prophylaxis: on protonix  DVT Prophylaxis: holding eliquis given anemia    Access: pIV  Antibiotics: vanc, zosyn, azithromycin   Oxygenation: airvo    Dispo: To be admitted for sepsis and AHRF 2/2 HAP and questionable CHF. Estimated length of stay >48 hours.    Hayden Colvin MD PGY-1

## 2025-03-19 NOTE — PROGRESS NOTES
Speech-Language Pathology    Speech-Language Pathology Clinical Swallow Evaluation    Patient Name: Nanette Flynn  MRN: [id number]  : 1935  Today's Date: 25  Start Time: 0950  Stop Time: 1013  Time Calculation (min): 23 min      ASSESSMENT  Impressions:  Functional oral phase and suspected pharyngeal phase dysphagia based on clinical swallow evaluation. Pt would benefit from skilled ST to minimize aspiration risk and ensure ongoing safety with the least restrictive diet.  Prognosis: Good    PLAN    RECOMMENDATIONS:  Is MBSS recommended? No, due to recent MBSS completed.  Solid consistency: Regular (IDDSI level 7)  Liquid consistency: Mildly thick (IDDSI 2) / Nectar-thick  Medication administration: Whole in puree    Compensatory swallow strategies:  - Upright positioning for all PO intake  - Remain upright for >30 min after meals  - Slow rate of intake  - Small bites  - Small sips    Recommended frequency/duration:  Skilled SLP services recommended: Yes  Frequency: 2x/week  Duration: Current admission  Discharge recommendation: Unable to determine at this time; please see follow-up notes for DC recommendation.  Treatment/Interventions: Assess diet tolerance  Strengths: Cognition and Family/caregiver support  Barriers to participation in tx: Baseline impaired functional status    Goals (start date 3/19/2025):  - Pt will consume prescribed diet (current diet is regular with nectar thick) without overt s/sx aspiration/penetration in 95% of observed trials.   Status: Goal initiated this date   Progress this date: n/a    - Pt will demonstrate follow-through of trained compensatory strategies during a meal/snack with 95% acc independently.   Status: Goal initiated this date   Progress this date: n/a      SUBJECTIVE    PMHx relevant to rehab:   Nanette Flynn is a 89 y.o. female with a PMH of hypothyroidism, HTN, PE (on Eliquis), glaucoma, chronic B/L wounds and left hand carpal tunnel admitted for  sepsis and AHRF 2/2 HAP.     Acute Medical Issues:  #Acute hypoxic respiratory failure  #Hospital acquired pneumonia  #?CHF exacerbation  #Sepsis  #Acute myocardial injury  #Elevated lactate  #Leukocytosis  Chief complaint: Pt was admitted on 3/18/25 due to   Chief Complaint   Patient presents with    Respiratory Distress   . She was found to have Sepsis with acute hypoxic respiratory failure, due to unspecified organism, unspecified whether septic shock present (Multi).    Relevant imaging results:  Modified barium swallow 2/20/25:      SLP Impressions with Severity Rating:  Pt presents with moderate oropharyngeal dysphagia upon completion of  modified barium swallow study this date. Swallowing physiology is  detailed above. Impairments most impacting swallowing safety and  efficiency include decreased hyolaryngeal elevation/excursion and  incomplete laryngeal vestibular closure. Patient demonstrated overt  aspiration with approximately 20 ml straw sips of thin liquids during  the swallow. Cough was minimally effecting at ejecting aspiration  material from airway. Noted transient penetration with 5ml (tsp) and  20 ml (straw sip) nectar thick liquids. No additional penetration or  aspiration was observed across other consistencies test      Strategies attempted- Effortful swallow resulted in improved airway  protection with straw sips of thin liquids, making pt a good  candidate for [place] Free Water Protocol.    Rosenbek's Penetration Aspiration Scale  Thin Liquids: 7. OVERT ASPIRATION, material is not cleared - contrast  passes glottis, visible residue, W/pt response During the Swallow  Nectar Thick Liquids: 2. PENETRATION that CLEARS - contrast enter  airway, above vocal cords, no residue During the Swallow  Honey Thick Liquids: 1. NO ASPIRATION & NO PENETRATION - no  aspiration, contrast does not enter airway Puree: 1. NO ASPIRATION  & NO PENETRATION - no aspiration, contrast does not enter airway  Solids: 1.  NO ASPIRATION & NO PENETRATION - no aspiration, contrast  does not enter airway    General Visit Information:  SLP Received On: 03/19/25  Patient Class: Inpatient  Living Environment: Home  Ordering Physician: Hayden Colvin MD  Reason for Referral: dysphagia  Prior to Session Communication: Bedside nurse    RN cleared pt to participate in session and reported that the patient has been coughing with intake.     Pt reported that she has been consistent with implementation of safest swallowing recommendations in the nursing home. Regular oral care, use of thickened liquids and appropriate implementation of the [place] Free Water Protocol: (Pt may have sips of thin water under the following conditions only: no food present, within 2 hours after oral care, when fully alert and upright. All liquids besides water must be thickened. )          [employer] Diet: regular/ mildly nectar thick  Current Diet : regular/ mildly- nectar thick    Status at time of evaluation:  Pain Assessment  Pain Assessment: 0-10  0-10 (Numeric) Pain Score: 0 - No pain    Pt was alert, pleasant, cooperative, and attentive for session.  Orientation: Ox4  Ability to follow functional commands: WFL  Nutritional status: Appears well-nourished/no concerns    Respiratory status: Supplemental oxygen via NC 13lpm  Baseline Vocal Quality: Normal  Volitional Cough: Strong  Volitional Swallow: Within Functional Limits  Patient positioning: Upright in bed      OBJECTIVE  Clinical swallow evaluation completed and consisted of interview, oral motor assessment, and PO trials (nectar thick liquids and cracker).    ORAL PHASE: Dentition in good condition. Oral mucosa were pink, moist, and free of obvious lesions. Lingual strength and ROM were functional. Labial strength/ROM were good. Labial seal was adequate. Mastication of regular solids was functional. A/P transit and oral clearance were adequate.    PHARYNGEAL PHASE: Laryngeal elevation was visualized or palpated  with all trials, however adequacy of hyolaryngeal elevation/excursion cannot be determined at bedside. No immediate or delayed s/sx aspiration/penetration were observed with any consistencies.    Was 3oz challenge administered: No, deferred due to safety concerns.    Treatment/Education:  Results and recommendations were relayed to: Patient, Family, Bedside nurse, and Physician  Education provided: Yes   Learner: Patient and Family   Barriers to learning: None   Method of teaching: Verbal and Demonstration   Topic: role of ST, results of assessment, risk for aspiration, recommended diet modifications, recommended safe swallow strategies, and swallow anatomy/physiology   Outcome of teaching: Pt/family demonstrated good understanding  Treatment provided: No

## 2025-03-19 NOTE — ED PROCEDURE NOTE
Procedure  Critical Care    Performed by: Jet Dalton MD  Authorized by: Jet Dalton MD    Critical care provider statement:     Critical care time (minutes):  40    Critical care time was exclusive of:  Separately billable procedures and treating other patients and teaching time    Critical care was necessary to treat or prevent imminent or life-threatening deterioration of the following conditions:  Respiratory failure    Critical care was time spent personally by me on the following activities:  Development of treatment plan with patient or surrogate, evaluation of patient's response to treatment, examination of patient, re-evaluation of patient's condition, pulse oximetry, ordering and review of radiographic studies, ordering and review of laboratory studies and ordering and performing treatments and interventions    Care discussed with: admitting provider               Jet Dalton MD  03/18/25 0879

## 2025-03-19 NOTE — CARE PLAN
The patient's goals for the shift include      The clinical goals for the shift include maintain sats greater to or equal to 92%

## 2025-03-19 NOTE — CARE PLAN
The patient's goals for the shift include  to improve work of breathing    The clinical goals for the shift include maintain sats greater to or equal to 92%      Problem: Pain - Adult  Goal: Verbalizes/displays adequate comfort level or baseline comfort level  Outcome: Progressing     Problem: Safety - Adult  Goal: Free from fall injury  Outcome: Progressing     Problem: Discharge Planning  Goal: Discharge to home or other facility with appropriate resources  Outcome: Progressing     Problem: Chronic Conditions and Co-morbidities  Goal: Patient's chronic conditions and co-morbidity symptoms are monitored and maintained or improved  Outcome: Progressing     Problem: Nutrition  Goal: Nutrient intake appropriate for maintaining nutritional needs  Outcome: Progressing     Problem: Skin  Goal: Decreased wound size/increased tissue granulation at next dressing change  Outcome: Progressing     Problem: Skin  Goal: Decreased wound size/increased tissue granulation at next dressing change  Outcome: Progressing     Problem: Skin  Goal: Participates in plan/prevention/treatment measures  Outcome: Progressing     Problem: Skin  Goal: Prevent/manage excess moisture  Outcome: Progressing     Problem: Skin  Goal: Prevent/minimize sheer/friction injuries  Outcome: Progressing     Problem: Skin  Goal: Promote/optimize nutrition  Outcome: Progressing     Problem: Skin  Goal: Promote skin healing  Outcome: Progressing

## 2025-03-19 NOTE — PROGRESS NOTES
Physical Therapy                 Therapy Communication Note    Patient Name: Nanette Flynn  MRN: [id number]  Department: [address]  Room: [address] [place], [place] [address]  Today's Date: 3/19/2025     Discipline: Physical Therapy    PT Missed Visit: Yes     Missed Visit Reason: Patient placed on medical hold. Repeat labs continue to show low H&H (5.7/16.5). Will hold PT eval until labs are therapeutic.     Missed Time: Attempt    Comment: [id number]

## 2025-03-19 NOTE — PROGRESS NOTES
Occupational Therapy                 Therapy Communication Note    Patient Name: Nanette Flynn  MRN: [id number]  Department: [address]  Room: [address] [place] [address]  Today's Date: 3/19/2025     Discipline: Occupational Therapy    Missed Visit Reason:  Patient placed on medical hold. Repeat labs continue to show low H&H (5.7/16.5). Will hold OT eval until labs are therapeutic.     Missed Time: Attempt

## 2025-03-19 NOTE — PROGRESS NOTES
Vancomycin Dosing by Pharmacy- Cessation of Therapy    Consult to pharmacy for vancomycin dosing has been discontinued by the prescriber, pharmacy will sign off at this time.    Please call pharmacy if there are further questions or re-enter a consult if vancomycin is resumed.     Kai Kiran [facility]

## 2025-03-19 NOTE — PROGRESS NOTES
03/19/25 1349   Discharge Planning   Living Arrangements Other (Comment)  (From [facility])   Support Systems Children   Assistance Needed Patient is A&OX3. Per [facility] patient is a min assist with transfers, CGA with ambulation. No O2 baseline. Patient reports facility uses a trish for transfer to wheelchair and can stand for BSC.   Type of Residence Skilled nursing facility   Who is requesting discharge planning? Provider   Home or Post Acute Services Post acute facilities (Rehab/SNF/etc)   Type of Post Acute Facility Services Skilled nursing   Expected Discharge Disposition SNF  (Patient from [facility]. Referral was placed to [facility](appointment scheduled for tomorrow between 930-10am), will await meeting for further dc planning.)   Does the patient need discharge transport arranged? Yes   RoundTrip coordination needed? Yes   Has discharge transport been arranged? No   Financial Resource Strain   How hard is it for you to pay for the very basics like food, housing, medical care, and heating? Not hard   Housing Stability   In the last 12 months, was there a time when you were not able to pay the mortgage or rent on time? N   In the past 12 months, how many times have you moved where you were living? 0   At any time in the past 12 months, were you homeless or living in a shelter (including now)? N   Transportation Needs   In the past 12 months, has lack of transportation kept you from medical appointments or from getting medications? no   In the past 12 months, has lack of transportation kept you from meetings, work, or from getting things needed for daily living? No   Patient Choice   Provider Choice list and CMS website (https://medicare.gov/care-compare#search) for post-acute Quality and Resource Measure Data were provided and reviewed with: Patient   Patient / Family choosing to utilize agency / facility established prior to hospitalization Yes   Stroke Family Assessment   Stroke Family  Assessment Needed No   Intensity of Service   Intensity of Service 0-30 min

## 2025-03-20 NOTE — PROGRESS NOTES
Physical Therapy                    PT SCREEN    Patient Name: Nanette Flynn  MRN: [id number]  Department: [address]  Room: [address] [place], [place] [address]  Today's Date: 3/20/2025     Discipline: Physical Therapy    PT Missed Visit: Yes     Missed Visit Reason: Cancel. PT consult received and chart reviewed. AM labs continue to demonstrate low H&H (5.5/17.4). Per [facility], family prefers pt return to [facility] with hospice, private pay. Acute PT not indicated at this time; will discontinue order.     If discharge plans change and pt's medical status improves, please re-consult PT services.     Missed Time: Cancel    Comment: [id number]

## 2025-03-20 NOTE — CARE PLAN
The patient's goals for the shift include      The clinical goals for the shift include Will remain HDS throughout shift

## 2025-03-20 NOTE — PROGRESS NOTES
"  Subjective    No acute events overnight. Pt was seen and examined by bedside. She was saturating on 15L, stated she felt weak and tired. Pt denied blood products this AM until hospice meeting was completed. Plan is for pt to go GIP 03/21.    Objective    Vitals  Visit Vitals  /84   Pulse 88   Temp 36.4 °C (97.5 °F) (Temporal)   Resp 26   Ht 1.6 m (5' 2.99\")   Wt 78.5 kg (173 lb 1 oz)   SpO2 92%   BMI 30.66 kg/m²   Smoking Status Former   BSA 1.87 m²       Physical Exam   Constitutional: ill-appearing   HEENT: EOMI, clear sclera, moist mucous membranes  CV: RRR, No M/R/G  PULM: CTAB, no coughing, SOB  ABDOMEN: Soft, NT/ND. No TTP. + BSx4.  SKIN: Normal Color, Warm, Dry, No Rashes   EXTREMITIES: Non-Tender, Full ROM, 1+ Pitting edema   NEURO: A&O x 3  PSYCH: Normal Mood & Behavior     IOs    Intake/Output Summary (Last 24 hours) at 3/20/2025 1706  Last data filed at 3/20/2025 0628  Gross per 24 hour   Intake --   Output 125 ml   Net -125 ml       Labs:   Results from last 72 hours   Lab Units 03/20/25  0514 03/19/25  0548 03/18/25  1640   SODIUM mmol/L 144 141 142   POTASSIUM mmol/L 3.1* 3.8 3.2*   CHLORIDE mmol/L 108* 105 105   CO2 mmol/L 24 25 23   BUN mg/dL 41* 37* 31*   CREATININE mg/dL 1.88* 1.88* 1.91*   GLUCOSE mg/dL 111* 142* 205*   CALCIUM mg/dL 8.4* 8.4* 8.8   ANION GAP mmol/L 15 15 17   EGFR mL/min/1.73m*2 25* 25* 25*   PHOSPHORUS mg/dL 2.8 3.3  --       Results from last 72 hours   Lab Units 03/20/25  0514 03/19/25  1029 03/19/25  0548 03/18/25  1640   WBC AUTO x10*3/uL 20.1*  --  19.6* 17.4*   HEMOGLOBIN g/dL 5.5* 5.7* 5.2* 6.1*   HEMATOCRIT % 17.4* 16.5* 16.6* 18.9*   PLATELETS AUTO x10*3/uL 364  --  311 333   NEUTROS PCT AUTO % 77.8  --  85.9 88.5   LYMPHS PCT AUTO % 11.5  --  8.1 7.4   MONOS PCT AUTO % 6.6  --  4.2 2.2   EOS PCT AUTO % 0.0  --  0.1 0.0      Lab Results   Component Value Date    CALCIUM 8.4 (L) 03/20/2025    PHOS 2.8 03/20/2025      Lab Results   Component Value Date    CRP 2.37 " (H) 01/08/2025      [unfilled]     Micro/ID:   Susceptibility data from last 90 days.  Collected Specimen Info Organism   02/07/25 Blood culture from Peripheral Venipuncture Staphylococcus epidermidis   02/07/25 Blood culture from Peripheral Venipuncture Bacillus species, not Bacillus anthracis       Lab Results   Component Value Date    URINECULTURE No significant growth 05/29/2024    BLOODCULT No growth at 1 day 03/18/2025    BLOODCULT No growth at 1 day 03/18/2025       Images  XR chest 1 view    Result Date: 3/18/2025  1. Worsening bilateral multifocal airspace disc suggestive of worsening pneumonia.       MACRO: None   Signed by: Ad Darby 3/18/2025 5:03 PM Dictation workstation:   [id number]    FL modified barium swallow study    Result Date: 2/21/2025  Abnormal exam with aspiration.   MACRO: None   Signed by: Ximena Sánchez 2/21/2025 3:54 PM Dictation workstation:   [id number]    CT angio chest for pulmonary embolism    Result Date: 2/19/2025  1. No pulmonary embolism. 2. Findings compatible with multifocal pneumonia involving all lobes, right greater than left, progressive compared to 2/9/2024 3. Small bilateral pleural effusions. 4. Severe coronary artery calcifications. 5. Underlying emphysema. 6. Dilated main pulmonary artery suggestive of pulmonary arterial hypertension. Signed by Quan Rashid MD      Meds  Scheduled medications  [Held by provider] apixaban, 2.5 mg, oral, BID  cyanocobalamin, 1,000 mcg, oral, BID  docusate sodium, 100 mg, oral, Daily  ipratropium-albuteroL, 3 mL, nebulization, TID  latanoprost, 1 drop, Both Eyes, Nightly  levothyroxine, 112 mcg, oral, Once per day on Monday Tuesday Wednesday Thursday Friday Saturday  [Held by provider] lisinopril, 10 mg, oral, Daily  meclizine, 12.5 mg, oral, Nightly  pantoprazole, 40 mg, oral, Daily before breakfast  piperacillin-tazobactam, 3.375 g, intravenous, q6h  potassium chloride, 20 mEq, intravenous, q2h      Continuous  medications     PRN medications  PRN medications: acetaminophen, dextromethorphan-guaifenesin, guaiFENesin, ipratropium-albuteroL, meclizine, oxygen, oxygen, promethazine     Problem List    Problem list:   Patient Active Problem List   Diagnosis    Abnormal antinuclear antibody titer    Idiopathic neuropathy    Nonexudative age-related macular degeneration    Numbness    Arthritis    Osteoarthritis    Postprocedural hypothyroidism    Thyroid disease    Vitamin D deficiency    Chronic fatigue    Herpes zoster without complication    Primary open angle glaucoma (POAG) of both eyes    Multiple subsegmental pulmonary emboli without acute cor pulmonale    Cellulitis    Generalized weakness    Multifocal pneumonia    Sepsis with acute hypoxic respiratory failure, due to unspecified organism, unspecified whether septic shock present (Multi)        Assessment and Plan    Nanette Flynn is a 89 y.o. female with a PMH of hypothyroidism, HTN, PE (on Eliquis), glaucoma, chronic B/L wounds and left hand carpal tunnel admitted for sepsis and AHRF 2/2 HAP.     Summary 03/20/25  - Plan for hospice GIP today      Acute Medical Issues:  #Acute hypoxic respiratory failure  #Hospital acquired pneumonia  #?CHF exacerbation  #Sepsis  #Acute myocardial injury  #Elevated lactate  #Leukocytosis  :: Recent admission in last 30 days for pneumonia with abx, initially improved but now worsening  - Baseline RA  - CXR worsening bilateral multifocal airspace disease, worsening pneumonia  Plan:  - continue zosyn, azithromycin for HAP pending cultures  - Duonebs scheduled and prn, robitussin, mucinex DM  - wean oxygen as tolerated  - Pt plans for [facility] hospice      #LALITA  ISO sepsis and poor oral intake  :: Baseline Cr ~1, was 1.29 at last discharge,   - Cr today: 1.91 on admission  Plan:  - S/p 500 mL bolus of fluids in ER  - ctm  - Avoid nephrotoxic meds  - Renally dose medications as able  - Strict I/O  - Anticipate improvement of kidney  function with treatment of pneumonia     #Acute on chronic anemia  - No signs/sx of active bleeding, likely worse ISO sepsis  - Hgb 6.1 on admission  Plan:  - patient declines blood transfusion at this time  - hold home eliquis  - ctm     #Dysphagia/Aspiration  -Evaluated on prior admission, mild thick liquids  - Pt and son stated she had worsening dysphagia over the weekend  PLAN:  -SLP consult     #Hypokalemia  Plan:  - potassium repleted,  monitor and replete as needed     Chronic Medical Issues:   #Hx PE - holding Eliquis 2.5 mg BID  #HTN - holding  Lisinopril 10 mg daily  #Hypothyroidism - Synthroid 112 mcg daily  #Vertigo- cont meclizine scheduled and prn  #Nausea- home promethazine suppository  #Vit B12 deficiency- cont home b12 BID  #Glaucoma- latanaprost eye drops  #GERD- protonix  #Constipation- cont home colace     Fluids: replete prn  Electrolytes: replete as needed  Nutrition: Regular diet, mild thick and easy to chew  GI Prophylaxis: on protonix  DVT Prophylaxis: holding eliquis given anemia     Access: pIV  Antibiotics: vanc, zosyn, azithromycin   Oxygenation: airvo     Dispo: To be admitted for sepsis and AHRF 2/2 HAP and questionable CHF. Estimated length of stay >48 hours.    Sergio Heredia DO  Internal Medicine, PGY-1

## 2025-03-20 NOTE — PROGRESS NOTES
03/20/25 1546   Discharge Planning   Assistance Needed Pt/family met with [facility] this am.  Plan is for pt to receive blood transfusion today and [facility] will make her GIP 3/21.

## 2025-03-20 NOTE — PROGRESS NOTES
Occupational Therapy                 Therapy Communication Note    Patient Name: Nanette Flynn  MRN: [id number]  Department: [address]  Room: [address] [place] [address]  Today's Date: 3/20/2025     Discipline: Occupational Therapy    OT Missed Visit: Yes     Missed Visit Reason: Missed Visit Reason: Patient placed on medical hold (Pt with hospice meeting this morning. In addition, pt with hemoglobin of 5.5, remains not medically approriate for OT evaluation.)    Missed Time: Attempt

## 2025-03-21 PROBLEM — J69.0: Status: RESOLVED | Noted: 2025-01-01 | Resolved: 2025-01-01

## 2025-03-21 PROBLEM — J80 ARDS (ADULT RESPIRATORY DISTRESS SYNDROME) (MULTI): Status: RESOLVED | Noted: 2025-01-01 | Resolved: 2025-01-01

## 2025-03-21 PROBLEM — J80 ARDS (ADULT RESPIRATORY DISTRESS SYNDROME): Status: ACTIVE | Noted: 2025-01-01

## 2025-03-21 PROBLEM — Z51.5 HOSPICE CARE: Status: RESOLVED | Noted: 2025-01-01 | Resolved: 2025-01-01

## 2025-03-21 PROBLEM — J69.0: Status: ACTIVE | Noted: 2025-01-01

## 2025-03-21 PROBLEM — Z51.5 HOSPICE CARE: Status: ACTIVE | Noted: 2025-01-01

## 2025-03-21 PROBLEM — J96.01 ACUTE HYPOXIC RESPIRATORY FAILURE (MULTI): Status: ACTIVE | Noted: 2025-01-01

## 2025-03-21 PROBLEM — J96.01 ACUTE HYPOXIC RESPIRATORY FAILURE: Status: RESOLVED | Noted: 2025-01-01 | Resolved: 2025-01-01

## 2025-03-21 NOTE — PROGRESS NOTES
03/21/25 1219   Discharge Planning   Living Arrangements Other (Comment)  (From [facility])   Support Systems Children   Assistance Needed Patient is A&OX3. Per Cleo garcia patient is a min assist with transfers, CGA with ambulation. No O2 baseline. Patient reports facility uses a trish for transfer to wheelchair and can stand for BSC.   Type of Residence Nursing home/residential care   Do you have animals or pets at home? No   Who is requesting discharge planning? Provider   Home or Post Acute Services Other (Comment)   Type of Post Acute Facility Services Other (Comment)   [facility] Discharge Disposition [facility]  ([facility] to [facility]   Does the patient need discharge transport arranged? Yes   RoundTrip coordination needed? Yes   Has discharge transport been arranged? No   Patient Choice   Provider Choice list and CMS website (https://medicare.gov/care-compare#search) for post-acute Quality and Resource Measure Data were provided and reviewed with: Patient   Patient / Family choosing to utilize agency / facility established prior to hospitalization Yes   Stroke Family Assessment   Stroke Family Assessment Needed No   Intensity of Service   Intensity of Service 0-30 min

## 2025-03-21 NOTE — DISCHARGE SUMMARY
Discharge Diagnosis  Acute hypoxic respiratory failure and sepsis 2/2 healthcare associated pneumonia  Acute on chronic anemia requiring blood transfusion  LALITA 2/2 sepsis  GIP hospice  Cardiopulmonary arrest    Issues Requiring Follow-Up  None    Discharge Meds     Medication List      STOP taking these medications     acetaminophen 325 mg tablet; Commonly known as: Tylenol   apixaban 2.5 mg tablet; Commonly known as: Eliquis   cholecalciferol 5,000 Units tablet; Commonly known as: Vitamin D-3   dextromethorphan-guaifenesin  mg 12 hr tablet; Commonly known as:   Mucinex DM   docusate sodium 100 mg capsule; Commonly known as: Colace   EMERGEN-C ORAL   guaiFENesin 200 mg/5 mL liquid   ipratropium-albuteroL 0.5-2.5 mg/3 mL nebulizer solution; Commonly known   as: Duo-Neb   levoFLOXacin 500 mg tablet; Commonly known as: Levaquin   lisinopril 10 mg tablet   meclizine 12.5 mg tablet; Commonly known as: Antivert   omeprazole 40 mg DR capsule; Commonly known as: PriLOSEC   PROBIOTIC ORAL   promethazine 25 mg suppository; Commonly known as: Phenergan   SOLU-Medrol (PF) 125 mg/2 mL injection; Generic drug: methylPREDNISolone   sod succinate (PF)   Synthroid 112 mcg tablet; Generic drug: levothyroxine   Travatan Z 0.004 % drops ophthalmic solution; Generic drug: travoprost   VITAMIN B-12 ORAL       Test Results Pending At Discharge  Pending Labs       No current pending labs.            Hospital Course  Nanette Flynn is a 89 y.o. female with a PMH of hypothyroidism, HTN, PE (on Eliquis), glaucoma, chronic B/L wounds and left hand carpal tunnel who presented from facility for worsening shortness of breath.     Patient was recently admitted for multifocal pneumonia from 2/19-2/24 and discharged to SNF.     She completed course of Augmentin outpatient but started to develop worsening oxygenation and cough 3 days ago. She ultimately was up to 8L on nasal cannula prior to being brought to the emergency room.     She  denies fevers or chills but stated that she was shivering on Sunday but didn't feel cold. Patient and family do not believe she has any worsening peripheral edema. Endorsed abdominal pain with resolved prior to presenting to ED. Has had poor appetite over the past day. Denies chest pain.     In the ED, patient arrived on CPAP, received Solu-Medrol and DuoNebs in ambulance without improvement.  Chest x-ray showed worsening multifocal pneumonia.  Patient met SIRS criteria for sepsis, lactate elevation (3.5), worsened LALITA and received vancomycin, cefepime, azithromycin.  Only 500 cc fluid bolus given concern for volume overload.  Troponin elevated at 311, repeat stable.  BNP elevated at 817.  Anemia with hemoglobin 6.1, however patient declined blood transfusion.     Hospital course: Patient continued to require Airvo during hospitalization. She did end up accepting blood transfusion, patient received 2 blood transfusions during admission, however unfortunately her hemoglobin was not improving with them. On admission, patient was discussing the idea of hospice care, however stated that she wanted to wait some time to allow her to speak with the family. Patient and family agreed for transition to [facility] hospice with [facility] services on 3/21. On 3/21/25 @ 7:23 PM, patient was pronounced dead with loved ones at bedside    Pertinent Physical Exam At Time of Discharge  Pt not responsive to stimuli.  Pupils fixed and dilated.  Absent corneal and gag reflexes.  Absent cardiopulmonary sounds, auscultated for >60 seconds.  Absent pulses, palpated for >60 seconds.        Outpatient Follow-Up  No future appointments.      Austin Adair MD

## 2025-03-21 NOTE — PROGRESS NOTES
Occupational Therapy                 Therapy Communication Note    Patient Name: Nanette Flynn  MRN: [id number]  Department: [address]  Room: [address] [place], [place] [address]  Today's Date: 3/21/2025     Discipline: Occupational Therapy          Missed Visit Reason: Missed Visit Reason: Cancel (labs continue to demonstrate low H&H(5.5/17.4). Pt family prefers pt return to [facility] with hospice, private pay. OT not indicated at this time; will discontinue order.If discharge plans change and pt's medical status improves,please re-consult)    Missed Time: Cancel

## 2025-03-21 NOTE — HOSPITAL COURSE
Nanette Flynn is a 89 y.o. female with a PMH of hypothyroidism, HTN, PE (on Eliquis), glaucoma, chronic B/L wounds and left hand carpal tunnel who presented from facility for worsening shortness of breath.     Patient was recently admitted for multifocal pneumonia from 2/19-2/24 and discharged to SNF.     She completed course of Augmentin outpatient but started to develop worsening oxygenation and cough 3 days ago. She ultimately was up to 8L on nasal cannula prior to being brought to the emergency room.     She denies fevers or chills but stated that she was shivering on Sunday but didn't feel cold. Patient and family do not believe she has any worsening peripheral edema. Endorsed abdominal pain with resolved prior to presenting to ED. Has had poor appetite over the past day. Denies chest pain.     In the ED, patient arrived on CPAP, received Solu-Medrol and DuoNebs in ambulance without improvement.  Chest x-ray showed worsening multifocal pneumonia.  Patient met SIRS criteria for sepsis, lactate elevation (3.5), worsened LALITA and received vancomycin, cefepime, azithromycin.  Only 500 cc fluid bolus given concern for volume overload.  Troponin elevated at 311, repeat stable.  BNP elevated at 817.  Anemia with hemoglobin 6.1, however patient declined blood transfusion.     Hospital course:

## 2025-03-21 NOTE — H&P
HPI  Pt is transitioned to inpatient hospice due to acute respiratory failure    She has a history of dysphagia and aspiration and presented with acute hypoxic respiratory failure requiring Airvo.  Due to recurrent aspiration despite a modified diet, it was explained to the pt and her family that this would likely continue to happen.    With this information, she decided to enroll in inpatient hospice     Past Medical History:   Diagnosis Date    Arthritis     Disease of thyroid gland     Glaucoma     Hypertension     Other specified postprocedural states     History of colonoscopy    Personal history of other diseases of the circulatory system     History of rheumatic fever    Personal history of other diseases of the circulatory system     History of rheumatic fever    Personal history of other medical treatment     History of mammogram       Past Surgical History:   Procedure Laterality Date    APPENDECTOMY  08/20/2018    Appendectomy    COLON SURGERY      CT ANGIO NECK  09/24/2013    CT NECK ANGIO W AND WO IV CONTRAST 9/24/2013 [facility]    CT HEAD ANGIO W AND WO IV CONTRAST  09/24/2013    CT HEAD ANGIO W AND WO IV CONTRAST 9/24/2013 [facility]    HYSTERECTOMY  08/20/2018    Hysterectomy    OTHER SURGICAL HISTORY  08/20/2018    Enterectomy           Physical Exam      3/20/2025     4:40 PM 3/20/2025     5:15 PM 3/20/2025     5:25 PM 3/20/2025     8:53 PM 3/21/2025     4:45 AM 3/21/2025     7:50 AM 3/21/2025    11:42 AM   Vitals   Systolic 176 176 176 143 185 178    Diastolic 86 88 88 72 99 102    BP Location Right arm   Right arm Right arm Right arm Right arm   Heart Rate 90 89 89 90 85 98 87   Temp 36.6 °C (97.9 °F) 36.6 °C (97.9 °F) 36.6 °C (97.9 °F) 36.5 °C (97.7 °F) 36.7 °C (98.1 °F) 36.3 °C (97.3 °F)    Resp 24  26 25 21 22 19     There is no height or weight on file to calculate BMI.  GEN - moderate respiratory distress  HEENT - dry MM  PULM - diminished air entry bilat with  crackles  CVS - tachy, regular  ABD - soft  EXTR - no LE edema      Labs  Results from last 7 days   Lab Units 03/20/25  0514 03/19/25  1029 03/19/25  0548 03/18/25  1640   WBC AUTO x10*3/uL 20.1*  --  19.6* 17.4*   HEMOGLOBIN g/dL 5.5* 5.7* 5.2* 6.1*   HEMATOCRIT % 17.4* 16.5* 16.6* 18.9*   PLATELETS AUTO x10*3/uL 364  --  311 333     Results from last 7 days   Lab Units 03/20/25  0514 03/19/25  0548 03/18/25  1640   SODIUM mmol/L 144 141 142   POTASSIUM mmol/L 3.1* 3.8 3.2*   CHLORIDE mmol/L 108* 105 105   CO2 mmol/L 24 25 23   BUN mg/dL 41* 37* 31*   CREATININE mg/dL 1.88* 1.88* 1.91*   CALCIUM mg/dL 8.4* 8.4* 8.8   PROTEIN TOTAL g/dL  --   --  6.6   BILIRUBIN TOTAL mg/dL  --   --  0.7   ALK PHOS U/L  --   --  89   ALT U/L  --   --  12   AST U/L  --   --  27   GLUCOSE mg/dL 111* 142* 205*         Assessment & Plan  1)  Acute Respiratory Failure due to Aspiration PNA and Likely ARDS  - symptom control, prn morphine  - will continue high flow O2 on the floor  - abx stopped    2)  Dysphagia  - liberated diet for pleasure    3) LALITA  - likely due to hypovolemia due to poor intake and insensible losses        Perez Willson MD

## 2025-03-21 NOTE — CARE PLAN
The patient's goals for the shift include      The clinical goals for the shift include Will remain comfortable throughout shift

## 2025-03-21 NOTE — PROGRESS NOTES
Subjective    No acute events overnight. Pt was seen and examined by bedside this morning. Pt was on AIRVO 40L/min, 59%. Pt was feeling SOB, fatigue; however was in good spirits.   Objective    Vitals  Visit Vitals  Smoking Status Former       Physical Exam   Constitutional: appears ill   HEENT: EOMI, clear sclera, moist mucous membranes  CV: RRR, No M/R/G  PULM: +coughing, +wheezing  ABDOMEN: Soft, NT/ND. No TTP. + BSx4.  SKIN: Normal Color, Warm, Dry, No Rashes   EXTREMITIES: Non-Tender, Full ROM  NEURO: A&O x 3  PSYCH: Normal Mood & Behavior    IOs  No intake or output data in the 24 hours ending 03/21/25 1441    Labs:   Results from last 72 hours   Lab Units 03/20/25  0514 03/19/25  0548 03/18/25  1640   SODIUM mmol/L 144 141 142   POTASSIUM mmol/L 3.1* 3.8 3.2*   CHLORIDE mmol/L 108* 105 105   CO2 mmol/L 24 25 23   BUN mg/dL 41* 37* 31*   CREATININE mg/dL 1.88* 1.88* 1.91*   GLUCOSE mg/dL 111* 142* 205*   CALCIUM mg/dL 8.4* 8.4* 8.8   ANION GAP mmol/L 15 15 17   EGFR mL/min/1.73m*2 25* 25* 25*   PHOSPHORUS mg/dL 2.8 3.3  --       Results from last 72 hours   Lab Units 03/20/25  0514 03/19/25  1029 03/19/25  0548 03/18/25  1640   WBC AUTO x10*3/uL 20.1*  --  19.6* 17.4*   HEMOGLOBIN g/dL 5.5* 5.7* 5.2* 6.1*   HEMATOCRIT % 17.4* 16.5* 16.6* 18.9*   PLATELETS AUTO x10*3/uL 364  --  311 333   NEUTROS PCT AUTO % 77.8  --  85.9 88.5   LYMPHS PCT AUTO % 11.5  --  8.1 7.4   MONOS PCT AUTO % 6.6  --  4.2 2.2   EOS PCT AUTO % 0.0  --  0.1 0.0      Lab Results   Component Value Date    CALCIUM 8.4 (L) 03/20/2025    PHOS 2.8 03/20/2025      Lab Results   Component Value Date    CRP 2.37 (H) 01/08/2025      [unfilled]     Micro/ID:   Susceptibility data from last 90 days.  Collected Specimen Info Organism   02/07/25 Blood culture from Peripheral Venipuncture Staphylococcus epidermidis   02/07/25 Blood culture from Peripheral Venipuncture Bacillus species, not Bacillus anthracis     Lab Results   Component Value Date     URINECULTURE No significant growth 05/29/2024    BLOODCULT No growth at 2 days 03/18/2025    BLOODCULT No growth at 2 days 03/18/2025       Images  XR chest 1 view    Result Date: 3/18/2025  1. Worsening bilateral multifocal airspace disc suggestive of worsening pneumonia.       MACRO: None   Signed by: Ad Darby 3/18/2025 5:03 PM Dictation workstation:   [id number]    FL modified barium swallow study    Result Date: 2/21/2025  Abnormal exam with aspiration.   MACRO: None   Signed by: Ximena Sánchez 2/21/2025 3:54 PM Dictation workstation:   [id number]    CT angio chest for pulmonary embolism    Result Date: 2/19/2025  1. No pulmonary embolism. 2. Findings compatible with multifocal pneumonia involving all lobes, right greater than left, progressive compared to 2/9/2024 3. Small bilateral pleural effusions. 4. Severe coronary artery calcifications. 5. Underlying emphysema. 6. Dilated main pulmonary artery suggestive of pulmonary arterial hypertension. Signed by Quan Rashid MD      Meds  Scheduled medications  HYDROmorphone, 1 mg, oral, q4h      Continuous medications     PRN medications  PRN medications: haloperidol lactate, HYDROmorphone, HYDROmorphone, HYDROmorphone, LORazepam     Problem List    Problem list:   Patient Active Problem List   Diagnosis    Abnormal antinuclear antibody titer    Idiopathic neuropathy    Nonexudative age-related macular degeneration    Numbness    Arthritis    Osteoarthritis    Postprocedural hypothyroidism    Thyroid disease    Vitamin D deficiency    Chronic fatigue    Herpes zoster without complication    Primary open angle glaucoma (POAG) of both eyes    Multiple subsegmental pulmonary emboli without acute cor pulmonale    Cellulitis    Generalized weakness    Multifocal pneumonia    Sepsis with acute hypoxic respiratory failure, due to unspecified organism, unspecified whether septic shock present (Multi)    Acute hypoxic respiratory failure (Multi)        Assessment  and Plan    Nanette Flynn is a 89 y.o. female with a PMH of hypothyroidism, HTN, PE (on Eliquis), glaucoma, chronic B/L wounds and left hand carpal tunnel admitted for sepsis and AHRF 2/2 HAP.     Summary 03/21/25  -Admitted to hospice [facility] today      Acute Medical Issues:  #End of life care   - Dilaudid 1mg q 4 hrs  - Haldol 1 mg q 4hrs   - Dialudid tablet 1mg q 1hr PRN for mild pain  - Dilaudid tablet 1mg for 1 hr PRN for severe pain   - Dilaudid 2 mg q1 hr PRN for moderate pain  - Ativan 0.5mg q 4hrs PRN for anxiety, insomnia,restlessness     Sergio Heredia DO  Internal Medicine, PGY-1

## 2025-03-21 NOTE — SIGNIFICANT EVENT
Called by RN to pronounce patient.    Pt not responsive to stimuli.  Pupils fixed and dilated.  Absent corneal and gag reflexes.  Absent cardiopulmonary sounds, auscultated for >60 seconds.  Absent pulses, palpated for >60 seconds.    Family and friends were at bedside.    Time of death pronounced on 3/21/25 @ 7:23 PM  Cause of death: Cardiopulmonary arrest

## 2025-03-21 NOTE — SIGNIFICANT EVENT
RN called for pt desat with coughing, RN increased HFNC to 15L, added NRB. SPO2 90%. Pt placed in 50L/70% AIRVO. Will attempt to wean as tolerated

## 2025-03-22 NOTE — NURSING NOTE
Patient no longer breathing.  notified and confirmed patient passing. Family at bedside. Hospice Nurse notified. Expiration Flowsheet Completed. Family allowed to stay at bedside, questions answered, and needs met.

## 2025-03-22 NOTE — DISCHARGE SUMMARY
Discharge Diagnosis  Acute hypoxic respiratory failure and sepsis 2/2 aspiration PNA and likely ARDS  Acute on chronic anemia requiring blood transfusion  LALITA   [facility] hospice         Hospital Course  Nanette Flynn is a 89 y.o. female with a PMH of hypothyroidism, HTN, PE (on Eliquis), glaucoma, chronic B/L wounds and left hand carpal tunnel who presented from facility for worsening shortness of breath.     Patient was recently admitted for multifocal pneumonia from 2/19-2/24 and discharged to SNF.     She completed course of Augmentin outpatient but started to develop worsening oxygenation and cough 3 days ago. She ultimately was up to 8L on nasal cannula prior to being brought to the emergency room.     She denies fevers or chills but stated that she was shivering on Sunday but didn't feel cold. Patient and family do not believe she has any worsening peripheral edema. Endorsed abdominal pain with resolved prior to presenting to ED. Has had poor appetite over the past day. Denies chest pain.     In the ED, patient arrived on CPAP, received Solu-Medrol and DuoNebs in ambulance without improvement.  Chest x-ray showed worsening multifocal pneumonia.  Patient met SIRS criteria for sepsis, lactate elevation (3.5), worsened LALITA and received vancomycin, cefepime, azithromycin.  Only 500 cc fluid bolus given concern for volume overload.  Troponin elevated at 311, repeat stable.  BNP elevated at 817.  Anemia with hemoglobin 6.1, however patient declined blood transfusion.     Hospital course: Patient continued to require Airvo during hospitalization. She did end up accepting blood transfusion, patient received 2 blood transfusions during admission, however unfortunately her hemoglobin was not improving with them. She has a known hx of recurrent aspiration despite modified diet and thick liquids.  She continued to need airvo for her stay and this was unable to be weaned.  Recurrent aspiration was a likely outcome and this  was communicated with the pt and her family.     Patient and family agreed for transition to [facility] hospice with [facility] services on 3/21.

## 2025-03-22 NOTE — NURSING NOTE
Post Mortem care is preformed on patient. Transport called and patient taken to [facility].  home notified that patient is ready to be take into their care.

## 2025-03-23 LAB
BACTERIA BLD CULT: NORMAL
BACTERIA BLD CULT: NORMAL

## 2025-04-16 LAB
ATRIAL RATE: 95 BPM
P AXIS: 17 DEGREES
P OFFSET: 176 MS
P ONSET: 126 MS
PR INTERVAL: 184 MS
Q ONSET: 218 MS
QRS COUNT: 15 BEATS
QRS DURATION: 90 MS
QT INTERVAL: 386 MS
QTC CALCULATION(BAZETT): 485 MS
QTC FREDERICIA: 449 MS
R AXIS: 16 DEGREES
T AXIS: 123 DEGREES
T OFFSET: 411 MS
VENTRICULAR RATE: 95 BPM